# Patient Record
Sex: MALE | Race: BLACK OR AFRICAN AMERICAN | NOT HISPANIC OR LATINO | Employment: FULL TIME | URBAN - METROPOLITAN AREA
[De-identification: names, ages, dates, MRNs, and addresses within clinical notes are randomized per-mention and may not be internally consistent; named-entity substitution may affect disease eponyms.]

---

## 2017-04-12 ENCOUNTER — TELEPHONE (OUTPATIENT)
Dept: FAMILY MEDICINE | Facility: CLINIC | Age: 52
End: 2017-04-12

## 2017-04-12 ENCOUNTER — OFFICE VISIT (OUTPATIENT)
Dept: FAMILY MEDICINE | Facility: CLINIC | Age: 52
End: 2017-04-12
Payer: COMMERCIAL

## 2017-04-12 ENCOUNTER — LAB VISIT (OUTPATIENT)
Dept: LAB | Facility: HOSPITAL | Age: 52
End: 2017-04-12
Attending: FAMILY MEDICINE
Payer: COMMERCIAL

## 2017-04-12 VITALS
HEIGHT: 78 IN | DIASTOLIC BLOOD PRESSURE: 86 MMHG | HEART RATE: 104 BPM | BODY MASS INDEX: 28.57 KG/M2 | WEIGHT: 246.94 LBS | SYSTOLIC BLOOD PRESSURE: 129 MMHG | TEMPERATURE: 98 F

## 2017-04-12 DIAGNOSIS — M06.9 RHEUMATOID ARTHRITIS OF HAND, UNSPECIFIED LATERALITY, UNSPECIFIED RHEUMATOID FACTOR PRESENCE: ICD-10-CM

## 2017-04-12 DIAGNOSIS — J06.9 UPPER RESPIRATORY TRACT INFECTION, UNSPECIFIED TYPE: Primary | ICD-10-CM

## 2017-04-12 DIAGNOSIS — R31.9 HEMATURIA: Primary | ICD-10-CM

## 2017-04-12 DIAGNOSIS — R51.9 HEADACHE, UNSPECIFIED HEADACHE TYPE: ICD-10-CM

## 2017-04-12 DIAGNOSIS — C61 PROSTATE CANCER: ICD-10-CM

## 2017-04-12 DIAGNOSIS — R31.9 HEMATURIA: ICD-10-CM

## 2017-04-12 DIAGNOSIS — I10 ESSENTIAL HYPERTENSION: ICD-10-CM

## 2017-04-12 LAB
ALBUMIN SERPL BCP-MCNC: 3.4 G/DL
ALP SERPL-CCNC: 85 U/L
ALT SERPL W/O P-5'-P-CCNC: 12 U/L
ANION GAP SERPL CALC-SCNC: 7 MMOL/L
AST SERPL-CCNC: 31 U/L
BILIRUB SERPL-MCNC: 0.4 MG/DL
BUN SERPL-MCNC: 18 MG/DL
CALCIUM SERPL-MCNC: 9.3 MG/DL
CHLORIDE SERPL-SCNC: 106 MMOL/L
CHOLEST/HDLC SERPL: 5.9 {RATIO}
CO2 SERPL-SCNC: 28 MMOL/L
CREAT SERPL-MCNC: 1.2 MG/DL
EST. GFR  (AFRICAN AMERICAN): >60 ML/MIN/1.73 M^2
EST. GFR  (NON AFRICAN AMERICAN): >60 ML/MIN/1.73 M^2
GLUCOSE SERPL-MCNC: 88 MG/DL
HDL/CHOLESTEROL RATIO: 16.8 %
HDLC SERPL-MCNC: 202 MG/DL
HDLC SERPL-MCNC: 34 MG/DL
LDLC SERPL CALC-MCNC: 126.6 MG/DL
NONHDLC SERPL-MCNC: 168 MG/DL
POTASSIUM SERPL-SCNC: 4.2 MMOL/L
PROSTATE SPECIFIC ANTIGEN, TOTAL: <0.01 NG/ML
PROT SERPL-MCNC: 7.7 G/DL
PSA FREE MFR SERPL: NORMAL %
PSA FREE SERPL-MCNC: <0.01 NG/ML
SODIUM SERPL-SCNC: 141 MMOL/L
TRIGL SERPL-MCNC: 207 MG/DL

## 2017-04-12 PROCEDURE — 99999 PR PBB SHADOW E&M-EST. PATIENT-LVL II: CPT | Mod: PBBFAC,,, | Performed by: FAMILY MEDICINE

## 2017-04-12 PROCEDURE — 80061 LIPID PANEL: CPT

## 2017-04-12 PROCEDURE — 84153 ASSAY OF PSA TOTAL: CPT

## 2017-04-12 PROCEDURE — 36415 COLL VENOUS BLD VENIPUNCTURE: CPT | Mod: PO

## 2017-04-12 PROCEDURE — 80053 COMPREHEN METABOLIC PANEL: CPT

## 2017-04-12 PROCEDURE — 3074F SYST BP LT 130 MM HG: CPT | Mod: S$GLB,,, | Performed by: FAMILY MEDICINE

## 2017-04-12 PROCEDURE — 99214 OFFICE O/P EST MOD 30 MIN: CPT | Mod: 25,S$GLB,, | Performed by: FAMILY MEDICINE

## 2017-04-12 PROCEDURE — 83036 HEMOGLOBIN GLYCOSYLATED A1C: CPT

## 2017-04-12 PROCEDURE — 1160F RVW MEDS BY RX/DR IN RCRD: CPT | Mod: S$GLB,,, | Performed by: FAMILY MEDICINE

## 2017-04-12 PROCEDURE — 96372 THER/PROPH/DIAG INJ SC/IM: CPT | Mod: S$GLB,,, | Performed by: FAMILY MEDICINE

## 2017-04-12 PROCEDURE — 3079F DIAST BP 80-89 MM HG: CPT | Mod: S$GLB,,, | Performed by: FAMILY MEDICINE

## 2017-04-12 RX ORDER — MELOXICAM 15 MG/1
15 TABLET ORAL DAILY
Qty: 10 TABLET | Refills: 1 | Status: SHIPPED | OUTPATIENT
Start: 2017-04-12 | End: 2017-08-08

## 2017-04-12 RX ORDER — METHYLPREDNISOLONE 4 MG/1
TABLET ORAL
Qty: 1 PACKAGE | Refills: 0 | Status: SHIPPED | OUTPATIENT
Start: 2017-04-12 | End: 2017-05-01

## 2017-04-12 RX ORDER — DEXAMETHASONE SODIUM PHOSPHATE 4 MG/ML
8 INJECTION, SOLUTION INTRA-ARTICULAR; INTRALESIONAL; INTRAMUSCULAR; INTRAVENOUS; SOFT TISSUE
Status: COMPLETED | OUTPATIENT
Start: 2017-04-12 | End: 2017-04-12

## 2017-04-12 RX ADMIN — DEXAMETHASONE SODIUM PHOSPHATE 8 MG: 4 INJECTION, SOLUTION INTRA-ARTICULAR; INTRALESIONAL; INTRAMUSCULAR; INTRAVENOUS; SOFT TISSUE at 11:04

## 2017-04-12 NOTE — PROGRESS NOTES
Sachin Gonzalez presents with moderate HA x 5 d followed by upper respiratory congestion,rhinnorhea,moderate cough past 2-3 days. OTC help slightly. Denies nausea,vomiting,diarrhea or significant fever.    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 100 tablet 2    methotrexate 2.5 MG Tab Take 10 tablets (25 mg total) by mouth every 7 days. 150 tablet 1    metoprolol succinate (TOPROL XL) 50 MG 24 hr tablet Take 1 tablet twice daily. hold if systolic blood pressure less than or equal to 100 or heart rate is less than 60 180 tablet 3    sildenafil (VIAGRA) 100 MG tablet Take 1 tablet (100 mg total) by mouth daily as needed. 3 tablet 11     No current facility-administered medications on file prior to visit.      Social History     Social History    Marital status: Single     Spouse name: N/A    Number of children: N/A    Years of education: N/A     Occupational History    Not on file.     Social History Main Topics    Smoking status: Never Smoker    Smokeless tobacco: Never Used    Alcohol use No    Drug use: No    Sexual activity: Yes     Partners: Female     Other Topics Concern    Not on file     Social History Narrative     Family History   Problem Relation Age of Onset    Stroke Father          ROS:  SKIN: No rashes, itching or changes in color or texture of skin.  EYES: Visual acuity fine. No photophobia, ocular pain or diplopia.EARS: Denies ear pain, discharge or  vertigo.NOSE: No loss of smell, no epistaxis some postnasal drip.MOUTH & THROAT: No hoarseness or change in voice. No excessive gum bleeding.CHEST: Denies TYLER, cyanosis, wheezing  CARDIOVASCULAR: Denies chest pain, PND, orthopnea or reduced exercise tolerance.  ABDOMEN:  No weight loss.No abdominal pain, no hematemesis or blood in stool.  URINARY: No flank pain, dysuria or hematuria.  PERIPHERAL VASCULAR: No claudication or cyanosis.  MUSCULOSKELETAL: Negative   NEUROLOGIC: No history of seizures, paralysis, alteration of gait or coordination.    PE: Vital signs as noted  Heent:Normocephalic with no recent cranial trauma,PERRLA,EOMI,conjunctiva clear,fundi reveal no hemmorhage exudate or papilledema.Otic canals clear, tympanic membranes slightly dull bilaterally.Nasal mucosa slightly red and edematous.Posterior pharynx slightly red but without exudate.  Neck:Supple with minimal anterior cervical adenopathy.  Chest:Clear bilateral breath sounds with mild scattered ronchi  Heart:Regular rhthym without murmer  Abdomen:Soft, non tender,no masses, no hepatosplenomegalyExtremeties and Neurologic:Grossly within normal limits  Impression: Upper Respiratory Infection. 465.9  HA  Hematuria  Plan: Medrol dspk  Meloxicam x 10 d   UA  If hematuria cont rec  con   Lab eval

## 2017-04-13 LAB
ESTIMATED AVG GLUCOSE: 117 MG/DL
HBA1C MFR BLD HPLC: 5.7 %

## 2017-04-19 ENCOUNTER — TELEPHONE (OUTPATIENT)
Dept: FAMILY MEDICINE | Facility: CLINIC | Age: 52
End: 2017-04-19

## 2017-04-19 NOTE — TELEPHONE ENCOUNTER
----- Message from Jessica Byrne sent at 4/19/2017  9:02 AM CDT -----  Contact: self [phone]  States that he is calling for lab results. Please call back at [phone]//thank you acc

## 2017-04-24 ENCOUNTER — TELEPHONE (OUTPATIENT)
Dept: FAMILY MEDICINE | Facility: CLINIC | Age: 52
End: 2017-04-24

## 2017-04-24 NOTE — TELEPHONE ENCOUNTER
----- Message from Yola Munoz sent at 4/24/2017  4:25 PM CDT -----  Contact: Patient  Patient stated that he is still having headache, Please call him at [phone].    Thanks  td

## 2017-05-01 ENCOUNTER — HOSPITAL ENCOUNTER (OUTPATIENT)
Dept: RADIOLOGY | Facility: HOSPITAL | Age: 52
Discharge: HOME | End: 2017-05-01
Attending: FAMILY MEDICINE
Payer: COMMERCIAL

## 2017-05-01 ENCOUNTER — OFFICE VISIT (OUTPATIENT)
Dept: INTERNAL MEDICINE | Facility: CLINIC | Age: 52
End: 2017-05-01
Payer: COMMERCIAL

## 2017-05-01 VITALS
SYSTOLIC BLOOD PRESSURE: 130 MMHG | BODY MASS INDEX: 29.28 KG/M2 | TEMPERATURE: 98 F | DIASTOLIC BLOOD PRESSURE: 92 MMHG | HEIGHT: 78 IN | WEIGHT: 253.06 LBS | HEART RATE: 85 BPM | OXYGEN SATURATION: 98 %

## 2017-05-01 DIAGNOSIS — S16.1XXD NECK STRAIN, SUBSEQUENT ENCOUNTER: Primary | ICD-10-CM

## 2017-05-01 DIAGNOSIS — S16.1XXD NECK STRAIN, SUBSEQUENT ENCOUNTER: ICD-10-CM

## 2017-05-01 PROBLEM — S16.1XXA NECK STRAIN: Status: ACTIVE | Noted: 2017-05-01

## 2017-05-01 PROCEDURE — 3075F SYST BP GE 130 - 139MM HG: CPT | Mod: S$GLB,,, | Performed by: FAMILY MEDICINE

## 2017-05-01 PROCEDURE — 72040 X-RAY EXAM NECK SPINE 2-3 VW: CPT | Mod: 26,,, | Performed by: RADIOLOGY

## 2017-05-01 PROCEDURE — 3080F DIAST BP >= 90 MM HG: CPT | Mod: S$GLB,,, | Performed by: FAMILY MEDICINE

## 2017-05-01 PROCEDURE — 1160F RVW MEDS BY RX/DR IN RCRD: CPT | Mod: S$GLB,,, | Performed by: FAMILY MEDICINE

## 2017-05-01 PROCEDURE — 99999 PR PBB SHADOW E&M-EST. PATIENT-LVL III: CPT | Mod: PBBFAC,,, | Performed by: FAMILY MEDICINE

## 2017-05-01 PROCEDURE — 72040 X-RAY EXAM NECK SPINE 2-3 VW: CPT | Mod: TC,PO

## 2017-05-01 PROCEDURE — 99213 OFFICE O/P EST LOW 20 MIN: CPT | Mod: S$GLB,,, | Performed by: FAMILY MEDICINE

## 2017-05-01 RX ORDER — CYCLOBENZAPRINE HCL 10 MG
10 TABLET ORAL 3 TIMES DAILY PRN
Qty: 30 TABLET | Refills: 3 | Status: SHIPPED | OUTPATIENT
Start: 2017-05-01 | End: 2017-05-10

## 2017-05-01 NOTE — PROGRESS NOTES
Subjective:       Patient ID: Sachin Gonzalez is a 51 y.o. male.    Chief Complaint: Headache    Headache    This is a recurrent problem. The current episode started 1 to 4 weeks ago. The problem occurs constantly. The problem has been gradually worsening. The pain is located in the bilateral and occipital region. The pain does not radiate. The quality of the pain is described as aching. The pain is at a severity of 6/10. Associated symptoms include neck pain. Pertinent negatives include no blurred vision, drainage, eye pain, eye redness, insomnia, muscle aches, numbness, phonophobia, photophobia, seizures, sinus pressure, tinnitus, visual change, vomiting or weakness. Nothing aggravates the symptoms. The treatment provided no relief. There is no history of cancer, hypertension, migraine headaches, obesity, sinus disease or TMJ.     Review of Systems   Constitutional: Negative.    HENT: Negative for sinus pressure and tinnitus.    Eyes: Negative for blurred vision, photophobia, pain and redness.   Respiratory: Negative.    Cardiovascular: Negative.    Gastrointestinal: Negative.  Negative for vomiting.   Musculoskeletal: Positive for neck pain.   Neurological: Positive for headaches. Negative for seizures, weakness and numbness.   Psychiatric/Behavioral: Negative.  The patient does not have insomnia.        Objective:      Physical Exam   Constitutional: He appears well-developed and well-nourished.   Cardiovascular: Normal rate and regular rhythm.    Pulmonary/Chest: Effort normal and breath sounds normal.   Abdominal: Soft. Bowel sounds are normal.   Musculoskeletal:        Cervical back: He exhibits decreased range of motion and spasm.   Skin: Skin is warm and dry.   Psychiatric: He has a normal mood and affect. His behavior is normal.       Assessment:       1. Neck strain, subsequent encounter        Plan:       Neck strain, subsequent encounter  Comments:  flexeril worked for neck pain that is the origin of  headaches. Continue with flexeril but see if injections may help. repeat xray  Orders:  -     X-Ray Cervical Spine AP And Lateral; Future; Expected date: 5/1/17  -     Ambulatory referral to Physical Medicine Rehab    Other orders  -     cyclobenzaprine (FLEXERIL) 10 MG tablet; Take 1 tablet (10 mg total) by mouth 3 (three) times daily as needed for Muscle spasms.  Dispense: 30 tablet; Refill: 3

## 2017-05-01 NOTE — MR AVS SNAPSHOT
[facility]  [address] [place] [address]  Phone: [phone]  Fax: [phone]                  Sachin Gonzalez   2017 2:40 PM   Office Visit    Description:  Male : 1965   Provider:  Domo Dick MD   Department:  [facility]           Reason for Visit     Headache           Diagnoses this Visit        Comments    Neck strain, subsequent encounter    -  Primary flexeril worked for neck pain that is the origin of headaches. Continue with flexeril but see if injections may help. repeat xray           To Do List           Future Appointments        Provider Department Dept Phone    2017 5:15 PM [facility] XR2 [facility] [phone]    5/10/2017 12:20 PM Zayra Arroyo MD [facility] [phone]      Goals (5 Years of Data)     Eat more fruits and vegetables       Follow-Up and Disposition     Return in about 6 months (around 2017).    Follow-up and Disposition History       These Medications        Disp Refills Start End    cyclobenzaprine (FLEXERIL) 10 MG tablet 30 tablet 3 2017    Take 1 tablet (10 mg total) by mouth 3 (three) times daily as needed for Muscle spasms. - Oral    Pharmacy: [facility] [place] [place] - [address] Ph #: [phone]         [facility]     [facility] Care Line -  Assistance  Unless otherwise directed by your provider, please contact [facility], our nurse care line that is available for  assistance.     Registered nurses in the [facility] provide: appointment scheduling, clinical advisement, health education, and other advisory services.  Call: [phone] (toll free)               Medications           Message regarding Medications     Verify the changes and/or additions to your medication regime listed below are the same as discussed with your clinician today.  If any of these changes or additions are  "incorrect, please notify your healthcare provider.        START taking these NEW medications        Refills    cyclobenzaprine (FLEXERIL) 10 MG tablet 3    Sig: Take 1 tablet (10 mg total) by mouth 3 (three) times daily as needed for Muscle spasms.    Class: Normal    Route: Oral      STOP taking these medications     methylPREDNISolone (MEDROL DOSEPACK) 4 mg tablet Use as directed on package           Verify that the below list of medications is an accurate representation of the medications you are currently taking.  If none reported, the list may be blank. If incorrect, please contact your healthcare provider. Carry this list with you in case of emergency.           Current Medications     metoprolol succinate (TOPROL XL) 50 MG 24 hr tablet Take 1 tablet twice daily. hold if systolic blood pressure less than or equal to 100 or heart rate is less than 60    sildenafil (VIAGRA) 100 MG tablet Take 1 tablet (100 mg total) by mouth daily as needed.    cyclobenzaprine (FLEXERIL) 10 MG tablet Take 1 tablet (10 mg total) by mouth 3 (three) times daily as needed for Muscle spasms.    folic acid (FOLVITE) 1 MG tablet Take 1 tablet (1,000 mcg total) by mouth once daily.    meloxicam (MOBIC) 15 MG tablet Take 1 tablet (15 mg total) by mouth once daily.    methotrexate 2.5 MG Tab Take 10 tablets (25 mg total) by mouth every 7 days.           Clinical Reference Information           Your Vitals Were     BP Pulse Temp Height Weight SpO2    130/92 (BP Location: Right arm, Patient Position: Sitting) 85 97.8 °F (36.6 °C) (Tympanic) 6' 7" (2.007 m) 114.8 kg (253 lb 1.4 oz) 98%    BMI                28.51 kg/m2          Blood Pressure          Most Recent Value    BP  (!)  130/92      Allergies as of 5/1/2017     No Known Allergies      Immunizations Administered on Date of Encounter - 5/1/2017     None      Orders Placed During Today's Visit      Normal Orders This Visit    Ambulatory referral to Physical Medicine Rehab     Future " Labs/Procedures Expected by Expires    X-Ray Cervical Spine AP And Lateral  5/1/2017 5/1/2018      Language Assistance Services     ATTENTION: Language assistance services are available, free of charge. Please call [phone].      ATENCIÓN: Si belem ballesteros, tiene a west disposición servicios gratuitos de asistencia lingüística. Llame al [phone].     CHÚ Ý: N?u b?n nói Ti?ng Vi?t, có các d?ch v? h? tr? ngôn ng? mi?n phí dành cho b?n. G?i s? [phone].         [facility] complies with applicable Federal civil rights laws and does not discriminate on the basis of race, color, national origin, age, disability, or sex.

## 2017-05-10 ENCOUNTER — INITIAL CONSULT (OUTPATIENT)
Dept: PHYSICAL MEDICINE AND REHAB | Facility: CLINIC | Age: 52
End: 2017-05-10
Payer: COMMERCIAL

## 2017-05-10 VITALS
HEART RATE: 93 BPM | BODY MASS INDEX: 29.27 KG/M2 | DIASTOLIC BLOOD PRESSURE: 85 MMHG | RESPIRATION RATE: 14 BRPM | SYSTOLIC BLOOD PRESSURE: 128 MMHG | WEIGHT: 253 LBS | HEIGHT: 78 IN

## 2017-05-10 DIAGNOSIS — G44.209 TENSION HEADACHE: Primary | ICD-10-CM

## 2017-05-10 DIAGNOSIS — M79.18 MYOFASCIAL PAIN: ICD-10-CM

## 2017-05-10 PROCEDURE — 3079F DIAST BP 80-89 MM HG: CPT | Mod: S$GLB,,, | Performed by: PHYSICAL MEDICINE & REHABILITATION

## 2017-05-10 PROCEDURE — 99204 OFFICE O/P NEW MOD 45 MIN: CPT | Mod: S$GLB,,, | Performed by: PHYSICAL MEDICINE & REHABILITATION

## 2017-05-10 PROCEDURE — 3074F SYST BP LT 130 MM HG: CPT | Mod: S$GLB,,, | Performed by: PHYSICAL MEDICINE & REHABILITATION

## 2017-05-10 PROCEDURE — 1160F RVW MEDS BY RX/DR IN RCRD: CPT | Mod: S$GLB,,, | Performed by: PHYSICAL MEDICINE & REHABILITATION

## 2017-05-10 PROCEDURE — 99999 PR PBB SHADOW E&M-EST. PATIENT-LVL III: CPT | Mod: PBBFAC,,, | Performed by: PHYSICAL MEDICINE & REHABILITATION

## 2017-05-10 RX ORDER — TIZANIDINE 2 MG/1
2-4 TABLET ORAL NIGHTLY PRN
Qty: 60 TABLET | Refills: 1 | Status: SHIPPED | OUTPATIENT
Start: 2017-05-10 | End: 2017-08-08

## 2017-05-10 NOTE — LETTER
May 10, 2017      Domo Dick MD  [address] [place] [address]           [facility]  [address] [place] [address]  Phone: [phone]  Fax: [phone]          Patient: Sachin Gonzalez   MR Number: [id number]   YOB: 1965   Date of Visit: 5/10/2017       Dear Dr. Domo Dick:    Thank you for referring Sachin Gonzalez to me for evaluation. Attached you will find relevant portions of my assessment and plan of care.    If you have questions, please do not hesitate to call me. I look forward to following Sachin Gonzalez along with you.    Sincerely,    Zayra Arroyo MD    Enclosure  CC:  No Recipients    If you would like to receive this communication electronically, please contact [email or web address] or [phone] to request more information on [employer] Link access.    For providers and/or their staff who would like to refer a patient to [facility], please contact us through our one-stop-shop provider referral line, [facility], at [phone].    If you feel you have received this communication in error or would no longer like to receive these types of communications, please e-mail [email or web address]

## 2017-05-10 NOTE — MR AVS SNAPSHOT
[facility]iat[facility]  [address] [facility] [address] [place] [address]  Phone: [phone]  Fax: [phone]                  Sachin Gonzalez   5/10/2017 12:20 PM   Initial consult    Description:  Male : 1965   Provider:  Zayra Arroyo MD   [facility]  [facility]           Reason for Visit     Headache           Diagnoses this Visit        Comments    Tension headache    -  Primary     Myofascial pain                To Do List           Goals (5 Years of Data)     Eat more fruits and vegetables       Follow-Up and Disposition     Return if symptoms worsen or fail to improve.       These Medications        Disp Refills Start End    tizanidine (ZANAFLEX) 2 MG tablet 60 tablet 1 5/10/2017     Take 1-2 tablets (2-4 mg total) by mouth nightly as needed. - Oral    Pharmacy: [address] [place]  [address] [facility] #: [phone]         [facility] On Call     [facility] Nurse Care Line -  Assistance  Unless otherwise directed by your provider, please contact [facility], our nurse care line that is available for  assistance.     Registered nurses in the [facility] Center provide: appointment scheduling, clinical advisement, health education, and other advisory services.  Call: [phone] (toll free)               Medications           Message regarding Medications     Verify the changes and/or additions to your medication regime listed below are the same as discussed with your clinician today.  If any of these changes or additions are incorrect, please notify your healthcare provider.        START taking these NEW medications        Refills    tizanidine (ZANAFLEX) 2 MG tablet 1    Sig: Take 1-2 tablets (2-4 mg total) by mouth nightly as needed.    Class: Normal    Route: Oral      STOP taking these medications     cyclobenzaprine (FLEXERIL) 10 MG tablet Take 1 tablet (10 mg total) by mouth 3 (three) times daily as needed for Muscle spasms.          "  Verify that the below list of medications is an accurate representation of the medications you are currently taking.  If none reported, the list may be blank. If incorrect, please contact your healthcare provider. Carry this list with you in case of emergency.           Current Medications     folic acid (FOLVITE) 1 MG tablet Take 1 tablet (1,000 mcg total) by mouth once daily.    methotrexate 2.5 MG Tab Take 10 tablets (25 mg total) by mouth every 7 days.    metoprolol succinate (TOPROL XL) 50 MG 24 hr tablet Take 1 tablet twice daily. hold if systolic blood pressure less than or equal to 100 or heart rate is less than 60    meloxicam (MOBIC) 15 MG tablet Take 1 tablet (15 mg total) by mouth once daily.    sildenafil (VIAGRA) 100 MG tablet Take 1 tablet (100 mg total) by mouth daily as needed.    tizanidine (ZANAFLEX) 2 MG tablet Take 1-2 tablets (2-4 mg total) by mouth nightly as needed.           Clinical Reference Information           Your Vitals Were     BP Pulse Resp Height Weight BMI    128/85 93 14 6' 7" (2.007 m) 114.8 kg (253 lb) 28.5 kg/m2      Blood Pressure          Most Recent Value    BP  128/85      Allergies as of 5/10/2017     No Known Allergies      Immunizations Administered on Date of Encounter - 5/10/2017     None      Orders Placed During Today's Visit      Normal Orders This Visit    Ambulatory Referral to Physical/Occupational Therapy       Instructions      Tension Headache    A muscle tension headache is a very common cause of head pain. Its also called a stress headache. When some people are under stress, they tense the muscles of their shoulder, neck, and scalp without knowing it. If this tension lasts long enough, a headache can occur. A tension headache can be quite painful. It can last for hours or even days.  Home care  Follow these tips when caring for yourself at home:  · Dont drive yourself home if you were given pain medicine for your headache. Instead, have someone else " drive you home. Try to sleep when you get home. You should feel much better when you wake up.  · Put heat on the back of your neck to help ease neck spasm.  · Drink only clear liquids or eat a light diet until your symptoms get better. This will help you avoid nausea or vomiting.  How to prevent headaches  · Figure out what is causing stress in your life. Learn new ways to handle your stress. Ideas include regular exercise, biofeedback, self-hypnosis, yoga, and meditation. Talk with your healthcare provider to find out more information about managing stress. Many books and digital media are also available on this subject.  · Take time out at the first sign of a tension headache, if possible. Take yourself out of the stressful situation. Find a quiet, comfortable place to sit or lie down and let yourself relax. Heat and deep massage of the tight areas in the neck and shoulders may help ease muscle spasm. You may also get relief from a medicine like ibuprofen or a prescribed muscle relaxant.  Follow-up care  Follow up with your healthcare provider, or as advised. Talk with your provider if you have frequent headaches. He or she can figure out a treatment plan. Ask if you can have medicine to take at home the next time you get a bad headache. This may keep you from having to visit the emergency department in the future. You may need to see a headache specialist (neurologist) if you continue to have headaches.  When to seek medical advice  Call your healthcare provider right away if any of these occur:  · Your head pain gets worse during sexual intercourse or strenuous activity  · Your head pain doesnt get better within 24 hours  · You arent able to keep liquids down (repeated vomiting)  · Fever of 100.4ºF (38ºC) or higher, or as directed by your healthcare provider  · Stiff neck  · Extreme drowsiness, confusion, or fainting  · Dizziness or dizziness with spinning sensation (vertigo)  · Weakness in an arm or leg or  one side of your face  · You have difficulty speaking  · Your vision changes  Date Last Reviewed: 8/1/2016  © 7691-3462 [employer]. [address] [place] [address], [place] [address]. All rights reserved. This information is not intended as a substitute for professional medical care. Always follow your healthcare professional's instructions.        Preventing Tension Headaches  Lifestyle changes are the key to preventing tension-type headaches. Learn what changes in your environment and daily activities can prevent the strain and tension that lead to headaches. If emotional stress is a factor, stress reduction may bring relief. Other lifestyle changes can help keep you healthier and better able to cope with pain.  What may cause your headaches  Causes of tension-type headaches include:  · Posture and movement. Your posture while you sit, work, drive, and even sleep can put stress on your shoulders and neck. This can tighten muscles in the back of your head, causing headaches.  · Lifting and carrying. These can cause strain on your back and neck, especially if you carry too much weight, carry weight unevenly, or use poor lifting technique.  · Certain sports. Activities that involve jumping, running, and sudden starts, stops, or changes of direction can jar your neck and head. This may lead to tight muscles and pain. Weightlifting and other activities that require upper body strength can lead to tight neck and shoulder muscles.  · Jaw tension. Clenching your jaw or grinding your teeth can result in tension and pain. This may happen while you sleep without your knowing it.  · Eye problems. Eyestrain can cause tension in the muscles around the eyes. Or a problem with your eyeglass prescription can make you hold your head at an awkward angle. This can cause neck strain and headaches.  · Emotional stress. Many factors lead to emotional stress: overwork, family problems, financial difficulties, or sudden life  changes. This may cause muscle tension.  What you can do  Once you know whats causing your headaches, you can work to prevent them. You may need to seek professional help to make some of the needed changes.  · Posture and movement changes. Change the setup of your workspace and car. Learn and maintain good posture. Avoid positions that strain your neck or shoulders. Make sure your bedding and pillows provide good support. Avoid sleeping on couches, chairs, or floors when a bed is available.   · Lifting and carrying. Learn good lifting technique. Make sure to use the proper tools and equipment for lifting.  · Change your sport. Switch to a low-impact sport. To help relieve stress on your neck and head, cut back on activities that depend on upper body strength or that put a lot of twisting motion on the back, such as golf.   · Dental work. See your dentist if you think your headaches are caused by jaw tension or teeth grinding.  · New eyewear. Buy an extra pair of glasses adjusted for reading or working at a computer. This helps to reduce eyestrain and keep your neck at a comfortable angle.  · Stress management. Learn techniques for relaxing and reducing emotional stress, like deep breathing, visualization, progressive relaxation, and biofeedback. Regular sleep habits can also help to control stress.  · Regular sleep and meals. It is important to have a regular sleep cycle and to avoid skipping meals.  Exercise can help  Exercise can improve overall health and help you to relax.  · Neck exercises help keep your neck muscles relaxed during the day. Try the neck exercises shown on this sheet. Start in a neutral (relaxed, centered) position. Do 3 repetitions every 2 to 4 hours throughout the day.  · Low-impact aerobic exercise helps keep your muscles strong and flexible. This helps prevent tension and the pain it causes.  · Stretching, luz elena chi, and yoga help keep your muscles flexible. They may also help relieve  emotional stress.    Lower your left ear toward your left shoulder. Return to neutral. Repeat on the right side.   Lower your chin to your chest and slowly return to neutral.     Look to the left. Return to neutral. Then look to the right.     Move your head forward and back while keeping the top of your head level.   Date Last Reviewed: 11/8/2015  © 0549-3759 [employer]. [address] [place] [address], [place] [address]. All rights reserved. This information is not intended as a substitute for professional medical care. Always follow your healthcare professional's instructions.        Self-Care for Headaches  Most headaches aren't serious and can be relieved with self-care. But some headaches may be a sign of another health problem like eye trouble or high blood pressure. To find the best treatment, learn what kind of headaches you get. For tension headaches, self-care will usually help. To treat migraines, ask your healthcare provider for advice. It is also possible to get both tension and migraine headaches. Self-care involves relieving the pain and avoiding headache triggers if you can.    Ways to reduce pain and tension  Try these steps:  · Apply a cold compress or ice pack to the pain site.  · Drink fluids. If nausea makes it hard to drink, try sucking on ice.  · Rest. Protect yourself from bright light and loud noises.  · Calm your emotions by imagining a peaceful scene.  · Massage tight neck, shoulder, and head muscles.  · To relax muscles, soak in a hot bath or use a hot shower.  Use medicines  Aspirin or aspirin substitutes, such as ibuprofen and acetaminophen, can relieve headache. Remember: Never give aspirin to anyone 18 years old or younger because of the risk of developing Reye syndrome. Use pain medicines only when necessary.  Track your headaches  Keeping a headache diary can help you and your healthcare provider identify what's causing your headaches:  · Note when each headache  "happens.  · Identify your activities and the foods you've eaten 6 to 8 hours before the headache began.  · Look for any trends or "triggers."  Signs of tension headache  Any of the following can be signs:  · Dull pain or feeling of pressure in a tight band around your head  · Pain in your neck or shoulders  · Headache without a definite beginning or end  · Headache after an activity such as driving or working on a computer  Signs of migraine  Any of the following can be signs:  · Throbbing pain on one or both sides of your head  · Nausea or vomiting  · Extreme sensitivity to light, sound, and smells  · Bright spots, flashes, or other visual changes  · Pain or nausea so severe that you can't continue your daily activities  Call your healthcare provider   If you have any of the following symptoms, contact your healthcare provider:  · A headache that lingers after a recent injury or bump to the head.  · A fever with a stiff neck or pain when you bend your head toward your chest.  · A headache along with slurred speech, changes in your vision, or numbness or weakness in your arms or legs.  · A headache for longer than 3 days.  · Frequent headaches, especially in the morning.  · Headaches with seizures   · Seek immediate medical attention if you have a headache that you would call "the worst headache you have ever had."   Date Last Reviewed: 10/4/2015  © 4440-5739 [employer]. [address] [place], [place] [id number]. All rights reserved. This information is not intended as a substitute for professional medical care. Always follow your healthcare professional's instructions.        Tension Headaches  Tension headaches cause a dull, steady pain on both sides of the head and in the neck and the back of the head. The eyes may also feel tired. Tension headaches can be triggered by muscle tension and clenching, lack of sleep, poor posture, eyestrain, stress,depression, anxiety, arthritis of the neck, and other " factors.    To help prevent tension headaches  Take the following steps:  · Make sure your work area is properly set up to help you avoid neck strain and eyestrain.  · Make sure that your eyeglass prescription is current and is appropriate for the work you do.  · Learn techniques for relaxing and reducing emotional stress. These include deep breathing, progressive relaxation, yoga, meditation, and biofeedback.  · Maintain a regular exercise regimen under the guidance of a doctor. This can help keep your neck and back flexible, strong, and relaxed.  To relieve the pain  Take the following steps:  · Use moist heat to relax the muscles. Soak in a hot bath or wrap a warm, moist towel around your neck.  · Brush your scalp lightly with a soft hairbrush.  · Give yourself a massage. Knead the muscles running from your shoulders up the back of your skull.  · Use an ice pack. Apply this directly to the place where you feel pain.  · Rest. Sleeping often helps relieve headache pain.  · Drink plenty of fluids. Dehydration is another trigger for headaches.  · Use pain medicine sparingly for moderate to severe pain.   Date Last Reviewed: 10/19/2015  © 5690-3904 [employer]. [address] [place] [address]. All rights reserved. This information is not intended as a substitute for professional medical care. Always follow your healthcare professional's instructions.        Myofascial Pain Syndrome: Fibrositis  Your pain is caused by a state of chronic muscle tension. This condition is called by various names: myofascial pain, fibrositis and trigger point pain. This can also be due to mechanical stress (such as working at a computer terminal for long periods; or work that requires repetitive motions of the arms or hands) or emotional stress (such as problems on the job or in your personal life). Sometimes there is no obvious cause. The pain can occur in the area of the muscle spasm or at a site distant to it.  For example, spasm of a neck muscle can cause headache. Spasm of the muscle near the shoulder blade can cause pain shooting down the arm.  Home Care:  · Try to identify the factors that may be causing your problem and change them:  ¨ If you feel that emotional stress is a cause of your pain, learn methods to deal more effectively with the stress in your life. These may include regular exercise, muscle relaxation techniques, meditation or simply taking time out for yourself. Consult your doctor or go to a local bookstore and review the many books and tapes available on the subject of stress reduction.  ¨ If you feel that physical stress is a cause for your pain, try to modify any poor work habits.  · You may use acetaminophen (Tylenol) or ibuprofen (Motrin, Advil) to control pain, unless another medicine was prescribed. [NOTE: If you have chronic liver or kidney disease or ever had a stomach ulcer or GI bleeding, talk with your doctor before using these medicines.]  · The use of heat to the muscle (hot compress or heating pad) will be helpful to reduce muscle spasm. Some persons get relief with ice packs. Apply an ice pack (crushed or cubed ice in a plastic bag, wrapped in a towel) for 20 minutes at a time as needed. Use the method that feels best to you.  · Massaging the trigger point and stretching out the muscle are an important parts of prevention and treatment. Trigger point massage can be done by first applying heat to the area to warm and prepare the muscle. Have someone apply steady thumb pressure directly on the knot in the muscle (the most tender point) for 30 seconds. Release the pressure, then massage the surrounding muscle. Repeat the process, applying more pressure to the trigger point each time. Do this up to the limit of pain. With each treatment, the trigger point should become less tender and the pain should decrease. You can apply local pressure to trigger points in the back by lying on the floor  with a tennis ball under the trigger point.  Follow Up  with your doctor as advised or if not improving within the next week. It may be necessary for you to receive physical therapy if you do not respond to home treatment alone.  Get Prompt Medical Attention  if any of the following occur:  · If your trigger point is in the chest muscles, observe for pain that becomes more severe, lasts longer, or spreads into your shoulder/arm, neck or back; you develop trouble breathing, sweating, nausea or vomiting in association with chest pain  · If you develop weakness or numbness in an extremity  · If your pain worsens, regardless of its location  © 5806-5688 [employer]. [address] [place] [address], [place] [address]. All rights reserved. This information is not intended as a substitute for professional medical care. Always follow your healthcare professional's instructions.          Trigger Point Injection  The cause of your muscle pain or spasms may be one or more trigger points. Your health care provider may decide to inject the painful spots to relax the muscle. This can help relieve your pain. Relaxing the muscle can also make movement easier. You may then be able to exercise to strengthen the muscle and help it heal.    What is a trigger point?  A trigger point is a tight, painful knot of muscle fiber. It can form where a muscle is strained or injured. The knot can sometimes be felt under the skin. A trigger point is very tender to the touch. Pain may also spread to other parts of the affected muscle. Muscles around a knee, shoulder blade, or other bones are prone to trigger points. This is because these muscles are more likely to be injured.    About the injections  Any muscle in the body can have one or more trigger points. Several injections may be needed in each trigger point to best relieve pain. These injections may be given in sessions about 2 weeks apart, depending on the preference of your health  care provider. In some cases, you may not feel much change in your symptoms until after the third injection.     © 6556-4683 [employer]. [address] [place] [address]. All rights reserved. This information is not intended as a substitute for professional medical care. Always follow your healthcare professional's instructions.            Your Neck Muscles  The muscles in the neck and shoulders need to be strong to hold the neck and head in place. These muscles also help move the neck and shoulders. Your health care provider can recommend exercises to help stretch and strengthen your neck muscles.    © 0068-8548 [employer]. [address] [place] [address]. All rights reserved. This information is not intended as a substitute for professional medical care. Always follow your healthcare professional's instructions.          Neck Problems: Relieving Your Symptoms  The first goal of treatment is to relieve your symptoms. Your health care provider may recommend self-care treatments. These include resting, applying ice and heat, taking medication, and doing exercises. Your health care provider may also recommend that you see a physical therapist, who can teach you ways to care for and strengthen your neck.    Self-Care Treatments  Pain can end quickly or last awhile. Either way, youll want relief as soon as possible. Your health care provider can tell you which treatments to do at home to help relieve your pain.  · Lying down for a short time takes pressure from the head off the neck.  · Ice and heat can help reduce pain. To bring down swelling, rest an ice pack wrapped in a thin towel on your neck for 15 minutes. To relax sore muscles, apply a warm, wet towel to the area. Or take a warm bath or shower.  · Over-the-counter medications, such as ibuprofen, naproxen, and aspirin, can help reduce pain and swelling. Acetaminophen can help relieve pain. Use these only as  directed.  · Exercises can relax muscles and prevent stiffness. To prepare, drape a warm, wet towel around your neck and shoulders for 5 minutes. Remove the towel. Then do any exercises recommended to you by your health care provider.  Physical Therapy  If self-care treatments arent helping relieve neck pain, your health care provider may suggest one or more sessions of physical therapy. Physical therapy is performed by a specialist trained to treat injuries. Your physical therapist (PT) will teach you how to strengthen muscles, improve the spines alignment, and help you move properly. Treatment methods used in physical therapy may include:  · Heat. A special heating pad called a neck pack may be applied to your neck.  · Exercises. Your PT will teach you exercises to help strengthen your neck and improve its range of motion.  · Joint mobilization. The PT gently moves your vertebrae to help restore motion in your neck joints and reduce neck pain.  · Soft tissue mobilization. The PT massages and stretches the muscles in your neck and shoulders.  · Electrical stimulation. Electrical impulses are sent into your neck. This helps reduce soreness and inflammation.  · Education in body mechanics. The PT shows you ways to position and move your body that protect the neck.  Other Treatments  If physical therapy doesnt relieve your neck pain, your health care provider may suggest other treatments. For example, medications or injections can help relieve pain and swelling. In some cases, surgery may be needed to treat neck problems.  © 4071-7763 [employer]. [address] [place] [address], [place] [address]. All rights reserved. This information is not intended as a substitute for professional medical care. Always follow your healthcare professional's instructions.          Understanding Neck Problems       If you suffer from neck pain, youre not alone. Many people have neck pain at some point in their lives. Problems  such as poor posture, injury, and wear and tear can lead to neck pain. Your health care provider will work with you to find the treatment thats best for your neck.  Types of Neck Problems  The following problems can cause pain or injury in your neck:  · Strains and sprains: Strains (stretched or torn muscles) and sprains (stretched or torn ligaments) can cause neck pain. Strains and sprains can occur during an accident, or when you overuse your neck through repetitive motion. They can also cause your muscles and ligaments to become inflamed (swollen and painful).  · Whiplash and other injuries: Whiplash can result when an impact throws your head, forcing your neck too far forward (hyperflexion), then too far backward (hyperextension). When combined, the two motions can cause a painful injury to different parts of your neck, such as muscles, ligaments, or joints. The most common cause of whiplash is a car accident. But it can also happen during a fall or sports injury.  · Weakened disks: A simple action, such as a sneeze or a cough, can cause one of your disks to bulge (herniate). A herniated disk can put pressure on your nerve and cause pain. Over time, disks can also thin out (degenerate). Flattened disks dont cushion vertebrae well and can cause vertebrae to rub together. Rubbing vertebrae can pinch nerves and cause pain.  · Weakened joints: Aging and injury can cause joints to slowly degenerate. Thinned joints can also cause vertebrae to rub together. This can cause abnormal growths of bone (bone spurs) to form on vertebrae. Bone spurs put pressure on nerves, causing pain.  Common Symptoms  If you have a neck problem, you may have one or more of the following symptoms:  · Muscle tension and spasm: You may not be able to move your neck, arms, or shoulders comfortably if you have muscle tension or stiffness in your neck. If your symptoms arent relieved, you may experience muscle spasms, or knots of contracted  tissue (trigger points) in areas of your neck and shoulders.  · Aches and pains: Dull aches in your head or neck, sharp pains, and swelling of the soft tissue of your neck and shoulders are common symptoms. If theres pressure on the nerves in your neck, you may feel pain in your arms or hands (referred pain).  · Numbness or weakness: If you injure the nerves in your neck, you may experience numbness, tingling, or weakness in your shoulders, arms, or hands. These symptoms arise when disks or bone spurs press on the nerves in your neck.  © 6412-3559 [employer]. [address] [place] [address], [place] [address]. All rights reserved. This information is not intended as a substitute for professional medical care. Always follow your healthcare professional's instructions.          Neck Spasm [No Trauma]    Spasm of the neck muscles can occur after a sudden awkward neck movement. Sleeping with your neck in a crooked position can also cause spasm. Some persons respond to emotional stress by tensing the muscles of their neck, shoulders and upper back. If neck spasm lasts long enough, it can cause headache.  The treatment described below will usually help the pain to go away in 5-7 days. Pain that continues may require further evaluation or other types of treatment such as physical therapy.  Home Care:  1. Rest and relax the muscles. Use a comfortable pillow that supports the head and keeps the spine in a neutral position. The position of the head should not be tilted forward or backward. A rolled up towel may help for a custom fit.  2. Some persons find relief with heat (hot shower, hot bath or heating pad) and massage, while others prefer cold packs (crushed or cubed ice in a plastic bag, wrapped in a towel). Try both and use the method that feels best for 20 minutes several times a day.  3. You may use acetaminophen (Tylenol) or ibuprofen (Motrin, Advil) to control pain, unless another medicine was prescribed.  [NOTE: If you have chronic liver or kidney disease or ever had a stomach ulcer or GI bleeding, talk with your doctor before using these medicines.]  Follow Up  with your doctor or this facility if your symptoms do not show signs of improvement after one week. Physical therapy or further evaluation may be needed.  [NOTE: If x-rays were taken, they will be reviewed by a radiologist. You will be notified of any new findings that may affect your care.]  Return Promptly  or contact your doctor if any of the following occurs:  · Pain becomes worse or spreads into one or both arms  · Weakness or numbness in one or both arms  · Increasing headache with nausea or vomiting  · Fever over 100.4ºF (38.0ºC)  © 1366-3279 [employer]. [address] [place] [address]. All rights reserved. This information is not intended as a substitute for professional medical care. Always follow your healthcare professional's instructions.          Know Your Neck: The Cervical Spine  By learning about the parts of the neck, you can better understand your neck problem. The bones of the neck are called cervical vertebrae, commonly identified as C1 through C7. Together, they form a bony column called the spine. Vertebrae also protect the spinal cord, a pathway for messages to reach the brain. Surrounding the spine are soft tissues such as muscles, tendons, and nerves.        Flexibility Is Key  For the neck to function normally, it has to be flexible enough to move without discomfort. A healthy neck can move easily in six different directions.    © 2000-2015 [employer]. [address] [place] [address]. All rights reserved. This information is not intended as a substitute for professional medical care. Always follow your healthcare professional's instructions.          Protecting Your Neck: Posture and Body Mechanics  Protecting your neck from injuries and pain involves practicing good posture and body  mechanics. This may mean correcting bad habits you have related to the way you hold and move your body. The tips below can help you improve your posture and body mechanics.    What Is Posture and Why Does It Matter?  Posture is the way you hold your body. For many of us, this means hunching over, thrusting the chin forward, and slouching the shoulders. But this kind of poor posture keeps muscles from properly supporting the neck and puts stress on muscles, disks, ligaments, and joints in your neck. As a result, injury and pain can occur.  How Is Your Posture?  Use a full-length mirror to check your posture. To begin, stand normally. Then slowly back up against a wall. Is there space between your head and the wall? Do you slouch your shoulders? Is your chin pointing up or down? All these can cause neck pain and injury.  Improving Your Posture  Follow these steps to improve your posture:  · Pull your shoulders back.  · Think of the ears, shoulders, and hips as a series of dots. Now, adjust your body to connect the dots in a straight line.  · Keep your chin level.  What Are Body Mechanics and Why Do They Matter?  The way you move and position your body during daily activities is called body mechanics. Good body mechanics help protect the neck. This means learning the right ways to stand, sit, and even sleep. So do whats best for your neck and practice good body mechanics.  Standing   To protect your neck while standing:  · Carry objects close to your body.  · Keep your ears and shoulders in a line while standing or walking.  · To lower yourself, bend at the knees with a straight back. Do this instead of looking down and reaching for objects.  · Work at eye level. Dont reach above your head or tilt your head back.  Sitting   To protect your neck while sitting:  · Set up your workstation so your monitor is at eye level. Also, use a document jean when viewing papers or books.  · Keep your knees at or slightly below  the level of your hips.  · Sit up straight, with feet flat on the floor. If your feet dont touch the floor, use a footrest.  · Avoid sitting or driving for long periods. Take frequent breaks.  Sleeping   To protect your neck while sleeping:  · Sleep on your back with a pillow under your knees, or on your side with a pillow between bent knees. This helps align the spine.  · Avoid using pillows that are too high or too low. Instead, use a neck roll or pillow under your neck while you sleep to keep the neck straight.  · Sleep on a mattress that supports you, with a pillow under your neck.  © 3529-7642 [employer]. [address] [place] [address], [place] [address]. All rights reserved. This information is not intended as a substitute for professional medical care. Always follow your healthcare professional's instructions.          Exercises at Your Workstation: Eyes, Neck, and Head     Tired eyes? Stiff neck? A few easy moves can help prevent these kinds of problems. Take a few minutes during your day to do these exercises--right at your desk. They'll loosen up your muscles, keep you more alert, and make a big difference in how you work and feel.    For your eyes  Eye cup  · Lean forward with your elbows on your desk.  · Cup your hands and place them lightly over your closed eyes. Hold for a minute, while breathing deeply in and out.  · Slowly uncover and open your eyes. Repeat 2 times.  Eye roll  · Close your eyes. Slowly roll your eyeballs clockwise all the way around. Repeat 3 times.  · Now slowly roll them all the way around counterclockwise. Repeat 3 times.  Eye rest  · Every 20 minutes, look away from the computer screen. Focus on an object at least 20 feet away. Stay focused on this object for a full 20 seconds.    For your neck and head  Warm-up  · Drop your head gently to your chest. While breathing in, slowly roll your head up to your left shoulder. While breathing out, slowly roll your head back to  center. Repeat to the right.  · Repeat 3 times on each side.  Head tilt  · Sit up straight. Tuck in your chin.  · Slowly tip your head to the left. Return to the center. Then, tip your head to the right.  · Repeat 3 times on each side.    Head turn  · Sit up straight.  · Slowly turn your head and look over your left shoulder. Hold for a few seconds. Go back to the center, then repeat to your right.  · Repeat 3 times on each side.  © 1908-8397 [employer]. [address] [place] [address]. All rights reserved. This information is not intended as a substitute for professional medical care. Always follow your healthcare professional's instructions.          Reach and Hold Exercise    Do this exercise on your hands and knees. Keep your knees under your hips and your hands under your shoulders. Keep your spine in a neutral position (not arched or sagging). Keep your ears in line with your shoulders. Hold for a few seconds before starting the exercise:  4. Tighten your abdominal muscles and raise one arm straight in front of you, palm down. Hold for 5 seconds, then lower. Repeat 5 times.  5. Do the exercise again, this time lifting your arm to the side. Repeat 5 times.  6. Do the exercise again, this time lifting your arm backward, palm up. Repeat 5 times.  Switch sides and do each exercise with the other arm.  © 8244-4745 [employer]. [address] [place] [address]. All rights reserved. This information is not intended as a substitute for professional medical care. Always follow your healthcare professional's instructions.        Shoulder and Upper Back Stretch  To start, stand tall with your ears, shoulders, and hips in line. Your feet should be slightly apart, positioned just under your hips. Focus your eyes directly in front of you.  this position for a few seconds before starting your exercise. This helps increase your awareness of proper posture.  Reach  overhead and slightly back with both arms. Keep your shoulders and neck aligned and your elbows behind your shoulders:  · With your palms facing the ceiling, turn your fingers inward.  · Take a deep breath. Breathe out, and lower your elbows toward your buttocks. Hold for 5 seconds, then return to starting position.  · Repeat 3 times.    © 6852-4655 [employer]. [address] [place] [address]. All rights reserved. This information is not intended as a substitute for professional medical care. Always follow your healthcare professional's instructions.          Shoulder Clock Exercise  To start, stand tall with your ears, shoulders, and hips in line. Your feet should be slightly apart, positioned just under your hips. Focus your eyes directly in front of you.  this position for a few seconds before starting your exercise. This helps increase your awareness of proper posture.  · Imagine that your right shoulder is the center of a clock. With the outer point of your shoulder, roll it around to slowly trace the outer edge of the clock.  · Move clockwise first, then counterclockwise.  · Repeat 3 times. Switch shoulders.   © 6329-7303 [employer]. [address] [place] [address]. All rights reserved. This information is not intended as a substitute for professional medical care. Always follow your healthcare professional's instructions.          Shoulder Girdle Stretch     To start, sit in a chair with your feet flat on the floor. Your weight should be slightly forward so that youre balanced evenly on your buttocks. Relax your shoulders and keep your head level. Using a chair with arms may help you keep your balance:  · Place 1 hand on the outside elbow of the other arm.  · Pull the arm across your body. Hold for 30 to 60 seconds. Repeat once.  · Switch sides.    © 7745-2503 [employer]. [address] [place] [address]. All rights reserved.  This information is not intended as a substitute for professional medical care. Always follow your healthcare professional's instructions.          Shoulder Exercises      To start, sit in a chair with your feet flat on the floor. Your weight should be slightly forward so that youre balanced evenly on your buttocks. Relax your shoulders and keep your head level. Avoid arching your back or rounding your shoulders. Using a chair with arms may help you keep your balance.  · Raise your arms, elbows bent, to shoulder height.  · Slowly move your forearms together. Hold for 5 seconds.  · Return to starting position. Repeat 5 times.  © 3758-0810 [employer] [address] [place] [address]. All rights reserved. This information is not intended as a substitute for professional medical care. Always follow your healthcare professional's instructions.        Shoulder Shrug Exercise  To start, sit in a chair with your feet flat on the floor. Shift your weight slightly forward to avoid rounding your back. Relax. Keep your ears, shoulders, and hips aligned:  · Raise both of your shoulders as high as you can, as if you were trying to touch them to your ears. Keep your head and neck still and relaxed.  · Hold for a count of 10. Release.  · Repeat 5 times.    © 0232-8515 [employer] [address] [place] [address]. All rights reserved. This information is not intended as a substitute for professional medical care. Always follow your healthcare professional's instructions.          Shoulder Squeeze Exercise     To start, sit in a chair with your feet flat on the floor. Shift your weight slightly forward to avoid rounding your back. Relax. Keep your ears, shoulders, and hips aligned:  · Raise your arms to shoulder height, elbows bent and palms forward.  · Move your arms back, squeezing your shoulder blades together.  · Hold for 10 seconds. Return to starting position.   · Repeat 5  times.     © 2289-7208 [employer]. [address] [place] [address], [place] [address]. All rights reserved. This information is not intended as a substitute for professional medical care. Always follow your healthcare professional's instructions.               Language Assistance Services     ATTENTION: Language assistance services are available, free of charge. Please call [phone].      ATENCIÓN: Si habla español, tiene a west disposición servicios gratuitos de asistencia lingüística. Llame al [phone].     CHÚ Ý: N?u b?n nói Ti?ng Vi?t, có các d?ch v? h? tr? ngôn ng? mi?n phí dành cho b?n. G?i s? [phone].         [facility] complies with applicable Federal civil rights laws and does not discriminate on the basis of race, color, national origin, age, disability, or sex.

## 2017-05-10 NOTE — PATIENT INSTRUCTIONS
Tension Headache    A muscle tension headache is a very common cause of head pain. Its also called a stress headache. When some people are under stress, they tense the muscles of their shoulder, neck, and scalp without knowing it. If this tension lasts long enough, a headache can occur. A tension headache can be quite painful. It can last for hours or even days.  Home care  Follow these tips when caring for yourself at home:  · Dont drive yourself home if you were given pain medicine for your headache. Instead, have someone else drive you home. Try to sleep when you get home. You should feel much better when you wake up.  · Put heat on the back of your neck to help ease neck spasm.  · Drink only clear liquids or eat a light diet until your symptoms get better. This will help you avoid nausea or vomiting.  How to prevent headaches  · Figure out what is causing stress in your life. Learn new ways to handle your stress. Ideas include regular exercise, biofeedback, self-hypnosis, yoga, and meditation. Talk with your healthcare provider to find out more information about managing stress. Many books and digital media are also available on this subject.  · Take time out at the first sign of a tension headache, if possible. Take yourself out of the stressful situation. Find a quiet, comfortable place to sit or lie down and let yourself relax. Heat and deep massage of the tight areas in the neck and shoulders may help ease muscle spasm. You may also get relief from a medicine like ibuprofen or a prescribed muscle relaxant.  Follow-up care  Follow up with your healthcare provider, or as advised. Talk with your provider if you have frequent headaches. He or she can figure out a treatment plan. Ask if you can have medicine to take at home the next time you get a bad headache. This may keep you from having to visit the emergency department in the future. You may need to see a headache specialist (neurologist) if you continue  to have headaches.  When to seek medical advice  Call your healthcare provider right away if any of these occur:  · Your head pain gets worse during sexual intercourse or strenuous activity  · Your head pain doesnt get better within 24 hours  · You arent able to keep liquids down (repeated vomiting)  · Fever of 100.4ºF (38ºC) or higher, or as directed by your healthcare provider  · Stiff neck  · Extreme drowsiness, confusion, or fainting  · Dizziness or dizziness with spinning sensation (vertigo)  · Weakness in an arm or leg or one side of your face  · You have difficulty speaking  · Your vision changes  Date Last Reviewed: 8/1/2016 © 2000-2016 [employer]. [address] [place] [address], [place] [address]. All rights reserved. This information is not intended as a substitute for professional medical care. Always follow your healthcare professional's instructions.        Preventing Tension Headaches  Lifestyle changes are the key to preventing tension-type headaches. Learn what changes in your environment and daily activities can prevent the strain and tension that lead to headaches. If emotional stress is a factor, stress reduction may bring relief. Other lifestyle changes can help keep you healthier and better able to cope with pain.  What may cause your headaches  Causes of tension-type headaches include:  · Posture and movement. Your posture while you sit, work, drive, and even sleep can put stress on your shoulders and neck. This can tighten muscles in the back of your head, causing headaches.  · Lifting and carrying. These can cause strain on your back and neck, especially if you carry too much weight, carry weight unevenly, or use poor lifting technique.  · Certain sports. Activities that involve jumping, running, and sudden starts, stops, or changes of direction can jar your neck and head. This may lead to tight muscles and pain. Weightlifting and other activities that require upper body strength  can lead to tight neck and shoulder muscles.  · Jaw tension. Clenching your jaw or grinding your teeth can result in tension and pain. This may happen while you sleep without your knowing it.  · Eye problems. Eyestrain can cause tension in the muscles around the eyes. Or a problem with your eyeglass prescription can make you hold your head at an awkward angle. This can cause neck strain and headaches.  · Emotional stress. Many factors lead to emotional stress: overwork, family problems, financial difficulties, or sudden life changes. This may cause muscle tension.  What you can do  Once you know whats causing your headaches, you can work to prevent them. You may need to seek professional help to make some of the needed changes.  · Posture and movement changes. Change the setup of your workspace and car. Learn and maintain good posture. Avoid positions that strain your neck or shoulders. Make sure your bedding and pillows provide good support. Avoid sleeping on couches, chairs, or floors when a bed is available.   · Lifting and carrying. Learn good lifting technique. Make sure to use the proper tools and equipment for lifting.  · Change your sport. Switch to a low-impact sport. To help relieve stress on your neck and head, cut back on activities that depend on upper body strength or that put a lot of twisting motion on the back, such as golf.   · Dental work. See your dentist if you think your headaches are caused by jaw tension or teeth grinding.  · New eyewear. Buy an extra pair of glasses adjusted for reading or working at a computer. This helps to reduce eyestrain and keep your neck at a comfortable angle.  · Stress management. Learn techniques for relaxing and reducing emotional stress, like deep breathing, visualization, progressive relaxation, and biofeedback. Regular sleep habits can also help to control stress.  · Regular sleep and meals. It is important to have a regular sleep cycle and to avoid skipping  meals.  Exercise can help  Exercise can improve overall health and help you to relax.  · Neck exercises help keep your neck muscles relaxed during the day. Try the neck exercises shown on this sheet. Start in a neutral (relaxed, centered) position. Do 3 repetitions every 2 to 4 hours throughout the day.  · Low-impact aerobic exercise helps keep your muscles strong and flexible. This helps prevent tension and the pain it causes.  · Stretching, luz elena chi, and yoga help keep your muscles flexible. They may also help relieve emotional stress.    Lower your left ear toward your left shoulder. Return to neutral. Repeat on the right side.   Lower your chin to your chest and slowly return to neutral.     Look to the left. Return to neutral. Then look to the right.     Move your head forward and back while keeping the top of your head level.   Date Last Reviewed: 11/8/2015  © 1240-5671 [employer]. [address] [place] [address]. All rights reserved. This information is not intended as a substitute for professional medical care. Always follow your healthcare professional's instructions.        Self-Care for Headaches  Most headaches aren't serious and can be relieved with self-care. But some headaches may be a sign of another health problem like eye trouble or high blood pressure. To find the best treatment, learn what kind of headaches you get. For tension headaches, self-care will usually help. To treat migraines, ask your healthcare provider for advice. It is also possible to get both tension and migraine headaches. Self-care involves relieving the pain and avoiding headache triggers if you can.    Ways to reduce pain and tension  Try these steps:  · Apply a cold compress or ice pack to the pain site.  · Drink fluids. If nausea makes it hard to drink, try sucking on ice.  · Rest. Protect yourself from bright light and loud noises.  · Calm your emotions by imagining a peaceful scene.  · Massage  "tight neck, shoulder, and head muscles.  · To relax muscles, soak in a hot bath or use a hot shower.  Use medicines  Aspirin or aspirin substitutes, such as ibuprofen and acetaminophen, can relieve headache. Remember: Never give aspirin to anyone 18 years old or younger because of the risk of developing Reye syndrome. Use pain medicines only when necessary.  Track your headaches  Keeping a headache diary can help you and your healthcare provider identify what's causing your headaches:  · Note when each headache happens.  · Identify your activities and the foods you've eaten 6 to 8 hours before the headache began.  · Look for any trends or "triggers."  Signs of tension headache  Any of the following can be signs:  · Dull pain or feeling of pressure in a tight band around your head  · Pain in your neck or shoulders  · Headache without a definite beginning or end  · Headache after an activity such as driving or working on a computer  Signs of migraine  Any of the following can be signs:  · Throbbing pain on one or both sides of your head  · Nausea or vomiting  · Extreme sensitivity to light, sound, and smells  · Bright spots, flashes, or other visual changes  · Pain or nausea so severe that you can't continue your daily activities  Call your healthcare provider   If you have any of the following symptoms, contact your healthcare provider:  · A headache that lingers after a recent injury or bump to the head.  · A fever with a stiff neck or pain when you bend your head toward your chest.  · A headache along with slurred speech, changes in your vision, or numbness or weakness in your arms or legs.  · A headache for longer than 3 days.  · Frequent headaches, especially in the morning.  · Headaches with seizures   · Seek immediate medical attention if you have a headache that you would call "the worst headache you have ever had."   Date Last Reviewed: 10/4/2015  © 1970-9453 [employer] [address] [facility], " [place] [address]. All rights reserved. This information is not intended as a substitute for professional medical care. Always follow your healthcare professional's instructions.        Tension Headaches  Tension headaches cause a dull, steady pain on both sides of the head and in the neck and the back of the head. The eyes may also feel tired. Tension headaches can be triggered by muscle tension and clenching, lack of sleep, poor posture, eyestrain, stress,depression, anxiety, arthritis of the neck, and other factors.    To help prevent tension headaches  Take the following steps:  · Make sure your work area is properly set up to help you avoid neck strain and eyestrain.  · Make sure that your eyeglass prescription is current and is appropriate for the work you do.  · Learn techniques for relaxing and reducing emotional stress. These include deep breathing, progressive relaxation, yoga, meditation, and biofeedback.  · Maintain a regular exercise regimen under the guidance of a doctor. This can help keep your neck and back flexible, strong, and relaxed.  To relieve the pain  Take the following steps:  · Use moist heat to relax the muscles. Soak in a hot bath or wrap a warm, moist towel around your neck.  · Brush your scalp lightly with a soft hairbrush.  · Give yourself a massage. Knead the muscles running from your shoulders up the back of your skull.  · Use an ice pack. Apply this directly to the place where you feel pain.  · Rest. Sleeping often helps relieve headache pain.  · Drink plenty of fluids. Dehydration is another trigger for headaches.  · Use pain medicine sparingly for moderate to severe pain.   Date Last Reviewed: 10/19/2015  © 3405-6779 [employer]. [address] [place] [address], [place] [address]. All rights reserved. This information is not intended as a substitute for professional medical care. Always follow your healthcare professional's instructions.        Myofascial Pain Syndrome:  Fibrositis  Your pain is caused by a state of chronic muscle tension. This condition is called by various names: myofascial pain, fibrositis and trigger point pain. This can also be due to mechanical stress (such as working at a computer terminal for long periods; or work that requires repetitive motions of the arms or hands) or emotional stress (such as problems on the job or in your personal life). Sometimes there is no obvious cause. The pain can occur in the area of the muscle spasm or at a site distant to it. For example, spasm of a neck muscle can cause headache. Spasm of the muscle near the shoulder blade can cause pain shooting down the arm.  Home Care:  · Try to identify the factors that may be causing your problem and change them:  ¨ If you feel that emotional stress is a cause of your pain, learn methods to deal more effectively with the stress in your life. These may include regular exercise, muscle relaxation techniques, meditation or simply taking time out for yourself. Consult your doctor or go to a local bookstore and review the many books and tapes available on the subject of stress reduction.  ¨ If you feel that physical stress is a cause for your pain, try to modify any poor work habits.  · You may use acetaminophen (Tylenol) or ibuprofen (Motrin, Advil) to control pain, unless another medicine was prescribed. [NOTE: If you have chronic liver or kidney disease or ever had a stomach ulcer or GI bleeding, talk with your doctor before using these medicines.]  · The use of heat to the muscle (hot compress or heating pad) will be helpful to reduce muscle spasm. Some persons get relief with ice packs. Apply an ice pack (crushed or cubed ice in a plastic bag, wrapped in a towel) for 20 minutes at a time as needed. Use the method that feels best to you.  · Massaging the trigger point and stretching out the muscle are an important parts of prevention and treatment. Trigger point massage can be done by  first applying heat to the area to warm and prepare the muscle. Have someone apply steady thumb pressure directly on the knot in the muscle (the most tender point) for 30 seconds. Release the pressure, then massage the surrounding muscle. Repeat the process, applying more pressure to the trigger point each time. Do this up to the limit of pain. With each treatment, the trigger point should become less tender and the pain should decrease. You can apply local pressure to trigger points in the back by lying on the floor with a tennis ball under the trigger point.  Follow Up  with your doctor as advised or if not improving within the next week. It may be necessary for you to receive physical therapy if you do not respond to home treatment alone.  Get Prompt Medical Attention  if any of the following occur:  · If your trigger point is in the chest muscles, observe for pain that becomes more severe, lasts longer, or spreads into your shoulder/arm, neck or back; you develop trouble breathing, sweating, nausea or vomiting in association with chest pain  · If you develop weakness or numbness in an extremity  · If your pain worsens, regardless of its location  © 3941-2393 [employer]. [address] [place] [address]. All rights reserved. This information is not intended as a substitute for professional medical care. Always follow your healthcare professional's instructions.          Trigger Point Injection  The cause of your muscle pain or spasms may be one or more trigger points. Your health care provider may decide to inject the painful spots to relax the muscle. This can help relieve your pain. Relaxing the muscle can also make movement easier. You may then be able to exercise to strengthen the muscle and help it heal.    What is a trigger point?  A trigger point is a tight, painful knot of muscle fiber. It can form where a muscle is strained or injured. The knot can sometimes be felt under the  skin. A trigger point is very tender to the touch. Pain may also spread to other parts of the affected muscle. Muscles around a knee, shoulder blade, or other bones are prone to trigger points. This is because these muscles are more likely to be injured.    About the injections  Any muscle in the body can have one or more trigger points. Several injections may be needed in each trigger point to best relieve pain. These injections may be given in sessions about 2 weeks apart, depending on the preference of your health care provider. In some cases, you may not feel much change in your symptoms until after the third injection.     © 6262-6775 [employer]. [address] [place] [address]. All rights reserved. This information is not intended as a substitute for professional medical care. Always follow your healthcare professional's instructions.            Your Neck Muscles  The muscles in the neck and shoulders need to be strong to hold the neck and head in place. These muscles also help move the neck and shoulders. Your health care provider can recommend exercises to help stretch and strengthen your neck muscles.    © 0598-6112 [employer]. [address] [place] [address]. All rights reserved. This information is not intended as a substitute for professional medical care. Always follow your healthcare professional's instructions.          Neck Problems: Relieving Your Symptoms  The first goal of treatment is to relieve your symptoms. Your health care provider may recommend self-care treatments. These include resting, applying ice and heat, taking medication, and doing exercises. Your health care provider may also recommend that you see a physical therapist, who can teach you ways to care for and strengthen your neck.    Self-Care Treatments  Pain can end quickly or last awhile. Either way, youll want relief as soon as possible. Your health care provider can tell you  which treatments to do at home to help relieve your pain.  · Lying down for a short time takes pressure from the head off the neck.  · Ice and heat can help reduce pain. To bring down swelling, rest an ice pack wrapped in a thin towel on your neck for 15 minutes. To relax sore muscles, apply a warm, wet towel to the area. Or take a warm bath or shower.  · Over-the-counter medications, such as ibuprofen, naproxen, and aspirin, can help reduce pain and swelling. Acetaminophen can help relieve pain. Use these only as directed.  · Exercises can relax muscles and prevent stiffness. To prepare, drape a warm, wet towel around your neck and shoulders for 5 minutes. Remove the towel. Then do any exercises recommended to you by your health care provider.  Physical Therapy  If self-care treatments arent helping relieve neck pain, your health care provider may suggest one or more sessions of physical therapy. Physical therapy is performed by a specialist trained to treat injuries. Your physical therapist (PT) will teach you how to strengthen muscles, improve the spines alignment, and help you move properly. Treatment methods used in physical therapy may include:  · Heat. A special heating pad called a neck pack may be applied to your neck.  · Exercises. Your PT will teach you exercises to help strengthen your neck and improve its range of motion.  · Joint mobilization. The PT gently moves your vertebrae to help restore motion in your neck joints and reduce neck pain.  · Soft tissue mobilization. The PT massages and stretches the muscles in your neck and shoulders.  · Electrical stimulation. Electrical impulses are sent into your neck. This helps reduce soreness and inflammation.  · Education in body mechanics. The PT shows you ways to position and move your body that protect the neck.  Other Treatments  If physical therapy doesnt relieve your neck pain, your health care provider may suggest other treatments. For example,  medications or injections can help relieve pain and swelling. In some cases, surgery may be needed to treat neck problems.  © 0075-4593 [employer]. [address] [place] [address], [place] [address]. All rights reserved. This information is not intended as a substitute for professional medical care. Always follow your healthcare professional's instructions.          Understanding Neck Problems       If you suffer from neck pain, youre not alone. Many people have neck pain at some point in their lives. Problems such as poor posture, injury, and wear and tear can lead to neck pain. Your health care provider will work with you to find the treatment thats best for your neck.  Types of Neck Problems  The following problems can cause pain or injury in your neck:  · Strains and sprains: Strains (stretched or torn muscles) and sprains (stretched or torn ligaments) can cause neck pain. Strains and sprains can occur during an accident, or when you overuse your neck through repetitive motion. They can also cause your muscles and ligaments to become inflamed (swollen and painful).  · Whiplash and other injuries: Whiplash can result when an impact throws your head, forcing your neck too far forward (hyperflexion), then too far backward (hyperextension). When combined, the two motions can cause a painful injury to different parts of your neck, such as muscles, ligaments, or joints. The most common cause of whiplash is a car accident. But it can also happen during a fall or sports injury.  · Weakened disks: A simple action, such as a sneeze or a cough, can cause one of your disks to bulge (herniate). A herniated disk can put pressure on your nerve and cause pain. Over time, disks can also thin out (degenerate). Flattened disks dont cushion vertebrae well and can cause vertebrae to rub together. Rubbing vertebrae can pinch nerves and cause pain.  · Weakened joints: Aging and injury can cause joints to slowly degenerate.  Thinned joints can also cause vertebrae to rub together. This can cause abnormal growths of bone (bone spurs) to form on vertebrae. Bone spurs put pressure on nerves, causing pain.  Common Symptoms  If you have a neck problem, you may have one or more of the following symptoms:  · Muscle tension and spasm: You may not be able to move your neck, arms, or shoulders comfortably if you have muscle tension or stiffness in your neck. If your symptoms arent relieved, you may experience muscle spasms, or knots of contracted tissue (trigger points) in areas of your neck and shoulders.  · Aches and pains: Dull aches in your head or neck, sharp pains, and swelling of the soft tissue of your neck and shoulders are common symptoms. If theres pressure on the nerves in your neck, you may feel pain in your arms or hands (referred pain).  · Numbness or weakness: If you injure the nerves in your neck, you may experience numbness, tingling, or weakness in your shoulders, arms, or hands. These symptoms arise when disks or bone spurs press on the nerves in your neck.  © 1119-3208 [employer]. [address] [place] [address]. All rights reserved. This information is not intended as a substitute for professional medical care. Always follow your healthcare professional's instructions.          Neck Spasm [No Trauma]    Spasm of the neck muscles can occur after a sudden awkward neck movement. Sleeping with your neck in a crooked position can also cause spasm. Some persons respond to emotional stress by tensing the muscles of their neck, shoulders and upper back. If neck spasm lasts long enough, it can cause headache.  The treatment described below will usually help the pain to go away in 5-7 days. Pain that continues may require further evaluation or other types of treatment such as physical therapy.  Home Care:  1. Rest and relax the muscles. Use a comfortable pillow that supports the head and keeps the spine in a  neutral position. The position of the head should not be tilted forward or backward. A rolled up towel may help for a custom fit.  2. Some persons find relief with heat (hot shower, hot bath or heating pad) and massage, while others prefer cold packs (crushed or cubed ice in a plastic bag, wrapped in a towel). Try both and use the method that feels best for 20 minutes several times a day.  3. You may use acetaminophen (Tylenol) or ibuprofen (Motrin, Advil) to control pain, unless another medicine was prescribed. [NOTE: If you have chronic liver or kidney disease or ever had a stomach ulcer or GI bleeding, talk with your doctor before using these medicines.]  Follow Up  with your doctor or this facility if your symptoms do not show signs of improvement after one week. Physical therapy or further evaluation may be needed.  [NOTE: If x-rays were taken, they will be reviewed by a radiologist. You will be notified of any new findings that may affect your care.]  Return Promptly  or contact your doctor if any of the following occurs:  · Pain becomes worse or spreads into one or both arms  · Weakness or numbness in one or both arms  · Increasing headache with nausea or vomiting  · Fever over 100.4ºF (38.0ºC)  © 2033-9135 [employer]. [address] [place] [address]. All rights reserved. This information is not intended as a substitute for professional medical care. Always follow your healthcare professional's instructions.          Know Your Neck: The Cervical Spine  By learning about the parts of the neck, you can better understand your neck problem. The bones of the neck are called cervical vertebrae, commonly identified as C1 through C7. Together, they form a bony column called the spine. Vertebrae also protect the spinal cord, a pathway for messages to reach the brain. Surrounding the spine are soft tissues such as muscles, tendons, and nerves.        Flexibility Is Key  For the neck to function  normally, it has to be flexible enough to move without discomfort. A healthy neck can move easily in six different directions.    © 1467-5832 [employer]. [address] [place] [address], [place] [address]. All rights reserved. This information is not intended as a substitute for professional medical care. Always follow your healthcare professional's instructions.          Protecting Your Neck: Posture and Body Mechanics  Protecting your neck from injuries and pain involves practicing good posture and body mechanics. This may mean correcting bad habits you have related to the way you hold and move your body. The tips below can help you improve your posture and body mechanics.    What Is Posture and Why Does It Matter?  Posture is the way you hold your body. For many of us, this means hunching over, thrusting the chin forward, and slouching the shoulders. But this kind of poor posture keeps muscles from properly supporting the neck and puts stress on muscles, disks, ligaments, and joints in your neck. As a result, injury and pain can occur.  How Is Your Posture?  Use a full-length mirror to check your posture. To begin, stand normally. Then slowly back up against a wall. Is there space between your head and the wall? Do you slouch your shoulders? Is your chin pointing up or down? All these can cause neck pain and injury.  Improving Your Posture  Follow these steps to improve your posture:  · Pull your shoulders back.  · Think of the ears, shoulders, and hips as a series of dots. Now, adjust your body to connect the dots in a straight line.  · Keep your chin level.  What Are Body Mechanics and Why Do They Matter?  The way you move and position your body during daily activities is called body mechanics. Good body mechanics help protect the neck. This means learning the right ways to stand, sit, and even sleep. So do whats best for your neck and practice good body mechanics.  Standing   To protect your neck while  standing:  · Carry objects close to your body.  · Keep your ears and shoulders in a line while standing or walking.  · To lower yourself, bend at the knees with a straight back. Do this instead of looking down and reaching for objects.  · Work at eye level. Dont reach above your head or tilt your head back.  Sitting   To protect your neck while sitting:  · Set up your workstation so your monitor is at eye level. Also, use a document jean when viewing papers or books.  · Keep your knees at or slightly below the level of your hips.  · Sit up straight, with feet flat on the floor. If your feet dont touch the floor, use a footrest.  · Avoid sitting or driving for long periods. Take frequent breaks.  Sleeping   To protect your neck while sleeping:  · Sleep on your back with a pillow under your knees, or on your side with a pillow between bent knees. This helps align the spine.  · Avoid using pillows that are too high or too low. Instead, use a neck roll or pillow under your neck while you sleep to keep the neck straight.  · Sleep on a mattress that supports you, with a pillow under your neck.  © 4703-5224 [employer]. [address] [place] [address]. All rights reserved. This information is not intended as a substitute for professional medical care. Always follow your healthcare professional's instructions.          Exercises at Your Workstation: Eyes, Neck, and Head     Tired eyes? Stiff neck? A few easy moves can help prevent these kinds of problems. Take a few minutes during your day to do these exercises--right at your desk. They'll loosen up your muscles, keep you more alert, and make a big difference in how you work and feel.    For your eyes  Eye cup  · Lean forward with your elbows on your desk.  · Cup your hands and place them lightly over your closed eyes. Hold for a minute, while breathing deeply in and out.  · Slowly uncover and open your eyes. Repeat 2 times.  Eye roll  · Close  your eyes. Slowly roll your eyeballs clockwise all the way around. Repeat 3 times.  · Now slowly roll them all the way around counterclockwise. Repeat 3 times.  Eye rest  · Every 20 minutes, look away from the computer screen. Focus on an object at least 20 feet away. Stay focused on this object for a full 20 seconds.    For your neck and head  Warm-up  · Drop your head gently to your chest. While breathing in, slowly roll your head up to your left shoulder. While breathing out, slowly roll your head back to center. Repeat to the right.  · Repeat 3 times on each side.  Head tilt  · Sit up straight. Tuck in your chin.  · Slowly tip your head to the left. Return to the center. Then, tip your head to the right.  · Repeat 3 times on each side.    Head turn  · Sit up straight.  · Slowly turn your head and look over your left shoulder. Hold for a few seconds. Go back to the center, then repeat to your right.  · Repeat 3 times on each side.  © 1241-3546 [employer]. [address] [place] [address]. All rights reserved. This information is not intended as a substitute for professional medical care. Always follow your healthcare professional's instructions.          Reach and Hold Exercise    Do this exercise on your hands and knees. Keep your knees under your hips and your hands under your shoulders. Keep your spine in a neutral position (not arched or sagging). Keep your ears in line with your shoulders. Hold for a few seconds before starting the exercise:  4. Tighten your abdominal muscles and raise one arm straight in front of you, palm down. Hold for 5 seconds, then lower. Repeat 5 times.  5. Do the exercise again, this time lifting your arm to the side. Repeat 5 times.  6. Do the exercise again, this time lifting your arm backward, palm up. Repeat 5 times.  Switch sides and do each exercise with the other arm.  © 6334-0930 [employer]. [address] [place] [address]. All  rights reserved. This information is not intended as a substitute for professional medical care. Always follow your healthcare professional's instructions.        Shoulder and Upper Back Stretch  To start, stand tall with your ears, shoulders, and hips in line. Your feet should be slightly apart, positioned just under your hips. Focus your eyes directly in front of you.  this position for a few seconds before starting your exercise. This helps increase your awareness of proper posture.  Reach overhead and slightly back with both arms. Keep your shoulders and neck aligned and your elbows behind your shoulders:  · With your palms facing the ceiling, turn your fingers inward.  · Take a deep breath. Breathe out, and lower your elbows toward your buttocks. Hold for 5 seconds, then return to starting position.  · Repeat 3 times.    © 3098-9937 [employer]. [address] [place] [address]. All rights reserved. This information is not intended as a substitute for professional medical care. Always follow your healthcare professional's instructions.          Shoulder Clock Exercise  To start, stand tall with your ears, shoulders, and hips in line. Your feet should be slightly apart, positioned just under your hips. Focus your eyes directly in front of you.  this position for a few seconds before starting your exercise. This helps increase your awareness of proper posture.  · Imagine that your right shoulder is the center of a clock. With the outer point of your shoulder, roll it around to slowly trace the outer edge of the clock.  · Move clockwise first, then counterclockwise.  · Repeat 3 times. Switch shoulders.   © 0398-6654 [employer]. [address] [place] [address]. All rights reserved. This information is not intended as a substitute for professional medical care. Always follow your healthcare professional's instructions.          Shoulder Girdle Stretch      To start, sit in a chair with your feet flat on the floor. Your weight should be slightly forward so that youre balanced evenly on your buttocks. Relax your shoulders and keep your head level. Using a chair with arms may help you keep your balance:  · Place 1 hand on the outside elbow of the other arm.  · Pull the arm across your body. Hold for 30 to 60 seconds. Repeat once.  · Switch sides.    © 2578-2552 [employer]. [address] [place] [address]. All rights reserved. This information is not intended as a substitute for professional medical care. Always follow your healthcare professional's instructions.          Shoulder Exercises      To start, sit in a chair with your feet flat on the floor. Your weight should be slightly forward so that youre balanced evenly on your buttocks. Relax your shoulders and keep your head level. Avoid arching your back or rounding your shoulders. Using a chair with arms may help you keep your balance.  · Raise your arms, elbows bent, to shoulder height.  · Slowly move your forearms together. Hold for 5 seconds.  · Return to starting position. Repeat 5 times.  © 6961-0189 [employer]. [address] [place] [address]. All rights reserved. This information is not intended as a substitute for professional medical care. Always follow your healthcare professional's instructions.        Shoulder Shrug Exercise  To start, sit in a chair with your feet flat on the floor. Shift your weight slightly forward to avoid rounding your back. Relax. Keep your ears, shoulders, and hips aligned:  · Raise both of your shoulders as high as you can, as if you were trying to touch them to your ears. Keep your head and neck still and relaxed.  · Hold for a count of 10. Release.  · Repeat 5 times.    © 2000-2015 [employer]. [address] [place] [address]. All rights reserved. This information is not intended as a substitute for  professional medical care. Always follow your healthcare professional's instructions.          Shoulder Squeeze Exercise     To start, sit in a chair with your feet flat on the floor. Shift your weight slightly forward to avoid rounding your back. Relax. Keep your ears, shoulders, and hips aligned:  · Raise your arms to shoulder height, elbows bent and palms forward.  · Move your arms back, squeezing your shoulder blades together.  · Hold for 10 seconds. Return to starting position.   · Repeat 5 times.     © 3438-8853 [employer]. [address] [place] [address], [place] [address]. All rights reserved. This information is not intended as a substitute for professional medical care. Always follow your healthcare professional's instructions.
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
## 2017-07-14 ENCOUNTER — TELEPHONE (OUTPATIENT)
Dept: PHYSICAL MEDICINE AND REHAB | Facility: CLINIC | Age: 52
End: 2017-07-14

## 2017-07-14 DIAGNOSIS — M79.18 MYOFASCIAL PAIN: Primary | ICD-10-CM

## 2017-07-14 DIAGNOSIS — G44.209 TENSION HEADACHE: ICD-10-CM

## 2017-07-14 NOTE — TELEPHONE ENCOUNTER
----- Message from Nilda Rod sent at 7/14/2017  9:44 AM CDT -----  needs chiropracter info again..[phone] (home)

## 2017-07-27 RX ORDER — METOPROLOL SUCCINATE 50 MG/1
TABLET, EXTENDED RELEASE ORAL
Qty: 180 TABLET | Refills: 3 | Status: SHIPPED | OUTPATIENT
Start: 2017-07-27 | End: 2018-11-14 | Stop reason: SDUPTHER

## 2017-08-08 ENCOUNTER — OFFICE VISIT (OUTPATIENT)
Dept: INTERNAL MEDICINE | Facility: CLINIC | Age: 52
End: 2017-08-08
Payer: COMMERCIAL

## 2017-08-08 VITALS
HEART RATE: 93 BPM | OXYGEN SATURATION: 98 % | BODY MASS INDEX: 27.64 KG/M2 | SYSTOLIC BLOOD PRESSURE: 118 MMHG | WEIGHT: 245.38 LBS | DIASTOLIC BLOOD PRESSURE: 78 MMHG | TEMPERATURE: 96 F

## 2017-08-08 DIAGNOSIS — K11.20 SIALADENITIS: Primary | ICD-10-CM

## 2017-08-08 PROCEDURE — 99999 PR PBB SHADOW E&M-EST. PATIENT-LVL III: CPT | Mod: PBBFAC,,, | Performed by: FAMILY MEDICINE

## 2017-08-08 PROCEDURE — 99214 OFFICE O/P EST MOD 30 MIN: CPT | Mod: S$GLB,,, | Performed by: FAMILY MEDICINE

## 2017-08-08 PROCEDURE — 3008F BODY MASS INDEX DOCD: CPT | Mod: S$GLB,,, | Performed by: FAMILY MEDICINE

## 2017-08-08 PROCEDURE — 3074F SYST BP LT 130 MM HG: CPT | Mod: S$GLB,,, | Performed by: FAMILY MEDICINE

## 2017-08-08 PROCEDURE — 3078F DIAST BP <80 MM HG: CPT | Mod: S$GLB,,, | Performed by: FAMILY MEDICINE

## 2017-08-08 RX ORDER — AMOXICILLIN AND CLAVULANATE POTASSIUM 500; 125 MG/1; MG/1
1 TABLET, FILM COATED ORAL 2 TIMES DAILY
Qty: 14 TABLET | Refills: 0 | Status: SHIPPED | OUTPATIENT
Start: 2017-08-08 | End: 2017-09-22 | Stop reason: ALTCHOICE

## 2017-08-08 NOTE — PROGRESS NOTES
Subjective:       Patient ID: Sachin Gonzalez is a 52 y.o. male.    Chief Complaint: Facial Swelling (Right jaw pain) and Headache    Right jaw pain:      Pt reports some right jaw pain about 1 day. Pt has had some sore throat with movement. Pt reports no sinus congestion or pressure. Pt had dental surgery but it was on the left side. Headaches in the past but unrelated to this episode      Review of Systems   Constitutional: Negative.    HENT: Negative.    Respiratory: Negative.    Cardiovascular: Negative.    Genitourinary: Negative.    Neurological: Negative.    Hematological: Negative.    Psychiatric/Behavioral: Negative.        Objective:      Physical Exam   Constitutional: He appears well-developed and well-nourished.   Cardiovascular: Normal rate and regular rhythm.    Pulmonary/Chest: Effort normal and breath sounds normal.   Abdominal: Soft. Bowel sounds are normal.   Skin: Skin is warm and dry.   Psychiatric: He has a normal mood and affect. His behavior is normal.       Assessment:       1. Sialadenitis        Plan:       Sialadenitis  Comments:  Augmentin 500 mg bid    Other orders  -     amoxicillin-clavulanate 500-125mg (AUGMENTIN) 500-125 mg Tab; Take 1 tablet (500 mg total) by mouth 2 (two) times daily.  Dispense: 14 tablet; Refill: 0

## 2017-08-08 NOTE — MEDICAL/APP STUDENT
Subjective:       Patient ID: Sachin Gonzalez is a 52 y.o. male.    Chief Complaint: Facial Swelling (Right jaw pain) and Headache    Pt is a 53 yo male with R sided jaw pain and swelling first noted yesterday.  Pain is constant, worsened with yawning and chewing.  Improvement with ibuprofen.  Pt reports root canal 2 - 3 weeks ago on Left side (opposite of today's pain) for which he was given amoxicillin and pain medications.  Pt took amoxicillin for about 4 - 7 days post-procedure, then restarted the medication yesterday when R sided pain and swelling started.  Mild improvement noted with restarting this medication.      Pt denies fevers, denies cough, denies muscle aches, does note some fatigue and sore throat.  No odynophagia, but mild dysphagia.          Review of Systems   Constitutional: Positive for fatigue. Negative for chills, fever and unexpected weight change.   HENT: Positive for facial swelling, postnasal drip, sore throat and trouble swallowing. Negative for congestion, drooling, ear pain, rhinorrhea, sinus pressure, sneezing and tinnitus.    Respiratory: Negative for cough, choking, chest tightness, wheezing and stridor.    Cardiovascular: Negative.    Gastrointestinal: Positive for blood in stool and constipation. Negative for abdominal distention, abdominal pain, diarrhea, nausea, rectal pain and vomiting.   Genitourinary: Negative.    Musculoskeletal: Negative.    Skin: Negative.    Allergic/Immunologic: Negative.    Neurological: Negative.    Hematological: Negative.    Psychiatric/Behavioral: Negative.        Objective:      Physical Exam    Assessment:       No diagnosis found.    Plan:     DDx siadylinitis vs infection of the oral cavity    - Augmentin 5 days

## 2017-09-22 ENCOUNTER — OFFICE VISIT (OUTPATIENT)
Dept: INTERNAL MEDICINE | Facility: CLINIC | Age: 52
End: 2017-09-22
Payer: COMMERCIAL

## 2017-09-22 ENCOUNTER — HOSPITAL ENCOUNTER (OUTPATIENT)
Dept: RADIOLOGY | Facility: HOSPITAL | Age: 52
Discharge: HOME | End: 2017-09-22
Attending: FAMILY MEDICINE
Payer: COMMERCIAL

## 2017-09-22 VITALS
HEART RATE: 96 BPM | HEIGHT: 78 IN | DIASTOLIC BLOOD PRESSURE: 74 MMHG | SYSTOLIC BLOOD PRESSURE: 106 MMHG | TEMPERATURE: 97 F | BODY MASS INDEX: 28.57 KG/M2 | WEIGHT: 246.94 LBS

## 2017-09-22 DIAGNOSIS — M79.671 RIGHT FOOT PAIN: ICD-10-CM

## 2017-09-22 DIAGNOSIS — H93.8X3 IRRITATION OF BOTH EARS: Primary | ICD-10-CM

## 2017-09-22 PROCEDURE — 3074F SYST BP LT 130 MM HG: CPT | Mod: S$GLB,,, | Performed by: FAMILY MEDICINE

## 2017-09-22 PROCEDURE — 73630 X-RAY EXAM OF FOOT: CPT | Mod: 50,TC,PO

## 2017-09-22 PROCEDURE — 99999 PR PBB SHADOW E&M-EST. PATIENT-LVL IV: CPT | Mod: PBBFAC,,, | Performed by: FAMILY MEDICINE

## 2017-09-22 PROCEDURE — 3008F BODY MASS INDEX DOCD: CPT | Mod: S$GLB,,, | Performed by: FAMILY MEDICINE

## 2017-09-22 PROCEDURE — 73630 X-RAY EXAM OF FOOT: CPT | Mod: 26,50,, | Performed by: RADIOLOGY

## 2017-09-22 PROCEDURE — 99213 OFFICE O/P EST LOW 20 MIN: CPT | Mod: 25,S$GLB,, | Performed by: FAMILY MEDICINE

## 2017-09-22 PROCEDURE — 3078F DIAST BP <80 MM HG: CPT | Mod: S$GLB,,, | Performed by: FAMILY MEDICINE

## 2017-09-22 RX ORDER — OLOPATADINE HYDROCHLORIDE 1 MG/ML
1 SOLUTION/ DROPS OPHTHALMIC 2 TIMES DAILY
Qty: 5 ML | Refills: 0 | Status: SHIPPED | OUTPATIENT
Start: 2017-09-22 | End: 2018-05-14 | Stop reason: ALTCHOICE

## 2017-09-22 NOTE — PROGRESS NOTES
Subjective:       Patient ID: Sachin Gonzalez is a 52 y.o. male.    Chief Complaint: Eye Problem and Foot Pain    Eye irritation:       Pt has some red eyes that are not itch by irritated.      Eye Problem    The right eye is affected. This is a new problem. The current episode started 1 to 4 weeks ago. The problem occurs constantly. The problem has been unchanged. There was no injury mechanism. The pain is at a severity of 7/10. The pain is moderate. There is no known exposure to pink eye. He does not wear contacts. Associated symptoms include eye redness. Pertinent negatives include no blurred vision, itching, nausea, photophobia or recent URI. He has tried nothing for the symptoms.   Foot Pain   Pertinent negatives include no arthralgias, joint swelling, myalgias or nausea.     Review of Systems   Eyes: Positive for redness. Negative for blurred vision, photophobia and itching.   Cardiovascular: Negative.    Gastrointestinal: Negative for nausea.   Genitourinary: Negative.    Musculoskeletal: Negative for arthralgias, gait problem, joint swelling and myalgias.        Foot pain   Neurological: Negative.    Hematological: Negative.        Objective:      Physical Exam   Constitutional: He is oriented to person, place, and time. He appears well-developed and well-nourished.   Neck: Normal range of motion. Neck supple.   Cardiovascular: Normal rate and regular rhythm.    Pulmonary/Chest: Effort normal and breath sounds normal.   Abdominal: Soft. Bowel sounds are normal. He exhibits no mass. There is no guarding.   Neurological: He is alert and oriented to person, place, and time.   Skin: Skin is warm and dry.       Assessment:       1. Irritation of both ears    2. Right foot pain        Plan:       Irritation of both ears  Comments:  Will recommend OTC antihistamine    Right foot pain  Comments:  Will get bilateral feet xray  Orders:  -     Ambulatory referral to Podiatry  -     X-Ray Foot Complete Bilateral;  Future; Expected date: 09/22/2017    Other orders  -     olopatadine (PATANOL) 0.1 % ophthalmic solution; Place 1 drop into the right eye 2 (two) times daily.  Dispense: 5 mL; Refill: 0

## 2017-10-11 ENCOUNTER — TELEPHONE (OUTPATIENT)
Dept: INTERNAL MEDICINE | Facility: CLINIC | Age: 52
End: 2017-10-11

## 2017-10-11 NOTE — TELEPHONE ENCOUNTER
----- Message from Mandi Astorga sent at 10/11/2017  4:00 PM CDT -----  Would like to know about xray results before podiatry appt. Please call back at [phone]. thanks

## 2017-10-12 ENCOUNTER — OFFICE VISIT (OUTPATIENT)
Dept: PODIATRY | Facility: CLINIC | Age: 52
End: 2017-10-12
Payer: COMMERCIAL

## 2017-10-12 VITALS
HEIGHT: 78 IN | BODY MASS INDEX: 28.59 KG/M2 | WEIGHT: 247.13 LBS | SYSTOLIC BLOOD PRESSURE: 131 MMHG | DIASTOLIC BLOOD PRESSURE: 90 MMHG | HEART RATE: 77 BPM

## 2017-10-12 DIAGNOSIS — M19.079 INFLAMMATION OF FOOT JOINT: Primary | ICD-10-CM

## 2017-10-12 DIAGNOSIS — M21.41 PES PLANUS OF BOTH FEET: ICD-10-CM

## 2017-10-12 DIAGNOSIS — M21.42 PES PLANUS OF BOTH FEET: ICD-10-CM

## 2017-10-12 PROCEDURE — 99203 OFFICE O/P NEW LOW 30 MIN: CPT | Mod: S$GLB,,, | Performed by: PODIATRIST

## 2017-10-12 PROCEDURE — 99999 PR PBB SHADOW E&M-EST. PATIENT-LVL III: CPT | Mod: PBBFAC,,, | Performed by: PODIATRIST

## 2017-10-12 RX ORDER — IBUPROFEN 800 MG/1
800 TABLET ORAL 2 TIMES DAILY
Qty: 60 TABLET | Refills: 3 | Status: SHIPPED | OUTPATIENT
Start: 2017-10-12 | End: 2019-02-19 | Stop reason: SDUPTHER

## 2017-10-12 NOTE — LETTER
October 24, 2017      Domo Dick MD  [address] [place] [address]           [facility] - Podiatry  [address]  [place] [address]  Phone: [phone]  Fax: [phone]          Patient: Sachin Gonzalez   MR Number: [id number]   YOB: 1965   Date of Visit: 10/12/2017       Dear Dr. Domo Dick:    Thank you for referring Sachin Gonzalez to me for evaluation. Attached you will find relevant portions of my assessment and plan of care.    If you have questions, please do not hesitate to call me. I look forward to following Sachin Gonzalez along with you.    Sincerely,    Bimal Wu  CC:  No Recipients    If you would like to receive this communication electronically, please contact [email or web address] or [phone] to request more information on [employer] access.    For providers and/or their staff who would like to refer a patient to [facility], please contact us through our one-stop-shop provider referral line, [facility], at [phone].    If you feel you have received this communication in error or would no longer like to receive these types of communications, please e-mail [email or web address]
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
## 2017-11-21 RX ORDER — METHOTREXATE 2.5 MG/1
TABLET ORAL
Qty: 20 TABLET | Refills: 14 | OUTPATIENT
Start: 2017-11-21

## 2017-11-22 ENCOUNTER — TELEPHONE (OUTPATIENT)
Dept: RHEUMATOLOGY | Facility: CLINIC | Age: 52
End: 2017-11-22

## 2017-11-22 DIAGNOSIS — M05.79 RHEUMATOID ARTHRITIS INVOLVING MULTIPLE SITES WITH POSITIVE RHEUMATOID FACTOR: Primary | ICD-10-CM

## 2017-11-22 DIAGNOSIS — Z79.631 LONG TERM METHOTREXATE USER: Primary | ICD-10-CM

## 2017-11-22 RX ORDER — METHOTREXATE 2.5 MG/1
25 TABLET ORAL
Qty: 150 TABLET | Refills: 1 | OUTPATIENT
Start: 2017-11-22

## 2017-11-23 NOTE — TELEPHONE ENCOUNTER
----- Message from Franca Garcia RN sent at 11/22/2017 12:48 PM CST -----  Please put labs in epic

## 2017-12-07 ENCOUNTER — LAB VISIT (OUTPATIENT)
Dept: LAB | Facility: HOSPITAL | Age: 52
End: 2017-12-07
Attending: INTERNAL MEDICINE
Payer: COMMERCIAL

## 2017-12-07 DIAGNOSIS — M05.79 RHEUMATOID ARTHRITIS INVOLVING MULTIPLE SITES WITH POSITIVE RHEUMATOID FACTOR: ICD-10-CM

## 2017-12-07 LAB
ALBUMIN SERPL BCP-MCNC: 3.7 G/DL
ALP SERPL-CCNC: 89 U/L
ALT SERPL W/O P-5'-P-CCNC: 15 U/L
ANION GAP SERPL CALC-SCNC: 7 MMOL/L
AST SERPL-CCNC: 22 U/L
BASOPHILS # BLD AUTO: 0.03 K/UL
BASOPHILS NFR BLD: 0.6 %
BILIRUB SERPL-MCNC: 0.3 MG/DL
BUN SERPL-MCNC: 20 MG/DL
CALCIUM SERPL-MCNC: 9.3 MG/DL
CHLORIDE SERPL-SCNC: 106 MMOL/L
CO2 SERPL-SCNC: 28 MMOL/L
CREAT SERPL-MCNC: 1.3 MG/DL
CRP SERPL-MCNC: 14.3 MG/L
DIFFERENTIAL METHOD: ABNORMAL
EOSINOPHIL # BLD AUTO: 0.2 K/UL
EOSINOPHIL NFR BLD: 4.2 %
ERYTHROCYTE [DISTWIDTH] IN BLOOD BY AUTOMATED COUNT: 13.3 %
ERYTHROCYTE [SEDIMENTATION RATE] IN BLOOD BY WESTERGREN METHOD: 10 MM/HR
EST. GFR  (AFRICAN AMERICAN): >60 ML/MIN/1.73 M^2
EST. GFR  (NON AFRICAN AMERICAN): >60 ML/MIN/1.73 M^2
GLUCOSE SERPL-MCNC: 93 MG/DL
HCT VFR BLD AUTO: 42.4 %
HGB BLD-MCNC: 14 G/DL
IMM GRANULOCYTES # BLD AUTO: 0.01 K/UL
IMM GRANULOCYTES NFR BLD AUTO: 0.2 %
LYMPHOCYTES # BLD AUTO: 1.2 K/UL
LYMPHOCYTES NFR BLD: 23.5 %
MCH RBC QN AUTO: 31.3 PG
MCHC RBC AUTO-ENTMCNC: 33 G/DL
MCV RBC AUTO: 95 FL
MONOCYTES # BLD AUTO: 0.5 K/UL
MONOCYTES NFR BLD: 10.1 %
NEUTROPHILS # BLD AUTO: 3.1 K/UL
NEUTROPHILS NFR BLD: 61.4 %
NRBC BLD-RTO: 0 /100 WBC
PLATELET # BLD AUTO: 271 K/UL
PMV BLD AUTO: 10.3 FL
POTASSIUM SERPL-SCNC: 4.2 MMOL/L
PROT SERPL-MCNC: 8 G/DL
RBC # BLD AUTO: 4.48 M/UL
SODIUM SERPL-SCNC: 141 MMOL/L
WBC # BLD AUTO: 4.97 K/UL

## 2017-12-07 PROCEDURE — 36415 COLL VENOUS BLD VENIPUNCTURE: CPT | Mod: PO

## 2017-12-07 PROCEDURE — 80053 COMPREHEN METABOLIC PANEL: CPT

## 2017-12-07 PROCEDURE — 85651 RBC SED RATE NONAUTOMATED: CPT | Mod: PO

## 2017-12-07 PROCEDURE — 86140 C-REACTIVE PROTEIN: CPT

## 2017-12-07 PROCEDURE — 85025 COMPLETE CBC W/AUTO DIFF WBC: CPT

## 2017-12-07 RX ORDER — METHOTREXATE 2.5 MG/1
25 TABLET ORAL
Qty: 150 TABLET | Refills: 1 | OUTPATIENT
Start: 2017-12-07

## 2017-12-08 ENCOUNTER — TELEPHONE (OUTPATIENT)
Dept: RHEUMATOLOGY | Facility: CLINIC | Age: 52
End: 2017-12-08

## 2017-12-08 RX ORDER — METHOTREXATE 2.5 MG/1
25 TABLET ORAL
Qty: 150 TABLET | Refills: 1 | Status: SHIPPED | OUTPATIENT
Start: 2017-12-08 | End: 2018-08-30 | Stop reason: SDUPTHER

## 2017-12-08 RX ORDER — FOLIC ACID 1 MG/1
1000 TABLET ORAL DAILY
Qty: 100 TABLET | Refills: 2 | Status: SHIPPED | OUTPATIENT
Start: 2017-12-08 | End: 2019-06-11 | Stop reason: SDUPTHER

## 2017-12-27 ENCOUNTER — TELEPHONE (OUTPATIENT)
Dept: INTERNAL MEDICINE | Facility: CLINIC | Age: 52
End: 2017-12-27

## 2017-12-27 DIAGNOSIS — Z12.5 SCREENING PSA (PROSTATE SPECIFIC ANTIGEN): Primary | ICD-10-CM

## 2017-12-27 NOTE — TELEPHONE ENCOUNTER
----- Message from Doris Leal sent at 12/27/2017  3:00 PM CST -----  Contact: pt   Pt would like to get a PSA test done,,, please call him back at [phone]

## 2017-12-27 NOTE — TELEPHONE ENCOUNTER
----- Message from Raeann Ellignton sent at 12/27/2017  3:52 PM CST -----  Contact: patient  Returning your call. Please call patient @ [phone]. Thanks, kiana

## 2017-12-28 ENCOUNTER — LAB VISIT (OUTPATIENT)
Dept: LAB | Facility: HOSPITAL | Age: 52
End: 2017-12-28
Attending: FAMILY MEDICINE
Payer: COMMERCIAL

## 2017-12-28 DIAGNOSIS — Z12.5 SCREENING PSA (PROSTATE SPECIFIC ANTIGEN): ICD-10-CM

## 2017-12-28 LAB — COMPLEXED PSA SERPL-MCNC: <0.01 NG/ML

## 2017-12-28 PROCEDURE — 36415 COLL VENOUS BLD VENIPUNCTURE: CPT | Mod: PO

## 2017-12-28 PROCEDURE — 84153 ASSAY OF PSA TOTAL: CPT

## 2018-01-04 ENCOUNTER — TELEPHONE (OUTPATIENT)
Dept: INTERNAL MEDICINE | Facility: CLINIC | Age: 53
End: 2018-01-04

## 2018-01-04 NOTE — TELEPHONE ENCOUNTER
----- Message from Valeria Edmond sent at 1/4/2018  2:44 PM CST -----  Pt at [phone]//states he had lab work done last week//is calling for the results//please call//thanks/[facility]

## 2018-03-09 ENCOUNTER — PATIENT OUTREACH (OUTPATIENT)
Dept: ADMINISTRATIVE | Facility: HOSPITAL | Age: 53
End: 2018-03-09

## 2018-05-14 ENCOUNTER — OFFICE VISIT (OUTPATIENT)
Dept: INTERNAL MEDICINE | Facility: CLINIC | Age: 53
End: 2018-05-14
Payer: COMMERCIAL

## 2018-05-14 ENCOUNTER — LAB VISIT (OUTPATIENT)
Dept: LAB | Facility: HOSPITAL | Age: 53
End: 2018-05-14
Attending: FAMILY MEDICINE
Payer: COMMERCIAL

## 2018-05-14 VITALS
WEIGHT: 241.88 LBS | DIASTOLIC BLOOD PRESSURE: 68 MMHG | HEIGHT: 78 IN | SYSTOLIC BLOOD PRESSURE: 100 MMHG | BODY MASS INDEX: 27.98 KG/M2 | TEMPERATURE: 98 F | HEART RATE: 86 BPM

## 2018-05-14 DIAGNOSIS — R20.0 NUMBNESS AND TINGLING OF BOTH LOWER EXTREMITIES: ICD-10-CM

## 2018-05-14 DIAGNOSIS — R20.2 NUMBNESS AND TINGLING OF BOTH LOWER EXTREMITIES: ICD-10-CM

## 2018-05-14 DIAGNOSIS — G44.209 TENSION-TYPE HEADACHE, NOT INTRACTABLE, UNSPECIFIED CHRONICITY PATTERN: Primary | ICD-10-CM

## 2018-05-14 DIAGNOSIS — R22.43 LOCALIZED SWELLING, MASS, OR LUMP OF LOWER EXTREMITY, BILATERAL: ICD-10-CM

## 2018-05-14 DIAGNOSIS — K62.5 BRBPR (BRIGHT RED BLOOD PER RECTUM): ICD-10-CM

## 2018-05-14 PROCEDURE — 36415 COLL VENOUS BLD VENIPUNCTURE: CPT | Mod: PO

## 2018-05-14 PROCEDURE — 3008F BODY MASS INDEX DOCD: CPT | Mod: CPTII,S$GLB,, | Performed by: FAMILY MEDICINE

## 2018-05-14 PROCEDURE — 99214 OFFICE O/P EST MOD 30 MIN: CPT | Mod: S$GLB,,, | Performed by: FAMILY MEDICINE

## 2018-05-14 PROCEDURE — 82607 VITAMIN B-12: CPT

## 2018-05-14 PROCEDURE — 3078F DIAST BP <80 MM HG: CPT | Mod: CPTII,S$GLB,, | Performed by: FAMILY MEDICINE

## 2018-05-14 PROCEDURE — 99999 PR PBB SHADOW E&M-EST. PATIENT-LVL III: CPT | Mod: PBBFAC,,, | Performed by: FAMILY MEDICINE

## 2018-05-14 PROCEDURE — 3074F SYST BP LT 130 MM HG: CPT | Mod: CPTII,S$GLB,, | Performed by: FAMILY MEDICINE

## 2018-05-14 PROCEDURE — 82746 ASSAY OF FOLIC ACID SERUM: CPT

## 2018-05-14 RX ORDER — TRAMADOL HYDROCHLORIDE 50 MG/1
TABLET ORAL
COMMUNITY
Start: 2018-04-25 | End: 2018-06-13 | Stop reason: ALTCHOICE

## 2018-05-14 RX ORDER — BUTALBITAL, ACETAMINOPHEN AND CAFFEINE 50; 325; 40 MG/1; MG/1; MG/1
1 TABLET ORAL EVERY 8 HOURS
Qty: 90 TABLET | Refills: 0 | Status: SHIPPED | OUTPATIENT
Start: 2018-05-14 | End: 2018-06-13

## 2018-05-14 NOTE — PROGRESS NOTES
Subjective:       Patient ID: Sachin Gonzalez is a 52 y.o. male.    Chief Complaint: Numbness and Headache    Numbness:      Pt reports that numbness in both legs. Pt reports that feels like numbness and swelling.           Headache    This is a new problem. The current episode started 1 to 4 weeks ago. The problem occurs constantly. The problem has been unchanged. The pain is located in the temporal region. The pain does not radiate. The quality of the pain is described as aching and squeezing. The pain is at a severity of 4/10. The pain is mild. Pertinent negatives include no abdominal pain, abnormal behavior, blurred vision, coughing, dizziness, drainage, fever, hearing loss, nausea, neck pain, tingling or tinnitus.     Review of Systems   Constitutional: Positive for activity change. Negative for fever.   HENT: Negative for hearing loss and tinnitus.    Eyes: Negative for blurred vision.   Respiratory: Negative for cough.    Gastrointestinal: Negative for abdominal pain and nausea.   Musculoskeletal: Negative for neck pain.   Neurological: Positive for headaches. Negative for dizziness and tingling.       Objective:      Physical Exam   Constitutional: He is oriented to person, place, and time. He appears well-developed and well-nourished.   Cardiovascular: Normal rate and regular rhythm.    Pulmonary/Chest: Effort normal and breath sounds normal.   Abdominal: Soft. Bowel sounds are normal.   Neurological: He is alert and oriented to person, place, and time.       Assessment:       1. Tension-type headache, not intractable, unspecified chronicity pattern    2. Numbness and tingling of both lower extremities    3. Localized swelling, mass, or lump of lower extremity, bilateral    4. BRBPR (bright red blood per rectum)        Plan:       Tension-type headache, not intractable, unspecified chronicity pattern  Comments:  Will start pt on fiorcet      Numbness and tingling of both lower extremities  Comments:  Will  do B12 and folate  Orders:  -     Folate; Future; Expected date: 05/14/2018  -     Vitamin B12; Future; Expected date: 05/14/2018  -     EMG W/ ULTRASOUND AND NERVE CONDUCTION TEST 2 Extremities; Future    Localized swelling, mass, or lump of lower extremity, bilateral  Comments:  Will do US to rule out  Orders:  -     US Lower Extremity Veins Bilateral; Future; Expected date: 05/14/2018    BRBPR (bright red blood per rectum)  Comments:  Clear colonoscopy. no metion of hemorrhoids or divertcula  Orders:  -     Ambulatory referral to Gastroenterology    Other orders  -     butalbital-acetaminophen-caffeine -40 mg (FIORICET, ESGIC) -40 mg per tablet; Take 1 tablet by mouth every 8 (eight) hours.  Dispense: 90 tablet; Refill: 0

## 2018-05-15 ENCOUNTER — TELEPHONE (OUTPATIENT)
Dept: INTERNAL MEDICINE | Facility: CLINIC | Age: 53
End: 2018-05-15

## 2018-05-15 LAB
FOLATE SERPL-MCNC: 35.8 NG/ML
VIT B12 SERPL-MCNC: 920 PG/ML

## 2018-05-15 NOTE — TELEPHONE ENCOUNTER
----- Message from Nilda Rod sent at 5/15/2018 10:53 AM CDT -----  states that antibiotic cream & steroid rx haven't been sent in..[phone].    [id number] - [place] - [address]  [address] [place] [address]  Phone: [phone] Fax: [phone]

## 2018-05-17 ENCOUNTER — HOSPITAL ENCOUNTER (OUTPATIENT)
Dept: RADIOLOGY | Facility: HOSPITAL | Age: 53
Discharge: HOME | End: 2018-05-17
Attending: STUDENT IN AN ORGANIZED HEALTH CARE EDUCATION/TRAINING PROGRAM
Payer: COMMERCIAL

## 2018-05-17 ENCOUNTER — OFFICE VISIT (OUTPATIENT)
Dept: RHEUMATOLOGY | Facility: CLINIC | Age: 53
End: 2018-05-17
Payer: COMMERCIAL

## 2018-05-17 ENCOUNTER — HOSPITAL ENCOUNTER (OUTPATIENT)
Dept: RADIOLOGY | Facility: HOSPITAL | Age: 53
Discharge: HOME | End: 2018-05-17
Attending: INTERNAL MEDICINE
Payer: COMMERCIAL

## 2018-05-17 VITALS
DIASTOLIC BLOOD PRESSURE: 73 MMHG | SYSTOLIC BLOOD PRESSURE: 127 MMHG | WEIGHT: 245 LBS | BODY MASS INDEX: 28.35 KG/M2 | HEIGHT: 78 IN | HEART RATE: 86 BPM

## 2018-05-17 DIAGNOSIS — M17.2 POST-TRAUMATIC OSTEOARTHRITIS OF BOTH KNEES: ICD-10-CM

## 2018-05-17 DIAGNOSIS — M15.9 GENERALIZED OSTEOARTHRITIS OF MULTIPLE SITES: ICD-10-CM

## 2018-05-17 DIAGNOSIS — M05.772 RHEUMATOID ARTHRITIS INVOLVING BOTH ANKLES WITH POSITIVE RHEUMATOID FACTOR: Primary | ICD-10-CM

## 2018-05-17 DIAGNOSIS — M19.90 OSTEOARTHRITIS, UNSPECIFIED OSTEOARTHRITIS TYPE, UNSPECIFIED SITE: ICD-10-CM

## 2018-05-17 DIAGNOSIS — Z79.631 LONG TERM METHOTREXATE USER: ICD-10-CM

## 2018-05-17 DIAGNOSIS — M05.771 RHEUMATOID ARTHRITIS INVOLVING BOTH ANKLES WITH POSITIVE RHEUMATOID FACTOR: Primary | ICD-10-CM

## 2018-05-17 DIAGNOSIS — M05.771 RHEUMATOID ARTHRITIS INVOLVING BOTH ANKLES WITH POSITIVE RHEUMATOID FACTOR: ICD-10-CM

## 2018-05-17 DIAGNOSIS — Z55.9 EDUCATIONAL CIRCUMSTANCE: ICD-10-CM

## 2018-05-17 DIAGNOSIS — M05.772 RHEUMATOID ARTHRITIS INVOLVING BOTH ANKLES WITH POSITIVE RHEUMATOID FACTOR: ICD-10-CM

## 2018-05-17 PROCEDURE — 73630 X-RAY EXAM OF FOOT: CPT | Mod: 50,TC

## 2018-05-17 PROCEDURE — 77077 JOINT SURVEY SINGLE VIEW: CPT | Mod: 26,,, | Performed by: STUDENT IN AN ORGANIZED HEALTH CARE EDUCATION/TRAINING PROGRAM

## 2018-05-17 PROCEDURE — 3074F SYST BP LT 130 MM HG: CPT | Mod: CPTII,S$GLB,, | Performed by: INTERNAL MEDICINE

## 2018-05-17 PROCEDURE — 73630 X-RAY EXAM OF FOOT: CPT | Mod: 26,50,, | Performed by: STUDENT IN AN ORGANIZED HEALTH CARE EDUCATION/TRAINING PROGRAM

## 2018-05-17 PROCEDURE — 3008F BODY MASS INDEX DOCD: CPT | Mod: CPTII,S$GLB,, | Performed by: INTERNAL MEDICINE

## 2018-05-17 PROCEDURE — 99214 OFFICE O/P EST MOD 30 MIN: CPT | Mod: S$GLB,,, | Performed by: INTERNAL MEDICINE

## 2018-05-17 PROCEDURE — 3078F DIAST BP <80 MM HG: CPT | Mod: CPTII,S$GLB,, | Performed by: INTERNAL MEDICINE

## 2018-05-17 PROCEDURE — 77077 JOINT SURVEY SINGLE VIEW: CPT | Mod: TC

## 2018-05-17 PROCEDURE — 99999 PR PBB SHADOW E&M-EST. PATIENT-LVL III: CPT | Mod: PBBFAC,,, | Performed by: INTERNAL MEDICINE

## 2018-05-17 SDOH — SOCIAL DETERMINANTS OF HEALTH (SDOH): PROBLEMS RELATED TO EDUCATION AND LITERACY, UNSPECIFIED: Z55.9

## 2018-05-17 ASSESSMENT — ROUTINE ASSESSMENT OF PATIENT INDEX DATA (RAPID3)
PSYCHOLOGICAL DISTRESS SCORE: 2.2
MDHAQ FUNCTION SCORE: .9
PAIN SCORE: 8
TOTAL RAPID3 SCORE: 6
AM STIFFNESS SCORE: 1, YES
PATIENT GLOBAL ASSESSMENT SCORE: 7
FATIGUE SCORE: 8

## 2018-05-17 NOTE — PATIENT INSTRUCTIONS
Lab draw and xrays today.   Cont Methotrexate 20mg every week (8 pills) with Folic acid 1mg every day.   Start daily aerobic exercise, at least 30 mins per day.  Daily stretches to feet and legs.   Take Cosamin ASU x 3pills/day for achy joints.   Have labs drawn every 3 months at [facility] to continue on Methotrexate.  Return to clinic in 6 months.
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
## 2018-05-17 NOTE — Clinical Note
Please give appt in 3 months. His labs are abnormal. Not necessarily due to RA but will need to reassess him.

## 2018-05-21 ENCOUNTER — INITIAL CONSULT (OUTPATIENT)
Dept: GASTROENTEROLOGY | Facility: CLINIC | Age: 53
End: 2018-05-21
Payer: COMMERCIAL

## 2018-05-21 VITALS
DIASTOLIC BLOOD PRESSURE: 70 MMHG | SYSTOLIC BLOOD PRESSURE: 110 MMHG | BODY MASS INDEX: 27.8 KG/M2 | HEART RATE: 72 BPM | HEIGHT: 78 IN | WEIGHT: 240.31 LBS

## 2018-05-21 DIAGNOSIS — Z12.11 SCREENING FOR COLON CANCER: ICD-10-CM

## 2018-05-21 DIAGNOSIS — D64.9 ANEMIA, UNSPECIFIED TYPE: Primary | ICD-10-CM

## 2018-05-21 PROCEDURE — 3078F DIAST BP <80 MM HG: CPT | Mod: CPTII,S$GLB,, | Performed by: NURSE PRACTITIONER

## 2018-05-21 PROCEDURE — 3074F SYST BP LT 130 MM HG: CPT | Mod: CPTII,S$GLB,, | Performed by: NURSE PRACTITIONER

## 2018-05-21 PROCEDURE — 99204 OFFICE O/P NEW MOD 45 MIN: CPT | Mod: S$GLB,,, | Performed by: NURSE PRACTITIONER

## 2018-05-21 PROCEDURE — 3008F BODY MASS INDEX DOCD: CPT | Mod: CPTII,S$GLB,, | Performed by: NURSE PRACTITIONER

## 2018-05-21 PROCEDURE — 99999 PR PBB SHADOW E&M-EST. PATIENT-LVL III: CPT | Mod: PBBFAC,,, | Performed by: NURSE PRACTITIONER

## 2018-05-21 NOTE — LETTER
May 21, 2018      Domo Dick MD  [address] [place] [address]           [facility]  [address] [place] [address]  Phone: [phone]  Fax: [phone]          Patient: Sachin oGnzalez   MR Number: [id number]   YOB: 1965   Date of Visit: 5/21/2018       Dear Dr. Domo Dick:    Thank you for referring Sachin Gonzalez to me for evaluation. Attached you will find relevant portions of my assessment and plan of care.    If you have questions, please do not hesitate to call me. I look forward to following Sachin Gonzalez along with you.    Sincerely,    Adia Mcmillan, [facility]    Enclosure  CC:  No Recipients    If you would like to receive this communication electronically, please contact [email or web address] or [phone] to request more information on [facility] [employer] access.    For providers and/or their staff who would like to refer a patient to [facility], please contact us through our one-stop-shop provider referral line, [facility], at [phone].    If you feel you have received this communication in error or would no longer like to receive these types of communications, please e-mail [email or web address]

## 2018-05-21 NOTE — PROGRESS NOTES
Clinic Consult:  [facility] Consultation Note    Reason for Consult:  The primary encounter diagnosis was Anemia, unspecified type. A diagnosis of Screening for colon cancer was also pertinent to this visit.    PCP: Domo Dick   [id number] / [place] [id number]    HPI:  This is a 52 y.o. male here for evaluation of the above  Pt states that over the last 2 years, he has had 3-4 episodes of rectal bleeding. He states that the blood is bright red, both when wiping and on top of the stool.  He denies any abdominal or rectal pain associated with the bleeding.  He has had some hard to pass stools associated with the bleeding.   Last colonoscopy 2014, unremarkable.   Denies any abdominal pain.  No nausea or vomiting.  No change in bowel pattern.  No weight loss.  Of note, he has a slight anemia on recent labs    Review of Systems   Constitutional: Negative for chills, fever, malaise/fatigue and weight loss.   Respiratory: Negative for cough.    Cardiovascular: Negative for chest pain.   Gastrointestinal:        Per HPI   Musculoskeletal: Negative for myalgias.   Skin: Negative for itching and rash.   Neurological: Negative for headaches.   Psychiatric/Behavioral: The patient is not nervous/anxious.        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       Surgical History:  Past Surgical History:   Procedure Laterality Date    PROSTATECTOMY         Family History:   Family History   Problem Relation Age of Onset    Stroke Father        Social History:   Social History   Substance Use Topics    Smoking status: Never Smoker    Smokeless tobacco: Never Used    Alcohol use No       Allergies: Reviewed    Home Medications:   Current Outpatient Prescriptions on File Prior to Visit   Medication Sig  "Dispense Refill    butalbital-acetaminophen-caffeine -40 mg (FIORICET, ESGIC) -40 mg per tablet Take 1 tablet by mouth every 8 (eight) hours. 90 tablet 0    folic acid (FOLVITE) 1 MG tablet Take 1 tablet (1,000 mcg total) by mouth once daily. 100 tablet 2    ibuprofen (ADVIL,MOTRIN) 800 MG tablet Take 1 tablet (800 mg total) by mouth 2 (two) times daily. 60 tablet 3    methotrexate 2.5 MG Tab Take 10 tablets (25 mg total) by mouth every 7 days. 150 tablet 1    metoprolol succinate (TOPROL XL) 50 MG 24 hr tablet Take 1 tablet twice daily. hold if systolic blood pressure less than or equal to 100 or heart rate is less than 60 180 tablet 3    traMADol (ULTRAM) 50 mg tablet       sildenafil (VIAGRA) 100 MG tablet Take 1 tablet (100 mg total) by mouth daily as needed. 3 tablet 11     No current facility-administered medications on file prior to visit.        Physical Exam:  Vital Signs:  /70   Pulse 72   Ht 6' 7" (2.007 m)   Wt 109 kg (240 lb 4.8 oz)   BMI 27.07 kg/m²   Body mass index is 27.07 kg/m².  Physical Exam   Constitutional: He is oriented to person, place, and time. He appears well-developed and well-nourished.   HENT:   Head: Normocephalic.   Eyes: No scleral icterus.   Neck: Normal range of motion.   Cardiovascular: Normal rate and regular rhythm.    Pulmonary/Chest: Effort normal and breath sounds normal.   Abdominal: Soft. Bowel sounds are normal. He exhibits no distension. There is no tenderness.   Musculoskeletal: Normal range of motion.   Neurological: He is alert and oriented to person, place, and time.   Skin: Skin is warm and dry.   Psychiatric: He has a normal mood and affect.   Vitals reviewed.      Labs: Pertinent labs reviewed.    Assessment:  1. Anemia, unspecified type    2. Screening for colon cancer         Recommendations:  - Hemorrhoids vs GIB vs other etiologies  - Will repeat CBC in 6 weeks for trending  - FIT to confirm bleeding.  If positive, will plan for " colonoscopy.   Anemia, unspecified type  -     CBC auto differential; Future; Expected date: 07/02/2018    Screening for colon cancer  -     Fecal Immunochemical Test (iFOBT); Future; Expected date: 05/21/2018          Follow up to be determined by results of above.        Thank you so much for allowing me to participate in the care of Sachin Gonzalez    JOB Pan

## 2018-05-22 ENCOUNTER — HOSPITAL ENCOUNTER (OUTPATIENT)
Dept: RADIOLOGY | Facility: HOSPITAL | Age: 53
Discharge: HOME | End: 2018-05-22
Attending: FAMILY MEDICINE
Payer: COMMERCIAL

## 2018-05-22 DIAGNOSIS — R22.43 LOCALIZED SWELLING, MASS, OR LUMP OF LOWER EXTREMITY, BILATERAL: ICD-10-CM

## 2018-05-22 PROCEDURE — 93970 EXTREMITY STUDY: CPT | Mod: TC,PO

## 2018-05-22 PROCEDURE — 93970 EXTREMITY STUDY: CPT | Mod: 26,,, | Performed by: RADIOLOGY

## 2018-05-30 ENCOUNTER — TELEPHONE (OUTPATIENT)
Dept: PODIATRY | Facility: CLINIC | Age: 53
End: 2018-05-30

## 2018-05-30 ENCOUNTER — OFFICE VISIT (OUTPATIENT)
Dept: PODIATRY | Facility: CLINIC | Age: 53
End: 2018-05-30
Payer: COMMERCIAL

## 2018-05-30 VITALS
HEIGHT: 78 IN | HEART RATE: 80 BPM | WEIGHT: 239.88 LBS | RESPIRATION RATE: 20 BRPM | DIASTOLIC BLOOD PRESSURE: 87 MMHG | SYSTOLIC BLOOD PRESSURE: 126 MMHG | BODY MASS INDEX: 27.75 KG/M2

## 2018-05-30 DIAGNOSIS — R20.0 NUMBNESS AND TINGLING OF BOTH LOWER EXTREMITIES: Primary | ICD-10-CM

## 2018-05-30 DIAGNOSIS — R20.2 NUMBNESS AND TINGLING OF BOTH LOWER EXTREMITIES: Primary | ICD-10-CM

## 2018-05-30 PROCEDURE — 3074F SYST BP LT 130 MM HG: CPT | Mod: CPTII,S$GLB,, | Performed by: PODIATRIST

## 2018-05-30 PROCEDURE — 3008F BODY MASS INDEX DOCD: CPT | Mod: CPTII,S$GLB,, | Performed by: PODIATRIST

## 2018-05-30 PROCEDURE — 99999 PR PBB SHADOW E&M-EST. PATIENT-LVL III: CPT | Mod: PBBFAC,,, | Performed by: PODIATRIST

## 2018-05-30 PROCEDURE — 3079F DIAST BP 80-89 MM HG: CPT | Mod: CPTII,S$GLB,, | Performed by: PODIATRIST

## 2018-05-30 PROCEDURE — 99214 OFFICE O/P EST MOD 30 MIN: CPT | Mod: S$GLB,,, | Performed by: PODIATRIST

## 2018-05-30 NOTE — TELEPHONE ENCOUNTER
----- Message from Demetria Munoz sent at 5/30/2018  3:42 PM CDT -----  Contact: pt   Call pt regarding a med that he was told to get over the counter. Pt states that he don't see that name of med on his after summary.     [phone] (home)

## 2018-05-30 NOTE — TELEPHONE ENCOUNTER
Mr. PHAN Gonzalez was given a return call and he was informed per Dr. Guzman that the over the counter mediation was vicks vapor rub with mentholatum. Patient verbalized understanding, and the call ended well.

## 2018-06-05 ENCOUNTER — LAB VISIT (OUTPATIENT)
Dept: LAB | Facility: HOSPITAL | Age: 53
End: 2018-06-05
Attending: NURSE PRACTITIONER
Payer: COMMERCIAL

## 2018-06-05 DIAGNOSIS — Z12.11 SCREENING FOR COLON CANCER: ICD-10-CM

## 2018-06-05 LAB — HEMOCCULT STL QL IA: NEGATIVE

## 2018-06-05 PROCEDURE — 82274 ASSAY TEST FOR BLOOD FECAL: CPT

## 2018-06-06 ENCOUNTER — TELEPHONE (OUTPATIENT)
Dept: GASTROENTEROLOGY | Facility: CLINIC | Age: 53
End: 2018-06-06

## 2018-06-06 NOTE — TELEPHONE ENCOUNTER
----- Message from Yola Munoz sent at 6/6/2018  2:43 PM CDT -----  Contact: Patient   Patient returned called, Please call him at [phone].      Thanks  Td

## 2018-06-10 NOTE — PROGRESS NOTES
Subjective:     Patient ID: Sachin Gonzalez is a 53 y.o. male.    Chief Complaint: Foot Pain (left heel pain for a few months)    Sachin is a 53 y.o. male who presents to the clinic complaining of heel pain in the left foot,  Patient states the pain is a burning tingling pain that has been present for several months. Patient denies pain in the heel just a burning pain . Patient denies any trauma to the foot.     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    Traumatic osteoarthritis of ankle or foot    Peyronie's disease    Long-term use of immunosuppressant medication    Cephalic vein thrombosis-post op    Sinusitis    Hypertension    Acute intractable tension-type headache    Neck pain    Shortness of breath    Chest discomfort    Dysuria    Urinary frequency    Neck strain    Tension-type headache, not intractable    Myofascial pain    Sialadenitis    Irritation of both ears    Right foot pain    Numbness and tingling of both lower extremities    Localized swelling, mass, or lump of lower extremity, bilateral    BRBPR (bright red blood per rectum)       Medication List with Changes/Refills   Current Medications    BUTALBITAL-ACETAMINOPHEN-CAFFEINE -40 MG (FIORICET, ESGIC) -40 MG PER TABLET    Take 1 tablet by mouth every 8 (eight) hours.    FOLIC ACID (FOLVITE) 1 MG TABLET    Take 1 tablet (1,000 mcg total) by mouth once daily.    IBUPROFEN (ADVIL,MOTRIN) 800 MG TABLET    Take 1 tablet (800 mg total) by mouth 2 (two) times daily.    METHOTREXATE 2.5 MG TAB    Take 10 tablets (25 mg total) by mouth every 7 days.    METOPROLOL SUCCINATE (TOPROL XL) 50 MG 24 HR TABLET    Take 1 tablet twice daily. hold if systolic blood pressure less than or equal to  "100 or heart rate is less than 60    SILDENAFIL (VIAGRA) 100 MG TABLET    Take 1 tablet (100 mg total) by mouth daily as needed.    TRAMADOL (ULTRAM) 50 MG TABLET           Review of patient's allergies indicates:   Allergen Reactions    No known allergies        Past Surgical History:   Procedure Laterality Date    PROSTATECTOMY         Family History   Problem Relation Age of Onset    Stroke Father        Social History     Social History    Marital status: Single     Spouse name: N/A    Number of children: N/A    Years of education: N/A     Occupational History    Not on file.     Social History Main Topics    Smoking status: Never Smoker    Smokeless tobacco: Never Used    Alcohol use No    Drug use: No    Sexual activity: Yes     Partners: Female     Other Topics Concern    Not on file     Social History Narrative    No narrative on file       Vitals:    05/30/18 1502   BP: 126/87   Pulse: 80   Resp: 20   Weight: 108.8 kg (239 lb 13.8 oz)   Height: 6' 7" (2.007 m)   PainSc: 0-No pain       Hemoglobin A1C   Date Value Ref Range Status   04/12/2017 5.7 4.5 - 6.2 % Final     Comment:     According to ADA guidelines, hemoglobin A1C <7.0% represents  optimal control in non-pregnant diabetic patients.  Different  metrics may apply to specific populations.   Standards of Medical Care in Diabetes - 2016.  For the purpose of screening for the presence of diabetes:  <5.7%     Consistent with the absence of diabetes  5.7-6.4%  Consistent with increasing risk for diabetes   (prediabetes)  >or=6.5%  Consistent with diabetes  Currently no consensus exists for use of hemoglobin A1C  for diagnosis of diabetes for children.         Review of Systems   Constitutional: Negative for chills and fever.   Respiratory: Negative for shortness of breath.    Cardiovascular: Negative for chest pain, palpitations, orthopnea, claudication and leg swelling.   Gastrointestinal: Negative for diarrhea, nausea and vomiting. "   Musculoskeletal: Negative for joint pain.   Skin: Negative for rash.   Neurological: Positive for tingling. Negative for dizziness, sensory change, focal weakness and weakness.   Psychiatric/Behavioral: Negative.              Objective:      PHYSICAL EXAM: Apperance: Alert and orient in no distress,well developed, and with good attention to grooming and body habits  Patient presents ambulating in tennis shoes.  Lower Extremity Exam:  VASCULAR: Dorsalis pedis pulses 2/4 bilateral and Posterior Tibial pulses 2/4 bilateral. Capillary fill time <4 seconds bilateral. No edema observed bilateral. Varicosities absent bilateral. Skin temperature of the lower extremities is warm to warm, proximal to distal. Hair growth WNL bilateral.  DERMATOLOGICAL: No skin rashes, subcutaneous nodules, lesions, or ulcers observed bilateral.  NEUROLOGICAL: Light touch, sharp-dull, proprioception all present and equal bilaterally.  Vibratory sensation intact at bilateral hallux. Protective sensation intact at all 10 sites as tested with a [place] 5.07 monofilament.   MUSCULOSKELETAL: Muscle strength is 5/5 for foot inverters, everters, plantarflexors, and dorsiflexors. Muscle tone is normal. (--) pain on palpation of left heel.         Assessment:       Encounter Diagnosis   Name Primary?    Numbness and tingling of both lower extremities Yes         Plan:   Numbness and tingling of both lower extremities      I counseled the patient on his conditions, regarding findings of my examination, my impressions, and usual treatment plan.   Discussed with patient treatments for neuropathy consisting of topical or oral medication.  Counseled patient on further testing such as EMG test that has been ordered by his PCP.  Recommendations given for over-the-counter medicine such as [employer] Go[employer] and/or Capsaicin.  Counseled patient on daily foot inspections and proper shoe gear.  Patient to return as needed.                Reina Guzman,  DPM [facility]

## 2018-06-12 ENCOUNTER — OFFICE VISIT (OUTPATIENT)
Dept: PHYSICAL MEDICINE AND REHAB | Facility: CLINIC | Age: 53
End: 2018-06-12
Payer: COMMERCIAL

## 2018-06-12 VITALS
WEIGHT: 239 LBS | HEIGHT: 78 IN | SYSTOLIC BLOOD PRESSURE: 123 MMHG | RESPIRATION RATE: 14 BRPM | DIASTOLIC BLOOD PRESSURE: 80 MMHG | HEART RATE: 89 BPM | BODY MASS INDEX: 27.65 KG/M2

## 2018-06-12 DIAGNOSIS — M54.16 CHRONIC LUMBAR RADICULOPATHY: ICD-10-CM

## 2018-06-12 PROCEDURE — 99999 PR PBB SHADOW E&M-EST. PATIENT-LVL III: CPT | Mod: PBBFAC,,, | Performed by: PHYSICAL MEDICINE & REHABILITATION

## 2018-06-12 PROCEDURE — 3074F SYST BP LT 130 MM HG: CPT | Mod: CPTII,S$GLB,, | Performed by: PHYSICAL MEDICINE & REHABILITATION

## 2018-06-12 PROCEDURE — 95909 NRV CNDJ TST 5-6 STUDIES: CPT | Mod: S$GLB,,, | Performed by: PHYSICAL MEDICINE & REHABILITATION

## 2018-06-12 PROCEDURE — 95885 MUSC TST DONE W/NERV TST LIM: CPT | Mod: S$GLB,,, | Performed by: PHYSICAL MEDICINE & REHABILITATION

## 2018-06-12 PROCEDURE — 99214 OFFICE O/P EST MOD 30 MIN: CPT | Mod: 25,S$GLB,, | Performed by: PHYSICAL MEDICINE & REHABILITATION

## 2018-06-12 PROCEDURE — 3008F BODY MASS INDEX DOCD: CPT | Mod: CPTII,S$GLB,, | Performed by: PHYSICAL MEDICINE & REHABILITATION

## 2018-06-12 PROCEDURE — 3079F DIAST BP 80-89 MM HG: CPT | Mod: CPTII,S$GLB,, | Performed by: PHYSICAL MEDICINE & REHABILITATION

## 2018-06-12 NOTE — PATIENT INSTRUCTIONS
Exercises to Strengthen Your Lower Back  Strong lower back and abdominal muscles work together to support your spine. The exercises below will help strengthen the lower back. It is important that you begin exercising slowly and increase levels gradually.  Always begin any exercise program with stretching. If you feel pain while doing any of these exercises, stop and talk to your doctor about a more specific exercise program that better suits your condition.   Low back stretch  The point of stretching is to make you more flexible and increase your range of motion. Stretch only as much as you are able. Stretch slowly. Do not push your stretch to the limit. If at any point you feel pain while stretching, this is your (temporary) limit.  · Lie on your back with your knees bent and both feet on the ground.  · Slowly raise your left knee to your chest as you flatten your lower back against the floor. Hold for 5 seconds.  · Relax and repeat the exercise with your right knee.  · Do 10 of these exercises for each leg.  · Repeat hugging both knees to your chest at the same time.  Building lower back strength  Start your exercise routine with 10 to 30 minutes a day, 1 to 3 times a day.  Initial exercises  Lying on your back:  1. Ankle pumps: Move your foot up and down, towards your head, and then away. Repeat 10 times with each foot.  2. Heel slides: Slowly bend your knee, drawing the heel of your foot towards you. Then slide your heel/foot from you, straightening your knee. Do not lift your foot off the floor (this is not a leg lift).  3. Abdominal contraction: Bend your knees and put your hands on your stomach. Tighten your stomach muscles. Hold for 5 seconds, then relax. Repeat 10 times.  4. Straight leg raise: Bend one leg at the knee and keep the other leg straight. Tighten your stomach muscles. Slowly lift your straight leg 6 to 12 inches off the floor and hold for up to 5 seconds. Repeat 10 times on each  side.  Standin. Wall squats: Stand with your back against the wall. Move your feet about 12 inches away from the wall. Tighten your stomach muscles, and slowly bend your knees until they are at about a 45 degree angle. Do not go down too far. Hold about 5 seconds. Then slowly return to your starting position. Repeat 10 times.  2. Heel raises: Stand facing the wall. Slowly raise the heels of your feet up and down, while keeping your toes on the floor. If you have trouble balancing, you can touch the wall with your hands. Repeat 10 times.  More advanced exercises  When you feel comfortable enough, try these exercises.  1. Kneeling lumbar extension: Begin on your hands and knees. At the same time, raise and straighten your right arm and left leg until they are parallel to the ground. Hold for 2 seconds and come back slowly to a starting position. Repeat with left arm and right leg, alternating 10 times.  2. Prone lumbar extension: Lie face down, arms extended overhead, palms on the floor. At the same time, raise your right arm and left leg as high as comfortably possible. Hold for 10 seconds and slowly return to start. Repeat with left arm and right leg, alternating 10 times. Gradually build up to 20 times. (Advanced: Repeat this exercise raising both arms and both legs a few inches off the floor at the same time. Hold for 5 seconds and release.)  3. Pelvic tilt: Lie on the floor on your back with your knees bent at 90 degrees. Your feet should be flat on the floor. Inhale, exhale, then slowly contract your abdominal muscles bringing your navel toward your spine. Let your pelvis rock back until your lower back is flat on the floor. Hold for 10 seconds while breathing smoothly.  4. Abdominal crunch: Perform a pelvic tilt (above) flattening your lower back against the floor. Holding the tension in your abdominal muscles, take another breath and raise your shoulder blades off the ground (this is not a full sit-up).  Keep your head in line with your body (dont bend your neck forward). Hold for 2 seconds, then slowly lower.  Date Last Reviewed: 6/1/2016  © 5122-4117 [employer]. [address] [place] [address]. All rights reserved. This information is not intended as a substitute for professional medical care. Always follow your healthcare professional's instructions.        Possible Causes of Low Back or Leg Pain    The symptoms in your back or leg may be due to pressure on a nerve. This pressure may be caused by a damaged disk or by abnormal bone growth. Either way, you may feel pain, burning, tingling, or numbness. If you have pressure on a nerve that connects to the sciatic nerve, pain may shoot down your leg.    Pressure from the disk  Constant wear and tear can weaken a disk over time and cause back pain. The disk can then be damaged by a sudden movement or injury. If its soft center begins to bulge, the disk may press on a nerve. Or the outside of the disk may tear, and the soft center may squeeze through and pinch a nerve.    Pressure from bone  As a disk wears out, the vertebrae right above and below the disk begin to touch. This can put pressure on a nerve. Often, abnormal bone (called bone spurs) grows where the vertebrae rub against each other. This can cause the foramen or the spinal canal to narrow (called stenosis) and press against a nerve.  Date Last Reviewed: 10/4/2015  © 5386-1264 [employer]. [address] [place] [address]. All rights reserved. This information is not intended as a substitute for professional medical care. Always follow your healthcare professional's instructions.        Causes of Lumbar (Low Back) Pain  Low back pain can be caused by problems with any part of the lumbar spine. A disk can herniate (push out) and press on a nerve. Vertebrae can rub against each other or slip out of place. This can irritate facet joints and nerves. It can also  lead to stenosis, a narrowing of the spinal canal or foramen.  Pressure from a disk  Constant wear and tear on a disk can cause it to weaken and push outward. Part of the disk may then press on nearby nerves. There are two common types of herniated disks:  Contained means the soft nucleus is protruding outward.   Extruded means the firm annulus has torn, letting the soft center squeeze through.     Pressure from bone  An unstable spine   With age, a disk may thin and wear out. Vertebrae above and below the disk may begin to touch. This can put pressure on nerves. It can also cause bone spurs (growths) to form where the bones rub together.    Stenosis results when bone spurs narrow the foramen or spinal canal. This also puts pressure on nerves. Slipping vertebrae can irritate nerves and joints. They can also worsen stenosis.    In some cases, vertebrae become unstable and slip forward. This is called spondylolisthesis.     Date Last Reviewed: 10/12/2015  © 4532-8257 [employer]. [address] [place] [address], [place] [address]. All rights reserved. This information is not intended as a substitute for professional medical care. Always follow your healthcare professional's instructions.        Relieving Back Pain  Back pain is a common problem. You can strain back muscles by lifting too much weight or just by moving the wrong way. Back strain can be uncomfortable, even painful. And it can take weeks or months to improve. To help yourself feel better and prevent future back strains, try these tips.  Important Note: Do not give aspirin to children or teens without first discussing it with your healthcare provider.      ? Ice    Ice reduces muscle pain and swelling. It helps most during the first 24 to 48 hours after an injury.  · Wrap an ice pack or a bag of frozen peas in a thin towel. (Never place ice directly on your skin.)  · Place the ice where your back hurts the most.  · Dont ice for more than 20 minutes  at a time.  · You can use ice several times a day.  ? Medicines  Over-the-counter pain relievers can include acetaminophen and anti-inflammatory medicines, which includes aspirin or ibuprofen. They can help ease discomfort. Some also reduce swelling.  · Tell your healthcare provider about any medicines you are already taking.  · Take medicines only as directed.  ? Heat  After the first 48 hours, heat can relax sore muscles and improve blood flow.  · Try a warm bath or shower. Or use a heating pad set on low. To prevent a burn, keep a cloth between you and the heating pad.  · Dont use a heating pad for more than 15 minutes at a time. Never sleep on a heating pad.  Date Last Reviewed: 9/1/2015  © 7439-2555 [employer]. [address] [place] [address], [place] [address]. All rights reserved. This information is not intended as a substitute for professional medical care. Always follow your healthcare professional's instructions.        Back Safety: Poor Posture Hurts  An unhealthy spine often starts with bad habits. Poor movement patterns and posture problems are common causes of back pain. Disk, bone, nerve, and soft tissue problems can all be affected by poor posture. They can lead to pain, stiffness, and other symptoms.    Poor posture backfires  Poor posture can cause pain. Too much slouching puts increased pressure on the disks. An excessive lumbar curve can overload and inflame the vertebrae. As a result, the back muscles may tighten or spasm to splint and protect the spine. This adds to the pain you feel.    Proper posture: The key to safe movement  Your spine bears your weight throughout the day. This is true whether youre sleeping, standing, or bending. Certain positions strain your spine more than others. But by maintaining proper posture in all positions, you can reduce the stress on your spine.       To improve your standing posture, follow these steps:  · Breathe deeply.  · Relax your shoulders,  hips, and ankles. · Think of the ears, shoulders, hips, and ankles as a series of dots. Now, adjust your body to connect the dots in a straight line.  · Tuck your buttocks in just a bit if you need to.      Date Last Reviewed: 10/28/2015  © 2742-1769 [employer]. [address] [place] [address], [place] [id number]. All rights reserved. This information is not intended as a substitute for professional medical care. Always follow your healthcare professional's instructions.        Caring for Your Back Throughout the Day  Take care of your back throughout the day. You will likely have fewer back problems if you do. Try to warm up before you move. Shift positions often. Also do your best to form healthy habits.    Warm up for the day  Do a few slow, catlike stretches before starting your day. This simple warmup can soften your disks, stretch your back muscles, and help prevent injuries.  Shift positions often  At work and at home, change positions often. This helps keep your body from getting stiff. Stand up or lean back while you sit. If you can, get up and move every 1/2 hour.  Form healthy habits  Here are some suggestions:   · Keep a healthy weight. When you weigh too much, your back is under excess strain. But losing just a few extra pounds can help a lot.  · Try not to overeat. Learn about serving sizes. The size of a serving depends on the food and the food group. Many foods list serving sizes on the labels.  · Handle minor aches with cold and heat. Apply cold the first 24 to 48 hours. Use heat after that. Always place a thin cloth between your skin and the source of cold or heat.  · Take medicines as directed. This helps keep pain under control. Always read labels, and call your healthcare provider or pharmacist if you have any questions.  Walk each day  A daily walk keeps your back and thigh muscles stretched and strong. This gives your back better support. Be sure to walk with your spines three curves  aligned, by keeping your head, hips, and toes connected by a vertical line.   Date Last Reviewed: 10/18/2015  © 7615-0269 [employer]. [address] [place] [address]. All rights reserved. This information is not intended as a substitute for professional medical care. Always follow your healthcare professional's instructions.        Relieving Tension in Your Back  Being relaxed helps keep your mind healthy and your back ready to move. Take short breaks often. Walk around. Stretch. Switch tasks. Also give the following a try.  Make time to relax. Start by setting aside 5 minutes daily.   Deep breathing    Deep breathing is a simple way to reduce stress. You can do it almost any time you need to relax.  · Inhale slowly through your nose. Let your lungs and stomach expand.  · Hold your breath for 2 to 3 seconds.  · Exhale slowly through your mouth until your lungs feel empty. Repeat 3 to 4 times.  Relieve tension  Muscle tension can create tender spots called trigger points. The tips below may help relieve muscle tension.  · Press the trigger point if you can reach it. If not, lie on a soft tennis ball, or ask a friend to press the spot. Use steady pressure for 10 to 15 seconds. Breathe deeply. Repeat a few times.  · Massage trigger points with ice for 2 to 5 minutes. Press lightly at first. Slowly increase firmness.  Date Last Reviewed: 10/18/2015  © 3694-9300 [employer]. [address] [place] [address]. All rights reserved. This information is not intended as a substitute for professional medical care. Always follow your healthcare professional's instructions.        Back Safety: Basics of Good Posture  Good posture helps protect you from injury. It also increases your comfort. Aim for good posture throughout the day.    Check your posture  The human body works best when it is properly aligned. To improve your standing posture, follow these steps:  · Take a moment to  close your eyes and feel your body. Then breathe deeply and relax your shoulders, hips, and knees.  · Now, from the very top of your head, lift up just a bit. Think of a line linking your ears, shoulders, hips, and ankles. Adjust your body to follow the line. You may need to relax your hips and tuck your buttocks under a bit.  · Next, take a look at yourself in a mirror. Is one ear, shoulder, or hip higher than the other? They should be level.  Check how you sit  When you sit properly, pressure on your back is reduced. Try these steps:  · Sit so that the curve of your lower back fits easily against the chair. Keep your gaze level.  · Support your feet. They should be flat on the floor or on a footrest. Your knees should be level with your hips.  · Adjust the chair height as needed. Sit so your forearms are level with the work surface.  Proper posture helps  When your back is aligned, its more likely to stay safe throughout the day.  · Standing in place. Rest one foot on a stool or low box to ease pressure on your lower back. Switch feet often. If you can, adjust the height of your work surface so your neck and shoulders arent under strain.  · Driving. Sit close enough to the steering wheel to keep your knees slightly bent. For comfort, your knees should be level with your hips or just a bit lower. Sit as straight as you can. The curve of your lower back should be fully supported.  · Walking. Stand tall and walk with your head up. Let your arms swing while you walk. This helps relax muscles. Wear shoes that fit and support your feet. If you will be standing or walking for a long time, dont wear high heels.  · Sitting and sleeping. Choose your furniture with care. Make sure its not causing or increasing your back pain. Chairs should allow for comfortable, correct sitting posture. Use pillows for added support if needed. Your bed should support your backs natural curves without being too hard or too soft.  Date  Last Reviewed: 10/18/2015  © 3882-4377 [employer]. [address] [place] [address], [place] [address]. All rights reserved. This information is not intended as a substitute for professional medical care. Always follow your healthcare professional's instructions.        Understanding Lumbar Radiculopathy    Lumbar radiculopathy is irritation or inflammation of a nerve root in the low back. It causes symptoms that spread out from the back down one or both legs. To understand this condition, it helps to understand the parts of the spine:  · Vertebrae. These are bones that stack to form the spine. The lumbar spine contains the 5 bottom vertebrae.  · Disks. These are soft pads of tissue between the vertebrae. They act as shock absorbers for the spine.  · Spinal canal. This is a tunnel formed within the stacked vertebrae. In the lumbar spine, nerves run through this canal.  · Nerves. These branch off and leave the spinal canal, traveling out to parts of the body. As they leave the spinal canal, nerves pass through openings between the vertebrae. The nerve root is the part of the nerve that is closest to the spinal canal.  · Sciatic nerve. This is a large nerve formed from several nerve roots in the low back. This nerve extends down the back of the leg to the foot.  With lumbar radiculopathy, nerve roots in the low back become irritated. This leads to pain and symptoms. The sciatic nerve is commonly involved, so the condition is often called sciatica.  What causes lumbar radiculopathy?  Aging, injury, poor posture, extra body weight, and other issues can lead to problems in the low back. These problems may then irritate nerve roots. They include:  · Damage to a disk in the lumbar spine. The damaged disk may then press on nearby nerve roots.  · Degeneration from wear and tear, and aging. This can lead to narrowing (stenosis) of the openings between the vertebrae. The narrowed openings press on nerve roots as they  leave the spinal canal.  · Unstable spine. This is when a vertebra slips forward. It can then press on a nerve root.  Other, less common things can put pressure on nerves in the low back. These include diabetes, infection, or a tumor.  Symptoms of lumbar radiculopathy  These include:  · Pain in the low back  · Pain, numbness, tingling, or weakness that travels into the buttocks, hip, groin, or leg  · Muscle spasms  Treatment for lumbar radiculopathy  In most cases, your healthcare provider will first try treatments that help relieve symptoms. These may include:  · Prescription and over-the-counter pain medicines. These help relieve pain, swelling, and irritation.  · Limits on positions and activities that increase pain. But lying in bed or avoiding all movement is only recommended for a short period of time.  · Physical therapy, including exercises and stretches. This helps decrease pain and increase movement and function.  · Steroid shots into the lower back. This may help relieve symptoms for a time.  · Weight-loss program. If you are overweight, losing extra pounds may help relieve symptoms.  In some cases, you may need surgery to fix the underlying problem. This depends on the cause, the symptoms, and how long the pain has lasted.  Possible complications  Over time, an irritated and inflamed nerve may become damaged. This may lead to long-lasting (permanent) numbness or weakness in your legs and feet. If symptoms change suddenly or get worse, be sure to let your healthcare provider know.  When to call your healthcare provider  Call your healthcare provider right away if you have any of these:  · New pain or pain that gets worse  · New or increasing weakness, tingling, or numbness in your leg or foot  · Problems controlling your bladder or bowel   Date Last Reviewed: 3/10/2016  © 6915-3445 [employer]. [address] [place] [address], [place] [address]. All rights reserved. This information is not intended  as a substitute for professional medical care. Always follow your healthcare professional's instructions.

## 2018-06-12 NOTE — PROGRESS NOTES
[facility] [address] [place] [address]  Phone: [phone]          Full Name: Sachin Gonzalez YOB: 1965  Patient ID: [id number]      Visit Date: 6/12/2018 14:33  Age: 53 Years 0 Months Old  Examining Physician: Zayra Arroyo M.D.  Referring Physician: Dr Dick  Reason for Referral: atif pain          Chief Complaint   Patient presents with    Numbness     legs       HPI: This is a 53 y.o.  male being seen in clinic today for evaluation of bilateral leg numbness/tingling mostly in the bottom of his feet. His left foot is worse than the right and has persisted over the past several months.  With prolonged standing/walking his symptoms can worsen.  Rest or sitting provides some relief.  He denies weakness in his legs and only occasional low back pain.    History obtained from patient    Past family, medical, social, and surgical history reviewed in chart    Review of Systems:     General- denies lethargy, weight change, fever, chills  Head/neck- denies swallowing difficulties  ENT- denies hearing changes  Cardiovascular-denies chest pain  Pulmonary- denies shortness of breath  GI- denies constipation or bowel incontinence  - denies bladder incontinence  Skin- denies wounds or rashes  Musculoskeletal- denies weakness, +pain  Neurologic- +numbness and tingling  Psychiatric- denies depressive or psychotic features, denies anxiety  Lymphatic-denies swelling  Endocrine- denies hypoglycemic symptoms/DM history  All other pertinent systems negative     Physical Examination:  General: Well developed, well nourished male, NAD  HEENT:NCAT EOMI bilaterally   Pulmonary:Normal respirations    Spinal Examination: CERVICAL  Active ROM is within normal limits.  Inspection: No deformity of spinal alignment.    Spinal Examination: LUMBAR or THORACIC  Active ROM is within normal limits.  Inspection: No deformity of spinal alignment.    Palpation: No vertebral tenderness to percussion.       Bilateral Upper and Lower Extremities:  Pulses are 2+ at radial bilaterally.  Shoulder/Elbow/Wrist/Hand ROM   Hip/Knee/Ankle ROM wnl  Bilateral Extremities show normal capillary refill.  No signs of cyanosis, rubor, edema, skin changes, or dysvascular changes of appendages.  Nails appear intact.    Neurological Exam:  Cranial Nerves:  II-XII grossly intact    Manual Muscle Testing: (Motor 5=normal)  5/5 strength bilateral lower extremities    No focal atrophy is noted of either \ lower extremity.    Bilateral Reflexes:hypo at patellar  No clonus at knee or ankle.    Sensation: tested to light touch  - intact in legs    Gait: Narrow base and good arm swing.      Entire procedure explained to patient prior to proceeding.  Verbal consent obtained      SNC      Nerve / Sites Rec. Site Onset Lat Peak Lat Amp Segments Distance Velocity     ms ms µV  mm m/s   L Sural - Ankle (Calf)      Calf Ankle NR NR NR Calf - Ankle 140 NR   R Sural - Ankle (Calf)      Calf Ankle NR NR NR Calf - Ankle 140 NR       MNC      Nerve / Sites Muscle Latency Amplitude Duration Rel Amp Segments Distance Lat Diff Velocity     ms mV ms %  mm ms m/s   L Peroneal - EDB      Ankle EDB 7.8 0.9 4.3 100 Ankle - EDB 80        Fib head EDB 17.2 0.8 5.5 91.7 Fib head - Ankle 430 9.4 46      Pop fossa EDB 19.4 0.7 4.4 83.6 Pop fossa - Fib head 100 2.2 45         Pop fossa - Ankle  11.6    R Peroneal - EDB      Ankle EDB 6.0 0.3 9.0 100 Ankle - EDB 80        Fib head EDB 16.6 0.3 11.4 96 Fib head - Ankle 420 10.6 40      Pop fossa EDB 19.1 0.3 10.5 118 Pop fossa - Fib head 100 2.4 41         Pop fossa - Ankle  13.1    L Tibial - AH      Ankle AH 13.3 0.0 5.1 100 Ankle - AH 80        Pop fossa AH 17.7 0.0 5.1 225 Pop fossa - Ankle 500 4.3 116   R Tibial - AH      Ankle AH 15.4 0.0 5.6 100 Ankle - AH 80        Pop fossa AH 25.4 0.0 5.4 104 Pop fossa - Ankle 530 10.0 53       EMG            EMG Summary Table     Spontaneous MUAP Recruitment   Muscle IA Fib  PSW Fasc Other Amp Dur Polys Pattern Effort   R. Gastrocnemius (Medial head) N None None None . N N N Red max   R. Tibialis anterior N None None None . N N N Red max   R. Abductor hallucis Decr None None None .    1-2 muap Sub Max   R. Extensor digitorum brevis N None None None . N n 1+ Reduced Max   R. Vastus lateralis N None None None . N n N N Max                          INTERPRETATION  -Bilateral sural sensory nerve  conduction study showed no response to stimulation  -Bilateral peroneal motor nerve conduction study showed prolonged latency, dec amplitude, and normal conduction velocity  -Bilateral tibial motor nerve conduction study showed prolonged latency, significantly dec amplitude, and conduction velocity    IMPRESSION  1. ABNORMAL study  2. There is electrodiagnostic evidence of a CHRONIC radiculopathy of the S1 and L5 nerve roots bilaterally    PLAN  1. Follow up with referring provider: Dr. Domo Dick  2. Handouts on lumbar radiculopathy, back care, exercise, etc provided. Consider referral to PT to focus on lower ext strength, desensitization. Try low dose gabapentin for symptom relief.   3. This study is good for one year. If symptoms worsen or do not improve, please re-consult.    Zayra Arroyo M.D.  Physical Medicine and Rehab

## 2018-06-12 NOTE — LETTER
June 12, 2018      Domo Dick MD  [address] [place] [address]           [facility] - Physiatry  [address] [place] [address]  Phone: [phone]  Fax: [phone]          Patient: Sachin Gonzalez   MR Number: [id number]   YOB: 1965   Date of Visit: 6/12/2018       Dear Dr. Domo Dick:    Thank you for referring Sachin Gonzalez to me for evaluation. Attached you will find relevant portions of my assessment and plan of care.    If you have questions, please do not hesitate to call me. I look forward to following Sachin Gonzalez along with you.    Sincerely,    Zayra Arroyo MD    Enclosure  CC:  No Recipients    If you would like to receive this communication electronically, please contact [email or web address] or [phone] to request more information on [employer] access.    For providers and/or their staff who would like to refer a patient to [facility], please contact us through our one-stop-shop provider referral line, [facility], at [phone].    If you feel you have received this communication in error or would no longer like to receive these types of communications, please e-mail [email or web address]

## 2018-06-13 ENCOUNTER — OFFICE VISIT (OUTPATIENT)
Dept: INTERNAL MEDICINE | Facility: CLINIC | Age: 53
End: 2018-06-13
Payer: COMMERCIAL

## 2018-06-13 ENCOUNTER — TELEPHONE (OUTPATIENT)
Dept: INTERNAL MEDICINE | Facility: CLINIC | Age: 53
End: 2018-06-13

## 2018-06-13 VITALS
BODY MASS INDEX: 28.24 KG/M2 | WEIGHT: 244.06 LBS | TEMPERATURE: 97 F | DIASTOLIC BLOOD PRESSURE: 78 MMHG | SYSTOLIC BLOOD PRESSURE: 128 MMHG | HEIGHT: 78 IN | HEART RATE: 108 BPM

## 2018-06-13 DIAGNOSIS — M54.16 CHRONIC RADICULAR LUMBAR PAIN: Primary | ICD-10-CM

## 2018-06-13 DIAGNOSIS — G89.29 CHRONIC RADICULAR LUMBAR PAIN: Primary | ICD-10-CM

## 2018-06-13 PROCEDURE — 99999 PR PBB SHADOW E&M-EST. PATIENT-LVL III: CPT | Mod: PBBFAC,,, | Performed by: FAMILY MEDICINE

## 2018-06-13 PROCEDURE — 3074F SYST BP LT 130 MM HG: CPT | Mod: CPTII,S$GLB,, | Performed by: FAMILY MEDICINE

## 2018-06-13 PROCEDURE — 3078F DIAST BP <80 MM HG: CPT | Mod: CPTII,S$GLB,, | Performed by: FAMILY MEDICINE

## 2018-06-13 PROCEDURE — 3008F BODY MASS INDEX DOCD: CPT | Mod: CPTII,S$GLB,, | Performed by: FAMILY MEDICINE

## 2018-06-13 PROCEDURE — 99214 OFFICE O/P EST MOD 30 MIN: CPT | Mod: S$GLB,,, | Performed by: FAMILY MEDICINE

## 2018-06-13 RX ORDER — GABAPENTIN 300 MG/1
300 CAPSULE ORAL 3 TIMES DAILY
Qty: 90 CAPSULE | Refills: 0 | Status: SHIPPED | OUTPATIENT
Start: 2018-06-13 | End: 2018-08-30 | Stop reason: SDUPTHER

## 2018-06-13 NOTE — PROGRESS NOTES
Subjective:       Patient ID: Sachin Gonzalez is a 53 y.o. male.    Chief Complaint: Results    HPI  Review of Systems   Constitutional: Negative.    Respiratory: Negative.    Gastrointestinal: Negative.    Genitourinary: Negative.    Musculoskeletal: Negative.    Hematological: Negative.    Psychiatric/Behavioral: Negative.        Objective:      Physical Exam   Constitutional: He is oriented to person, place, and time. He appears well-developed and well-nourished.   Cardiovascular: Normal rate and regular rhythm.    Pulmonary/Chest: Effort normal and breath sounds normal.   Abdominal: Soft. Bowel sounds are normal.   Musculoskeletal:        Lumbar back: He exhibits normal range of motion, no tenderness, no pain and no spasm.   Neurological: He is alert and oriented to person, place, and time.   Skin: Skin is warm and dry.       Assessment:       1. Chronic radicular lumbar pain        Plan:       Chronic radicular lumbar pain  Comments:  Will do MRI of the lumbar spine. Will start on Gabapentin 300 mg po tid work up too  Orders:  -     MRI Lumbar Spine Without Contrast; Future; Expected date: 06/13/2018    Other orders  -     gabapentin (NEURONTIN) 300 MG capsule; Take 1 capsule (300 mg total) by mouth 3 (three) times daily.  Dispense: 90 capsule; Refill: 0

## 2018-06-13 NOTE — PATIENT INSTRUCTIONS
Gabapentin:      5 days: 1 tablet at night       Day 6:     1 tablet in morning and 1 at night.      Day 11:      1 tablet in morning, noon and night

## 2018-06-13 NOTE — TELEPHONE ENCOUNTER
----- Message from Domo Dick MD sent at 6/12/2018  4:18 PM CDT -----  Lets get pt in for appt. Review EMG  ----- Message -----  From: Zayra Arroyo MD  Sent: 6/12/2018   3:08 PM  To: Reina Guzman DPM, Domo Dick MD

## 2018-06-27 ENCOUNTER — PATIENT OUTREACH (OUTPATIENT)
Dept: ADMINISTRATIVE | Facility: HOSPITAL | Age: 53
End: 2018-06-27

## 2018-07-03 ENCOUNTER — TELEPHONE (OUTPATIENT)
Dept: RADIOLOGY | Facility: HOSPITAL | Age: 53
End: 2018-07-03

## 2018-07-05 ENCOUNTER — HOSPITAL ENCOUNTER (OUTPATIENT)
Dept: RADIOLOGY | Facility: HOSPITAL | Age: 53
Discharge: HOME | End: 2018-07-05
Attending: FAMILY MEDICINE
Payer: COMMERCIAL

## 2018-07-05 ENCOUNTER — TELEPHONE (OUTPATIENT)
Dept: INTERNAL MEDICINE | Facility: CLINIC | Age: 53
End: 2018-07-05

## 2018-07-05 DIAGNOSIS — M54.16 CHRONIC RADICULAR LUMBAR PAIN: ICD-10-CM

## 2018-07-05 DIAGNOSIS — G89.29 CHRONIC RADICULAR LUMBAR PAIN: ICD-10-CM

## 2018-07-05 NOTE — PROGRESS NOTES
Patient states that MD informed him that his head was not going into magnet.  Patient will contact ordering MD for sedative and further instructions.

## 2018-07-05 NOTE — TELEPHONE ENCOUNTER
Patient was scheduled for MRI this morning and could not complete it. Please place external referral for open MRI

## 2018-07-06 DIAGNOSIS — M54.16 LUMBAR RADICULOPATHY, CHRONIC: Primary | ICD-10-CM

## 2018-07-10 ENCOUNTER — PATIENT MESSAGE (OUTPATIENT)
Dept: GASTROENTEROLOGY | Facility: CLINIC | Age: 53
End: 2018-07-10

## 2018-07-11 ENCOUNTER — TELEPHONE (OUTPATIENT)
Dept: INTERNAL MEDICINE | Facility: CLINIC | Age: 53
End: 2018-07-11

## 2018-07-11 DIAGNOSIS — M54.16 LUMBAR RADICULOPATHY, CHRONIC: Primary | ICD-10-CM

## 2018-07-11 NOTE — TELEPHONE ENCOUNTER
----- Message from Ana Patrick sent at 7/11/2018  3:35 PM CDT -----  Pt is requesting a call from nurse to discuss [place] images dosen't have any opening.          Please call pt back at [phone]

## 2018-07-11 NOTE — TELEPHONE ENCOUNTER
----- Message from Deepthi Ng sent at 7/11/2018 10:36 AM CDT -----  Contact: Pt.   Pt is calling regarding  [facility] is  waiting on insurance AUTHORIZATION. ...[phone] (home)

## 2018-07-16 ENCOUNTER — TELEPHONE (OUTPATIENT)
Dept: INTERNAL MEDICINE | Facility: CLINIC | Age: 53
End: 2018-07-16

## 2018-07-16 NOTE — TELEPHONE ENCOUNTER
----- Message from Yola Munoz sent at 7/16/2018  9:07 AM CDT -----  Contact: Patient   Patient said that he has the DVD for his MRI and wants to know what to do with it. Please call him at [phone].    Thanks  td

## 2018-07-16 NOTE — TELEPHONE ENCOUNTER
----- Message from Ashleigh Bonilla sent at 7/16/2018  9:14 AM CDT -----  Contact: pt-  [phone]  [facility]- is needing correct fax number.  They state they sent paperwork Friday.  Pt would not take fax number from me.

## 2018-07-19 ENCOUNTER — TELEPHONE (OUTPATIENT)
Dept: INTERNAL MEDICINE | Facility: CLINIC | Age: 53
End: 2018-07-19

## 2018-07-19 NOTE — TELEPHONE ENCOUNTER
----- Message from Terrie José sent at 7/19/2018  2:28 PM CDT -----  Contact: PT  He's calling in regards to mri results pls call pt back at  [phone] (home)

## 2018-07-19 NOTE — TELEPHONE ENCOUNTER
Patient notified I did see his mri results, but would call to find out I also [facility] and was told that the report has been faxed several times already to [phone]. I informed them that was the wrong fax number and to please refax to [phone]

## 2018-07-20 ENCOUNTER — TELEPHONE (OUTPATIENT)
Dept: INTERNAL MEDICINE | Facility: CLINIC | Age: 53
End: 2018-07-20

## 2018-07-20 DIAGNOSIS — M48.061 LUMBAR FORAMINAL STENOSIS: Primary | ICD-10-CM

## 2018-07-23 ENCOUNTER — TELEPHONE (OUTPATIENT)
Dept: INTERNAL MEDICINE | Facility: CLINIC | Age: 53
End: 2018-07-23

## 2018-07-23 NOTE — TELEPHONE ENCOUNTER
----- Message from Jessica Byrne sent at 7/23/2018  9:36 AM CDT -----  Contact: self [phone]  States that he needs the dos and don'ts of his diagnosis and a letter with his diagnosis. Please call back at [phone]//thank you acc

## 2018-07-24 ENCOUNTER — TELEPHONE (OUTPATIENT)
Dept: INTERNAL MEDICINE | Facility: CLINIC | Age: 53
End: 2018-07-24

## 2018-07-24 NOTE — TELEPHONE ENCOUNTER
----- Message from Lisbeth Estes sent at 7/24/2018  8:45 AM CDT -----  Contact: pt  Pt is requesting a call back from the nurse in regards to getting his MRI results and the do's and don't of what the DrErasmowant him to do or don't do.  [phone] (home)

## 2018-07-24 NOTE — TELEPHONE ENCOUNTER
Patient would like some recommendation of Do's and Don'ts from his MRI. He would like it in a letter form with his diagnosis from the MRI.

## 2018-07-30 ENCOUNTER — TELEPHONE (OUTPATIENT)
Dept: INTERNAL MEDICINE | Facility: CLINIC | Age: 53
End: 2018-07-30

## 2018-07-30 NOTE — TELEPHONE ENCOUNTER
Returned patient's call. Informed pt that requested letter is not ready.  Will call once provider has completed it.

## 2018-07-30 NOTE — TELEPHONE ENCOUNTER
----- Message from Lisbeth Estes sent at 7/30/2018  2:32 PM CDT -----  Contact: pt   Pt is requesting a call  Back from the nurse in regards to getting the do's and don'ts for his job and a copy of  His MRI  Results for his job  [phone] (home)

## 2018-08-02 ENCOUNTER — TELEPHONE (OUTPATIENT)
Dept: INTERNAL MEDICINE | Facility: CLINIC | Age: 53
End: 2018-08-02

## 2018-08-02 NOTE — TELEPHONE ENCOUNTER
Patient stated that he is calling in regards to a letter that states the do's and dont's in regards to his MRI and the diagnosis of the MRI. Patient stated that his job is waiting on letter

## 2018-08-02 NOTE — TELEPHONE ENCOUNTER
----- Message from Zahra Lane sent at 8/2/2018  3:40 PM CDT -----  Contact: self/[phone]  Would like to consult with nurse regarding test result and his do and don't, patient states his job is waiting on a respond, please call back at [phone]. Thanks/ar

## 2018-08-07 ENCOUNTER — TELEPHONE (OUTPATIENT)
Dept: INTERNAL MEDICINE | Facility: CLINIC | Age: 53
End: 2018-08-07

## 2018-08-07 NOTE — TELEPHONE ENCOUNTER
----- Message from Ena Norris sent at 8/7/2018 11:38 AM CDT -----  Contact: Patient  Patient called and stated his results of MRI to be mailed to him were supposed to be mailed to him about 2 weeks ago and he needs it for his job. Also, he stated he needed a list of do's and don't's based on the MRI results. He can be contacted at [phone].    Thanks,  Ena

## 2018-08-13 ENCOUNTER — TELEPHONE (OUTPATIENT)
Dept: RHEUMATOLOGY | Facility: CLINIC | Age: 53
End: 2018-08-13

## 2018-08-14 ENCOUNTER — TELEPHONE (OUTPATIENT)
Dept: NEUROSURGERY | Facility: CLINIC | Age: 53
End: 2018-08-14

## 2018-08-14 ENCOUNTER — TELEPHONE (OUTPATIENT)
Dept: INTERNAL MEDICINE | Facility: CLINIC | Age: 53
End: 2018-08-14
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
## 2018-08-14 NOTE — TELEPHONE ENCOUNTER
----- Message from Zahra Lane sent at 8/14/2018 10:40 AM CDT -----  Contact: self/[phone]  Would like to consult with nurse regarding his appt on 08-24 with Neurosurgery, please call back at [phone]. Thanks/ar

## 2018-08-14 NOTE — TELEPHONE ENCOUNTER
Spoke with pt and rescheduled appt for [facility]. Date, time, and location verified.     ----- Message from Ena Arzola LPN sent at 8/14/2018 10:56 AM CDT -----  Patient is scheduled on 8/24/2018 at 10:00am. He stated that he has a plan to catch that day and wants to know if he would be earlier that morning. Please contact patient and let him know.    Thanks,  Ginette LAZARO

## 2018-08-15 ENCOUNTER — OFFICE VISIT (OUTPATIENT)
Dept: RHEUMATOLOGY | Facility: CLINIC | Age: 53
End: 2018-08-15
Payer: COMMERCIAL

## 2018-08-15 ENCOUNTER — HOSPITAL ENCOUNTER (OUTPATIENT)
Dept: RADIOLOGY | Facility: HOSPITAL | Age: 53
Discharge: HOME | End: 2018-08-15
Attending: INTERNAL MEDICINE
Payer: COMMERCIAL

## 2018-08-15 VITALS
SYSTOLIC BLOOD PRESSURE: 141 MMHG | BODY MASS INDEX: 28.88 KG/M2 | HEIGHT: 78 IN | HEART RATE: 81 BPM | DIASTOLIC BLOOD PRESSURE: 84 MMHG | WEIGHT: 249.63 LBS

## 2018-08-15 DIAGNOSIS — M15.9 GENERALIZED OSTEOARTHRITIS OF MULTIPLE SITES: ICD-10-CM

## 2018-08-15 DIAGNOSIS — Z55.9 EDUCATIONAL CIRCUMSTANCE: ICD-10-CM

## 2018-08-15 DIAGNOSIS — M05.772 RHEUMATOID ARTHRITIS INVOLVING BOTH ANKLES WITH POSITIVE RHEUMATOID FACTOR: Primary | ICD-10-CM

## 2018-08-15 DIAGNOSIS — Z79.631 LONG TERM METHOTREXATE USER: ICD-10-CM

## 2018-08-15 DIAGNOSIS — M54.2 CERVICALGIA: ICD-10-CM

## 2018-08-15 DIAGNOSIS — M17.2 POST-TRAUMATIC OSTEOARTHRITIS OF BOTH KNEES: ICD-10-CM

## 2018-08-15 DIAGNOSIS — M05.771 RHEUMATOID ARTHRITIS INVOLVING BOTH ANKLES WITH POSITIVE RHEUMATOID FACTOR: Primary | ICD-10-CM

## 2018-08-15 PROCEDURE — 72050 X-RAY EXAM NECK SPINE 4/5VWS: CPT | Mod: 26,,, | Performed by: RADIOLOGY

## 2018-08-15 PROCEDURE — 99999 PR PBB SHADOW E&M-EST. PATIENT-LVL III: CPT | Mod: PBBFAC,,, | Performed by: INTERNAL MEDICINE

## 2018-08-15 PROCEDURE — 99214 OFFICE O/P EST MOD 30 MIN: CPT | Mod: S$GLB,,, | Performed by: INTERNAL MEDICINE

## 2018-08-15 PROCEDURE — 72050 X-RAY EXAM NECK SPINE 4/5VWS: CPT | Mod: TC

## 2018-08-15 PROCEDURE — 3008F BODY MASS INDEX DOCD: CPT | Mod: CPTII,S$GLB,, | Performed by: INTERNAL MEDICINE

## 2018-08-15 PROCEDURE — 3077F SYST BP >= 140 MM HG: CPT | Mod: CPTII,S$GLB,, | Performed by: INTERNAL MEDICINE

## 2018-08-15 PROCEDURE — 3079F DIAST BP 80-89 MM HG: CPT | Mod: CPTII,S$GLB,, | Performed by: INTERNAL MEDICINE

## 2018-08-15 SDOH — SOCIAL DETERMINANTS OF HEALTH (SDOH): PROBLEMS RELATED TO EDUCATION AND LITERACY, UNSPECIFIED: Z55.9

## 2018-08-15 ASSESSMENT — ROUTINE ASSESSMENT OF PATIENT INDEX DATA (RAPID3)
TOTAL RAPID3 SCORE: 2.67
PATIENT GLOBAL ASSESSMENT SCORE: 3
WHEN YOU AWAKENED IN THE MORNING OVER THE LAST WEEK, PLEASE INDICATE THE AMOUNT OF TIME IT TAKES UNTIL YOU ARE AS LIMBER AS YOU WILL BE FOR THE DAY: 5 MINUTES
PSYCHOLOGICAL DISTRESS SCORE: 2.2
FATIGUE SCORE: 3
PAIN SCORE: 3
AM STIFFNESS SCORE: 1, YES
MDHAQ FUNCTION SCORE: .6

## 2018-08-15 NOTE — PATIENT INSTRUCTIONS
[facility]  [address]  [phone]     You may want to gradually increase your gabapentin. You can double up on the nightly dose & discuss with your PCP.     Moist heat and range of motion of your neck.    Do the neck and low back exercises shown.

## 2018-08-27 ENCOUNTER — HOSPITAL ENCOUNTER (OUTPATIENT)
Dept: RADIOLOGY | Facility: HOSPITAL | Age: 53
Discharge: HOME | End: 2018-08-27
Attending: NEUROLOGICAL SURGERY
Payer: COMMERCIAL

## 2018-08-27 ENCOUNTER — INITIAL CONSULT (OUTPATIENT)
Dept: NEUROSURGERY | Facility: CLINIC | Age: 53
End: 2018-08-27
Payer: COMMERCIAL

## 2018-08-27 VITALS
HEIGHT: 78 IN | WEIGHT: 249.88 LBS | HEART RATE: 77 BPM | SYSTOLIC BLOOD PRESSURE: 140 MMHG | DIASTOLIC BLOOD PRESSURE: 90 MMHG | BODY MASS INDEX: 28.91 KG/M2

## 2018-08-27 DIAGNOSIS — M54.9 BACK PAIN, UNSPECIFIED BACK LOCATION, UNSPECIFIED BACK PAIN LATERALITY, UNSPECIFIED CHRONICITY: Primary | ICD-10-CM

## 2018-08-27 DIAGNOSIS — M48.061 LUMBAR FORAMINAL STENOSIS: ICD-10-CM

## 2018-08-27 DIAGNOSIS — M54.9 BACK PAIN, UNSPECIFIED BACK LOCATION, UNSPECIFIED BACK PAIN LATERALITY, UNSPECIFIED CHRONICITY: ICD-10-CM

## 2018-08-27 PROCEDURE — 99999 PR PBB SHADOW E&M-EST. PATIENT-LVL III: CPT | Mod: PBBFAC,,, | Performed by: NEUROLOGICAL SURGERY

## 2018-08-27 PROCEDURE — 72100 X-RAY EXAM L-S SPINE 2/3 VWS: CPT | Mod: 26,,, | Performed by: RADIOLOGY

## 2018-08-27 PROCEDURE — 72100 X-RAY EXAM L-S SPINE 2/3 VWS: CPT | Mod: TC,FY,PO

## 2018-08-27 PROCEDURE — 99204 OFFICE O/P NEW MOD 45 MIN: CPT | Mod: S$GLB,,, | Performed by: NEUROLOGICAL SURGERY

## 2018-08-27 PROCEDURE — 3008F BODY MASS INDEX DOCD: CPT | Mod: CPTII,S$GLB,, | Performed by: NEUROLOGICAL SURGERY

## 2018-08-27 PROCEDURE — 72120 X-RAY BEND ONLY L-S SPINE: CPT | Mod: 26,,, | Performed by: RADIOLOGY

## 2018-08-27 PROCEDURE — 3080F DIAST BP >= 90 MM HG: CPT | Mod: CPTII,S$GLB,, | Performed by: NEUROLOGICAL SURGERY

## 2018-08-27 PROCEDURE — 3077F SYST BP >= 140 MM HG: CPT | Mod: CPTII,S$GLB,, | Performed by: NEUROLOGICAL SURGERY

## 2018-08-27 NOTE — PROGRESS NOTES
Neurosurgery History and Physical    Patient ID: Sachin Gonzalez is a 53 y.o. male.    Chief Complaint   Patient presents with    Lumbar Spine Pain (L-Spine)     pt states pain began around 2 months ago, unsure if accident related or not, lower back into BLE grace LLE with numbness and tingling; denies bladder issues states has noticed darkened stool; Oswestry 22%; PHQ 9         Review of Systems   Constitutional: Negative.    HENT: Negative.    Eyes: Negative.    Respiratory: Negative.    Cardiovascular: Negative.    Gastrointestinal: Negative.    Endocrine: Negative.    Genitourinary: Negative.    Musculoskeletal: Positive for back pain. Negative for gait problem.   Skin: Negative.    Allergic/Immunologic: Negative.    Neurological: Positive for numbness. Negative for weakness.   Hematological: Negative.    Psychiatric/Behavioral: Negative.        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     Social History     Socioeconomic History    Marital status: Single     Spouse name: Not on file    Number of children: Not on file    Years of education: Not on file    Highest education level: Not on file   Social Needs    Financial resource strain: Not on file    Food insecurity - worry: Not on file    Food insecurity - inability: Not on file    Transportation needs - medical: Not on file    Transportation needs - non-medical: Not on file   Occupational History    Not on file   Tobacco Use    Smoking status: Never Smoker    Smokeless tobacco: Never Used   Substance and Sexual Activity    Alcohol use: No    Drug use: No    Sexual activity: Yes     Partners: Female   Other Topics Concern    Not on file   Social History Narrative    Not on file     Family History   Problem Relation Age of Onset    Stroke Father      Review of  "patient's allergies indicates:   Allergen Reactions    No known allergies        Current Outpatient Medications:     folic acid (FOLVITE) 1 MG tablet, Take 1 tablet (1,000 mcg total) by mouth once daily., Disp: 100 tablet, Rfl: 2    gabapentin (NEURONTIN) 300 MG capsule, Take 1 capsule (300 mg total) by mouth 3 (three) times daily., Disp: 90 capsule, Rfl: 0    ibuprofen (ADVIL,MOTRIN) 800 MG tablet, Take 1 tablet (800 mg total) by mouth 2 (two) times daily., Disp: 60 tablet, Rfl: 3    methotrexate 2.5 MG Tab, Take 10 tablets (25 mg total) by mouth every 7 days., Disp: 150 tablet, Rfl: 1    metoprolol succinate (TOPROL XL) 50 MG 24 hr tablet, Take 1 tablet twice daily. hold if systolic blood pressure less than or equal to 100 or heart rate is less than 60, Disp: 180 tablet, Rfl: 3  Blood pressure (!) 140/90, pulse 77, height 6' 7" (2.007 m), weight 113.3 kg (249 lb 14.3 oz).      Neurologic Exam     Mental Status   Oriented to person, place, and time.   Attention: normal. Concentration: normal.   Speech: speech is normal   Level of consciousness: alert  Knowledge: good.     Cranial Nerves     CN II   Visual acuity: normal    CN III, IV, VI   Pupils are equal, round, and reactive to light.  Extraocular motions are normal.     CN V   Facial sensation intact.     CN VII   Facial expression full, symmetric.     CN VIII   Hearing: intact    CN IX, X   Palate: symmetric    CN XI   CN XI normal.     CN XII   CN XII normal.     Motor Exam   Muscle bulk: normal  Overall muscle tone: normal  Right arm pronator drift: absent  Left arm pronator drift: absent    Strength   Right deltoid: 5/5  Left deltoid: 5/5  Right biceps: 5/5  Left biceps: 5/5  Right triceps: 5/5  Left triceps: 5/5  Right wrist flexion: 5/5  Left wrist flexion: 5/5  Right wrist extension: 5/5  Left wrist extension: 5/5  Right interossei: 5/5  Left interossei: 5/5  Right iliopsoas: 5/5  Left iliopsoas: 5/5  Right quadriceps: 5/5  Left quadriceps: " 5/5  Right hamstrin/5  Left hamstrin/5  Right anterior tibial: 5/5  Left anterior tibial: 5/5  Right posterior tibial: 5/5  Left posterior tibial: 5/5  Right peroneal: 5/5  Left peroneal: 5/5  Right gastroc: 5/5  Left gastroc: 5/5    Sensory Exam   Light touch normal.     Gait, Coordination, and Reflexes     Gait  Gait: normal    Coordination   Romberg: negative  Finger to nose coordination: normal    Tremor   Resting tremor: absent    Reflexes   Right brachioradialis: 1+  Left brachioradialis: 1+  Right biceps: 1+  Left biceps: 1+  Right triceps: 1+  Left triceps: 1+  Right patellar: 0  Left patellar: 0  Right achilles: 0  Left achilles: 0  Right plantar: normal  Left plantar: normal  Right Adkins: absent  Left Adkins: absent  Right ankle clonus: absent  Left ankle clonus: absent      Physical Exam   Constitutional: He is oriented to person, place, and time. He appears well-developed and well-nourished.   HENT:   Head: Normocephalic and atraumatic.   Eyes: EOM are normal. Pupils are equal, round, and reactive to light.   Neck: Normal range of motion. Neck supple.   Cardiovascular: Normal rate and regular rhythm.   Pulmonary/Chest: Effort normal.   Abdominal: Soft.   Musculoskeletal: Normal range of motion.   Neurological: He is alert and oriented to person, place, and time. He has a normal Finger-Nose-Finger Test and a normal Romberg Test. Gait normal.   Reflex Scores:       Tricep reflexes are 1+ on the right side and 1+ on the left side.       Bicep reflexes are 1+ on the right side and 1+ on the left side.       Brachioradialis reflexes are 1+ on the right side and 1+ on the left side.       Patellar reflexes are 0 on the right side and 0 on the left side.       Achilles reflexes are 0 on the right side and 0 on the left side.  Skin: Skin is warm and dry.   Psychiatric: He has a normal mood and affect. His speech is normal and behavior is normal. Judgment and thought content normal.   Nursing note and  "vitals reviewed.      Vital Signs  Pulse: 77  BP: (!) 140/90  Pain Score:   4  Pain Loc: Back  Height and Weight  Height: 6' 7" (200.7 cm)  Weight: 113.3 kg (249 lb 14.3 oz)  BSA (Calculated - sq m): 2.51 sq meters  BMI (Calculated): 28.2  Weight in (lb) to have BMI = 25: 221.5]    Provider dictation:  I reviewed the MRI imaging. He has degenerative L4-L5 grade I spondylolisthesis with disc degeneration and Modic changes and Schmorl's node in L4. I do not appreciate a pars defect. He had EMG which showed chronic (not acute) bilateral L5 and S1 radiculopathy.    He has been experiencing some left foot numbness in the plantar aspect when standing for a long time on hard surfaces. This has resolved in the last couple weeks when he has not had to stand as much. He has low back pain when leaning forward to pick things up occasionally but this is mild.     On exam, he is full strength with perhaps trace isolated bilateral EHL mild weakness although I suspect this is rather due to his bilateral hallux valgus deformity. He currently has no numbness. He has no myelopathy.     I do note see any convincing correlate to his left foot numbness on his MRI. He describes a heal and plantar foot distribution, which would correspond to S1. There is no stenosis at L5-S1 and the mild lateral recess stenosis at L4-L5 is worse on the right. His low back is certainly likely to be due to his L4-L5 degeneration and spondylolisthesis but it is mild, intermittent and he feels that it is improving with core-strengthening exercises.     We will order an updated flexion-extension XR of his lumbar spine to rule major dynamic instability but absent any concerning findings, I do not suspect that any lumbar surgery would be indicated. If his numbness returns, consideration for intrinsic foot pathology should be given but a trial of lumbar epidural steroid injection would not be unreasonable.           Visit Diagnosis:  Back pain, unspecified back " location, unspecified back pain laterality, unspecified chronicity  -     X-Ray Lumbar Spine Ap Lateral w/Flex Ext; Future; Expected date: 08/27/2018    Lumbar foraminal stenosis

## 2018-08-27 NOTE — LETTER
August 27, 2018      Domo Dick MD  [address]  [place] [address]           [facility]  [address] [facility] [address] [place] [address]  Phone: [phone]  Fax: [phone]          Patient: Sachin Gonzalez   MR Number: [id number]   YOB: 1965   Date of Visit: 8/27/2018       Dear Dr. Domo Dick:    Thank you for referring Sachin Gonzalez to me for evaluation. Attached you will find relevant portions of my assessment and plan of care.    If you have questions, please do not hesitate to call me. I look forward to following Sachin Gonzalez along with you.    Sincerely,    Nelda Galeana, NACHO    Enclosure  CC:  No Recipients    If you would like to receive this communication electronically, please contact [email or web address] or [phone] to request more information on [employer] access.    For providers and/or their staff who would like to refer a patient to [facility], please contact us through our one-stop-shop provider referral line, [facility], at [phone].    If you feel you have received this communication in error or would no longer like to receive these types of communications, please e-mail [email or web address]

## 2018-08-28 ENCOUNTER — TELEPHONE (OUTPATIENT)
Dept: NEUROSURGERY | Facility: CLINIC | Age: 53
End: 2018-08-28

## 2018-08-28 NOTE — TELEPHONE ENCOUNTER
Called patient and discussed results of lumbar x-ray with flex/ext. There is no instability with flex/ext. No need for further neurosurgical follow-up. Patient informed to call the clinic with any further questions or concerns.

## 2018-08-30 RX ORDER — METHOTREXATE 2.5 MG/1
TABLET ORAL
Qty: 150 TABLET | Refills: 1 | Status: SHIPPED | OUTPATIENT
Start: 2018-08-30 | End: 2019-06-10 | Stop reason: SDUPTHER

## 2018-08-31 NOTE — TELEPHONE ENCOUNTER
----- Message from Sam Nino sent at 8/31/2018  7:46 AM CDT -----  Contact: Ptg   States he needs a copy of his excuse rg him being unable to lift any weight and can be reached at [phone]/ faxed to [phone] Attn: Sachin Gonzalez//zi/osito

## 2018-09-04 ENCOUNTER — TELEPHONE (OUTPATIENT)
Dept: NEUROSURGERY | Facility: CLINIC | Age: 53
End: 2018-09-04

## 2018-09-04 RX ORDER — GABAPENTIN 300 MG/1
CAPSULE ORAL
Qty: 90 CAPSULE | Refills: 0 | Status: SHIPPED | OUTPATIENT
Start: 2018-09-04 | End: 2020-01-06

## 2018-09-04 NOTE — TELEPHONE ENCOUNTER
Please advise. Dr. Brady did not suggest a surgical intervention.    ----- Message from Lan Enamorado sent at 9/4/2018 10:05 AM CDT -----  Contact: pt  He's calling in regards to a dr's excuse and work limitations for his employer, [phone] (home)

## 2018-09-05 NOTE — TELEPHONE ENCOUNTER
Left voice message for pt informing that a work excuse may be obtained for date of visit, but not for a restriction note.

## 2018-09-05 NOTE — TELEPHONE ENCOUNTER
Pt called back and is upset d/t we will not write an excuse for restrictions. He is requesting a call from either Dr. Brady or NADYA Montemayor to further explain why. Please advise. Thanks in advance.

## 2018-09-07 NOTE — TELEPHONE ENCOUNTER
Returned patient's call and no answer. Left voicemail to return call. If patient returns call please inform him that we do not provide work limitations for non-surgical patients.     We can refer him to Physical Medicine & Rehab for evaluation of work limitations. Please place referral and schedule appointment for patient if he agrees.

## 2018-09-12 DIAGNOSIS — M54.9 BACK PAIN, UNSPECIFIED BACK LOCATION, UNSPECIFIED BACK PAIN LATERALITY, UNSPECIFIED CHRONICITY: Primary | ICD-10-CM

## 2018-09-12 DIAGNOSIS — M48.061 LUMBAR FORAMINAL STENOSIS: ICD-10-CM

## 2018-10-02 ENCOUNTER — TELEPHONE (OUTPATIENT)
Dept: NEUROSURGERY | Facility: CLINIC | Age: 53
End: 2018-10-02

## 2018-10-02 DIAGNOSIS — M54.2 NECK PAIN: Primary | ICD-10-CM

## 2018-10-02 NOTE — TELEPHONE ENCOUNTER
pt and notified him that we were sending his info and referral to [facility] in [place]. Pt verbalized understanding.     ----- Message from Tiffany Bass PA-C sent at 10/2/2018  1:03 PM CDT -----  Please send referral to [facility] for FCE.     Thanks!    ----- Message -----  From: Zayra Arroyo MD  Sent: 10/2/2018  10:31 AM  To: Tiffany Bass PA-C    No I do not.  This is done by physical therapy.  There are certain therapy clinics outside of [facility] who perform FCEs. ([facility] is the therapy clinic I usually send patients to for FCEs)  I talked with the patient this morning and had him rescheduled with interventional pain in the next 4-5 weeks if he doesn't improve conservatively.      ----- Message -----  From: Tiffany Bass PA-C  Sent: 10/2/2018  10:30 AM  To: Zayra Arroyo MD    Patient was referred to you for a functional capacity test. Do you not perform these?    Thanks you,    Tiffany Bass PA-C    ----- Message -----  From: Zayra Arroyo MD  Sent: 10/2/2018   7:47 AM  To: Tiffany Bass PA-C    Good morning,    This patient was referred to me incorrectly.  We have a separate pain management department (Dr Graham and Dr Vargas) who practice interventional and minimal medical management.  If you all are considering epidurals, then he needs to see the department of interventional pain management.  If you want him to consider therapy, then he should see me.  Let me know if you have any questions or concerns.     Thanks    Dr Arroyo

## 2018-11-06 ENCOUNTER — TELEPHONE (OUTPATIENT)
Dept: FAMILY MEDICINE | Facility: CLINIC | Age: 53
End: 2018-11-06

## 2018-11-06 NOTE — TELEPHONE ENCOUNTER
----- Message from Lisbeth Estes sent at 11/6/2018 10:22 AM CST -----  Contact: pt   Pt is requesting a call back from the nurse in regards to the pt needing a refill and being out of his med for a week now pt is also wanting Dr Blum to  Remain his PCP   [phone] (home)         1. What is the name of the medication you are requesting? metoprolol  2. What is the dose?   3. How do you take the medication? Orally, topically, etc? Orally  4. How often do you take this medication? 2 daily  5. Do you need a 30 day or 90 day supply? 90 day  6. How many refills are you requesting? Per   7. What is your preferred pharmacy and location of the pharmacy?     [facility] [address] [place] [address] [address] [facility]  [address] [facility] [address] [address]  Phone: [phone] Fax: [phone]    8. Who can we contact with further questions? Pt

## 2018-11-13 ENCOUNTER — TELEPHONE (OUTPATIENT)
Dept: FAMILY MEDICINE | Facility: CLINIC | Age: 53
End: 2018-11-13

## 2018-11-13 NOTE — TELEPHONE ENCOUNTER
----- Message from Sam Nino sent at 11/13/2018 10:49 AM CST -----  Contact: Pt   States he's calling to follow up on why is medicine wasn't called in to his pharm and can be reached at [phone]//thanks/dbw     1. What is the name of the medication you are requesting? metroprlol  2. What is the dose? unknown  3. How do you take the medication? Orally, topically, etc? Orally   4. How often do you take this medication? Twice daily   5. Do you need a 30 day or 90 day supply? 90 days  6. How many refills are you requesting?   7. What is your preferred pharmacy and location of the pharmacy?   8. Who can we contact with further questions? pt      [facility] [address] [place] [address] [place] - [address] [facility]  [address] [facility] [address]  Phone: [phone] Fax: [phone]

## 2018-11-13 NOTE — TELEPHONE ENCOUNTER
Left detailed message for pt that he switched PCP's over a year ago and Dr. Blum is not able to take him back at this time.

## 2018-11-13 NOTE — TELEPHONE ENCOUNTER
----- Message from Sam Nino sent at 11/13/2018 10:48 AM CST -----  Contact: pt   States he's calling to get a release to rtn to work and date it for November 1st & can be reached at [phone]//thanks/dbw

## 2018-11-14 ENCOUNTER — OFFICE VISIT (OUTPATIENT)
Dept: INTERNAL MEDICINE | Facility: CLINIC | Age: 53
End: 2018-11-14
Payer: COMMERCIAL

## 2018-11-14 VITALS
WEIGHT: 254.63 LBS | SYSTOLIC BLOOD PRESSURE: 120 MMHG | HEIGHT: 78 IN | DIASTOLIC BLOOD PRESSURE: 80 MMHG | TEMPERATURE: 97 F | HEART RATE: 110 BPM | BODY MASS INDEX: 29.46 KG/M2

## 2018-11-14 DIAGNOSIS — I10 ESSENTIAL HYPERTENSION: Primary | ICD-10-CM

## 2018-11-14 DIAGNOSIS — M48.061 LUMBAR FORAMINAL STENOSIS: ICD-10-CM

## 2018-11-14 PROCEDURE — 99214 OFFICE O/P EST MOD 30 MIN: CPT | Mod: 25,S$GLB,, | Performed by: FAMILY MEDICINE

## 2018-11-14 PROCEDURE — 90686 IIV4 VACC NO PRSV 0.5 ML IM: CPT | Mod: S$GLB,,, | Performed by: FAMILY MEDICINE

## 2018-11-14 PROCEDURE — 3079F DIAST BP 80-89 MM HG: CPT | Mod: CPTII,S$GLB,, | Performed by: FAMILY MEDICINE

## 2018-11-14 PROCEDURE — 3074F SYST BP LT 130 MM HG: CPT | Mod: CPTII,S$GLB,, | Performed by: FAMILY MEDICINE

## 2018-11-14 PROCEDURE — 99999 PR PBB SHADOW E&M-EST. PATIENT-LVL III: CPT | Mod: PBBFAC,,, | Performed by: FAMILY MEDICINE

## 2018-11-14 PROCEDURE — 3008F BODY MASS INDEX DOCD: CPT | Mod: CPTII,S$GLB,, | Performed by: FAMILY MEDICINE

## 2018-11-14 PROCEDURE — 90471 IMMUNIZATION ADMIN: CPT | Mod: S$GLB,,, | Performed by: FAMILY MEDICINE

## 2018-11-14 RX ORDER — METOPROLOL SUCCINATE 50 MG/1
TABLET, EXTENDED RELEASE ORAL
Qty: 180 TABLET | Refills: 1 | Status: SHIPPED | OUTPATIENT
Start: 2018-11-14 | End: 2019-06-07 | Stop reason: SDUPTHER

## 2018-11-14 RX ORDER — BACLOFEN 20 MG/1
20 TABLET ORAL 3 TIMES DAILY
Qty: 90 TABLET | Refills: 3 | Status: SHIPPED | OUTPATIENT
Start: 2018-11-14 | End: 2019-06-11

## 2018-11-14 NOTE — PROGRESS NOTES
Subjective:       Patient ID: Sachin Gonzalez is a 53 y.o. male.    Chief Complaint: paperwork    F/U:      Pt is a 53 year old with multiple arthritic complaints. Pt has lower back pain that has been worked up by Neurosurgery. There is no surgical issues at this time. Pt BP is well controlled.      Review of Systems   Constitutional: Negative.    Respiratory: Negative.    Cardiovascular: Positive for leg swelling (bilateral).   Genitourinary: Negative.    Neurological: Negative.        Objective:      Physical Exam   Constitutional: He is oriented to person, place, and time. He appears well-developed and well-nourished.   Cardiovascular: Normal rate and regular rhythm. Exam reveals no friction rub.   No murmur heard.  Pulmonary/Chest: Effort normal and breath sounds normal. No stridor. He has no wheezes.   Musculoskeletal: Normal range of motion.   Neurological: He is alert and oriented to person, place, and time.       Assessment:       1. Essential hypertension    2. Lumbar foraminal stenosis        Plan:       Essential hypertension  Comments:  BP is well controlled    Lumbar foraminal stenosis  Comments:  Pt has been seen by neurosurgery does not feel there is any surgical issues. Will start pt on baclofen 20 mg three times a day    Other orders  -     baclofen (LIORESAL) 20 MG tablet; Take 1 tablet (20 mg total) by mouth 3 (three) times daily.  Dispense: 90 tablet; Refill: 3

## 2018-11-14 NOTE — TELEPHONE ENCOUNTER
----- Message from Nisreen Gary sent at 11/13/2018  4:27 PM CST -----  Contact: pt  The pt states he needs a refill on his Metoprolol medication, the pt also needs a medical release form, the pt can be reached at [phone]///[email or web address]

## 2018-11-14 NOTE — TELEPHONE ENCOUNTER
Returned patient's call.  Patient did not answer.  Patient will need an appointment to discuss letter with

## 2018-11-14 NOTE — TELEPHONE ENCOUNTER
----- Message from Zahra Lane sent at 11/14/2018  9:52 AM CST -----  Contact: self/[phone]  Returning call, please call back at [phone]. Thanks/ar

## 2018-11-15 ENCOUNTER — LAB VISIT (OUTPATIENT)
Dept: LAB | Facility: HOSPITAL | Age: 53
End: 2018-11-15
Attending: INTERNAL MEDICINE
Payer: COMMERCIAL

## 2018-11-15 ENCOUNTER — TELEPHONE (OUTPATIENT)
Dept: RHEUMATOLOGY | Facility: CLINIC | Age: 53
End: 2018-11-15

## 2018-11-15 DIAGNOSIS — Z79.631 LONG TERM METHOTREXATE USER: ICD-10-CM

## 2018-11-15 DIAGNOSIS — M05.771 RHEUMATOID ARTHRITIS INVOLVING BOTH ANKLES WITH POSITIVE RHEUMATOID FACTOR: ICD-10-CM

## 2018-11-15 DIAGNOSIS — M05.772 RHEUMATOID ARTHRITIS INVOLVING BOTH ANKLES WITH POSITIVE RHEUMATOID FACTOR: ICD-10-CM

## 2018-11-15 DIAGNOSIS — R30.0 DYSURIA: Primary | ICD-10-CM

## 2018-11-15 DIAGNOSIS — M15.9 GENERALIZED OSTEOARTHRITIS OF MULTIPLE SITES: ICD-10-CM

## 2018-11-15 LAB
ALBUMIN SERPL BCP-MCNC: 3.7 G/DL
ALP SERPL-CCNC: 77 U/L
ALT SERPL W/O P-5'-P-CCNC: 37 U/L
ANION GAP SERPL CALC-SCNC: 7 MMOL/L
AST SERPL-CCNC: 31 U/L
BASOPHILS # BLD AUTO: 0.03 K/UL
BASOPHILS NFR BLD: 0.6 %
BILIRUB SERPL-MCNC: 0.5 MG/DL
BUN SERPL-MCNC: 17 MG/DL
CALCIUM SERPL-MCNC: 9.3 MG/DL
CHLORIDE SERPL-SCNC: 104 MMOL/L
CO2 SERPL-SCNC: 28 MMOL/L
CREAT SERPL-MCNC: 1.2 MG/DL
CRP SERPL-MCNC: 8.7 MG/L
DIFFERENTIAL METHOD: ABNORMAL
EOSINOPHIL # BLD AUTO: 0.2 K/UL
EOSINOPHIL NFR BLD: 3 %
ERYTHROCYTE [DISTWIDTH] IN BLOOD BY AUTOMATED COUNT: 13.4 %
ERYTHROCYTE [SEDIMENTATION RATE] IN BLOOD BY WESTERGREN METHOD: 5 MM/HR
EST. GFR  (AFRICAN AMERICAN): >60 ML/MIN/1.73 M^2
EST. GFR  (NON AFRICAN AMERICAN): >60 ML/MIN/1.73 M^2
GLUCOSE SERPL-MCNC: 122 MG/DL
HCT VFR BLD AUTO: 42.1 %
HGB BLD-MCNC: 13.6 G/DL
IMM GRANULOCYTES # BLD AUTO: 0.02 K/UL
IMM GRANULOCYTES NFR BLD AUTO: 0.4 %
LYMPHOCYTES # BLD AUTO: 1 K/UL
LYMPHOCYTES NFR BLD: 18.4 %
MCH RBC QN AUTO: 31.2 PG
MCHC RBC AUTO-ENTMCNC: 32.3 G/DL
MCV RBC AUTO: 97 FL
MONOCYTES # BLD AUTO: 0.4 K/UL
MONOCYTES NFR BLD: 7.6 %
NEUTROPHILS # BLD AUTO: 3.7 K/UL
NEUTROPHILS NFR BLD: 70 %
NRBC BLD-RTO: 0 /100 WBC
PLATELET # BLD AUTO: 237 K/UL
PMV BLD AUTO: 10.9 FL
POTASSIUM SERPL-SCNC: 4.1 MMOL/L
PROT SERPL-MCNC: 7.6 G/DL
RBC # BLD AUTO: 4.36 M/UL
SODIUM SERPL-SCNC: 139 MMOL/L
WBC # BLD AUTO: 5.27 K/UL

## 2018-11-15 PROCEDURE — 86140 C-REACTIVE PROTEIN: CPT

## 2018-11-15 PROCEDURE — 80053 COMPREHEN METABOLIC PANEL: CPT

## 2018-11-15 PROCEDURE — 36415 COLL VENOUS BLD VENIPUNCTURE: CPT | Mod: PO

## 2018-11-15 PROCEDURE — 85651 RBC SED RATE NONAUTOMATED: CPT | Mod: PO

## 2018-11-15 PROCEDURE — 85025 COMPLETE CBC W/AUTO DIFF WBC: CPT

## 2019-02-19 DIAGNOSIS — R20.0 NUMBNESS AND TINGLING OF BOTH LOWER EXTREMITIES: Primary | ICD-10-CM

## 2019-02-19 DIAGNOSIS — M19.079 INFLAMMATION OF FOOT JOINT: ICD-10-CM

## 2019-02-19 DIAGNOSIS — R20.2 NUMBNESS AND TINGLING OF BOTH LOWER EXTREMITIES: Primary | ICD-10-CM

## 2019-02-19 RX ORDER — IBUPROFEN 800 MG/1
800 TABLET ORAL 2 TIMES DAILY
Qty: 60 TABLET | Refills: 3 | Status: SHIPPED | OUTPATIENT
Start: 2019-02-19 | End: 2020-01-06 | Stop reason: SDUPTHER

## 2019-04-02 ENCOUNTER — TELEPHONE (OUTPATIENT)
Dept: INTERNAL MEDICINE | Facility: CLINIC | Age: 54
End: 2019-04-02

## 2019-04-02 NOTE — TELEPHONE ENCOUNTER
----- Message from Herlinda Laughlin sent at 4/2/2019 10:28 AM CDT -----  Contact: [phone]   Pt would like to consult with the nurse about a recommendation for vitamins.  Please call back at [phone]. x-

## 2019-05-21 ENCOUNTER — LAB VISIT (OUTPATIENT)
Dept: LAB | Facility: HOSPITAL | Age: 54
End: 2019-05-21
Attending: INTERNAL MEDICINE
Payer: COMMERCIAL

## 2019-05-21 DIAGNOSIS — Z79.631 LONG TERM METHOTREXATE USER: ICD-10-CM

## 2019-05-21 DIAGNOSIS — M15.9 GENERALIZED OSTEOARTHRITIS OF MULTIPLE SITES: ICD-10-CM

## 2019-05-21 DIAGNOSIS — M05.771 RHEUMATOID ARTHRITIS INVOLVING BOTH ANKLES WITH POSITIVE RHEUMATOID FACTOR: ICD-10-CM

## 2019-05-21 DIAGNOSIS — M05.772 RHEUMATOID ARTHRITIS INVOLVING BOTH ANKLES WITH POSITIVE RHEUMATOID FACTOR: ICD-10-CM

## 2019-05-21 LAB
BASOPHILS # BLD AUTO: 0.03 K/UL (ref 0–0.2)
BASOPHILS NFR BLD: 0.7 % (ref 0–1.9)
DIFFERENTIAL METHOD: ABNORMAL
EOSINOPHIL # BLD AUTO: 0.2 K/UL (ref 0–0.5)
EOSINOPHIL NFR BLD: 4.1 % (ref 0–8)
ERYTHROCYTE [DISTWIDTH] IN BLOOD BY AUTOMATED COUNT: 13.2 % (ref 11.5–14.5)
ERYTHROCYTE [SEDIMENTATION RATE] IN BLOOD BY WESTERGREN METHOD: 24 MM/HR (ref 0–23)
HCT VFR BLD AUTO: 42.7 % (ref 40–54)
HGB BLD-MCNC: 13.9 G/DL (ref 14–18)
IMM GRANULOCYTES # BLD AUTO: 0 K/UL (ref 0–0.04)
IMM GRANULOCYTES NFR BLD AUTO: 0 % (ref 0–0.5)
LYMPHOCYTES # BLD AUTO: 1.3 K/UL (ref 1–4.8)
LYMPHOCYTES NFR BLD: 31.6 % (ref 18–48)
MCH RBC QN AUTO: 31.7 PG (ref 27–31)
MCHC RBC AUTO-ENTMCNC: 32.6 G/DL (ref 32–36)
MCV RBC AUTO: 97 FL (ref 82–98)
MONOCYTES # BLD AUTO: 0.4 K/UL (ref 0.3–1)
MONOCYTES NFR BLD: 9.2 % (ref 4–15)
NEUTROPHILS # BLD AUTO: 2.2 K/UL (ref 1.8–7.7)
NEUTROPHILS NFR BLD: 54.4 % (ref 38–73)
NRBC BLD-RTO: 0 /100 WBC
PLATELET # BLD AUTO: 269 K/UL (ref 150–350)
PMV BLD AUTO: 10.4 FL (ref 9.2–12.9)
RBC # BLD AUTO: 4.39 M/UL (ref 4.6–6.2)
WBC # BLD AUTO: 4.11 K/UL (ref 3.9–12.7)

## 2019-05-21 PROCEDURE — 85025 COMPLETE CBC W/AUTO DIFF WBC: CPT

## 2019-05-21 PROCEDURE — 85652 RBC SED RATE AUTOMATED: CPT

## 2019-05-21 PROCEDURE — 80053 COMPREHEN METABOLIC PANEL: CPT

## 2019-05-21 PROCEDURE — 86140 C-REACTIVE PROTEIN: CPT

## 2019-05-21 PROCEDURE — 36415 COLL VENOUS BLD VENIPUNCTURE: CPT

## 2019-05-22 LAB
ALBUMIN SERPL BCP-MCNC: 3.9 G/DL (ref 3.5–5.2)
ALP SERPL-CCNC: 80 U/L (ref 55–135)
ALT SERPL W/O P-5'-P-CCNC: 14 U/L (ref 10–44)
ANION GAP SERPL CALC-SCNC: 10 MMOL/L (ref 8–16)
AST SERPL-CCNC: 21 U/L (ref 10–40)
BILIRUB SERPL-MCNC: 0.4 MG/DL (ref 0.1–1)
BUN SERPL-MCNC: 18 MG/DL (ref 6–20)
CALCIUM SERPL-MCNC: 10.1 MG/DL (ref 8.7–10.5)
CHLORIDE SERPL-SCNC: 105 MMOL/L (ref 95–110)
CO2 SERPL-SCNC: 24 MMOL/L (ref 23–29)
CREAT SERPL-MCNC: 1.2 MG/DL (ref 0.5–1.4)
CRP SERPL-MCNC: 5.3 MG/L (ref 0–8.2)
EST. GFR  (AFRICAN AMERICAN): >60 ML/MIN/1.73 M^2
EST. GFR  (NON AFRICAN AMERICAN): >60 ML/MIN/1.73 M^2
GLUCOSE SERPL-MCNC: 90 MG/DL (ref 70–110)
POTASSIUM SERPL-SCNC: 4.3 MMOL/L (ref 3.5–5.1)
PROT SERPL-MCNC: 7.8 G/DL (ref 6–8.4)
SODIUM SERPL-SCNC: 139 MMOL/L (ref 136–145)

## 2019-06-07 DIAGNOSIS — I10 HYPERTENSION, UNSPECIFIED TYPE: Primary | ICD-10-CM
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
## 2019-06-08 NOTE — TELEPHONE ENCOUNTER
Confirmed patient's preferred pharmacy, allergies, and current medications in Epic.    Forwarded metoprolol ER 50 MG tab refill request to Dr. Dick for review.

## 2019-06-10 RX ORDER — METOPROLOL SUCCINATE 50 MG/1
TABLET, EXTENDED RELEASE ORAL
Qty: 180 TABLET | Refills: 3 | Status: SHIPPED | OUTPATIENT
Start: 2019-06-10 | End: 2020-03-13 | Stop reason: SDUPTHER

## 2019-06-10 RX ORDER — METHOTREXATE 2.5 MG/1
25 TABLET ORAL
Qty: 150 TABLET | Refills: 1 | Status: SHIPPED | OUTPATIENT
Start: 2019-06-10 | End: 2019-06-15 | Stop reason: SDUPTHER

## 2019-06-11 ENCOUNTER — OFFICE VISIT (OUTPATIENT)
Dept: RHEUMATOLOGY | Facility: CLINIC | Age: 54
End: 2019-06-11
Payer: COMMERCIAL

## 2019-06-11 VITALS
HEIGHT: 78 IN | DIASTOLIC BLOOD PRESSURE: 94 MMHG | HEART RATE: 77 BPM | WEIGHT: 249.13 LBS | BODY MASS INDEX: 28.82 KG/M2 | SYSTOLIC BLOOD PRESSURE: 146 MMHG

## 2019-06-11 DIAGNOSIS — Z79.631 LONG TERM METHOTREXATE USER: ICD-10-CM

## 2019-06-11 DIAGNOSIS — M05.79 RHEUMATOID ARTHRITIS INVOLVING MULTIPLE SITES WITH POSITIVE RHEUMATOID FACTOR: Primary | ICD-10-CM

## 2019-06-11 DIAGNOSIS — M15.9 GENERALIZED OSTEOARTHRITIS OF MULTIPLE SITES: ICD-10-CM

## 2019-06-11 DIAGNOSIS — Z55.9 EDUCATIONAL CIRCUMSTANCE: ICD-10-CM

## 2019-06-11 PROCEDURE — 99999 PR PBB SHADOW E&M-EST. PATIENT-LVL III: CPT | Mod: PBBFAC,,, | Performed by: INTERNAL MEDICINE

## 2019-06-11 PROCEDURE — 3008F PR BODY MASS INDEX (BMI) DOCUMENTED: ICD-10-PCS | Mod: CPTII,S$GLB,, | Performed by: INTERNAL MEDICINE

## 2019-06-11 PROCEDURE — 3077F PR MOST RECENT SYSTOLIC BLOOD PRESSURE >= 140 MM HG: ICD-10-PCS | Mod: CPTII,S$GLB,, | Performed by: INTERNAL MEDICINE

## 2019-06-11 PROCEDURE — 3008F BODY MASS INDEX DOCD: CPT | Mod: CPTII,S$GLB,, | Performed by: INTERNAL MEDICINE

## 2019-06-11 PROCEDURE — 3080F PR MOST RECENT DIASTOLIC BLOOD PRESSURE >= 90 MM HG: ICD-10-PCS | Mod: CPTII,S$GLB,, | Performed by: INTERNAL MEDICINE

## 2019-06-11 PROCEDURE — 99999 PR PBB SHADOW E&M-EST. PATIENT-LVL III: ICD-10-PCS | Mod: PBBFAC,,, | Performed by: INTERNAL MEDICINE

## 2019-06-11 PROCEDURE — 3080F DIAST BP >= 90 MM HG: CPT | Mod: CPTII,S$GLB,, | Performed by: INTERNAL MEDICINE

## 2019-06-11 PROCEDURE — 99214 OFFICE O/P EST MOD 30 MIN: CPT | Mod: S$GLB,,, | Performed by: INTERNAL MEDICINE

## 2019-06-11 PROCEDURE — 99214 PR OFFICE/OUTPT VISIT, EST, LEVL IV, 30-39 MIN: ICD-10-PCS | Mod: S$GLB,,, | Performed by: INTERNAL MEDICINE

## 2019-06-11 PROCEDURE — 3077F SYST BP >= 140 MM HG: CPT | Mod: CPTII,S$GLB,, | Performed by: INTERNAL MEDICINE

## 2019-06-11 RX ORDER — FOLIC ACID 1 MG/1
1000 TABLET ORAL DAILY
Qty: 100 TABLET | Refills: 2 | Status: SHIPPED | OUTPATIENT
Start: 2019-06-11

## 2019-06-11 SDOH — SOCIAL DETERMINANTS OF HEALTH (SDOH): PROBLEMS RELATED TO EDUCATION AND LITERACY, UNSPECIFIED: Z55.9

## 2019-06-11 ASSESSMENT — ROUTINE ASSESSMENT OF PATIENT INDEX DATA (RAPID3)
PSYCHOLOGICAL DISTRESS SCORE: 1.1
WHEN YOU AWAKENED IN THE MORNING OVER THE LAST WEEK, PLEASE INDICATE THE AMOUNT OF TIME IT TAKES UNTIL YOU ARE AS LIMBER AS YOU WILL BE FOR THE DAY: 3 MINUTES
PAIN SCORE: 4.5
TOTAL RAPID3 SCORE: 4.61
FATIGUE SCORE: 2.5
MDHAQ FUNCTION SCORE: .7
AM STIFFNESS SCORE: 1, YES
PATIENT GLOBAL ASSESSMENT SCORE: 7

## 2019-06-11 NOTE — PATIENT INSTRUCTIONS
Try taking 4 MTX in the AM & 4 in the PM on the one day a week you take it.  Take folic acid 1 mg/d = 1000 mcg    Build up your quads.  Do shoulder exercises.  Do low back exercises.

## 2019-06-15 RX ORDER — METHOTREXATE 2.5 MG/1
TABLET ORAL
Qty: 150 TABLET | Refills: 1 | Status: SHIPPED | OUTPATIENT
Start: 2019-06-15 | End: 2020-01-30

## 2019-08-07 ENCOUNTER — CLINICAL SUPPORT (OUTPATIENT)
Dept: OTHER | Facility: CLINIC | Age: 54
End: 2019-08-07
Payer: COMMERCIAL

## 2019-08-07 DIAGNOSIS — Z00.8 ENCOUNTER FOR OTHER GENERAL EXAMINATION: ICD-10-CM

## 2019-08-07 PROCEDURE — 82947 PR  ASSAY QUANTITATIVE,BLOOD GLUCOSE: ICD-10-PCS | Mod: QW,S$GLB,, | Performed by: INTERNAL MEDICINE

## 2019-08-07 PROCEDURE — 80061 PR  LIPID PANEL: ICD-10-PCS | Mod: QW,S$GLB,, | Performed by: INTERNAL MEDICINE

## 2019-08-07 PROCEDURE — 80061 LIPID PANEL: CPT | Mod: QW,S$GLB,, | Performed by: INTERNAL MEDICINE

## 2019-08-07 PROCEDURE — 99401 PR PREVENT COUNSEL,INDIV,15 MIN: ICD-10-PCS | Mod: S$GLB,,, | Performed by: INTERNAL MEDICINE

## 2019-08-07 PROCEDURE — 82947 ASSAY GLUCOSE BLOOD QUANT: CPT | Mod: QW,S$GLB,, | Performed by: INTERNAL MEDICINE

## 2019-08-07 PROCEDURE — 99401 PREV MED CNSL INDIV APPRX 15: CPT | Mod: S$GLB,,, | Performed by: INTERNAL MEDICINE

## 2019-08-09 VITALS — HEIGHT: 78 IN | BODY MASS INDEX: 28.06 KG/M2

## 2019-08-09 LAB
GLUCOSE SERPL-MCNC: 132 MG/DL (ref 60–140)
HDLC SERPL-MCNC: 40 MG/DL
POC CHOLESTEROL, LDL (DOCK): 146 MG/DL
POC CHOLESTEROL, TOTAL: 212 MG/DL
TRIGL SERPL-MCNC: 133 MG/DL

## 2019-12-04 ENCOUNTER — LAB VISIT (OUTPATIENT)
Dept: LAB | Facility: HOSPITAL | Age: 54
End: 2019-12-04
Attending: INTERNAL MEDICINE
Payer: COMMERCIAL

## 2019-12-04 ENCOUNTER — IMMUNIZATION (OUTPATIENT)
Dept: PHARMACY | Facility: CLINIC | Age: 54
End: 2019-12-04
Payer: COMMERCIAL

## 2019-12-04 DIAGNOSIS — Z79.631 LONG TERM METHOTREXATE USER: ICD-10-CM

## 2019-12-04 DIAGNOSIS — R30.0 DYSURIA: ICD-10-CM

## 2019-12-04 DIAGNOSIS — M05.79 RHEUMATOID ARTHRITIS INVOLVING MULTIPLE SITES WITH POSITIVE RHEUMATOID FACTOR: ICD-10-CM

## 2019-12-04 LAB
ALBUMIN SERPL BCP-MCNC: 3.9 G/DL (ref 3.5–5.2)
ALP SERPL-CCNC: 90 U/L (ref 55–135)
ALT SERPL W/O P-5'-P-CCNC: 13 U/L (ref 10–44)
ANION GAP SERPL CALC-SCNC: 5 MMOL/L (ref 8–16)
AST SERPL-CCNC: 28 U/L (ref 10–40)
BASOPHILS # BLD AUTO: 0.03 K/UL (ref 0–0.2)
BASOPHILS NFR BLD: 0.6 % (ref 0–1.9)
BILIRUB SERPL-MCNC: 0.6 MG/DL (ref 0.1–1)
BUN SERPL-MCNC: 15 MG/DL (ref 6–20)
CALCIUM SERPL-MCNC: 9.3 MG/DL (ref 8.7–10.5)
CHLORIDE SERPL-SCNC: 106 MMOL/L (ref 95–110)
CO2 SERPL-SCNC: 28 MMOL/L (ref 23–29)
COMPLEXED PSA SERPL-MCNC: <0.01 NG/ML (ref 0–4)
CREAT SERPL-MCNC: 1.2 MG/DL (ref 0.5–1.4)
CRP SERPL-MCNC: 21.2 MG/L (ref 0–8.2)
DIFFERENTIAL METHOD: ABNORMAL
EOSINOPHIL # BLD AUTO: 0.1 K/UL (ref 0–0.5)
EOSINOPHIL NFR BLD: 2.5 % (ref 0–8)
ERYTHROCYTE [DISTWIDTH] IN BLOOD BY AUTOMATED COUNT: 14 % (ref 11.5–14.5)
ERYTHROCYTE [SEDIMENTATION RATE] IN BLOOD BY WESTERGREN METHOD: 17 MM/HR (ref 0–23)
EST. GFR  (AFRICAN AMERICAN): >60 ML/MIN/1.73 M^2
EST. GFR  (NON AFRICAN AMERICAN): >60 ML/MIN/1.73 M^2
GLUCOSE SERPL-MCNC: 100 MG/DL (ref 70–110)
HCT VFR BLD AUTO: 42.9 % (ref 40–54)
HGB BLD-MCNC: 13.6 G/DL (ref 14–18)
IMM GRANULOCYTES # BLD AUTO: 0.01 K/UL (ref 0–0.04)
IMM GRANULOCYTES NFR BLD AUTO: 0.2 % (ref 0–0.5)
LYMPHOCYTES # BLD AUTO: 1 K/UL (ref 1–4.8)
LYMPHOCYTES NFR BLD: 20 % (ref 18–48)
MCH RBC QN AUTO: 32 PG (ref 27–31)
MCHC RBC AUTO-ENTMCNC: 31.7 G/DL (ref 32–36)
MCV RBC AUTO: 101 FL (ref 82–98)
MONOCYTES # BLD AUTO: 0.6 K/UL (ref 0.3–1)
MONOCYTES NFR BLD: 11.4 % (ref 4–15)
NEUTROPHILS # BLD AUTO: 3.2 K/UL (ref 1.8–7.7)
NEUTROPHILS NFR BLD: 65.3 % (ref 38–73)
NRBC BLD-RTO: 0 /100 WBC
PLATELET # BLD AUTO: 231 K/UL (ref 150–350)
PMV BLD AUTO: 10.9 FL (ref 9.2–12.9)
POTASSIUM SERPL-SCNC: 4.4 MMOL/L (ref 3.5–5.1)
PROT SERPL-MCNC: 7.5 G/DL (ref 6–8.4)
RBC # BLD AUTO: 4.25 M/UL (ref 4.6–6.2)
SODIUM SERPL-SCNC: 139 MMOL/L (ref 136–145)
WBC # BLD AUTO: 4.84 K/UL (ref 3.9–12.7)

## 2019-12-04 PROCEDURE — 85652 RBC SED RATE AUTOMATED: CPT

## 2019-12-04 PROCEDURE — 84153 ASSAY OF PSA TOTAL: CPT

## 2019-12-04 PROCEDURE — 80053 COMPREHEN METABOLIC PANEL: CPT

## 2019-12-04 PROCEDURE — 36415 COLL VENOUS BLD VENIPUNCTURE: CPT

## 2019-12-04 PROCEDURE — 85025 COMPLETE CBC W/AUTO DIFF WBC: CPT

## 2019-12-04 PROCEDURE — 86140 C-REACTIVE PROTEIN: CPT

## 2019-12-06 ENCOUNTER — TELEPHONE (OUTPATIENT)
Dept: URGENT CARE | Facility: CLINIC | Age: 54
End: 2019-12-06

## 2019-12-06 ENCOUNTER — OFFICE VISIT (OUTPATIENT)
Dept: INTERNAL MEDICINE | Facility: CLINIC | Age: 54
End: 2019-12-06
Payer: COMMERCIAL

## 2019-12-06 VITALS
DIASTOLIC BLOOD PRESSURE: 94 MMHG | OXYGEN SATURATION: 98 % | WEIGHT: 244.94 LBS | HEIGHT: 78 IN | HEART RATE: 83 BPM | TEMPERATURE: 99 F | BODY MASS INDEX: 28.34 KG/M2 | SYSTOLIC BLOOD PRESSURE: 126 MMHG

## 2019-12-06 DIAGNOSIS — R22.0 FACIAL SWELLING: Primary | ICD-10-CM

## 2019-12-06 DIAGNOSIS — B35.1 TOENAIL FUNGUS: ICD-10-CM

## 2019-12-06 PROCEDURE — 99999 PR PBB SHADOW E&M-EST. PATIENT-LVL IV: CPT | Mod: PBBFAC,,, | Performed by: NURSE PRACTITIONER

## 2019-12-06 PROCEDURE — 3008F PR BODY MASS INDEX (BMI) DOCUMENTED: ICD-10-PCS | Mod: CPTII,S$GLB,, | Performed by: NURSE PRACTITIONER

## 2019-12-06 PROCEDURE — 99214 OFFICE O/P EST MOD 30 MIN: CPT | Mod: S$GLB,,, | Performed by: NURSE PRACTITIONER

## 2019-12-06 PROCEDURE — 3080F PR MOST RECENT DIASTOLIC BLOOD PRESSURE >= 90 MM HG: ICD-10-PCS | Mod: CPTII,S$GLB,, | Performed by: NURSE PRACTITIONER

## 2019-12-06 PROCEDURE — 3008F BODY MASS INDEX DOCD: CPT | Mod: CPTII,S$GLB,, | Performed by: NURSE PRACTITIONER

## 2019-12-06 PROCEDURE — 3074F SYST BP LT 130 MM HG: CPT | Mod: CPTII,S$GLB,, | Performed by: NURSE PRACTITIONER

## 2019-12-06 PROCEDURE — 99214 PR OFFICE/OUTPT VISIT, EST, LEVL IV, 30-39 MIN: ICD-10-PCS | Mod: S$GLB,,, | Performed by: NURSE PRACTITIONER

## 2019-12-06 PROCEDURE — 3074F PR MOST RECENT SYSTOLIC BLOOD PRESSURE < 130 MM HG: ICD-10-PCS | Mod: CPTII,S$GLB,, | Performed by: NURSE PRACTITIONER

## 2019-12-06 PROCEDURE — 3080F DIAST BP >= 90 MM HG: CPT | Mod: CPTII,S$GLB,, | Performed by: NURSE PRACTITIONER

## 2019-12-06 PROCEDURE — 99999 PR PBB SHADOW E&M-EST. PATIENT-LVL IV: ICD-10-PCS | Mod: PBBFAC,,, | Performed by: NURSE PRACTITIONER

## 2019-12-06 RX ORDER — CLINDAMYCIN HYDROCHLORIDE 300 MG/1
300 CAPSULE ORAL 3 TIMES DAILY
Qty: 30 CAPSULE | Refills: 0 | Status: SHIPPED | OUTPATIENT
Start: 2019-12-06 | End: 2019-12-16

## 2019-12-06 NOTE — TELEPHONE ENCOUNTER
----- Message from Nilda Rod sent at 12/6/2019  1:33 PM CST -----  Contact: self  patient possibly 15 mins late...[phone] (home)

## 2019-12-06 NOTE — PROGRESS NOTES
Subjective:       Patient ID: Sachin Gonzalez is a 54 y.o. male.    Chief Complaint: Facial Swelling (RT X 4 days)    Patient presents with a few concerns.  Reports swelling area to right side of face that started about 4 days ago.  Unsure of the source.  Reports toenail fungus that started a few months ago.  Also has some abdominal pain and bloating after eating dairy products.   This is something new.  Has tried Lactaid pills- mild relief.     Review of Systems   Constitutional: Negative for chills and fatigue.   Respiratory: Negative for cough and shortness of breath.    Gastrointestinal: Positive for abdominal pain (intermittent with dairy ).   Musculoskeletal: Negative for arthralgias and gait problem.   Skin: Positive for wound. Negative for color change and rash.   Psychiatric/Behavioral: Negative for agitation and confusion.       Objective:      Physical Exam   Constitutional: He is oriented to person, place, and time. Vital signs are normal. He appears well-developed and well-nourished.   HENT:   Head: Normocephalic and atraumatic.       Neck: Normal range of motion.   Cardiovascular: Normal rate.   Pulmonary/Chest: Effort normal.   Musculoskeletal: Normal range of motion.   Discolored 2nd toenail.    Neurological: He is alert and oriented to person, place, and time.   Skin: Skin is warm.   Psychiatric: He has a normal mood and affect. His behavior is normal.       Assessment:       1. Facial swelling    2. Toenail fungus        Plan:         Facial swelling    Toenail fungus  -     Ambulatory Referral to Podiatry    Other orders  -     clindamycin (CLEOCIN) 300 MG capsule; Take 1 capsule (300 mg total) by mouth 3 (three) times daily. for 10 days  Dispense: 30 capsule; Refill: 0        Recommended to follow up in 2 weeks or sooner if symptoms change.  Podiatry referral.  Patient will schedule a follow up when he looks at his work schedule.

## 2019-12-06 NOTE — TELEPHONE ENCOUNTER
I returned a call to the pt who states he was just getting dropped off to his car and he could make it her for 2:25 and I informed him that will be fine and I will see him then. He verbalized understanding. anneli  //tracy

## 2019-12-12 ENCOUNTER — TELEPHONE (OUTPATIENT)
Dept: INTERNAL MEDICINE | Facility: CLINIC | Age: 54
End: 2019-12-12

## 2019-12-12 ENCOUNTER — OFFICE VISIT (OUTPATIENT)
Dept: INTERNAL MEDICINE | Facility: CLINIC | Age: 54
End: 2019-12-12
Payer: COMMERCIAL

## 2019-12-12 VITALS
HEIGHT: 78 IN | BODY MASS INDEX: 28.29 KG/M2 | HEART RATE: 102 BPM | TEMPERATURE: 98 F | DIASTOLIC BLOOD PRESSURE: 88 MMHG | WEIGHT: 244.5 LBS | SYSTOLIC BLOOD PRESSURE: 128 MMHG

## 2019-12-12 DIAGNOSIS — M06.9 RHEUMATOID ARTHRITIS OF HAND, UNSPECIFIED LATERALITY, UNSPECIFIED RHEUMATOID FACTOR PRESENCE: ICD-10-CM

## 2019-12-12 DIAGNOSIS — C61 PROSTATE CANCER: ICD-10-CM

## 2019-12-12 DIAGNOSIS — K11.20 PAROTIDITIS: Primary | ICD-10-CM

## 2019-12-12 PROCEDURE — 3074F PR MOST RECENT SYSTOLIC BLOOD PRESSURE < 130 MM HG: ICD-10-PCS | Mod: CPTII,S$GLB,, | Performed by: FAMILY MEDICINE

## 2019-12-12 PROCEDURE — 3079F DIAST BP 80-89 MM HG: CPT | Mod: CPTII,S$GLB,, | Performed by: FAMILY MEDICINE

## 2019-12-12 PROCEDURE — 3008F PR BODY MASS INDEX (BMI) DOCUMENTED: ICD-10-PCS | Mod: CPTII,S$GLB,, | Performed by: FAMILY MEDICINE

## 2019-12-12 PROCEDURE — 3079F PR MOST RECENT DIASTOLIC BLOOD PRESSURE 80-89 MM HG: ICD-10-PCS | Mod: CPTII,S$GLB,, | Performed by: FAMILY MEDICINE

## 2019-12-12 PROCEDURE — 99999 PR PBB SHADOW E&M-EST. PATIENT-LVL III: CPT | Mod: PBBFAC,,, | Performed by: FAMILY MEDICINE

## 2019-12-12 PROCEDURE — 3074F SYST BP LT 130 MM HG: CPT | Mod: CPTII,S$GLB,, | Performed by: FAMILY MEDICINE

## 2019-12-12 PROCEDURE — 99214 OFFICE O/P EST MOD 30 MIN: CPT | Mod: S$GLB,,, | Performed by: FAMILY MEDICINE

## 2019-12-12 PROCEDURE — 99214 PR OFFICE/OUTPT VISIT, EST, LEVL IV, 30-39 MIN: ICD-10-PCS | Mod: S$GLB,,, | Performed by: FAMILY MEDICINE

## 2019-12-12 PROCEDURE — 99999 PR PBB SHADOW E&M-EST. PATIENT-LVL III: ICD-10-PCS | Mod: PBBFAC,,, | Performed by: FAMILY MEDICINE

## 2019-12-12 PROCEDURE — 3008F BODY MASS INDEX DOCD: CPT | Mod: CPTII,S$GLB,, | Performed by: FAMILY MEDICINE

## 2019-12-12 RX ORDER — CIPROFLOXACIN 750 MG/1
750 TABLET, FILM COATED ORAL EVERY 12 HOURS
Qty: 20 TABLET | Refills: 0 | Status: SHIPPED | OUTPATIENT
Start: 2019-12-12 | End: 2020-01-06

## 2019-12-12 RX ORDER — METHYLPREDNISOLONE 4 MG/1
TABLET ORAL
Qty: 1 PACKAGE | Refills: 0 | Status: SHIPPED | OUTPATIENT
Start: 2019-12-12 | End: 2020-01-06

## 2019-12-12 NOTE — TELEPHONE ENCOUNTER
MD Luisa Fernandez MA             MTX & Cipro may increase MTX levels, but ok to take.   Just monitor CBC & renal fcn.

## 2019-12-12 NOTE — TELEPHONE ENCOUNTER
----- Message from Franca Trinidad sent at 12/12/2019  8:46 AM CST -----  Contact: pt  Type:  Patient Returning Call    Who Called:pt  Who Left Message for Patient:nurse  Does the patient know what this is regarding?:his appt today   Would the patient rather a call back or a response via [employer]? Call back   Best Call Back Number:[phone]  Additional Information: .    Thank you

## 2019-12-12 NOTE — TELEPHONE ENCOUNTER
----- Message from Jenn Jackson MD sent at 12/12/2019 12:57 PM CST -----  MTX & Cipro may increase MTX levels, but ok to take.  Just monitor CBC & renal fcn.  ----- Message -----  From: Luisa Obrien MA  Sent: 12/12/2019  12:16 PM CST  To: Jenn Jackson MD    Good afternoon,     Dr. Dick wants to know if he adds Cipro to patient already on Clindamycin (due to insufficient coverage for parodiditis, should patient stop methotrexate until done?  If so, for how long

## 2019-12-13 ENCOUNTER — HOSPITAL ENCOUNTER (OUTPATIENT)
Dept: RADIOLOGY | Facility: HOSPITAL | Age: 54
Discharge: HOME | End: 2019-12-13
Attending: FAMILY MEDICINE
Payer: COMMERCIAL

## 2019-12-13 DIAGNOSIS — K11.20 PAROTIDITIS: ICD-10-CM

## 2019-12-13 PROCEDURE — 76536 US EXAM OF HEAD AND NECK: CPT | Mod: TC,PO

## 2019-12-13 PROCEDURE — 76536 US SOFT TISSUE HEAD NECK THYROID: ICD-10-PCS | Mod: 26,,, | Performed by: RADIOLOGY

## 2019-12-13 PROCEDURE — 76536 US EXAM OF HEAD AND NECK: CPT | Mod: 26,,, | Performed by: RADIOLOGY

## 2019-12-13 NOTE — TELEPHONE ENCOUNTER
I s/w pt in regards to medication, I informed pt that it is okay for him to take methotrexate with cipro they will just have to monitor his cbc and renal function. Pt thanked me for returning call and ended call. //BJ

## 2019-12-13 NOTE — TELEPHONE ENCOUNTER
----- Message from Demetria Munoz sent at 12/13/2019 10:44 AM CST -----  Contact: pt   Pt is requesting a call back in regards to verify that he is able to take a certain med and also to see if the med that the doctor want to put him on was called in.   [phone] (home)

## 2019-12-19 ENCOUNTER — PATIENT MESSAGE (OUTPATIENT)
Dept: INTERNAL MEDICINE | Facility: CLINIC | Age: 54
End: 2019-12-19

## 2020-01-06 ENCOUNTER — OFFICE VISIT (OUTPATIENT)
Dept: PODIATRY | Facility: CLINIC | Age: 55
End: 2020-01-06
Payer: COMMERCIAL

## 2020-01-06 VITALS
WEIGHT: 244.94 LBS | SYSTOLIC BLOOD PRESSURE: 126 MMHG | HEART RATE: 100 BPM | HEIGHT: 78 IN | BODY MASS INDEX: 28.34 KG/M2 | DIASTOLIC BLOOD PRESSURE: 94 MMHG

## 2020-01-06 DIAGNOSIS — R20.0 NUMBNESS AND TINGLING OF BOTH LOWER EXTREMITIES: ICD-10-CM

## 2020-01-06 DIAGNOSIS — R20.2 NUMBNESS AND TINGLING OF BOTH LOWER EXTREMITIES: ICD-10-CM

## 2020-01-06 DIAGNOSIS — M19.079 INFLAMMATION OF FOOT JOINT: Primary | ICD-10-CM

## 2020-01-06 DIAGNOSIS — B35.1 ONYCHOMYCOSIS OF TOENAIL: ICD-10-CM

## 2020-01-06 PROCEDURE — 3008F PR BODY MASS INDEX (BMI) DOCUMENTED: ICD-10-PCS | Mod: CPTII,S$GLB,, | Performed by: PODIATRIST

## 2020-01-06 PROCEDURE — 3080F DIAST BP >= 90 MM HG: CPT | Mod: CPTII,S$GLB,, | Performed by: PODIATRIST

## 2020-01-06 PROCEDURE — 3080F PR MOST RECENT DIASTOLIC BLOOD PRESSURE >= 90 MM HG: ICD-10-PCS | Mod: CPTII,S$GLB,, | Performed by: PODIATRIST

## 2020-01-06 PROCEDURE — 99213 PR OFFICE/OUTPT VISIT, EST, LEVL III, 20-29 MIN: ICD-10-PCS | Mod: S$GLB,,, | Performed by: PODIATRIST

## 2020-01-06 PROCEDURE — 99999 PR PBB SHADOW E&M-EST. PATIENT-LVL II: ICD-10-PCS | Mod: PBBFAC,,, | Performed by: PODIATRIST

## 2020-01-06 PROCEDURE — 3074F PR MOST RECENT SYSTOLIC BLOOD PRESSURE < 130 MM HG: ICD-10-PCS | Mod: CPTII,S$GLB,, | Performed by: PODIATRIST

## 2020-01-06 PROCEDURE — 99999 PR PBB SHADOW E&M-EST. PATIENT-LVL II: CPT | Mod: PBBFAC,,, | Performed by: PODIATRIST

## 2020-01-06 PROCEDURE — 3074F SYST BP LT 130 MM HG: CPT | Mod: CPTII,S$GLB,, | Performed by: PODIATRIST

## 2020-01-06 PROCEDURE — 3008F BODY MASS INDEX DOCD: CPT | Mod: CPTII,S$GLB,, | Performed by: PODIATRIST

## 2020-01-06 PROCEDURE — 99213 OFFICE O/P EST LOW 20 MIN: CPT | Mod: S$GLB,,, | Performed by: PODIATRIST

## 2020-01-06 RX ORDER — IBUPROFEN 800 MG/1
800 TABLET ORAL 2 TIMES DAILY
Qty: 60 TABLET | Refills: 3 | Status: SHIPPED | OUTPATIENT
Start: 2020-01-06 | End: 2020-03-04 | Stop reason: SDUPTHER

## 2020-01-06 NOTE — LETTER
January 12, 2020      Jeanette Goodwin NP  [address]  [place] [address]           [facility]  [address] [facility]  [place] [address]  Phone: [phone]  Fax: [phone]          Patient: Sachin Gonzalez   MR Number: [id number]   YOB: 1965   Date of Visit: 1/6/2020       Dear Jeanette Goodwin:    Thank you for referring Sachin Gonzalez to me for evaluation. Attached you will find relevant portions of my assessment and plan of care.    If you have questions, please do not hesitate to call me. I look forward to following Sachin Gonzalez along with you.    Sincerely,    Reina Guzman DPM    Enclosure  CC:  No Recipients    If you would like to receive this communication electronically, please contact [email or web address] or [phone] to request more information on [employer] access.    For providers and/or their staff who would like to refer a patient to [facility], please contact us through our one-stop-shop provider referral line, [facility], at [phone].    If you feel you have received this communication in error or would no longer like to receive these types of communications, please e-mail [email or web address]

## 2020-01-08 NOTE — PROGRESS NOTES
Subjective:     Patient ID: Sachin Gonzalez is a 54 y.o. male.    Chief Complaint: Nail Problem (possible bilateral 2nd toe fungus. thick and discolored nails. no c/o pain. wears tennis shoes with socks. non-diabetic Pt. last seen on 12/12/19 with Dr. Dick)    Sachin is a 54 y.o. male who presents to the clinic complaining of thick and discolored toenails on both feet. Sachin is inquiring about treatment options.    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    Traumatic osteoarthritis of ankle or foot    Peyronie's disease    Long-term use of immunosuppressant medication    Cephalic vein thrombosis-post op    Sinusitis    Hypertension    Acute intractable tension-type headache    Neck pain    Shortness of breath    Chest discomfort    Dysuria    Urinary frequency    Neck strain    Tension-type headache, not intractable    Myofascial pain    Parotiditis    Irritation of both ears    Right foot pain    Numbness and tingling of both lower extremities    Localized swelling, mass, or lump of lower extremity, bilateral    BRBPR (bright red blood per rectum)    Lumbar foraminal stenosis       Medication List with Changes/Refills   Current Medications    FOLIC ACID (FOLVITE) 1 MG TABLET    Take 1 tablet (1,000 mcg total) by mouth once daily.    METHOTREXATE 2.5 MG TAB    TAKE 10 TABLETS BY MOUTH ONCE A WEEK    METOPROLOL SUCCINATE (TOPROL XL) 50 MG 24 HR TABLET    Take 1 tablet twice daily. hold if systolic blood pressure less than or equal to 100 or heart rate is less than 60   Changed and/or Refilled Medications    Modified Medication Previous Medication    IBUPROFEN (ADVIL,MOTRIN) 800 MG TABLET ibuprofen (ADVIL,MOTRIN) 800 MG tablet       Take 1 tablet (800 mg  "total) by mouth 2 (two) times daily.    Take 1 tablet (800 mg total) by mouth 2 (two) times daily.   Discontinued Medications    CIPROFLOXACIN HCL (CIPRO) 750 MG TABLET    Take 1 tablet (750 mg total) by mouth every 12 (twelve) hours.    GABAPENTIN (NEURONTIN) 300 MG CAPSULE    TAKE 1 CAPSULE BY MOUTH THREE TIMES DAILY    METHYLPREDNISOLONE (MEDROL DOSEPACK) 4 MG TABLET    use as directed       Review of patient's allergies indicates:   Allergen Reactions    No known allergies        Past Surgical History:   Procedure Laterality Date    PROSTATECTOMY         Family History   Problem Relation Age of Onset    Stroke Father        Social History     Socioeconomic History    Marital status: Single     Spouse name: Not on file    Number of children: Not on file    Years of education: Not on file    Highest education level: Not on file   Occupational History    Not on file   Social Needs    Financial resource strain: Not on file    Food insecurity:     Worry: Not on file     Inability: Not on file    Transportation needs:     Medical: Not on file     Non-medical: Not on file   Tobacco Use    Smoking status: Never Smoker    Smokeless tobacco: Never Used   Substance and Sexual Activity    Alcohol use: No    Drug use: No    Sexual activity: Yes     Partners: Female   Lifestyle    Physical activity:     Days per week: Not on file     Minutes per session: Not on file    Stress: Not at all   Relationships    Social connections:     Talks on phone: Not on file     Gets together: Not on file     Attends Anabaptism service: Not on file     Active member of club or organization: Not on file     Attends meetings of clubs or organizations: Not on file     Relationship status: Not on file   Other Topics Concern    Not on file   Social History Narrative    Not on file       Vitals:    01/06/20 1403   BP: (!) 126/94   Pulse: 100   Weight: 111.1 kg (244 lb 15.3 oz)   Height: 6' 7" (2.007 m)       Review of Systems "   Constitutional: Negative for chills and fever.   Respiratory: Negative for shortness of breath.    Cardiovascular: Negative for chest pain, palpitations, orthopnea, claudication and leg swelling.   Gastrointestinal: Negative for diarrhea, nausea and vomiting.   Musculoskeletal: Negative for joint pain.   Skin: Negative for rash.   Neurological: Negative for dizziness, tingling, sensory change, focal weakness and weakness.   Psychiatric/Behavioral: Negative.              Objective:      PHYSICAL EXAM: Apperance: Alert and orient in no distress,well developed, and with good attention to grooming and body habits  Patient presents ambulating in tennis shoes.  Lower Extremity Exam:  VASCULAR: Dorsalis pedis pulses 2/4 bilateral and Posterior Tibial pulses 2/4 bilateral. Capillary fill time <4 seconds bilateral. No edema observed bilateral. Varicosities absent bilateral. Skin temperature of the lower extremities is warm to warm, proximal to distal. Hair growth WNL bilateral.  DERMATOLOGICAL: No skin rashes, subcutaneous nodules, lesions, or ulcers observed bilateral. Bilateral 2nd nails elongated, thickened, and discolored with subungual debris.  NEUROLOGICAL: Light touch, sharp-dull, proprioception all present and equal bilaterally.  Vibratory sensation intact at bilateral hallux. Protective sensation intact at all 10 sites as tested with a [place] 5.07 monofilament.   MUSCULOSKELETAL: Muscle strength is 5/5 for foot inverters, everters, plantarflexors, and dorsiflexors. Muscle tone is normal. (--) pain on palpation of left heel.         Assessment:       Encounter Diagnoses   Name Primary?    Inflammation of foot joint Yes    Numbness and tingling of both lower extremities     Onychomycosis of toenail          Plan:   Inflammation of foot joint  -     ibuprofen (ADVIL,MOTRIN) 800 MG tablet; Take 1 tablet (800 mg total) by mouth 2 (two) times daily.  Dispense: 60 tablet; Refill: 3    Numbness and tingling  of both lower extremities  -     ibuprofen (ADVIL,MOTRIN) 800 MG tablet; Take 1 tablet (800 mg total) by mouth 2 (two) times daily.  Dispense: 60 tablet; Refill: 3    Onychomycosis of toenail      I counseled the patient on his conditions, regarding findings of my examination, my impressions, and usual treatment plan.   Patient instructed to spray all shoes with Lysol disinfectant spray and let dry before wearing. Patient instructed to wash all socks in hot water and bleach.  Discuss treatment options for nail fungus.  I explained that fungus lives in a warm dark moist environment and therefore patient should make every attempt to keep feet clean and dry.  We discussed drying feet thoroughly after shower particularly between the toes and then applying powder between the toes and in the shoes.    For fungal toenails I prescribed topical medication to be used daily for up to a year.  We also discussed oral Lamisil but I did not recommend it as a first line of treatment since it is an internal medicine that may potentially have side effects, including liver problems. Patient elects for OTC topical treatment.   Prescribed Ibuprofen 800mg to be taken as needed. Patient advised on the possible elevation of blood pressure or heart effects and caution to take pills as needed and to discontinue use if symptoms arise, patient agreed.  Patient to return as needed.              eRina Guzman DPM  [facility]

## 2020-01-12 ENCOUNTER — PATIENT OUTREACH (OUTPATIENT)
Dept: ADMINISTRATIVE | Facility: OTHER | Age: 55
End: 2020-01-12

## 2020-01-12 DIAGNOSIS — Z12.11 ENCOUNTER FOR FIT (FECAL IMMUNOCHEMICAL TEST) SCREENING: Primary | ICD-10-CM

## 2020-01-30 RX ORDER — METHOTREXATE 2.5 MG/1
TABLET ORAL
Qty: 150 TABLET | Refills: 4 | Status: SHIPPED | OUTPATIENT
Start: 2020-01-30 | End: 2021-03-22

## 2020-02-06 ENCOUNTER — PATIENT OUTREACH (OUTPATIENT)
Dept: ADMINISTRATIVE | Facility: HOSPITAL | Age: 55
End: 2020-02-06

## 2020-02-14 ENCOUNTER — PATIENT OUTREACH (OUTPATIENT)
Dept: ADMINISTRATIVE | Facility: HOSPITAL | Age: 55
End: 2020-02-14

## 2020-03-04 DIAGNOSIS — R20.0 NUMBNESS AND TINGLING OF BOTH LOWER EXTREMITIES: ICD-10-CM

## 2020-03-04 DIAGNOSIS — R20.2 NUMBNESS AND TINGLING OF BOTH LOWER EXTREMITIES: ICD-10-CM

## 2020-03-04 DIAGNOSIS — M19.079 INFLAMMATION OF FOOT JOINT: ICD-10-CM

## 2020-03-04 RX ORDER — IBUPROFEN 800 MG/1
800 TABLET ORAL 2 TIMES DAILY
Qty: 60 TABLET | Refills: 3 | Status: SHIPPED | OUTPATIENT
Start: 2020-03-04 | End: 2020-07-01

## 2020-03-11 ENCOUNTER — OFFICE VISIT (OUTPATIENT)
Dept: INTERNAL MEDICINE | Facility: CLINIC | Age: 55
End: 2020-03-11
Payer: COMMERCIAL

## 2020-03-11 VITALS
HEART RATE: 100 BPM | BODY MASS INDEX: 28.16 KG/M2 | WEIGHT: 243.38 LBS | TEMPERATURE: 97 F | SYSTOLIC BLOOD PRESSURE: 112 MMHG | DIASTOLIC BLOOD PRESSURE: 84 MMHG | HEIGHT: 78 IN

## 2020-03-11 DIAGNOSIS — K21.9 GASTROESOPHAGEAL REFLUX DISEASE, ESOPHAGITIS PRESENCE NOT SPECIFIED: ICD-10-CM

## 2020-03-11 DIAGNOSIS — R09.81 SINUS CONGESTION: ICD-10-CM

## 2020-03-11 DIAGNOSIS — K11.5 PAROTID SIALOLITHIASIS: Primary | ICD-10-CM

## 2020-03-11 PROCEDURE — 3008F BODY MASS INDEX DOCD: CPT | Mod: CPTII,S$GLB,, | Performed by: FAMILY MEDICINE

## 2020-03-11 PROCEDURE — 99999 PR PBB SHADOW E&M-EST. PATIENT-LVL IV: ICD-10-PCS | Mod: PBBFAC,,, | Performed by: FAMILY MEDICINE

## 2020-03-11 PROCEDURE — 99214 OFFICE O/P EST MOD 30 MIN: CPT | Mod: S$GLB,,, | Performed by: FAMILY MEDICINE

## 2020-03-11 PROCEDURE — 3074F PR MOST RECENT SYSTOLIC BLOOD PRESSURE < 130 MM HG: ICD-10-PCS | Mod: CPTII,S$GLB,, | Performed by: FAMILY MEDICINE

## 2020-03-11 PROCEDURE — 3079F DIAST BP 80-89 MM HG: CPT | Mod: CPTII,S$GLB,, | Performed by: FAMILY MEDICINE

## 2020-03-11 PROCEDURE — 3074F SYST BP LT 130 MM HG: CPT | Mod: CPTII,S$GLB,, | Performed by: FAMILY MEDICINE

## 2020-03-11 PROCEDURE — 3008F PR BODY MASS INDEX (BMI) DOCUMENTED: ICD-10-PCS | Mod: CPTII,S$GLB,, | Performed by: FAMILY MEDICINE

## 2020-03-11 PROCEDURE — 99999 PR PBB SHADOW E&M-EST. PATIENT-LVL IV: CPT | Mod: PBBFAC,,, | Performed by: FAMILY MEDICINE

## 2020-03-11 PROCEDURE — 99214 PR OFFICE/OUTPT VISIT, EST, LEVL IV, 30-39 MIN: ICD-10-PCS | Mod: S$GLB,,, | Performed by: FAMILY MEDICINE

## 2020-03-11 PROCEDURE — 3079F PR MOST RECENT DIASTOLIC BLOOD PRESSURE 80-89 MM HG: ICD-10-PCS | Mod: CPTII,S$GLB,, | Performed by: FAMILY MEDICINE

## 2020-03-11 RX ORDER — PANTOPRAZOLE SODIUM 40 MG/1
40 TABLET, DELAYED RELEASE ORAL DAILY
Qty: 30 TABLET | Refills: 11 | Status: SHIPPED | OUTPATIENT
Start: 2020-03-11 | End: 2020-04-21 | Stop reason: SDUPTHER

## 2020-03-11 RX ORDER — METHYLPREDNISOLONE 4 MG/1
TABLET ORAL
Qty: 1 PACKAGE | Refills: 0 | Status: SHIPPED | OUTPATIENT
Start: 2020-03-11 | End: 2020-05-07

## 2020-03-11 NOTE — PROGRESS NOTES
Subjective:       Patient ID: Sachin Gonzalez is a 54 y.o. male.    Chief Complaint: Facial Swelling (right )    Pt is a 54 year old. Pt has Parotid calculus in Dec. Seemed to resolve and now complaining last few weeks. Pt also reporting some GERD symptoms with some dark stools. Feels burning in epigastric area.    Review of Systems   Constitutional: Negative.    Respiratory: Negative.    Cardiovascular: Negative.    Genitourinary: Negative.    Musculoskeletal: Negative.    Neurological: Negative.    Hematological: Negative.        Objective:      Physical Exam   Constitutional: He is oriented to person, place, and time. He appears well-developed and well-nourished.   Cardiovascular: Normal rate and regular rhythm. Exam reveals no friction rub.   No murmur heard.  Pulmonary/Chest: Effort normal and breath sounds normal. No stridor. He has no wheezes.   Abdominal: Soft. Bowel sounds are normal. There is no tenderness. There is no guarding.   Musculoskeletal: Normal range of motion.   Neurological: He is alert and oriented to person, place, and time. He displays normal reflexes. Coordination normal.   Skin: Skin is warm and dry.   Psychiatric: He has a normal mood and affect. His behavior is normal.       Assessment:       1. Parotid sialolithiasis    2. Gastroesophageal reflux disease, esophagitis presence not specified    3. Sinus congestion        Plan:       Parotid sialolithiasis  Comments:  Pt has 8 mm calculus per US done in Dec.   Orders:  -     Ambulatory referral/consult to ENT; Future; Expected date: 03/18/2020    Gastroesophageal reflux disease, esophagitis presence not specified  Comments:  Will start on protonix    Sinus congestion  Comments:  Will start on medrol dose pack    Other orders  -     pantoprazole (PROTONIX) 40 MG tablet; Take 1 tablet (40 mg total) by mouth once daily.  Dispense: 30 tablet; Refill: 11  -     methylPREDNISolone (MEDROL DOSEPACK) 4 mg tablet; use as directed  Dispense: 1  Package; Refill: 0

## 2020-03-12 ENCOUNTER — PATIENT OUTREACH (OUTPATIENT)
Dept: ADMINISTRATIVE | Facility: OTHER | Age: 55
End: 2020-03-12

## 2020-03-13 ENCOUNTER — OFFICE VISIT (OUTPATIENT)
Dept: OTOLARYNGOLOGY | Facility: CLINIC | Age: 55
End: 2020-03-13
Payer: COMMERCIAL

## 2020-03-13 VITALS
SYSTOLIC BLOOD PRESSURE: 126 MMHG | WEIGHT: 240.94 LBS | TEMPERATURE: 99 F | RESPIRATION RATE: 16 BRPM | BODY MASS INDEX: 27.88 KG/M2 | HEART RATE: 75 BPM | HEIGHT: 78 IN | DIASTOLIC BLOOD PRESSURE: 82 MMHG

## 2020-03-13 DIAGNOSIS — I10 HYPERTENSION, UNSPECIFIED TYPE: ICD-10-CM

## 2020-03-13 DIAGNOSIS — K11.20 PAROTIDITIS: ICD-10-CM

## 2020-03-13 DIAGNOSIS — K11.5 PAROTID SIALOLITHIASIS: Primary | ICD-10-CM

## 2020-03-13 DIAGNOSIS — K11.5 PAROTID SIALOLITHIASIS: ICD-10-CM

## 2020-03-13 PROCEDURE — 99243 PR OFFICE CONSULTATION,LEVEL III: ICD-10-PCS | Mod: S$GLB,,, | Performed by: OTOLARYNGOLOGY

## 2020-03-13 PROCEDURE — 99243 OFF/OP CNSLTJ NEW/EST LOW 30: CPT | Mod: S$GLB,,, | Performed by: OTOLARYNGOLOGY

## 2020-03-13 PROCEDURE — 99999 PR PBB SHADOW E&M-EST. PATIENT-LVL IV: CPT | Mod: PBBFAC,,, | Performed by: OTOLARYNGOLOGY

## 2020-03-13 PROCEDURE — 99999 PR PBB SHADOW E&M-EST. PATIENT-LVL IV: ICD-10-PCS | Mod: PBBFAC,,, | Performed by: OTOLARYNGOLOGY

## 2020-03-13 RX ORDER — AMOXICILLIN AND CLAVULANATE POTASSIUM 875; 125 MG/1; MG/1
1 TABLET, FILM COATED ORAL 2 TIMES DAILY
Qty: 20 TABLET | Refills: 0 | Status: SHIPPED | OUTPATIENT
Start: 2020-03-13 | End: 2020-03-23

## 2020-03-13 NOTE — LETTER
March 13, 2020      Domo Dick MD  [address] [facility]  [place] [address]           [facility]  [address] [facility] [place] [address]  Phone: [phone]  Fax: [phone]          Patient: Sachin Gonzalez   MR Number: [id number]   YOB: 1965   Date of Visit: 3/13/2020       Dear Dr. Domo Dick:    Thank you for referring Sachin Gonzalez to me for evaluation. Attached you will find relevant portions of my assessment and plan of care.    If you have questions, please do not hesitate to call me. I look forward to following Sachin Gonzalez along with you.    Sincerely,    Bony Wheeler MD    Enclosure  CC:  No Recipients    If you would like to receive this communication electronically, please contact [email or web address] or [phone] to request more information on [employer] access.    For providers and/or their staff who would like to refer a patient to [facility], please contact us through our one-stop-shop provider referral line, [facility], at [phone].    If you feel you have received this communication in error or would no longer like to receive these types of communications, please e-mail [email or web address]

## 2020-03-13 NOTE — PROGRESS NOTES
Referring Provider:    Domo Dick Md  [address] [facility]  [place] [address]  Subjective:   Patient: Sachin Gonzalez [id number], :1965   Visit date:3/13/2020 10:44 AM    Chief Complaint:  Follow-up (parotid sialolithiasis, salivary gland)    HPI:  Sachin is a 54 y.o. male who I was asked to see in consultation for evaluation of the following issue(s):    Patient presented to clinic on 20 for an evaluation of a R parotid sialolithiasis. Seen by PCP< Dr Dick, for this on 20 w/u with US revealed stone. Previous episode in Dec 2019.   Since then, he has never had full relief from the pain and discomfort on the right side.  No prior history of similar events.     Review of Systems:  -     Allergic/Immunologic: is allergic to no known allergies..  -     Constitutional: Current temp: 98.5 °F (36.9 °C) (Tympanic)      His meds, allergies, medical, surgical, social & family histories were reviewed & updated:  -     He has a current medication list which includes the following prescription(s): folic acid, ibuprofen, methotrexate, methylprednisolone, metoprolol succinate, pantoprazole, and amoxicillin-clavulanate 875-125mg.  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   -     He does not have any pertinent problems on file.   -     He  has a past surgical history that includes Prostatectomy.  -     He  reports that he has never smoked. He has never used smokeless tobacco. He reports that he does not drink alcohol or use drugs.  -     His family history includes Stroke in his father.  -     He is allergic to no known allergies.    Objective:     Physical Exam:  Vitals:  /82 (BP Location: Left arm, Patient Position: Sitting, BP Method: Large (Automatic))   Pulse 75   Temp 98.5 °F (36.9 °C) (Tympanic)   Resp 16   Ht 6'  "7" (2.007 m)   Wt 109.3 kg (240 lb 15.4 oz)   BMI 27.15 kg/m²   General appearance:  Well developed, well nourished    Eyes:  Extraocular motions intact, PERRL    Communication:  no hoarseness, no dysphonia    Ears:  Otoscopy of external auditory canals and tympanic membranes was normal, clinical speech reception thresholds grossly intact, no mass/lesion of auricle.  Nose:  No masses/lesions of external nose, nasal mucosa, septum, and turbinates were within normal limits.  Mouth:  No mass/lesion of lips, teeth, gums, hard/soft palate, tongue, tonsils, or oropharynx.  Clear saliva expressed from Stensen's duct     Cardiovascular:  No pedal edema; Radial Pulses +2     Neck & Lymphatics:  No cervical lymphadenopathy, no neck mass/crepitus/ asymmetry, trachea is midline, no thyroid enlargement/tenderness/mass. Right tail of parotid firm, enlarged and tender to palpation    Psych: Oriented x3,  Alert with normal mood and affect.     Respiration/Chest:  Symmetric expansion during respiration, normal respiratory effort.    Skin:  Warm and intact. No ulcerations of face, scalp, neck.    US SOFT TISSUE HEAD NECK THYROID    CLINICAL HISTORY:  .  Sialoadenitis, unspecified    TECHNIQUE:  Ultrasound of the right parotid gland was performed.    COMPARISON:  None.    FINDINGS:  There is heterogeneity of the right parotid parenchyma and a large 8 mm parotid calculus.  Evaluation of the contralateral left parotid gland is unremarkable.      Impression       Right parotid sialolithiasis.         Assessment & Plan:   Sachin was seen today for follow-up.    Diagnoses and all orders for this visit:    Parotid sialolithiasis  -     Ambulatory referral/consult to ENT  -     CT Maxillofacial Without Contrast; Future  -     amoxicillin-clavulanate 875-125mg (AUGMENTIN) 875-125 mg per tablet; Take 1 tablet by mouth 2 (two) times daily. for 10 days    Parotid sialolithiasis  Comments:  Pt has 8 mm calculus per US done in Dec. "   Orders:  -     Ambulatory referral/consult to ENT  -     CT Maxillofacial Without Contrast; Future  -     amoxicillin-clavulanate 875-125mg (AUGMENTIN) 875-125 mg per tablet; Take 1 tablet by mouth 2 (two) times daily. for 10 days    Parotiditis  -     CT Maxillofacial Without Contrast; Future  -     amoxicillin-clavulanate 875-125mg (AUGMENTIN) 875-125 mg per tablet; Take 1 tablet by mouth 2 (two) times daily. for 10 days      CT max face  Follow up  Stone is in the tail of parotid so unlikely to be amenable to transoral removal; likely will need formal parotidectomy        Thank you for allowing me to participate in the care of [facility].       Bony Wheeler MD, FACS  [facility]   [facility]  [address]  [place] [address]  P: [phone]  F: [phone]

## 2020-03-15 RX ORDER — METOPROLOL SUCCINATE 50 MG/1
TABLET, EXTENDED RELEASE ORAL
Qty: 180 TABLET | Refills: 0 | Status: SHIPPED | OUTPATIENT
Start: 2020-03-15 | End: 2020-08-17

## 2020-03-18 ENCOUNTER — HOSPITAL ENCOUNTER (OUTPATIENT)
Dept: RADIOLOGY | Facility: HOSPITAL | Age: 55
Discharge: HOME | End: 2020-03-18
Attending: OTOLARYNGOLOGY
Payer: COMMERCIAL

## 2020-03-18 DIAGNOSIS — K11.20 PAROTIDITIS: ICD-10-CM

## 2020-03-18 DIAGNOSIS — K11.5 PAROTID SIALOLITHIASIS: ICD-10-CM

## 2020-03-18 PROCEDURE — 70486 CT MAXILLOFACIAL W/O DYE: CPT | Mod: TC

## 2020-03-18 PROCEDURE — 70486 CT MAXILLOFACIAL WITHOUT CONTRAST: ICD-10-PCS | Mod: 26,,, | Performed by: RADIOLOGY

## 2020-03-18 PROCEDURE — 70486 CT MAXILLOFACIAL W/O DYE: CPT | Mod: 26,,, | Performed by: RADIOLOGY

## 2020-04-02 ENCOUNTER — TELEPHONE (OUTPATIENT)
Dept: OTOLARYNGOLOGY | Facility: CLINIC | Age: 55
End: 2020-04-02

## 2020-04-02 ENCOUNTER — OFFICE VISIT (OUTPATIENT)
Dept: OTOLARYNGOLOGY | Facility: CLINIC | Age: 55
End: 2020-04-02
Payer: COMMERCIAL

## 2020-04-02 DIAGNOSIS — K11.5 PAROTID SIALOLITHIASIS: Primary | ICD-10-CM

## 2020-04-02 PROCEDURE — 99213 OFFICE O/P EST LOW 20 MIN: CPT | Mod: 95,,, | Performed by: OTOLARYNGOLOGY

## 2020-04-02 PROCEDURE — 99213 PR OFFICE/OUTPT VISIT, EST, LEVL III, 20-29 MIN: ICD-10-PCS | Mod: 95,,, | Performed by: OTOLARYNGOLOGY

## 2020-04-02 NOTE — PROGRESS NOTES
Referring Provider:    No referring provider defined for this encounter.  Subjective:   Patient: Sachin Gonzalez [id number], :1965   Visit date:2020 10:44 AM    Chief Complaint:  Parotid swelling    HPI:  Sachin is a 54 y.o. male who I was asked to see in consultation for evaluation of the following issue(s):    Patient presented to clinic on 20 for an evaluation of a R parotid sialolithiasis. Seen by PCP< Dr Dick, for this on 20 w/u with US revealed stone. Previous episode in Dec 2019.   Since then, he had never had full relief from the pain and discomfort on the right side.  I placed him on Augmentin at that visit.  Since then, no longer with pain or discomfort.  Swelling is persistent focally but improved.     Review of Systems:  -     Allergic/Immunologic: is allergic to no known allergies..  -     Constitutional: afebrile      His meds, allergies, medical, surgical, social & family histories were reviewed & updated:  -     He has a current medication list which includes the following prescription(s): folic acid, ibuprofen, methotrexate, methylprednisolone, metoprolol succinate, and pantoprazole.  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   -     He does not have any pertinent problems on file.   -     He  has a past surgical history that includes Prostatectomy.  -     He  reports that he has never smoked. He has never used smokeless tobacco. He reports that he does not drink alcohol or use drugs.  -     His family history includes Stroke in his father.  -     He is allergic to no known allergies.    Objective:       Physical Exam:    [x] Well developed  [x] well nourished  [x] Extraocular motions intact  [x] no hoarseness   [x] no dysphonia  [x]  No masses/lesions of external nose  [x] Oriented x3  [x]  Alert with  normal mood and affect.   [x]  No ulcerations of face or neck.    Fullness in right posterior parotid    US SOFT TISSUE HEAD NECK THYROID    CLINICAL HISTORY:  .  Sialoadenitis, unspecified    TECHNIQUE:  Ultrasound of the right parotid gland was performed.    COMPARISON:  None.    FINDINGS:  There is heterogeneity of the right parotid parenchyma and a large 8 mm parotid calculus.  Evaluation of the contralateral left parotid gland is unremarkable.      Impression       Right parotid sialolithiasis.     EXAMINATION:  CT MAXILLOFACIAL WITHOUT CONTRAST    CLINICAL HISTORY:  chronic parotitis; Sialolithiasis    TECHNIQUE:  Low dose axial images, sagittal and coronal reformations were obtained through the face.  Contrast was not administered.    COMPARISON:  Ultrasound 12/13/2019    FINDINGS:  Evaluation limited without contrast.  Dental hardware artifact also limits evaluation.    Visualized brain and orbits are unremarkable.  Mild paranasal sinus mucosal thickening present.  Mastoid air cells clear.  No acute osseous abnormality.  Degenerative findings of the visualized mid cervical spine.    There is increased attenuation the right parotid gland compared to the left without significant enlargement or overlying fat stranding.  8 mm right intraparotid calcification present.  No definite salivary gland ductal dilatation or evidence of sialolithiasis.    No pathologically enlarged lymph nodes.  Visualized airway unremarkable.      Impression       Increased attenuation of the right parotid gland which may reflect sequela of chronic inflammation.  No significant overlying fat stranding to suggest acute inflammation.  8 mm intraparotid calcification present without distal obstructing sialolithiasis appreciated.      Electronically signed by: Fish Springer MD  Date: 03/18/2020  Time: 16:21         Assessment & Plan:   Diagnoses and all orders for this visit:    Parotid sialolithiasis  -     Case Request Operating Room:  EXCISION, PAROTID GLAND            Large sialolith in proximal parotid, posterior to buccinator.  Recommend posterior approach.  Discussed risks of sialocele, fistula, stricture, facial nerve weakness.   Immunosuppressed.  As long as symptoms are mild, will wait until May.  Discussed that this may need to be postponed even further due to COVID 19.  If sx are worsening, will need to be taken to the OR sooner.    Sachin understood that the risk of parotid surgery includes, but is not limited to facial nerve paralysis or weakness, bleeding/hematoma, infection, skin flap necrosis, cosmetic deformity, trismus, salivary fistula, facial synkinesis, numbness of the ear, recurrence of tumor, cosmetic deformity (scarring or contour defect), and sweating of the cheek when eating.  Temporary facial nerve paresis, involving all or just one or two branches of the facial nerve, and permanent total paralysis have occurred, respectively, in 9.3% to 64.6% and in 0% to 8% of parotidectomies, reported in the literature. The cases of transient facial nerve paresis generally resolved within 6 months, with 90% within 1 month.  Incidence of facial nerve injury is higher in patients with very large tumors, malignancy and deep lobe tumors.        Thank you for allowing me to participate in the care of Sachin.           Bony Wheeler MD, FACS  [facility]   [facility]  [address]  [place] [address]  P: [phone]  F: [phone]      The patient location is: home  The chief complaint leading to consultation is: parotid swelling  Visit type: Virtual visit with synchronous audio and video  Total time spent with patient: 25  Each patient to whom he or she provides medical services by telemedicine is:  (1) informed of the relationship between the physician and patient and the respective role of any other health care provider with respect to management of the patient; and (2) notified that he or she  may decline to receive medical services by telemedicine and may withdraw from such care at any time.

## 2020-04-02 NOTE — TELEPHONE ENCOUNTER
Pt needed to reschedule his appointment for later due to technical difficulties             ----- Message from Lucinda Wolff sent at 4/2/2020  3:10 PM CDT -----  Contact: self  Pt is requesting a call back regarding virtual appt. Stated that he has been trying to get help from tech. Please call pt back at [phone]

## 2020-04-21 ENCOUNTER — TELEPHONE (OUTPATIENT)
Dept: INTERNAL MEDICINE | Facility: CLINIC | Age: 55
End: 2020-04-21

## 2020-04-21 ENCOUNTER — HOSPITAL ENCOUNTER (EMERGENCY)
Facility: HOSPITAL | Age: 55
Discharge: HOME | End: 2020-04-21
Attending: EMERGENCY MEDICINE
Payer: COMMERCIAL

## 2020-04-21 VITALS
SYSTOLIC BLOOD PRESSURE: 154 MMHG | DIASTOLIC BLOOD PRESSURE: 81 MMHG | BODY MASS INDEX: 26.81 KG/M2 | OXYGEN SATURATION: 99 % | TEMPERATURE: 99 F | HEART RATE: 79 BPM | WEIGHT: 238 LBS | RESPIRATION RATE: 18 BRPM

## 2020-04-21 DIAGNOSIS — R19.7 DIARRHEA, UNSPECIFIED TYPE: ICD-10-CM

## 2020-04-21 DIAGNOSIS — K21.9 GASTROESOPHAGEAL REFLUX DISEASE, ESOPHAGITIS PRESENCE NOT SPECIFIED: Primary | ICD-10-CM
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
## 2020-04-21 DIAGNOSIS — R06.02 SOB (SHORTNESS OF BREATH): ICD-10-CM

## 2020-04-21 LAB
ALBUMIN SERPL BCP-MCNC: 3.9 G/DL (ref 3.5–5.2)
ALP SERPL-CCNC: 69 U/L (ref 55–135)
ALT SERPL W/O P-5'-P-CCNC: 18 U/L (ref 10–44)
ANION GAP SERPL CALC-SCNC: 10 MMOL/L (ref 8–16)
AST SERPL-CCNC: 20 U/L (ref 10–40)
BASOPHILS # BLD AUTO: 0.04 K/UL (ref 0–0.2)
BASOPHILS NFR BLD: 0.9 % (ref 0–1.9)
BILIRUB SERPL-MCNC: 0.4 MG/DL (ref 0.1–1)
BNP SERPL-MCNC: 29 PG/ML (ref 0–99)
BUN SERPL-MCNC: 19 MG/DL (ref 6–20)
CALCIUM SERPL-MCNC: 9.3 MG/DL (ref 8.7–10.5)
CHLORIDE SERPL-SCNC: 106 MMOL/L (ref 95–110)
CK SERPL-CCNC: 121 U/L (ref 20–200)
CO2 SERPL-SCNC: 25 MMOL/L (ref 23–29)
CREAT SERPL-MCNC: 1.2 MG/DL (ref 0.5–1.4)
DIFFERENTIAL METHOD: ABNORMAL
EOSINOPHIL # BLD AUTO: 0.1 K/UL (ref 0–0.5)
EOSINOPHIL NFR BLD: 2.6 % (ref 0–8)
ERYTHROCYTE [DISTWIDTH] IN BLOOD BY AUTOMATED COUNT: 13.7 % (ref 11.5–14.5)
EST. GFR  (AFRICAN AMERICAN): >60 ML/MIN/1.73 M^2
EST. GFR  (NON AFRICAN AMERICAN): >60 ML/MIN/1.73 M^2
GLUCOSE SERPL-MCNC: 99 MG/DL (ref 70–110)
HCT VFR BLD AUTO: 44.9 % (ref 40–54)
HGB BLD-MCNC: 14.7 G/DL (ref 14–18)
IMM GRANULOCYTES # BLD AUTO: 0.01 K/UL (ref 0–0.04)
IMM GRANULOCYTES NFR BLD AUTO: 0.2 % (ref 0–0.5)
INR PPP: 1 (ref 0.8–1.2)
LYMPHOCYTES # BLD AUTO: 1.1 K/UL (ref 1–4.8)
LYMPHOCYTES NFR BLD: 24.8 % (ref 18–48)
MCH RBC QN AUTO: 32.2 PG (ref 27–31)
MCHC RBC AUTO-ENTMCNC: 32.7 G/DL (ref 32–36)
MCV RBC AUTO: 99 FL (ref 82–98)
MONOCYTES # BLD AUTO: 0.5 K/UL (ref 0.3–1)
MONOCYTES NFR BLD: 10.2 % (ref 4–15)
NEUTROPHILS # BLD AUTO: 2.8 K/UL (ref 1.8–7.7)
NEUTROPHILS NFR BLD: 61.3 % (ref 38–73)
NRBC BLD-RTO: 0 /100 WBC
PLATELET # BLD AUTO: 266 K/UL (ref 150–350)
PMV BLD AUTO: 9.3 FL (ref 9.2–12.9)
POTASSIUM SERPL-SCNC: 4 MMOL/L (ref 3.5–5.1)
PROT SERPL-MCNC: 7.8 G/DL (ref 6–8.4)
PROTHROMBIN TIME: 10.9 SEC (ref 9–12.5)
RBC # BLD AUTO: 4.56 M/UL (ref 4.6–6.2)
SARS-COV-2 RDRP RESP QL NAA+PROBE: NEGATIVE
SODIUM SERPL-SCNC: 141 MMOL/L (ref 136–145)
TROPONIN I SERPL DL<=0.01 NG/ML-MCNC: <0.006 NG/ML (ref 0–0.03)
WBC # BLD AUTO: 4.6 K/UL (ref 3.9–12.7)

## 2020-04-21 PROCEDURE — 84484 ASSAY OF TROPONIN QUANT: CPT

## 2020-04-21 PROCEDURE — 82550 ASSAY OF CK (CPK): CPT

## 2020-04-21 PROCEDURE — 85610 PROTHROMBIN TIME: CPT

## 2020-04-21 PROCEDURE — 85025 COMPLETE CBC W/AUTO DIFF WBC: CPT

## 2020-04-21 PROCEDURE — 80053 COMPREHEN METABOLIC PANEL: CPT

## 2020-04-21 PROCEDURE — 93010 EKG 12-LEAD: ICD-10-PCS | Mod: ,,, | Performed by: INTERNAL MEDICINE

## 2020-04-21 PROCEDURE — U0002 COVID-19 LAB TEST NON-CDC: HCPCS

## 2020-04-21 PROCEDURE — 83880 ASSAY OF NATRIURETIC PEPTIDE: CPT

## 2020-04-21 PROCEDURE — 93010 ELECTROCARDIOGRAM REPORT: CPT | Mod: ,,, | Performed by: INTERNAL MEDICINE

## 2020-04-21 PROCEDURE — 93005 ELECTROCARDIOGRAM TRACING: CPT

## 2020-04-21 PROCEDURE — 99285 EMERGENCY DEPT VISIT HI MDM: CPT | Mod: 25

## 2020-04-21 PROCEDURE — 36415 COLL VENOUS BLD VENIPUNCTURE: CPT

## 2020-04-21 RX ORDER — PANTOPRAZOLE SODIUM 40 MG/1
40 TABLET, DELAYED RELEASE ORAL DAILY
Qty: 30 TABLET | Refills: 0 | Status: SHIPPED | OUTPATIENT
Start: 2020-04-21 | End: 2020-06-10 | Stop reason: SDUPTHER

## 2020-04-21 RX ORDER — HYDROCORTISONE 25 MG/ML
LOTION TOPICAL 2 TIMES DAILY
Qty: 60 ML | Refills: 0 | Status: SHIPPED | OUTPATIENT
Start: 2020-04-21 | End: 2020-05-07

## 2020-04-21 NOTE — TELEPHONE ENCOUNTER
I returned pt's call in regards being tested for covid 19. Pt stated that he started experiencing SOB and cough on last week. I informed pt that he could go to the [facility] on [facility] and they would let him know if he can be tested. Pt thanked me for returning his call and ended call. //BJ

## 2020-04-21 NOTE — ED PROVIDER NOTES
SCRIBE #1 NOTE: I, Ari Waters, am scribing for, and in the presence of, Bret Jacobsen NP. I have scribed the entire note.       History     Chief Complaint   Patient presents with    Shortness of Breath     Pt reports some SOB over the last week with chest pressure; Did see [facility] today and was told to come to ED due to oxygen sat dropped to 92% with exertion. denies any SOB currently. Denies Cough, congestion, or fever. Reports episode of diarrhea today. States he also has some skin rash to his arms, hands, and inner legs for several weeks. Denies itching has been putting witch hazel on spots; denies change of soaps, detergents, etc     Gastroesophageal Reflux     Also reports he was dx with GERD and put on meds for this sensation in chest before which helped but he ran out of the medication. Reports when he belches the pain gets better      Review of patient's allergies indicates:   Allergen Reactions    No known allergies          History of Present Illness     HPI    4/21/2020, 6:44 PM  History obtained from the patient      History of Present Illness: Sachin Gonzalez is a 54 y.o. male patient with a PMHx of HTN, RA, prostate cancer, and traumatic osteoarthritis who presents to the Emergency Department for evaluation of SOB associated with chest pressure which onset gradually one week ago. Pt saw his PCP today and was referred to the ED for a SPO2 of 92% upon exertion.  Pt also c/o gastroesophageal reflux after being dx with GERD. He states that the medication relieved this issue, but he's run out. Symptoms are constant and moderate in severity. Exertion exacerbates pt' SOB reported. Associated sxs include diarrhea. Patient denies any cough, congestion, fever, chills, HA, dizziness, abd pain, nausea, vomiting, weakness, back pain, neck pain, sore throat, and all other sxs at this time. Pt also presents with skin rash to arms, but states he has been working in the yard. Prior Tx includes witch hazel, with  no new recent use of detergents or soaps. No further complaints or concerns at this time.       Arrival mode: Personal vehicle     PCP: Domo Dick MD        Past Medical History:  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       Past Surgical History:  Past Surgical History:   Procedure Laterality Date    PROSTATECTOMY           Family History:  Family History   Problem Relation Age of Onset    Stroke Father        Social History:  Social History     Tobacco Use    Smoking status: Never Smoker    Smokeless tobacco: Never Used   Substance and Sexual Activity    Alcohol use: No    Drug use: No    Sexual activity: Yes     Partners: Female        Review of Systems     Review of Systems   Constitutional: Negative for chills and fever.   HENT: Negative for congestion and sore throat.         (+) gastroesophageal reflux   Respiratory: Positive for shortness of breath. Negative for cough.    Cardiovascular: Negative for chest pain.        (+) chest pressure   Gastrointestinal: Positive for diarrhea. Negative for abdominal pain, nausea and vomiting.   Genitourinary: Negative for dysuria.   Musculoskeletal: Negative for back pain and neck pain.   Skin: Negative for rash.   Neurological: Negative for dizziness, weakness and headaches.   Hematological: Does not bruise/bleed easily.   All other systems reviewed and are negative.     Physical Exam     Initial Vitals [04/21/20 1814]   BP Pulse Resp Temp SpO2   (!) 155/82 96 20 99.1 °F (37.3 °C) 100 %      MAP       --          Physical Exam  Nursing Notes and Vital Signs Reviewed.  Constitutional: Patient is in no acute distress. Well-developed and well-nourished.  Head: Atraumatic. Normocephalic.  Eyes: PERRL. EOM intact. Conjunctivae are not pale. No scleral icterus.  ENT: Mucous  membranes are moist. Oropharynx is clear and symmetric.    Neck: Supple. Full ROM. No lymphadenopathy.  Cardiovascular: Regular rate. Regular rhythm. No murmurs, rubs, or gallops. Distal pulses are 2+ and symmetric.  Pulmonary/Chest: No respiratory distress. Clear to auscultation bilaterally. No wheezing or rales.  Abdominal: Soft and non-distended.  There is no tenderness.  No rebound, guarding, or rigidity. Good bowel sounds.  Musculoskeletal: Moves all extremities. No obvious deformities. No edema.   Skin: Warm and dry. Small, erythematous lesions to arms.  Neurological:  Alert, awake, and appropriate.  Normal speech.  No acute focal neurological deficits are appreciated.  Psychiatric: Normal affect. Good eye contact. Appropriate in content.     ED Course   Procedures  ED Vital Signs:  Vitals:    04/21/20 1814 04/21/20 1933 04/21/20 2021   BP: (!) 155/82  (!) 154/81   Pulse: 96 82 79   Resp: 20 18 18   Temp: 99.1 °F (37.3 °C)  98.9 °F (37.2 °C)   TempSrc: Oral  Oral   SpO2: 100% 99% 99%   Weight: 108 kg (238 lb)         Abnormal Lab Results:  Labs Reviewed   CBC W/ AUTO DIFFERENTIAL - Abnormal; Notable for the following components:       Result Value    RBC 4.56 (*)     Mean Corpuscular Volume 99 (*)     Mean Corpuscular Hemoglobin 32.2 (*)     All other components within normal limits   SARS-COV-2 RNA AMPLIFICATION, QUAL    Narrative:     What symptom criteria does the patient meet?->Difficulty  breathing   COMPREHENSIVE METABOLIC PANEL   B-TYPE NATRIURETIC PEPTIDE   TROPONIN I   PROTIME-INR   CK        All Lab Results:  Results for orders placed or performed during the hospital encounter of 04/21/20   COVID-19 Rapid Screening   Result Value Ref Range    SARS-CoV-2 RNA, Amplification, Qual Negative Negative   CBC auto differential   Result Value Ref Range    WBC 4.60 3.90 - 12.70 K/uL    RBC 4.56 (L) 4.60 - 6.20 M/uL    Hemoglobin 14.7 14.0 - 18.0 g/dL    Hematocrit 44.9 40.0 - 54.0 %    Mean Corpuscular Volume  99 (H) 82 - 98 fL    Mean Corpuscular Hemoglobin 32.2 (H) 27.0 - 31.0 pg    Mean Corpuscular Hemoglobin Conc 32.7 32.0 - 36.0 g/dL    RDW 13.7 11.5 - 14.5 %    Platelets 266 150 - 350 K/uL    MPV 9.3 9.2 - 12.9 fL    Immature Granulocytes 0.2 0.0 - 0.5 %    Gran # (ANC) 2.8 1.8 - 7.7 K/uL    Immature Grans (Abs) 0.01 0.00 - 0.04 K/uL    Lymph # 1.1 1.0 - 4.8 K/uL    Mono # 0.5 0.3 - 1.0 K/uL    Eos # 0.1 0.0 - 0.5 K/uL    Baso # 0.04 0.00 - 0.20 K/uL    nRBC 0 0 /100 WBC    Gran% 61.3 38.0 - 73.0 %    Lymph% 24.8 18.0 - 48.0 %    Mono% 10.2 4.0 - 15.0 %    Eosinophil% 2.6 0.0 - 8.0 %    Basophil% 0.9 0.0 - 1.9 %    Differential Method Automated    Comprehensive metabolic panel   Result Value Ref Range    Sodium 141 136 - 145 mmol/L    Potassium 4.0 3.5 - 5.1 mmol/L    Chloride 106 95 - 110 mmol/L    CO2 25 23 - 29 mmol/L    Glucose 99 70 - 110 mg/dL    BUN, Bld 19 6 - 20 mg/dL    Creatinine 1.2 0.5 - 1.4 mg/dL    Calcium 9.3 8.7 - 10.5 mg/dL    Total Protein 7.8 6.0 - 8.4 g/dL    Albumin 3.9 3.5 - 5.2 g/dL    Total Bilirubin 0.4 0.1 - 1.0 mg/dL    Alkaline Phosphatase 69 55 - 135 U/L    AST 20 10 - 40 U/L    ALT 18 10 - 44 U/L    Anion Gap 10 8 - 16 mmol/L    eGFR if African American >60 >60 mL/min/1.73 m^2    eGFR if non African American >60 >60 mL/min/1.73 m^2   Brain natriuretic peptide   Result Value Ref Range    BNP 29 0 - 99 pg/mL   Troponin I   Result Value Ref Range    Troponin I <0.006 0.000 - 0.026 ng/mL   Protime-INR   Result Value Ref Range    Prothrombin Time 10.9 9.0 - 12.5 sec    INR 1.0 0.8 - 1.2   CPK   Result Value Ref Range     20 - 200 U/L         Imaging Results:  Imaging Results          X-Ray Chest AP Portable (Final result)  Result time 04/21/20 20:05:05    Final result by Rafael Howell MD (04/21/20 20:05:05)                 Impression:      1.  Low lung volumes with vascular crowding or atelectasis in the lung bases.  A function of interstitial pulmonary edema or interstitial  infectious process difficult to exclude in the right clinical setting.    2.  Stable findings as noted above.      Electronically signed by: Rafael Howell MD  Date:    04/21/2020  Time:    20:05             Narrative:    EXAMINATION:  XR CHEST AP PORTABLE    CLINICAL HISTORY:  Suspected Covid-19 Virus Infection;    COMPARISON:  November 14, 2016    FINDINGS:  Low lung volumes with vascular crowding or atelectasis in the lung bases.  The lungs are otherwise clear.  The cardiac silhouette size is normal. The trachea is midline and the mediastinal width is normal. Negative for focal infiltrate, effusion or pneumothorax. Pulmonary vasculature is normal. Negative for osseous abnormalities. Tortuous aorta.  Marginal spondylosis with convex-left curvature of the upper thoracic spine.                                 The EKG was ordered, reviewed, and independently interpreted by the ED provider.  Interpretation time: 18:25  Rate: 90 BPM  Rhythm: normal sinus rhythm  Interpretation: No significant ST changes. No STEMI.           The Emergency Provider reviewed the vital signs and test results, which are outlined above.     ED Discussion   8:33 PM: Reassessed pt at this time.  Pt states his condition has improved at this time. Discussed with pt all pertinent ED information and results. Discussed pt dx and plan of tx. Gave pt all f/u and return to the ED instructions. All questions and concerns were addressed at this time. Pt expresses understanding of information and instructions, and is comfortable with plan to discharge. Pt is stable for discharge.    I discussed with patient and/or family/caretaker that evaluation in the ED does not suggest any emergent or life threatening medical conditions requiring immediate intervention beyond what was provided in the ED, and I believe patient is safe for discharge.  Regardless, an unremarkable evaluation in the ED does not preclude the development or presence of a serious of life  threatening condition. As such, patient was instructed to return immediately for any worsening or change in current symptoms.         Medical Decision Making:   Clinical Tests:   Lab Tests: Ordered and Reviewed  Radiological Study: Ordered and Reviewed  Medical Tests: Ordered and Reviewed           ED Medication(s):  Medications - No data to display    Discharge Medication List as of 4/21/2020  8:23 PM          Follow-up Information     Schedule an appointment as soon as possible for a visit  with Domo Dick MD.    Specialty:  Family Medicine  Contact information:  [address]  [place] [address]  [phone]             [facility] - .    Specialty:  Emergency Medicine  Why:  As needed, If symptoms worsen  Contact information:  [address] [facility] [address] [phone]                     Scribe Attestation:   Scribe #1: I performed the above scribed service and the documentation accurately describes the services I performed. I attest to the accuracy of the note.     Attending:   Physician Attestation Statement for Scribe #1: I, Bret Jacobsen NP, personally performed the services described in this documentation, as scribed by Ari Waters, in my presence, and it is both accurate and complete.           Clinical Impression       ICD-10-CM ICD-9-CM   1. Gastroesophageal reflux disease, esophagitis presence not specified K21.9 530.81   2. SOB (shortness of breath) R06.02 786.05   3. Diarrhea, unspecified type R19.7 787.91   4. Bug bite, initial encounter W57.XXXA 919.4       Disposition:   Disposition: Discharged  Condition: Stable         Bret Jacobsen NP  04/22/20 1750

## 2020-04-21 NOTE — TELEPHONE ENCOUNTER
----- Message from Celeste Fields sent at 4/21/2020  3:16 PM CDT -----  Contact: Patient  Patient would like a call back concerning getting orders for Covid-19 testing. Please call to advise at Ph .[phone] (home)

## 2020-04-28 ENCOUNTER — TELEPHONE (OUTPATIENT)
Dept: OTOLARYNGOLOGY | Facility: CLINIC | Age: 55
End: 2020-04-28

## 2020-04-28 NOTE — TELEPHONE ENCOUNTER
Spoke with pt and got him scheduled for his pre op visit pt is still currently on the schedule to have surgery on 5/13/20        ----- Message from Ashli Hunter LPN sent at 4/28/2020  8:13 AM CDT -----  Good morning,   I called this patient yesterday to schedule a PAT appointment before scheduled surgery on 5/13/20. He said he was unaware that he was scheduled for surgery and would call Dr. Lea office to clarify. Is he still going to have surgery on 5/13?

## 2020-05-05 ENCOUNTER — PATIENT OUTREACH (OUTPATIENT)
Dept: ADMINISTRATIVE | Facility: OTHER | Age: 55
End: 2020-05-05

## 2020-05-07 ENCOUNTER — OFFICE VISIT (OUTPATIENT)
Dept: OTOLARYNGOLOGY | Facility: CLINIC | Age: 55
End: 2020-05-07
Payer: COMMERCIAL

## 2020-05-07 ENCOUNTER — HOSPITAL ENCOUNTER (OUTPATIENT)
Dept: PREADMISSION TESTING | Facility: HOSPITAL | Age: 55
Discharge: HOME | End: 2020-05-07
Attending: OTOLARYNGOLOGY
Payer: COMMERCIAL

## 2020-05-07 ENCOUNTER — TELEPHONE (OUTPATIENT)
Dept: OTOLARYNGOLOGY | Facility: CLINIC | Age: 55
End: 2020-05-07

## 2020-05-07 VITALS
WEIGHT: 238.75 LBS | DIASTOLIC BLOOD PRESSURE: 87 MMHG | HEART RATE: 72 BPM | TEMPERATURE: 98 F | BODY MASS INDEX: 26.9 KG/M2 | SYSTOLIC BLOOD PRESSURE: 122 MMHG

## 2020-05-07 VITALS
RESPIRATION RATE: 16 BRPM | OXYGEN SATURATION: 99 % | DIASTOLIC BLOOD PRESSURE: 82 MMHG | TEMPERATURE: 99 F | SYSTOLIC BLOOD PRESSURE: 131 MMHG | HEART RATE: 77 BPM

## 2020-05-07 DIAGNOSIS — R07.89 CHEST DISCOMFORT: ICD-10-CM

## 2020-05-07 DIAGNOSIS — C61 PROSTATE CANCER: ICD-10-CM

## 2020-05-07 DIAGNOSIS — Z01.818 PREOP TESTING: Primary | ICD-10-CM

## 2020-05-07 DIAGNOSIS — K11.5 PAROTID SIALOLITHIASIS: ICD-10-CM

## 2020-05-07 DIAGNOSIS — K11.5 PAROTID SIALOLITHIASIS: Primary | ICD-10-CM

## 2020-05-07 DIAGNOSIS — Z79.60 LONG-TERM USE OF IMMUNOSUPPRESSANT MEDICATION: ICD-10-CM

## 2020-05-07 PROCEDURE — 3074F SYST BP LT 130 MM HG: CPT | Mod: CPTII,S$GLB,, | Performed by: OTOLARYNGOLOGY

## 2020-05-07 PROCEDURE — 3008F BODY MASS INDEX DOCD: CPT | Mod: CPTII,S$GLB,, | Performed by: OTOLARYNGOLOGY

## 2020-05-07 PROCEDURE — 3074F PR MOST RECENT SYSTOLIC BLOOD PRESSURE < 130 MM HG: ICD-10-PCS | Mod: CPTII,S$GLB,, | Performed by: OTOLARYNGOLOGY

## 2020-05-07 PROCEDURE — 99999 PR PBB SHADOW E&M-EST. PATIENT-LVL III: CPT | Mod: PBBFAC,,, | Performed by: OTOLARYNGOLOGY

## 2020-05-07 PROCEDURE — 3079F DIAST BP 80-89 MM HG: CPT | Mod: CPTII,S$GLB,, | Performed by: OTOLARYNGOLOGY

## 2020-05-07 PROCEDURE — 99999 PR PBB SHADOW E&M-EST. PATIENT-LVL III: ICD-10-PCS | Mod: PBBFAC,,, | Performed by: OTOLARYNGOLOGY

## 2020-05-07 PROCEDURE — 99214 OFFICE O/P EST MOD 30 MIN: CPT | Mod: S$GLB,,, | Performed by: OTOLARYNGOLOGY

## 2020-05-07 PROCEDURE — 3079F PR MOST RECENT DIASTOLIC BLOOD PRESSURE 80-89 MM HG: ICD-10-PCS | Mod: CPTII,S$GLB,, | Performed by: OTOLARYNGOLOGY

## 2020-05-07 PROCEDURE — 3008F PR BODY MASS INDEX (BMI) DOCUMENTED: ICD-10-PCS | Mod: CPTII,S$GLB,, | Performed by: OTOLARYNGOLOGY

## 2020-05-07 PROCEDURE — 99214 PR OFFICE/OUTPT VISIT, EST, LEVL IV, 30-39 MIN: ICD-10-PCS | Mod: S$GLB,,, | Performed by: OTOLARYNGOLOGY

## 2020-05-07 RX ORDER — ASCORBIC ACID 250 MG
250 TABLET ORAL DAILY
COMMUNITY

## 2020-05-07 RX ORDER — MAGNESIUM 30 MG
TABLET ORAL NIGHTLY
COMMUNITY
End: 2022-02-15

## 2020-05-07 NOTE — H&P
Preoperative History and Physical                                                             Hospital Medicine      Chief Complaint: Preoperative evaluation     History of Present Illness:      Sachin Gonzalez is a 54 y.o. male with a right parotid sialolithiasis who presents to the office today for a preoperative consultation at the request of Dr. Wheeler who plans on performing right parotidectomy.     Functional Status:      The patient is able to climb a flight of stairs. The patient is able to ambulate 3 blocks without difficulty. The patient's functional status is not affected by the surgical problem. The patient's functional status is not affected by shortness of breath, chest pain, dyspnea on exertion and fatigue.    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        Past Surgical History:      Past Surgical History:   Procedure Laterality Date    ESOPHAGOGASTRODUODENOSCOPY      PROSTATECTOMY  2011        Social History:      Social History     Socioeconomic History    Marital status: Single     Spouse name: Not on file    Number of children: Not on file    Years of education: Not on file    Highest education level: Not on file   Occupational History    Not on file   Social Needs    Financial resource strain: Not on file    Food insecurity:     Worry: Not on file     Inability: Not on file    Transportation needs:     Medical: Not on file     Non-medical: Not on file   Tobacco Use    Smoking status: Never Smoker    Smokeless tobacco: Never Used   Substance and Sexual Activity    Alcohol use: No     Drug use: No    Sexual activity: Yes     Partners: Female   Lifestyle    Physical activity:     Days per week: Not on file     Minutes per session: Not on file    Stress: Not at all   Relationships    Social connections:     Talks on phone: Not on file     Gets together: Not on file     Attends Confucianist service: Not on file     Active member of club or organization: Not on file     Attends meetings of clubs or organizations: Not on file     Relationship status: Not on file   Other Topics Concern    Not on file   Social History Narrative    Not on file        Family History:      Family History   Problem Relation Age of Onset    Stroke Father     Alcohol abuse Father     Arthritis Mother     Hypertension Mother     Anxiety disorder Mother     No Known Problems Sister     Kidney disease Brother        Allergies:      Review of patient's allergies indicates:   Allergen Reactions    No known allergies        Medications:      Current Outpatient Medications   Medication Sig    ALFALFA ORAL Take by mouth every evening. Hold 5 days prior to surgery    ascorbic acid, vitamin C, (VITAMIN C) 250 MG tablet Take 250 mg by mouth once daily.    folic acid (FOLVITE) 1 MG tablet Take 1 tablet (1,000 mcg total) by mouth once daily.    herbal complex no.174 (ECHINACEA AND GOLDENSEAL ORAL) Take by mouth once daily.    ibuprofen (ADVIL,MOTRIN) 800 MG tablet Take 1 tablet (800 mg total) by mouth 2 (two) times daily. (Patient taking differently: Take 400 mg by mouth every 6 (six) hours as needed. Hold 3 days prior to surgery)    magnesium 30 mg Tab Take by mouth every evening.    methotrexate 2.5 MG Tab TAKE 10 TABLETS EVERY 7 DAYS (Patient taking differently: Take 2.5 mg by mouth every 7 days. Last dose was 5/1/20)    metoprolol succinate (TOPROL XL) 50 MG 24 hr tablet Take 1 tablet twice daily. hold if systolic blood pressure less than or equal to 100 or heart rate is less than 60    pantoprazole (PROTONIX) 40  MG tablet Take 1 tablet (40 mg total) by mouth once daily. (Patient taking differently: Take 40 mg by mouth every evening. )     No current facility-administered medications for this encounter.        Vitals:      Vitals:    05/07/20 1116   BP: 131/82   Pulse: 77   Resp: 16   Temp: 99 °F (37.2 °C)       Review of Systems:        Constitutional: Negative for fever, chills, weight loss, malaise/fatigue and diaphoresis.   HENT: +right neck swollen Negative for hearing loss, ear pain, nosebleeds, congestion, sore throat, neck pain, tinnitus and ear discharge.    Eyes: Negative for blurred vision, double vision, photophobia, pain, discharge and redness.   Respiratory: Negative for cough, hemoptysis, sputum production, shortness of breath, wheezing and stridor.    Cardiovascular: +pt reports recurrent chest pain accompanied by SOB and fatigue Negative for palpitations, orthopnea, claudication, leg swelling and PND.   Gastrointestinal: Negative for heartburn, nausea, vomiting, abdominal pain, diarrhea, constipation, blood in stool and melena.   Genitourinary: Negative for dysuria, urgency, frequency, hematuria and flank pain.   Musculoskeletal: +chronic back pain and arthralgias Negative for myalgias and falls.   Skin: Negative for itching and rash.   Neurological: Negative for dizziness, tingling, tremors, sensory change, speech change, focal weakness, seizures, loss of consciousness, weakness and headaches.   Endo/Heme/Allergies: Negative for environmental allergies and polydipsia. Does not bruise/bleed easily.   Psychiatric/Behavioral: Negative for depression, suicidal ideas, hallucinations, memory loss and substance abuse. The patient is not nervous/anxious and does not have insomnia.    All 14 systems reviewed and negative except as noted above.    Physical Exam:      Constitutional: Appears well-developed, well-nourished and in no acute distress.  Patient is oriented to person, place, and time.   Head: Normocephalic  and atraumatic. Mucous membranes moist.  Neck: +fullness to right parotid Neck supple  Cardiovascular: Normal rate and regular rhythm.  S1 S2 appreciated by ascultation.  Pulmonary/Chest: Effort normal and clear to auscultation bilaterally. No respiratory distress.   Abdomen: Soft. Non-tender and non-distended. Bowel sounds are normal.   Neurological: Patient is alert and oriented to person, place and time. Moves all extremities.  Skin: Warm and dry. No lesions.  Extremities: No clubbing, cyanosis or edema.    Laboratory data:      Reviewed and noted in plan where applicable. Please see chart for full laboratory data.    No results for input(s): CPK, CPKMB, TROPONINI, MB in the last 24 hours. No results for input(s): POCTGLUCOSE in the last 24 hours.     Lab Results   Component Value Date    INR 1.0 2020    INR 1.1 06/10/2012       Lab Results   Component Value Date    WBC 4.60 2020    HGB 14.7 2020    HCT 44.9 2020    MCV 99 (H) 2020     2020       No results for input(s): GLU, NA, K, CL, CO2, BUN, CREATININE, CALCIUM, MG in the last 24 hours.    Predictors of intubation difficulty:       Morbid obesity? no   Anatomically abnormal facies? no   Prominent incisors? no   Receding mandible? no   Short, thick neck? no   Neck range of motion: normal     Cardiographics:      EC20  Normal sinus rhythm  Normal ECG  When compared with ECG of 2016 08:19,  NV interval has decreased  Confirmed by ELISSA JOHNS MD ([id number]) on 2020 11:15:32 AM    Imaging:      Chest x-ray: 20  Low lung volumes with vascular crowding or atelectasis in the lung bases.  A function of interstitial pulmonary edema or interstitial infectious process difficult to exclude in the right clinical setting  Assessment and Plan:      Parotid sialolithiasis  The patient is scheduled for possible right parotidectomy by Dr. Wheeler     Known risk factors for perioperative complications:    Episodes of  chest discomfort   GERD  HTN  Immunocompromised 2/2 Methotrexate     Difficulty with intubation is not anticipated.    Cardiac Risk Estimation: Will obtain cardiac evaluation prior to surgery 2/2 recent episodes of exertional CP/SON     1.) Preoperative workup as follows: cardiac eval .  2.) Change in medication regimen before surgery: discontinue Herbals and NSAIDs (Ibuprofen) 5 days before surgery  3.) Prophylaxis for cardiac events with perioperative beta-blockers: cont Toprol.  4.) Invasive hemodynamic monitoring perioperatively: not indicated.  5.) Deep vein thrombosis prophylaxis postoperatively: regimen to be chosen by surgical team.  6.) Surveillance for postoperative MI with ECG immediately postoperatively and on postoperati ve days 1 and 2 AND troponin levels 24 hours postoperatively and on day 4 or hospital discharge (whichever comes first): not indicated.  7.) Current medications which may produce withdrawal symptoms if withheld perioperatively: none   8.) Other measures: F/U with Cardiology as scheduled for Preop eval, instructed pt to contact Rheumatologist regarding perioperative instructions for Methotrexate     Chest discomfort  Pt reported chest/epigastric pain/burning to PCP on 3/11/20-treated with protonix   Pt had another episode of exertional chest pain accompanied by SOB after working in yard on 4/22/20. Pt was evaluated in ED that day. EKG was normal. Troponin was normal. BNP normal. CXR showed possible interstitial pulmonary edema   Pt reports he still has intermittent chest discomfort   Will have pt obtain Preop cardiac evaluation.     Gastroesophageal reflux disease  Cont Protonix     RA (rheumatoid arthritis)  Followed by Dr. Jackson   Cont methotrexate and Folic acid  Pt instructed to contact his Rheumatologist regarding perioperative instructions for Methotrexate     Hypertension  Stable   Cont Toprol     Long-term use of immunosuppressant medication  2/2 Methotrexate  See above      Prostate cancer  S/p Prostatectomy   Stable   F/U with Urology

## 2020-05-07 NOTE — ASSESSMENT & PLAN NOTE
Pt reported chest/epigastric pain/burning to PCP on 3/11/20-treated with protonix   Pt had another episode of exertional chest pain accompanied by SOB after working in yard on 4/22/20. Pt was evaluated in ED that day. EKG was normal. Troponin was normal. BNP normal. CXR showed possible interstitial pulmonary edema   Will have pt obtain Preop cardiac evaluation.

## 2020-05-07 NOTE — PROGRESS NOTES
Subjective:   Patient: Sachin Gonzalez [id number], :1965   Visit date:2020 12:17 PM    Chief Complaint:  Other (pre op)    HPI:  Sachin is a 54 y.o. male who presented to clinic on 20 for a f/u visit. Patient first presented to clinic on 20 for an evaluation of a R parotid sialolithiasis. Seen by PCP  Dr Dick, for this on 20 w/u with US revealed stone. Previous episode in Dec 2019.  Since, he has never had full relief from the pain and discomfort on the right side.  No prior history of similar events.  Feeling better since last visit.       Review of Systems:  -     Allergic/Immunologic: is allergic to no known allergies..  -     Constitutional: Current temp: 98 °F (36.7 °C) (Tympanic)    -     He has a current medication list which includes the following prescription(s): alfalfa, ascorbic acid (vitamin c), folic acid, herbal complex no.174, ibuprofen, magnesium, methotrexate, metoprolol succinate, and pantoprazole.  Objective:     Physical Exam:  Vitals:  /87   Pulse 72   Temp 98 °F (36.7 °C) (Tympanic)   Wt 108.3 kg (238 lb 12.1 oz)   BMI 26.90 kg/m²   Appearance:  Well-developed, well-nourished.  Communication:  Able to communicate, no hoarseness.  Head & Face:  Normocephalic, atraumatic, no sinus tenderness, normal facial strength.  Eyes:  Extraocular motions intact.  Ears:  Otoscopy of external auditory canals and tympanic membranes was normal, clinical speech reception thresholds grossly intact, no mass/lesion of auricle.  Nose:  No masses/lesions of external nose, nasal mucosa, septum, and turbinates were within normal limits.  Mouth:  No mass/lesion of lips, teeth, gums, hard/soft palate, tongue, tonsils, or oropharynx.  Neck & Lymphatics:  No cervical lymphadenopathy, no neck mass/crepitus/ asymmetry, trachea is midline, no thyroid enlargement/tenderness/mass.  Firm area in right tail of parotid  Neuro/Psych: Alert with normal mood and affect.   Abdominal: Normal  appearance.   Respiration/Chest:  Symmetric expansion during respiration, normal respiratory effort.  Skin:  Warm and intact  Cardiovascular:  No peripheral vascular edema or varicosities.    CT MAXILLOFACIAL WITHOUT CONTRAST    CLINICAL HISTORY:  chronic parotitis; Sialolithiasis    TECHNIQUE:  Low dose axial images, sagittal and coronal reformations were obtained through the face.  Contrast was not administered.    COMPARISON:  Ultrasound 12/13/2019    FINDINGS:  Evaluation limited without contrast.  Dental hardware artifact also limits evaluation.    Visualized brain and orbits are unremarkable.  Mild paranasal sinus mucosal thickening present.  Mastoid air cells clear.  No acute osseous abnormality.  Degenerative findings of the visualized mid cervical spine.    There is increased attenuation the right parotid gland compared to the left without significant enlargement or overlying fat stranding.  8 mm right intraparotid calcification present.  No definite salivary gland ductal dilatation or evidence of sialolithiasis.    No pathologically enlarged lymph nodes.  Visualized airway unremarkable.      Impression       Increased attenuation of the right parotid gland which may reflect sequela of chronic inflammation.  No significant overlying fat stranding to suggest acute inflammation.  8 mm intraparotid calcification present without distal obstructing sialolithiasis appreciated.     US SOFT TISSUE HEAD NECK THYROID    CLINICAL HISTORY:  .  Sialoadenitis, unspecified    TECHNIQUE:  Ultrasound of the right parotid gland was performed.    COMPARISON:  None.    FINDINGS:  There is heterogeneity of the right parotid parenchyma and a large 8 mm parotid calculus.  Evaluation of the contralateral left parotid gland is unremarkable.      Impression       Right parotid sialolithiasis.         Assessment & Plan:   Sachin was seen today for other.    Diagnoses and all orders for this visit:    Parotid  sialolithiasis        Discussed with him parotidectomy.  He is reluctant to have any type of larger surgery.  I discussed that sialendoscopy +/- ESWL may be an option.  I encouraged him to see Dr. Ledezma for evaluation.      Over 25 minutes were spent in face to face contact with the patient with greater than 50% spent discussing their diagnosis and coordination of care.                    Bony Wheeler MD, FACS  [facility]   [facility]  [address]  [place] [address]  P: [phone]  F: [phone]

## 2020-05-07 NOTE — DISCHARGE INSTRUCTIONS
To confirm, Your doctor has instructed you that surgery is scheduled for 5/13/20.      Please report to [facility] on Monday 5/11/20 between 8am-12pm for your mandatory COVID19 test.      Please report to [facility].  Pre admit office will call afternoon prior to surgery with final arrival time    INSTRUCTIONS IMPORTANT!!!   Do not eat, drink, or smoke after 12 midnight-including water. OK to brush teeth, no gum, candy or mints!    ¨ Take only these medicines with a small swallow of water-morning of surgery.  Metoprolol     Pre operative instructions:  Please review the Pre-Operative Instruction booklet that you were given.        Bathing Instructions--See page 6 in the Pre-operative booklet.      Prevention of surgical site infections:     -Keep incisions clean and dry.   -Do not soak/submerge incisions in water until completely healed.   -Do not apply lotions, powders, creams, or deodorants to site.   -Always make sure hands are cleaned with antibacterial soap/ alcohol-based  prior to touching the surgical site.  (This includes doctors, nurses, staff, and yourself.)    Signs and symptoms:   -Redness and pain around the area where you had surgery   -Drainage of cloudy fluid from your surgical wound   -Fever over 100.4       I have read or had read and explained to me, and understand the above information.  Additional comments or instructions:  Received a copy of Pre-operative instructions booklet, FAQ surgical site infection sheet, and packets of hibiclens (if indicated).

## 2020-05-07 NOTE — ASSESSMENT & PLAN NOTE
The patient is scheduled for possible right parotidectomy by Dr. Wheeler     Known risk factors for perioperative complications:    Episodes of chest discomfort   GERD  HTN  Immunocompromised 2/2 Methotrexate     Difficulty with intubation is not anticipated.    Cardiac Risk Estimation: Will obtain cardiac evaluation prior to surgery 2/2 recent episodes of exertional CP/SON     1.) Preoperative workup as follows: cardiac eval .  2.) Change in medication regimen before surgery: discontinue Herbals and NSAIDs (Ibuprofen) 5 days before surgery  3.) Prophylaxis for cardiac events with perioperative beta-blockers: cont Toprol.  4.) Invasive hemodynamic monitoring perioperatively: not indicated.  5.) Deep vein thrombosis prophylaxis postoperatively: regimen to be chosen by surgical team.  6.) Surveillance for postoperative MI with ECG immediately postoperatively and on postoperati ve days 1 and 2 AND troponin levels 24 hours postoperatively and on day 4 or hospital discharge (whichever comes first): not indicated.  7.) Current medications which may produce withdrawal symptoms if withheld perioperatively: none   8.) Other measures: F/U with Cardiology as scheduled for Preop eval, instructed pt to contact Rheumatologist regarding perioperative instructions for Methotrexate

## 2020-05-07 NOTE — ASSESSMENT & PLAN NOTE
Followed by Dr. Jackson   Cont methotrexate and Folic acid  Pt instructed to contact his Rheumatologist regarding perioperative instructions for Methotrexate

## 2020-05-12 ENCOUNTER — PATIENT OUTREACH (OUTPATIENT)
Dept: ADMINISTRATIVE | Facility: OTHER | Age: 55
End: 2020-05-12

## 2020-05-13 ENCOUNTER — OFFICE VISIT (OUTPATIENT)
Dept: CARDIOLOGY | Facility: CLINIC | Age: 55
End: 2020-05-13
Payer: COMMERCIAL

## 2020-05-13 VITALS
BODY MASS INDEX: 27.05 KG/M2 | DIASTOLIC BLOOD PRESSURE: 82 MMHG | HEART RATE: 85 BPM | OXYGEN SATURATION: 99 % | SYSTOLIC BLOOD PRESSURE: 122 MMHG | WEIGHT: 240.06 LBS

## 2020-05-13 DIAGNOSIS — E78.2 MIXED HYPERLIPIDEMIA: ICD-10-CM

## 2020-05-13 DIAGNOSIS — Z01.810 PREOP CARDIOVASCULAR EXAM: Primary | ICD-10-CM

## 2020-05-13 DIAGNOSIS — I10 ESSENTIAL HYPERTENSION: ICD-10-CM

## 2020-05-13 DIAGNOSIS — R07.89 CHEST PAIN, ATYPICAL: ICD-10-CM

## 2020-05-13 PROCEDURE — 99245 PR OFFICE CONSULTATION,LEVEL V: ICD-10-PCS | Mod: S$GLB,,, | Performed by: INTERNAL MEDICINE

## 2020-05-13 PROCEDURE — 99999 PR PBB SHADOW E&M-EST. PATIENT-LVL III: ICD-10-PCS | Mod: PBBFAC,,, | Performed by: INTERNAL MEDICINE

## 2020-05-13 PROCEDURE — 99245 OFF/OP CONSLTJ NEW/EST HI 55: CPT | Mod: S$GLB,,, | Performed by: INTERNAL MEDICINE

## 2020-05-13 PROCEDURE — 99999 PR PBB SHADOW E&M-EST. PATIENT-LVL III: CPT | Mod: PBBFAC,,, | Performed by: INTERNAL MEDICINE

## 2020-05-13 NOTE — PROGRESS NOTES
Subjective:   Patient ID:  Sachin Gonzalez is a 54 y.o. male who presents for evaluation of No chief complaint on file.      55 yo male, came in for preop of removal of R parotid sialolithiasis  PMH HTN, GERD and prostate cancer s/p TURP, h/o XRT in 2012.  C/o chest pain pressure, after heavy meals, or spicy food. Resolved after protonix.  No dyspnea, dizziness, syncope, orthopnea.  No smoking/drinking  No f/h premature CAD  Dose walking, yard work,. 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       Past Surgical History:   Procedure Laterality Date    ESOPHAGOGASTRODUODENOSCOPY      PROSTATECTOMY  2011       Social History     Tobacco Use    Smoking status: Never Smoker    Smokeless tobacco: Never Used   Substance Use Topics    Alcohol use: No    Drug use: No       Family History   Problem Relation Age of Onset    Stroke Father     Alcohol abuse Father     Arthritis Mother     Hypertension Mother     Anxiety disorder Mother     No Known Problems Sister     Kidney disease Brother        Review of Systems   Constitution: Negative for decreased appetite, diaphoresis, fever, malaise/fatigue and night sweats.   HENT: Negative for nosebleeds.    Eyes: Negative for blurred vision and double vision.   Cardiovascular: Positive for chest pain. Negative for claudication, dyspnea on exertion, irregular heartbeat, leg swelling, near-syncope, orthopnea, palpitations, paroxysmal nocturnal dyspnea and syncope.   Respiratory: Negative for cough, shortness of breath, sleep disturbances due to breathing, snoring, sputum production and wheezing.    Endocrine: Negative for cold intolerance and polyuria.   Hematologic/Lymphatic: Does  not bruise/bleed easily.   Skin: Negative for rash.   Musculoskeletal: Negative for back pain, falls, joint pain, joint swelling and neck pain.   Gastrointestinal: Negative for abdominal pain, heartburn, nausea and vomiting.   Genitourinary: Negative for dysuria, frequency and hematuria.   Neurological: Negative for difficulty with concentration, dizziness, focal weakness, headaches, light-headedness, numbness, seizures and weakness.   Psychiatric/Behavioral: Negative for depression, memory loss and substance abuse. The patient does not have insomnia.    Allergic/Immunologic: Negative for HIV exposure and hives.       Objective:   Physical Exam   Constitutional: He is oriented to person, place, and time. He appears well-nourished.   HENT:   Head: Normocephalic.   Eyes: Pupils are equal, round, and reactive to light.   Neck: Normal carotid pulses and no JVD present. Carotid bruit is not present. No thyromegaly present.   Cardiovascular: Normal rate, regular rhythm, normal heart sounds and normal pulses.  No extrasystoles are present. PMI is not displaced. Exam reveals no gallop and no S3.   No murmur heard.  Pulmonary/Chest: Breath sounds normal. No stridor. No respiratory distress.   Abdominal: Soft. Bowel sounds are normal. There is no tenderness. There is no rebound.   Musculoskeletal: Normal range of motion.   Neurological: He is alert and oriented to person, place, and time.   Skin: Skin is intact. No rash noted.   Psychiatric: His behavior is normal.       Lab Results   Component Value Date    CHOL 202 (H) 04/12/2017    CHOL 209 (H) 06/13/2016     Lab Results   Component Value Date    HDL 34 (L) 04/12/2017    HDL 41 06/13/2016     Lab Results   Component Value Date    LDLCALC 126.6 04/12/2017    LDLCALC 136.0 06/13/2016     Lab Results   Component Value Date    TRIG 207 (H) 04/12/2017    TRIG 160 (H) 06/13/2016     Lab Results   Component Value Date    CHOLHDL 16.8 (L) 04/12/2017    CHOLHDL 19.6 (L) 06/13/2016        Chemistry        Component Value Date/Time     04/21/2020 1847    K 4.0 04/21/2020 1847     04/21/2020 1847    CO2 25 04/21/2020 1847    BUN 19 04/21/2020 1847    CREATININE 1.2 04/21/2020 1847    GLU 99 04/21/2020 1847        Component Value Date/Time    CALCIUM 9.3 04/21/2020 1847    ALKPHOS 69 04/21/2020 1847    AST 20 04/21/2020 1847    ALT 18 04/21/2020 1847    BILITOT 0.4 04/21/2020 1847    ESTGFRAFRICA >60 04/21/2020 1847    EGFRNONAA >60 04/21/2020 1847          Lab Results   Component Value Date    HGBA1C 5.7 04/12/2017     Lab Results   Component Value Date    TSH 0.874 06/13/2016     Lab Results   Component Value Date    INR 1.0 04/21/2020    INR 1.1 06/10/2012     Lab Results   Component Value Date    WBC 4.60 04/21/2020    HGB 14.7 04/21/2020    HCT 44.9 04/21/2020    MCV 99 (H) 04/21/2020     04/21/2020     BNP  @LABRCNTIP(BNP,BNPTRIAGEBLO)@  CrCl cannot be calculated (Patient's most recent lab result is older than the maximum 7 days allowed.).  No results found in the last 24 hours.  No results found in the last 24 hours.  No results found in the last 24 hours.    Assessment:      1. Preop cardiovascular exam    2. Essential hypertension    3. Mixed hyperlipidemia    4. Chest pain, atypical        Plan:   Atypical CP and preop ekg sinus tachy  ETT and ECHO ordered    Continue BB  Check Lipid profile with PCP  Counseled DASH  Recommend heart-healthy diet, weight control and regular exercise.  Myles. Risk modification.   F/u withpcp  I have reviewed all pertinent labs and cardiac studies. Plans and recommendations have been formulated under my direct supervision. All questions answered and patient voiced understanding. Patient to continue current medications.     05/22/2020 Addendum  ECHO and treadmill stress ekg normal.  LOW periop risk of CV events for non-high risk procedure.  Good functional and exercise capacity.  No chest pain, active arrhythmia and CHF symptoms.  Ok to  proceed the scheduled surgery without further cardiac study.

## 2020-05-13 NOTE — LETTER
May 13, 2020      [place]  [address] [place] [address]           [facility]  [address]  [place] [address]  Phone: [phone]  Fax: [phone]          Patient: Sachin Gonzalez   MR Number: [id number]   YOB: 1965   Date of Visit: 5/13/2020       Dear Viktoria Reid:    Thank you for referring Sachin Gonzalez to me for evaluation. Attached you will find relevant portions of my assessment and plan of care.    If you have questions, please do not hesitate to call me. I look forward to following Sachin Gonzalez along with you.    Sincerely,    Josef Morrissey MD    Enclosure  CC:  No Recipients    If you would like to receive this communication electronically, please contact [email or web address] or [phone] to request more information on [employer] access.    For providers and/or their staff who would like to refer a patient to [facility], please contact us through our one-stop-shop provider referral line, [facility], at [phone].    If you feel you have received this communication in error or would no longer like to receive these types of communications, please e-mail [email or web address]

## 2020-05-21 ENCOUNTER — HOSPITAL ENCOUNTER (OUTPATIENT)
Dept: CARDIOLOGY | Facility: HOSPITAL | Age: 55
Discharge: HOME | End: 2020-05-21
Attending: INTERNAL MEDICINE
Payer: COMMERCIAL

## 2020-05-21 VITALS
DIASTOLIC BLOOD PRESSURE: 82 MMHG | HEIGHT: 78 IN | BODY MASS INDEX: 27.77 KG/M2 | SYSTOLIC BLOOD PRESSURE: 122 MMHG | WEIGHT: 240 LBS

## 2020-05-21 DIAGNOSIS — Z01.810 PREOP CARDIOVASCULAR EXAM: ICD-10-CM

## 2020-05-21 LAB
AORTIC ROOT ANNULUS: 3.84 CM
ASCENDING AORTA: 2.95 CM
AV INDEX (PROSTH): 0.91
AV MEAN GRADIENT: 3 MMHG
AV PEAK GRADIENT: 5 MMHG
AV VALVE AREA: 2.94 CM2
AV VELOCITY RATIO: 0.96
BSA FOR ECHO PROCEDURE: 2.46 M2
CV ECHO LV RWT: 0.73 CM
DOP CALC AO PEAK VEL: 1.12 M/S
DOP CALC AO VTI: 23.02 CM
DOP CALC LVOT AREA: 3.2 CM2
DOP CALC LVOT DIAMETER: 2.03 CM
DOP CALC LVOT PEAK VEL: 1.08 M/S
DOP CALC LVOT STROKE VOLUME: 67.74 CM3
DOP CALCLVOT PEAK VEL VTI: 20.94 CM
E WAVE DECELERATION TIME: 193.82 MSEC
E/A RATIO: 1.13
E/E' RATIO: 7.37 M/S
ECHO LV POSTERIOR WALL: 1.28 CM (ref 0.6–1.1)
FRACTIONAL SHORTENING: 33 % (ref 28–44)
INTERVENTRICULAR SEPTUM: 1.43 CM (ref 0.6–1.1)
IVRT: 97.05 MSEC
LA MAJOR: 5.19 CM
LA MINOR: 5.32 CM
LA WIDTH: 4.53 CM
LEFT ATRIUM SIZE: 3.05 CM
LEFT ATRIUM VOLUME INDEX: 25.1 ML/M2
LEFT ATRIUM VOLUME: 61.71 CM3
LEFT INTERNAL DIMENSION IN SYSTOLE: 2.36 CM (ref 2.1–4)
LEFT VENTRICLE DIASTOLIC VOLUME INDEX: 20.86 ML/M2
LEFT VENTRICLE DIASTOLIC VOLUME: 51.37 ML
LEFT VENTRICLE MASS INDEX: 67 G/M2
LEFT VENTRICLE SYSTOLIC VOLUME INDEX: 7.9 ML/M2
LEFT VENTRICLE SYSTOLIC VOLUME: 19.39 ML
LEFT VENTRICULAR INTERNAL DIMENSION IN DIASTOLE: 3.51 CM (ref 3.5–6)
LEFT VENTRICULAR MASS: 164.84 G
LV LATERAL E/E' RATIO: 7 M/S
LV SEPTAL E/E' RATIO: 7.78 M/S
MV PEAK A VEL: 0.62 M/S
MV PEAK E VEL: 0.7 M/S
PISA TR MAX VEL: 1.79 M/S
PULM VEIN S/D RATIO: 0.75
PV PEAK D VEL: 0.56 M/S
PV PEAK S VEL: 0.42 M/S
PV PEAK VELOCITY: 0.99 CM/S
RA MAJOR: 5.26 CM
RA PRESSURE: 3 MMHG
RA WIDTH: 4.43 CM
RIGHT VENTRICULAR END-DIASTOLIC DIMENSION: 3.12 CM
SINUS: 3.71 CM
STJ: 3.27 CM
TDI LATERAL: 0.1 M/S
TDI SEPTAL: 0.09 M/S
TDI: 0.1 M/S
TR MAX PG: 13 MMHG
TV REST PULMONARY ARTERY PRESSURE: 16 MMHG

## 2020-05-21 PROCEDURE — 93306 ECHO (CUPID ONLY): ICD-10-PCS | Mod: 26,,, | Performed by: INTERNAL MEDICINE

## 2020-05-21 PROCEDURE — 93018 CV STRESS TEST I&R ONLY: CPT | Mod: ,,, | Performed by: INTERNAL MEDICINE

## 2020-05-21 PROCEDURE — 93016 CV STRESS TEST SUPVJ ONLY: CPT | Mod: ,,, | Performed by: INTERNAL MEDICINE

## 2020-05-21 PROCEDURE — 93017 CV STRESS TEST TRACING ONLY: CPT

## 2020-05-21 PROCEDURE — 93016 EXERCISE STRESS - EKG (CUPID ONLY): ICD-10-PCS | Mod: ,,, | Performed by: INTERNAL MEDICINE

## 2020-05-21 PROCEDURE — 93306 TTE W/DOPPLER COMPLETE: CPT

## 2020-05-21 PROCEDURE — 93306 TTE W/DOPPLER COMPLETE: CPT | Mod: 26,,, | Performed by: INTERNAL MEDICINE

## 2020-05-21 PROCEDURE — 93018 EXERCISE STRESS - EKG (CUPID ONLY): ICD-10-PCS | Mod: ,,, | Performed by: INTERNAL MEDICINE

## 2020-05-22 ENCOUNTER — TELEPHONE (OUTPATIENT)
Dept: CARDIOLOGY | Facility: CLINIC | Age: 55
End: 2020-05-22

## 2020-05-22 ENCOUNTER — PATIENT MESSAGE (OUTPATIENT)
Dept: RHEUMATOLOGY | Facility: CLINIC | Age: 55
End: 2020-05-22

## 2020-05-22 ENCOUNTER — TELEPHONE (OUTPATIENT)
Dept: OTOLARYNGOLOGY | Facility: CLINIC | Age: 55
End: 2020-05-22

## 2020-05-22 LAB
CV STRESS BASE HR: 84 BPM
DIASTOLIC BLOOD PRESSURE: 80 MMHG
OHS CV CPX 1 MINUTE RECOVERY HEART RATE: 141 BPM
OHS CV CPX 85 PERCENT MAX PREDICTED HEART RATE MALE: 139
OHS CV CPX ESTIMATED METS: 11
OHS CV CPX MAX PREDICTED HEART RATE: 164
OHS CV CPX PATIENT IS FEMALE: 0
OHS CV CPX PATIENT IS MALE: 1
OHS CV CPX PEAK DIASTOLIC BLOOD PRESSURE: 82 MMHG
OHS CV CPX PEAK HEAR RATE: 155 BPM
OHS CV CPX PEAK RATE PRESSURE PRODUCT: NORMAL
OHS CV CPX PEAK SYSTOLIC BLOOD PRESSURE: 200 MMHG
OHS CV CPX PERCENT MAX PREDICTED HEART RATE ACHIEVED: 95
OHS CV CPX RATE PRESSURE PRODUCT PRESENTING: 9408
STRESS ANGINA INDEX: 0
STRESS ECHO POST EXERCISE DUR MIN: 8 MINUTES
STRESS ECHO POST EXERCISE DUR SEC: 0 SECONDS
STRESS ECHO TARGET HR: 139 BPM
SYSTOLIC BLOOD PRESSURE: 112 MMHG

## 2020-05-22 NOTE — TELEPHONE ENCOUNTER
----- Message from Tiffany Underwood sent at 5/22/2020  8:35 AM CDT -----  Contact: self  Pt requesting a call back to know if the referral can be emailed or mailed to him. He is not wanting to come in to the clinic. Please call pt back at [phone]

## 2020-05-22 NOTE — TELEPHONE ENCOUNTER
2nd fax of referral to Dr. Ledezma office along with notes from provider. Also, advised patient he may  the hard copy of referral and notes along with CT to bring with him. Copy placed at the 2nd floor registration desk at [facility].

## 2020-05-22 NOTE — TELEPHONE ENCOUNTER
Patient contacted and was advised that his:    ----- Message from Josef Morrissey MD sent at 5/22/2020  7:03 AM CDT -----  Stress test and echo normal  Ok for the surgery  The note should be faxed to the surgeon's office  Patient stated understanding with no questions or concerns the information will be faxed to Dr. Wheeler in our syte and Dr. Ledezma at [facility].

## 2020-05-22 NOTE — TELEPHONE ENCOUNTER
Attempted to return call to patient, no voicemail is setup for a message to be left. Will send records to patient in mail as requested as well as referral and notes faxed a second time to dean's office.

## 2020-06-10 ENCOUNTER — TELEPHONE (OUTPATIENT)
Dept: INTERNAL MEDICINE | Facility: CLINIC | Age: 55
End: 2020-06-10

## 2020-06-10 ENCOUNTER — OFFICE VISIT (OUTPATIENT)
Dept: URGENT CARE | Facility: CLINIC | Age: 55
End: 2020-06-10
Payer: COMMERCIAL

## 2020-06-10 ENCOUNTER — NURSE TRIAGE (OUTPATIENT)
Dept: ADMINISTRATIVE | Facility: CLINIC | Age: 55
End: 2020-06-10

## 2020-06-10 ENCOUNTER — HOSPITAL ENCOUNTER (OUTPATIENT)
Dept: RADIOLOGY | Facility: CLINIC | Age: 55
Discharge: HOME | End: 2020-06-10
Attending: NURSE PRACTITIONER
Payer: COMMERCIAL

## 2020-06-10 VITALS
DIASTOLIC BLOOD PRESSURE: 87 MMHG | TEMPERATURE: 101 F | OXYGEN SATURATION: 95 % | HEART RATE: 117 BPM | SYSTOLIC BLOOD PRESSURE: 129 MMHG

## 2020-06-10 DIAGNOSIS — J22 BACTERIAL LOWER RESPIRATORY INFECTION: Primary | ICD-10-CM

## 2020-06-10 DIAGNOSIS — R06.02 SHORTNESS OF BREATH: ICD-10-CM

## 2020-06-10 DIAGNOSIS — B96.89 BACTERIAL LOWER RESPIRATORY INFECTION: Primary | ICD-10-CM

## 2020-06-10 DIAGNOSIS — K21.9 GASTROESOPHAGEAL REFLUX DISEASE, ESOPHAGITIS PRESENCE NOT SPECIFIED: ICD-10-CM

## 2020-06-10 DIAGNOSIS — R06.02 SOB (SHORTNESS OF BREATH): ICD-10-CM

## 2020-06-10 DIAGNOSIS — R50.9 FEVER, UNSPECIFIED FEVER CAUSE: ICD-10-CM

## 2020-06-10 LAB
CTP QC/QA: YES
FLUAV AG NPH QL: NEGATIVE
FLUBV AG NPH QL: NEGATIVE

## 2020-06-10 PROCEDURE — 99214 OFFICE O/P EST MOD 30 MIN: CPT | Mod: 25,S$GLB,, | Performed by: NURSE PRACTITIONER

## 2020-06-10 PROCEDURE — 87804 POCT INFLUENZA A/B: ICD-10-PCS | Mod: QW,S$GLB,, | Performed by: NURSE PRACTITIONER

## 2020-06-10 PROCEDURE — 71046 XR CHEST PA AND LATERAL: ICD-10-PCS | Mod: S$GLB,,, | Performed by: RADIOLOGY

## 2020-06-10 PROCEDURE — 71046 X-RAY EXAM CHEST 2 VIEWS: CPT | Mod: S$GLB,,, | Performed by: RADIOLOGY

## 2020-06-10 PROCEDURE — 99214 PR OFFICE/OUTPT VISIT, EST, LEVL IV, 30-39 MIN: ICD-10-PCS | Mod: 25,S$GLB,, | Performed by: NURSE PRACTITIONER

## 2020-06-10 PROCEDURE — U0003 INFECTIOUS AGENT DETECTION BY NUCLEIC ACID (DNA OR RNA); SEVERE ACUTE RESPIRATORY SYNDROME CORONAVIRUS 2 (SARS-COV-2) (CORONAVIRUS DISEASE [COVID-19]), AMPLIFIED PROBE TECHNIQUE, MAKING USE OF HIGH THROUGHPUT TECHNOLOGIES AS DESCRIBED BY CMS-2020-01-R: HCPCS

## 2020-06-10 PROCEDURE — 87804 INFLUENZA ASSAY W/OPTIC: CPT | Mod: QW,S$GLB,, | Performed by: NURSE PRACTITIONER

## 2020-06-10 RX ORDER — ALBUTEROL SULFATE 90 UG/1
2 AEROSOL, METERED RESPIRATORY (INHALATION) EVERY 6 HOURS PRN
Qty: 18 G | Refills: 0 | Status: SHIPPED | OUTPATIENT
Start: 2020-06-10 | End: 2020-10-05

## 2020-06-10 RX ORDER — GUAIFENESIN 600 MG/1
600 TABLET, EXTENDED RELEASE ORAL 2 TIMES DAILY PRN
Qty: 14 TABLET | Refills: 0 | Status: SHIPPED | OUTPATIENT
Start: 2020-06-10 | End: 2020-06-17

## 2020-06-10 RX ORDER — LEVOFLOXACIN 750 MG/1
750 TABLET ORAL DAILY
Qty: 7 TABLET | Refills: 0 | Status: SHIPPED | OUTPATIENT
Start: 2020-06-10 | End: 2020-06-17

## 2020-06-10 RX ORDER — PREDNISONE 20 MG/1
20 TABLET ORAL DAILY
Qty: 5 TABLET | Refills: 0 | Status: SHIPPED | OUTPATIENT
Start: 2020-06-10 | End: 2020-06-15

## 2020-06-10 RX ORDER — PANTOPRAZOLE SODIUM 40 MG/1
40 TABLET, DELAYED RELEASE ORAL DAILY
Qty: 30 TABLET | Refills: 0 | Status: SHIPPED | OUTPATIENT
Start: 2020-06-10 | End: 2020-12-17

## 2020-06-10 NOTE — PATIENT INSTRUCTIONS
Shortness of Breath (Dyspnea)  Shortness of breath is the feeling that you can't catch your breath or get enough air. It is also known as dyspnea.  Dyspnea can be caused by many different conditions. They include:  · Acute asthma attack.  · Worsening of chronic lung diseases such as chronic bronchitis and emphysema.  · Heart failure. This is when weak heart muscle allows extra fluid to collect in the lungs.  · Panic attacks or anxiety. Fear can cause rapid breathing (hyperventilation).  · Pneumonia, or an infection in the lung tissue.  · Exposure to toxic substances, fumes, smoke, or certain medicines.  · Blood clot in the lung (pulmonary embolism). This is often from a piece of blood clot in a deep vein of the leg (deep vein thrombosis) that breaks off and travels to the lungs.  · Heart attack or heart-related chest pain (angina).  · Anemia.  · Collapsed lung (pneumothorax).  · Dehydration.  · Pregnancy.  Based on your visit today, the exact cause of your shortness of breath is not certain. Your tests dont show any of the serious causes of dyspnea. You may need other tests to find out if you have a serious problem. Its important to watch for any new symptoms or symptoms that get worse. Follow up with your healthcare provider as directed.  Home care  Follow these tips to take care of yourself at home:  · When your symptoms are better, go back to your usual activities.  · If you smoke, you should stop. Join a quit-smoking program or ask your healthcare provider for help.  · Eat a healthy diet and get plenty of sleep.  · Get regular exercise. Talk with your healthcare provider before starting to exercise, especially if you have other medical problems.  · Cut down on the amount of caffeine and stimulants you consume.  Follow-up care  Follow up with your healthcare provider, or as advised.  If tests were done, you will be told if your treatment needs to be changed. You can call as directed for the results.  (Note:  If an X-ray was taken, a specialist will review it. You will be notified of any new findings that may affect your care.)  Call 911 or get immediate medical care  Shortness of breath may be a sign of a serious medical problem. For example, it may be a problem with your heart or lungs. Call 911 if you have worsening shortness of breath or trouble breathing, especially with any of the symptoms below:  · You are confused or its difficult to wake you.  · You faint or lose consciousness.  · You have a fast heartbeat, or your heartbeat is irregular.  · You are coughing up blood.  · You have pain in your chest, arm, shoulder, neck, or upper back.  · You break out in a sweat.  When to seek medical advice  Call your healthcare provider right away if any of these occur:  · Slight shortness of breath or wheezing  · Redness, pain or swelling in your leg, arm, or other body area  · Swelling in both legs or ankles  · Fast weight gain  · Dizziness or weakness  · Fever of 100.4ºF (38ºC) or higher, or as directed by your healthcare provider  Date Last Reviewed: 9/13/2015  © 5722-1729 [employer]. [address] [place] [address]. All rights reserved. This information is not intended as a substitute for professional medical care. Always follow your healthcare professional's instructions.        Febrile Illness, Uncertain Cause (Adult)  You have a fever, but the cause is not certain. A fever is a natural reaction of the body to an illness such as infection due to a virus or bacteria. In most cases, the temperature itself is not harmful. It actually helps the body fight infections. A fever does not need to be treated unless you feel very uncomfortable.  Sometimes a fever can be an early sign of a more serious infection, so make sure to follow up if your condition worsens.  Home care  Unless given other instructions by your healthcare provider, follow these guidelines when caring for yourself at home.  General  care  · If your symptoms are severe, rest at home for the first 2 to 3 days. When you resume activity, don't let yourself get too tired.  · Do not smoke. Also avoid being exposed to secondhand smoke.  · Your appetite may be poor, so a light diet is fine. Avoid dehydration by drinking 6 to 8 glasses of fluids per day (such as water, soft drinks, sports drinks, juices, tea, or soup). Extra fluids will help loosen secretions in the nose and lungs.  Medicines  · You can take acetaminophen or ibuprofen for pain, unless you were given a different fever-reducing/pain medicine to use. (Note: If you have chronic liver or kidney disease or have ever had a stomach ulcer or gastrointestinal bleeding, talk with your healthcare provider before using these medicines. Also talk to your provider if you are taking medicine to prevent blood clots.) Aspirin should never be given to anyone younger than 18 years of age who is ill with a viral infection or fever. It may cause severe liver or brain damage.  · If you were given antibiotics, take them until they are used up, or your healthcare provider tells you to stop. It is important to finish the antibiotics even though you feel better. This is to make sure the infection has cleared. Be aware that antibiotics are not usually given for a fever with an unknown cause.  · Over-the-counter medicines will not shorten the duration of the illness. However, they may be helpful for the following symptoms: cough, sore throat, or nasal and sinus congestion. Ask your pharmacist for product suggestions. (Note: Do not use decongestants if you have high blood pressure.)  Follow-up care  Follow up with your healthcare provider, or as advised.  · If a culture was done, you will be notified if your treatment needs to be changed. You can call as directed for the results.  · If X-rays, a CT, or an ultrasound were done, a specialist will review them. You will be notified of any findings that may affect your  care.  Call 911  Contact emergency services right away if any of these occur:  · Trouble breathing or swallowing, or wheezing  · Chest pain  · Confusion  · Extreme drowsiness or trouble awakening  · Fainting or loss of consciousness  · Rapid heart rate  · Low blood pressure  · Vomiting blood, or large amounts of blood in stool  · Seizure  When to seek medical advice  Call your healthcare provider right away if any of these occur:  · Cough with lots of colored sputum (mucus) or blood in your sputum  · Severe headache  · Face, neck, throat, or ear pain  · Feeling drowsy  · Abdominal pain  · Repeated vomiting or diarrhea  · Joint pain or a new rash  · Burning when urinating  · Fever of 100.4°F (38°C) or higher, that does not get better after taking fever-reducing medicine  · Feeling weak or dizzy  Date Last Reviewed: 7/30/2015  © 9414-7664 [employer]. [address] [place] [address]. All rights reserved. This information is not intended as a substitute for professional medical care. Always follow your healthcare professional's instructions.      Guidelines for General Prevention of COVID-19    o Take steps to protect yourself from COVID-19. Perform hand hygiene frequently. Wash your hands often with soap and water for at least 20 seconds of use and alcohol-based hand , covering all surfaces of your hands and rubbing them together until they feel dry.  o Avoid touching your eyes, nose, and mouth with unwashed hands.  o Avoid close contact with people and stay home if youre sick, except to get medical care.   o Cover coughs and sneezes with a tissue, or use the inside of your elbow. Immediately wash your hands or use hand .     For more information, see CDC link below:    https://www.cdc.gov/coronavirus/2019-ncov/hcp/guidance-prevent-spread.html#precautionsInstructions for Patients with Confirmed or Suspected COVID-19    If you are awaiting your test result, you will either be  called or it will be released to the patient portal.  If you have any questions about your test, please visit [email or web address] or call our COVID-19 information line at [phone].      Preventing the Spread of Coronavirus Disease 2019 (COVID-19) in Homes and Residential Communities -- Patients     Prevention steps for people with confirmed or suspected COVID-19 (including persons under investigation) who do not need to be hospitalized and people with confirmed COVID-19 who were hospitalized and determined to be medically stable to go home.    Stay home except to get medical care.    Separate yourself from other people and animals in your home.    Call ahead before visiting your doctor.    Wear a face mask.    Cover your coughs and sneezes.    Clean your hands often.    Avoid sharing personal household items.    Clean all high-touch surfaces every day.    Monitor your symptoms. Seek prompt medical attention if your illness is worsening (e.g., difficulty breathing). Before seeking care, call your healthcare provider.    If you have a medical emergency and must call 911, notify the dispatcher that you have or are being evaluated for COVID-19. If possible, put on a face mask before emergency medical services arrive.    Use the following symptom-based strategy to return to normal activity following a suspected or confirmed case of COVID-19. Continue isolation until:   o At least 3 days (72 hours) have passed since recovery defined as resolution of fever without the use of fever-reducing medications and improvement in respiratory symptoms (e.g. cough, shortness of breath), and   o At least 10 days have passed since symptoms first appeared.     Precautions for household members, intimate partners and caregivers in a non-healthcare setting of a patient with symptomatic laboratory-confirmed COVID-19 or a patient under investigation.     Household members, intimate partners and caregivers in a  non-healthcare setting may have close contact with a person with symptomatic, laboratory-confirmed COVID-19 or a person under investigation. Close contacts should monitor their health; they should call their healthcare provider right away if they develop symptoms suggestive of COVID-19 (e.g., fever, cough, shortness of breath). Close contacts should also follow these recommendations:      Make sure that you understand and can help the patient follow their healthcare providers instructions for medication(s) and care. You should help the patient with basic needs in the home and provide support for getting groceries, prescriptions, and other personal needs.    Monitor the patients symptoms. If the patient is getting sicker, call his or her healthcare provider and tell them that the patient has laboratory-confirmed COVID-19. This will help the healthcare providers office take steps to keep people in the office or waiting room from getting infected. Ask the healthcare provider to call the local or [facility] health department for additional guidance. If the patient has a medical emergency and you need to call 911, notify the dispatch personnel that the patient has or is being evaluated for COVID-19.    Household members should stay in another room or be  from the patient as much as possible. Household members should use a separate bedroom and bathroom, if available.    Prohibit visitors who do not have an essential need to be in the home.    Household members should care for any pets. Do not handle pets or other animals while sick.    Make sure that shared spaces in the home have good air flow, such as by an air conditioner.    Perform hand hygiene frequently. Wash your hands often with soap and water for at least 20 seconds or use an alcohol-based hand  that contains 60 to 95% alcohol, covering all surfaces of your hands and rubbing them together until they feel dry. Soap and water are preferred  if hands are visibly dirty.    Avoid touching your eyes, nose and mouth with unwashed hands.    The patient should wear a face mask when you are around other people. If the patient is not able to wear a face mask (for example, because it causes trouble breathing), you, as the caregiver, should wear a mask when you are in the same room as the patient.    Wear a disposable face mask and gloves when you touch or have contact with the patients blood, stool or body fluids, such as saliva, sputum, nasal mucus, vomit and urine.   o Throw out disposable face masks and gloves after using them. Do not reuse.   o When removing personal protective equipment, first remove and dispose of gloves. Then, immediately clean your hands with soap and water or alcohol-based hand . Next, remove and dispose of face mask, and immediately clean your hands again with soap and water or alcohol-based hand .    Avoid sharing household items with the patient. You should not share dishes, drinking glasses, cups, eating utensils, towels, bedding or other items. After the patient uses these items, you should wash them thoroughly (see below Wash laundry thoroughly).    Clean all high-touch surfaces, such as counters, tabletops, doorknobs, bathroom fixtures, toilets, phones, keyboards, tablets and bedside tables, every day. Also, clean any surfaces that may have blood, stool or body fluids on them.   o Use a household cleaning spray or wipe, according to the label instructions. Labels contain instructions for safe and effective use of the cleaning product including precautions you should take when applying the product, such as wearing gloves and making sure you have good ventilation during use of the product.    Wash laundry thoroughly.   o Immediately remove and wash clothes or bedding that have blood, stool or body fluids on them.  o Wear disposable gloves while handling soiled items and keep soiled items away from your  body. Clean your hands (with soap and water or an alcohol-based hand ) immediately after removing your gloves.   o Read and follow directions on labels of laundry or clothing items and detergent. In general, using a normal laundry detergent according to washing machine instructions and dry thoroughly using the warmest temperatures recommended on the clothing label.    Place all used disposable gloves, face masks and other contaminated items in a lined container before disposing of them with other household waste. Clean your hands (with soap and water or an alcohol-based hand ) immediately after handling these items. Soap and water should be used preferentially if hands are visibly dirty.   Discuss any additional questions with your state or local health department  Tips to Control Acid Reflux    To control acid reflux, youll need to make some basic diet and lifestyle changes. The simple steps outlined below may be all youll need to ease discomfort.  Watch what you eat  · Avoid fatty foods and spicy foods.  · Eat fewer acidic foods, such as citrus and tomato-based foods. These can increase symptoms.  · Limit drinking alcohol, caffeine, and fizzy beverages. All increase acid reflux.  · Try limiting chocolate, peppermint, and spearmint. These can worsen acid reflux in some people.  Watch when you eat  · Avoid lying down for 3 hours after eating.  · Do not snack before going to bed.  Raise your head  Raising your head and upper body by 4 to 6 inches helps limit reflux when youre lying down. Put blocks under the head of your bed frame to raise it.  Other changes  · Lose weight, if you need to  · Dont exercise near bedtime  · Avoid tight-fitting clothes  · Limit aspirin and ibuprofen  · Stop smoking   Date Last Reviewed: 7/1/2016  © 0884-7238 [employer]. [address] [place] [address], [place] [address]. All rights reserved. This information is not intended as a substitute for professional  medical care. Always follow your healthcare professional's instructions.

## 2020-06-10 NOTE — TELEPHONE ENCOUNTER
I s/w pt informing him to go to ER or UC. Pt stated that he was going to go UC. I informed pt to get us updated. Pt thanked me for the call and ended call. //BJ

## 2020-06-10 NOTE — PROGRESS NOTES
Subjective:       Patient ID: Sachin Gonzalez is a 55 y.o. male.    Vitals:  tympanic temperature is 101.1 °F (38.4 °C) (abnormal). His blood pressure is 129/87 and his pulse is 117 (abnormal). His oxygen saturation is 95%.     Chief Complaint: COVID-19 Concerns and Gastroesophageal Reflux    Fever    This is a new problem. The current episode started in the past 7 days. The problem occurs constantly. The problem has been gradually worsening. The maximum temperature noted was 101 to 101.9 F. The temperature was taken using a tympanic thermometer. Associated symptoms include diarrhea and headaches. Pertinent negatives include no chest pain, congestion, coughing, nausea, rash, sore throat, urinary pain or vomiting. He has tried acetaminophen for the symptoms. The treatment provided mild relief.       Constitution: Positive for fatigue and fever. Negative for chills.   HENT: Negative for congestion and sore throat.    Neck: Negative for painful lymph nodes.   Cardiovascular: Negative for chest pain and leg swelling.   Eyes: Negative for double vision and blurred vision.   Respiratory: Positive for shortness of breath. Negative for cough.    Gastrointestinal: Positive for diarrhea. Negative for nausea and vomiting.   Genitourinary: Negative for dysuria, frequency and urgency.   Musculoskeletal: Negative for joint pain, joint swelling, muscle cramps and muscle ache.   Skin: Negative for color change, pale and rash.   Allergic/Immunologic: Negative for seasonal allergies.   Neurological: Positive for headaches. Negative for dizziness, history of vertigo, light-headedness and passing out.   Hematologic/Lymphatic: Negative for swollen lymph nodes, easy bruising/bleeding and history of blood clots. Does not bruise/bleed easily.   Psychiatric/Behavioral: Negative for nervous/anxious, sleep disturbance and depression. The patient is not nervous/anxious.        Objective:      Physical Exam   Constitutional: He is oriented to  person, place, and time. He appears well-developed and well-nourished. He is cooperative.  Non-toxic appearance. He does not have a sickly appearance. He does not appear ill. No distress.   HENT:   Head: Normocephalic and atraumatic.   Right Ear: Hearing, tympanic membrane, external ear and ear canal normal.   Left Ear: Hearing, tympanic membrane, external ear and ear canal normal.   Nose: Nose normal. No mucosal edema, rhinorrhea or nasal deformity. No epistaxis. Right sinus exhibits no maxillary sinus tenderness and no frontal sinus tenderness. Left sinus exhibits no maxillary sinus tenderness and no frontal sinus tenderness.   Mouth/Throat: Uvula is midline, oropharynx is clear and moist and mucous membranes are normal. No trismus in the jaw. Normal dentition. No uvula swelling. No oropharyngeal exudate, posterior oropharyngeal edema or posterior oropharyngeal erythema.   Eyes: Conjunctivae and lids are normal. No scleral icterus.   Neck: Trachea normal, full passive range of motion without pain and phonation normal. Neck supple. No neck rigidity. No edema and no erythema present.   Cardiovascular: Regular rhythm, normal heart sounds, intact distal pulses and normal pulses. Tachycardia present.   Pulmonary/Chest: No accessory muscle usage. No tachypnea and no bradypnea. No respiratory distress. He has decreased breath sounds in the left lower field. He has no rhonchi.   Mild increased respiratory effort   Abdominal: Soft. Normal appearance and bowel sounds are normal. He exhibits no distension, no abdominal bruit, no pulsatile midline mass and no mass. There is tenderness (mild) in the epigastric area.   Musculoskeletal: Normal range of motion. He exhibits no edema or deformity.   Lymphadenopathy:        Head (right side): No submandibular and no tonsillar adenopathy present.        Head (left side): No submandibular and no tonsillar adenopathy present.   Neurological: He is alert and oriented to person, place,  and time. He has normal strength. He exhibits normal muscle tone. Coordination normal.   Skin: Skin is warm, dry, intact, not diaphoretic, not pale and no rash. Capillary refill takes less than 2 seconds.   Psychiatric: He has a normal mood and affect. His speech is normal and behavior is normal. Judgment and thought content normal. Cognition and memory are normal.   Nursing note and vitals reviewed.        Assessment:       1. Bacterial lower respiratory infection    2. SOB (shortness of breath)    3. Gastroesophageal reflux disease, esophagitis presence not specified    4. Fever, unspecified fever cause    5. Shortness of breath        Plan:         Bacterial lower respiratory infection  -     levoFLOXacin (LEVAQUIN) 750 MG tablet; Take 1 tablet (750 mg total) by mouth once daily. for 7 days  Dispense: 7 tablet; Refill: 0  -     guaiFENesin (MUCINEX) 600 mg 12 hr tablet; Take 1 tablet (600 mg total) by mouth 2 (two) times daily as needed.  Dispense: 14 tablet; Refill: 0  -     predniSONE (DELTASONE) 20 MG tablet; Take 1 tablet (20 mg total) by mouth once daily. for 5 days  Dispense: 5 tablet; Refill: 0    SOB (shortness of breath)  -     XR CHEST PA AND LATERAL; Future; Expected date: 06/10/2020  -     albuterol (VENTOLIN HFA) 90 mcg/actuation inhaler; Inhale 2 puffs into the lungs every 6 (six) hours as needed for Wheezing. Rescue  Dispense: 18 g; Refill: 0    Gastroesophageal reflux disease, esophagitis presence not specified  -     pantoprazole (PROTONIX) 40 MG tablet; Take 1 tablet (40 mg total) by mouth once daily.  Dispense: 30 tablet; Refill: 0    Fever, unspecified fever cause  -     Airborne and Contact and Droplet Isolation Status; Standing  -     POCT Influenza A/B    Shortness of breath    Other orders  -     COVID-19 Routine Screening         Results for orders placed or performed in visit on 06/10/20   POCT Influenza A/B   Result Value Ref Range    Rapid Influenza A Ag Negative Negative    Rapid  Influenza B Ag Negative Negative     Acceptable Yes      Xr Chest Pa And Lateral    Result Date: 6/10/2020  EXAMINATION: XR CHEST PA AND LATERAL CLINICAL HISTORY: SOB; Shortness of breath TECHNIQUE: PA and lateral views of the chest were performed. COMPARISON: 04/21/2020. FINDINGS: The trachea is unremarkable.  The cardiomediastinal silhouette is within normal limits.  The hemidiaphragms are unremarkable.  There is no evidence of free air beneath the hemidiaphragms.  There are no pleural effusions.  There is no evidence of a pneumothorax.  There is no evidence of pneumomediastinum.  There is an airspace opacity left lower lobe.  There are minimal degenerative changes in the osseous structures.     Airspace opacity in the left lower lobe, consistent with pneumonia.  Follow-up to resolution is suggested. This report was flagged in Epic as abnormal. Electronically signed by: Wil Blake MD Date:    06/10/2020 Time:    15:49      Take antibiotics for entire course.  Do not save medications for later, all medications must be taken for full regimen.  · Getting plenty of rest is very important to fighting infections.  · Increase fluids.   · May apply warm compresses as needed for facial pain and congestion.   · Saline nasal spray to loosen nasal congestion.  · Plain Mucinex as advised  · You may take an over the counter antihistamine for allergy symptoms such as sneezing, itchy/watery eyes, scratchy throat, or congestion.  · Take Tylenol or Ibuprofen as needed for sore throat, body aches, or fever.  · [place] diet as advised  · Avoid fried, greasy, spicy foods and carbonated and caffeinated beverages  · Powerade 0/Gatorade 0 as advised  · Practice social distancing, self quarantine from family and friends, good hand hygiene and infection prevention practices until symptom-free for at least 10 days and fever free for at least 72 hours without fever reducing medication  · Follow up with your primary care  provider if symptoms persist >10 days or sooner for any new or worsening symptoms.   Go to the ER for any fever that does not improve with Tylenol/Ibuprofen, neck stiffness, rash, severe headache, vision changes, shortness of breath, chest pain, facial swelling, severe facial pain, or any other new and concerning symptoms.

## 2020-06-10 NOTE — TELEPHONE ENCOUNTER
Pt states that he has been having headaches, sweating, states he has been feeling weak. Pt states when he check his BP it was 141/101 and . He stated after he took tylenol about 3hrs later everything started to feel normal. Pt states that when he woke up the next day he started feel the same. He states that he checked his temp was 99.7. He states that his reflux seems to have been getting worse. Pt states that when he takes tylenol it kind of help with the headache and the warmness of his body. I informed pt I would get message sent to Dr. Dick for approval. //BJ

## 2020-06-10 NOTE — TELEPHONE ENCOUNTER
Pt thinks he may have covid-19, would like to be tested.  Last week he began with some sweating and headaches.  Two days ago symptoms worsened, headaches, temp today 99.7, he has developed some mild sob, interfering with usual daily activities, has to take rest breaks, and he has some dizziness when he gets up and moves around, as well.  He has had several episodes of loose stools today.  Pt does not want to go to the ED, no appts with Dr. Dick available, gave information on [facility] in [place] area.      Reason for Disposition   MILD difficulty breathing (e.g., minimal/no SOB at rest, SOB with walking, pulse <100)    Additional Information   Negative: SEVERE difficulty breathing (e.g., struggling for each breath, speaks in single words)   Negative: Difficult to awaken or acting confused (e.g., disoriented, slurred speech)   Negative: Bluish (or gray) lips or face now   Negative: Shock suspected (e.g., cold/pale/clammy skin, too weak to stand, low BP, rapid pulse)   Negative: Sounds like a life-threatening emergency to the triager   Negative: [1] COVID-19 exposure AND [2] NO symptoms   Negative: COVID-19 and Breastfeeding, questions about   Negative: [1] Adult with possible COVID-19 symptoms AND [2] triager concerned about severity of symptoms or other causes   Negative: SEVERE or constant chest pain or pressure (Exception: mild central chest pain, present only when coughing)   Negative: MODERATE difficulty breathing (e.g., speaks in phrases, SOB even at rest, pulse 100-120)    Protocols used: CORONAVIRUS (COVID-19) DIAGNOSED OR [id number]

## 2020-06-12 ENCOUNTER — TELEPHONE (OUTPATIENT)
Dept: URGENT CARE | Facility: CLINIC | Age: 55
End: 2020-06-12

## 2020-06-12 LAB — SARS-COV-2 RNA RESP QL NAA+PROBE: NOT DETECTED

## 2020-06-12 NOTE — TELEPHONE ENCOUNTER
Spoke with patient by phone after patient identifiers, regarding negative COVID-19 results, who reports understanding

## 2020-06-15 ENCOUNTER — TELEPHONE (OUTPATIENT)
Dept: INTERNAL MEDICINE | Facility: CLINIC | Age: 55
End: 2020-06-15

## 2020-06-15 NOTE — TELEPHONE ENCOUNTER
----- Message from Tiffany Underwood sent at 6/15/2020 11:35 AM CDT -----  Type:  Sooner Apoointment Request    Caller is requesting a sooner appointment.  Caller declined first available appointment listed below.  Caller will not accept being placed on the waitlist and is requesting a message be sent to doctor.  Name of Caller:Thelander  When is the first available appointment?07/01/2020  Symptoms:pneumonia check//medication check  Would the patient rather a call back or a response via [facility]? call  Best Call Back Number:[phone]  Additional Information:

## 2020-06-25 ENCOUNTER — TELEPHONE (OUTPATIENT)
Dept: RHEUMATOLOGY | Facility: CLINIC | Age: 55
End: 2020-06-25

## 2020-06-25 ENCOUNTER — TELEPHONE (OUTPATIENT)
Dept: INTERNAL MEDICINE | Facility: CLINIC | Age: 55
End: 2020-06-25

## 2020-06-25 DIAGNOSIS — Z79.631 LONG TERM METHOTREXATE USER: Primary | ICD-10-CM

## 2020-06-25 NOTE — TELEPHONE ENCOUNTER
I returned call to pt, he stated that he would like to know if he could take clear lungs to help with his pneumonia. It is an OTC medication. I informed him I would get message sent to Dr. Dick and give him a call back. //BJ

## 2020-06-25 NOTE — TELEPHONE ENCOUNTER
----- Message from Erica Trinidad sent at 6/25/2020  7:26 AM CDT -----  Contact: self  Requesting call back regarding question about pt medication. Rx is clear lungs. Pt also requesting order to have blood work done. Please call back at [phone].

## 2020-06-29 NOTE — TELEPHONE ENCOUNTER
I informed pt that Dr. Dick stated it would be okay for him to take medication it should not hurt him and to give us a call if he has a reaction. //BJ

## 2020-07-01 ENCOUNTER — OFFICE VISIT (OUTPATIENT)
Dept: INTERNAL MEDICINE | Facility: CLINIC | Age: 55
End: 2020-07-01
Payer: COMMERCIAL

## 2020-07-01 ENCOUNTER — HOSPITAL ENCOUNTER (OUTPATIENT)
Dept: RADIOLOGY | Facility: HOSPITAL | Age: 55
Discharge: HOME | End: 2020-07-01
Attending: NURSE PRACTITIONER
Payer: COMMERCIAL

## 2020-07-01 VITALS
OXYGEN SATURATION: 100 % | HEART RATE: 79 BPM | WEIGHT: 229.5 LBS | TEMPERATURE: 97 F | DIASTOLIC BLOOD PRESSURE: 80 MMHG | BODY MASS INDEX: 26.55 KG/M2 | SYSTOLIC BLOOD PRESSURE: 122 MMHG | HEIGHT: 78 IN

## 2020-07-01 DIAGNOSIS — F41.9 ANXIETY: ICD-10-CM

## 2020-07-01 DIAGNOSIS — R51.9 ACUTE INTRACTABLE HEADACHE, UNSPECIFIED HEADACHE TYPE: ICD-10-CM

## 2020-07-01 DIAGNOSIS — J18.9 COMMUNITY ACQUIRED PNEUMONIA OF LEFT LOWER LOBE OF LUNG: Primary | ICD-10-CM

## 2020-07-01 DIAGNOSIS — J18.9 COMMUNITY ACQUIRED PNEUMONIA OF LEFT LOWER LOBE OF LUNG: ICD-10-CM

## 2020-07-01 PROCEDURE — 3074F PR MOST RECENT SYSTOLIC BLOOD PRESSURE < 130 MM HG: ICD-10-PCS | Mod: CPTII,S$GLB,, | Performed by: NURSE PRACTITIONER

## 2020-07-01 PROCEDURE — 3008F PR BODY MASS INDEX (BMI) DOCUMENTED: ICD-10-PCS | Mod: CPTII,S$GLB,, | Performed by: NURSE PRACTITIONER

## 2020-07-01 PROCEDURE — 99999 PR PBB SHADOW E&M-EST. PATIENT-LVL III: CPT | Mod: PBBFAC,,, | Performed by: NURSE PRACTITIONER

## 2020-07-01 PROCEDURE — 3079F DIAST BP 80-89 MM HG: CPT | Mod: CPTII,S$GLB,, | Performed by: NURSE PRACTITIONER

## 2020-07-01 PROCEDURE — 99999 PR PBB SHADOW E&M-EST. PATIENT-LVL III: ICD-10-PCS | Mod: PBBFAC,,, | Performed by: NURSE PRACTITIONER

## 2020-07-01 PROCEDURE — 71046 X-RAY EXAM CHEST 2 VIEWS: CPT | Mod: 26,,, | Performed by: RADIOLOGY

## 2020-07-01 PROCEDURE — 3008F BODY MASS INDEX DOCD: CPT | Mod: CPTII,S$GLB,, | Performed by: NURSE PRACTITIONER

## 2020-07-01 PROCEDURE — 99214 PR OFFICE/OUTPT VISIT, EST, LEVL IV, 30-39 MIN: ICD-10-PCS | Mod: S$GLB,,, | Performed by: NURSE PRACTITIONER

## 2020-07-01 PROCEDURE — 71046 XR CHEST PA AND LATERAL: ICD-10-PCS | Mod: 26,,, | Performed by: RADIOLOGY

## 2020-07-01 PROCEDURE — 3074F SYST BP LT 130 MM HG: CPT | Mod: CPTII,S$GLB,, | Performed by: NURSE PRACTITIONER

## 2020-07-01 PROCEDURE — 71046 X-RAY EXAM CHEST 2 VIEWS: CPT | Mod: TC

## 2020-07-01 PROCEDURE — 3079F PR MOST RECENT DIASTOLIC BLOOD PRESSURE 80-89 MM HG: ICD-10-PCS | Mod: CPTII,S$GLB,, | Performed by: NURSE PRACTITIONER

## 2020-07-01 PROCEDURE — 99214 OFFICE O/P EST MOD 30 MIN: CPT | Mod: S$GLB,,, | Performed by: NURSE PRACTITIONER

## 2020-07-01 RX ORDER — BUSPIRONE HYDROCHLORIDE 5 MG/1
5 TABLET ORAL 2 TIMES DAILY PRN
Qty: 30 TABLET | Refills: 0 | Status: SHIPPED | OUTPATIENT
Start: 2020-07-01 | End: 2020-10-05

## 2020-07-01 RX ORDER — MELOXICAM 15 MG/1
15 TABLET ORAL DAILY
Qty: 10 TABLET | Refills: 0 | Status: SHIPPED | OUTPATIENT
Start: 2020-07-01 | End: 2020-10-05

## 2020-07-01 NOTE — PROGRESS NOTES
Subjective:       Patient ID: Sachin Gonzalez is a 55 y.o. male.    Chief Complaint: Follow-up    HPI    Pt is here follow up on LLE pneumonia  Was given steroid pack and levaquin. 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     Past Surgical History:   Procedure Laterality Date    ESOPHAGOGASTRODUODENOSCOPY      PROSTATECTOMY  2011     Social History     Socioeconomic History    Marital status: Single     Spouse name: Not on file    Number of children: Not on file    Years of education: Not on file    Highest education level: Not on file   Occupational History    Not on file   Social Needs    Financial resource strain: Not on file    Food insecurity     Worry: Not on file     Inability: Not on file    Transportation needs     Medical: Not on file     Non-medical: Not on file   Tobacco Use    Smoking status: Never Smoker    Smokeless tobacco: Never Used   Substance and Sexual Activity    Alcohol use: No    Drug use: No    Sexual activity: Yes     Partners: Female   Lifestyle    Physical activity     Days per week: Not on file     Minutes per session: Not on file    Stress: Not at all   Relationships    Social connections     Talks on phone: Not on file     Gets together: Not on file     Attends Rastafarian service: Not on file     Active member of club or organization: Not on file     Attends meetings of clubs or organizations: Not on file     Relationship status: Not on file   Other Topics Concern    Not on file   Social History Narrative    Not on file     Review of patient's allergies indicates:    Allergen Reactions    No known allergies      Current Outpatient Medications   Medication Sig    albuterol (VENTOLIN HFA) 90 mcg/actuation inhaler Inhale 2 puffs into the lungs every 6 (six) hours as needed for Wheezing. Rescue    ALFALFA ORAL Take by mouth every evening. Hold 5 days prior to surgery    ascorbic acid, vitamin C, (VITAMIN C) 250 MG tablet Take 250 mg by mouth once daily.    folic acid (FOLVITE) 1 MG tablet Take 1 tablet (1,000 mcg total) by mouth once daily.    herbal complex no.174 (ECHINACEA AND GOLDENSEAL ORAL) Take by mouth once daily.    magnesium 30 mg Tab Take by mouth every evening.    methotrexate 2.5 MG Tab TAKE 10 TABLETS EVERY 7 DAYS (Patient taking differently: Take 2.5 mg by mouth every 7 days. Last dose was 5/1/20)    metoprolol succinate (TOPROL XL) 50 MG 24 hr tablet Take 1 tablet twice daily. hold if systolic blood pressure less than or equal to 100 or heart rate is less than 60    pantoprazole (PROTONIX) 40 MG tablet Take 1 tablet (40 mg total) by mouth once daily.    busPIRone (BUSPAR) 5 MG Tab Take 1 tablet (5 mg total) by mouth 2 (two) times daily as needed.    meloxicam (MOBIC) 15 MG tablet Take 1 tablet (15 mg total) by mouth once daily.     No current facility-administered medications for this visit.            Review of Systems   Constitutional: Negative for activity change, appetite change, chills, diaphoresis, fatigue, fever and unexpected weight change.   HENT: Negative for congestion, ear pain, postnasal drip, rhinorrhea, sinus pressure, sinus pain, sneezing, sore throat, tinnitus, trouble swallowing and voice change.    Eyes: Negative for photophobia, pain and visual disturbance.   Respiratory: Negative for cough, chest tightness, shortness of breath and wheezing.    Cardiovascular: Negative for chest pain, palpitations and leg swelling.   Gastrointestinal: Negative for abdominal distention, abdominal pain, constipation, diarrhea, nausea and vomiting.    Genitourinary: Negative for decreased urine volume, difficulty urinating, dysuria, flank pain, frequency, hematuria and urgency.   Musculoskeletal: Negative for arthralgias, back pain, joint swelling, neck pain and neck stiffness.   Allergic/Immunologic: Negative for immunocompromised state.   Neurological: Positive for headaches. Negative for dizziness, tremors, seizures, syncope, facial asymmetry, speech difficulty, weakness, light-headedness and numbness.   Hematological: Negative for adenopathy. Does not bruise/bleed easily.   Psychiatric/Behavioral: Positive for sleep disturbance. Negative for confusion. The patient is nervous/anxious.        Objective:      Physical Exam  HENT:      Head: Normocephalic and atraumatic.      Right Ear: Tympanic membrane normal.      Left Ear: Tympanic membrane normal.   Eyes:      Conjunctiva/sclera: Conjunctivae normal.   Neck:      Musculoskeletal: Normal range of motion and neck supple.   Cardiovascular:      Rate and Rhythm: Normal rate and regular rhythm.      Heart sounds: Normal heart sounds.   Pulmonary:      Effort: Pulmonary effort is normal.      Breath sounds: Normal breath sounds.   Abdominal:      General: Bowel sounds are normal.      Palpations: Abdomen is soft.   Musculoskeletal: Normal range of motion.   Skin:     General: Skin is warm and dry.   Neurological:      Mental Status: He is alert and oriented to person, place, and time.         Assessment:     Vitals:    07/01/20 1057   BP: 122/80   Pulse: 79   Temp: 97.3 °F (36.3 °C)         1. Community acquired pneumonia of left lower lobe of lung    2. Anxiety    3. Acute intractable headache, unspecified headache type        Plan:   Community acquired pneumonia of left lower lobe of lung  -     X-Ray Chest PA And Lateral; Future; Expected date: 07/01/2020    Anxiety  -     busPIRone (BUSPAR) 5 MG Tab; Take 1 tablet (5 mg total) by mouth 2 (two) times daily as needed.  Dispense: 30 tablet; Refill: 0    Acute  intractable headache, unspecified headache type  -     meloxicam (MOBIC) 15 MG tablet; Take 1 tablet (15 mg total) by mouth once daily.  Dispense: 10 tablet; Refill: 0      cxr today  Phone review to follow   Try meds above. If ineffective see PCP

## 2020-07-02 ENCOUNTER — TELEPHONE (OUTPATIENT)
Dept: INTERNAL MEDICINE | Facility: CLINIC | Age: 55
End: 2020-07-02

## 2020-07-02 NOTE — TELEPHONE ENCOUNTER
I returned pt's call in regards to xray. He stated he would like to know if he still has to continue doing inhaler and spirometer. I informed pt I would find out from Dr. Dick and give him a return call. //BJ

## 2020-07-06 ENCOUNTER — TELEPHONE (OUTPATIENT)
Dept: INTERNAL MEDICINE | Facility: CLINIC | Age: 55
End: 2020-07-06

## 2020-07-06 NOTE — TELEPHONE ENCOUNTER
I returned pt's call in regards to medication that he was prescribed at visit on July 1st. Pt stated that he believes that he should not be taking meloxicam for headaches and would like to know from NP is this what she wants him to be taking. I informed pt I would get message sent to Margarita Beltrán and give him a call back. //BJ

## 2020-07-06 NOTE — TELEPHONE ENCOUNTER
----- Message from Va Valladares sent at 7/6/2020  1:20 PM CDT -----  Regarding: confirm medicaiton.  Contact: pt  Pt needs to speak with office. Pt believes he has the wrong medication. [phone]

## 2020-07-07 ENCOUNTER — TELEPHONE (OUTPATIENT)
Dept: INTERNAL MEDICINE | Facility: CLINIC | Age: 55
End: 2020-07-07

## 2020-07-07 RX ORDER — BUTALBITAL, ACETAMINOPHEN AND CAFFEINE 50; 325; 40 MG/1; MG/1; MG/1
1 TABLET ORAL EVERY 6 HOURS PRN
Qty: 20 TABLET | Refills: 0 | Status: SHIPPED | OUTPATIENT
Start: 2020-07-07 | End: 2020-08-06

## 2020-07-07 NOTE — TELEPHONE ENCOUNTER
----- Message from Celeste Fields sent at 7/7/2020  8:35 AM CDT -----  Regarding: elevated blood pressure  Contact: Patient  Patient would like the nurse to give him a call back concerning his elevated blood pressure and headaches. Please call at Ph .[phone] (home)

## 2020-07-07 NOTE — TELEPHONE ENCOUNTER
I s/w pt informing him that medication was sent to pharmacy for him to try, pt scheduled an appointment to follow on Thursday July 9th. //BJ

## 2020-07-07 NOTE — TELEPHONE ENCOUNTER
I returned call to pt in regards to headaches, he stated that he came in to see Margarita Beltrán due to Dr. Dick being out and she prescribed him meloxicam, he states that the medication is not helping him at  All. Pt stated that his BP has been running, 150/100, 140/90, 137/88, 133/90. He states that he has been having nonstop headaches and tylenol is not working. I informed pt I would get message to Dr. Dick and give him a return call. //BJ

## 2020-07-09 ENCOUNTER — OFFICE VISIT (OUTPATIENT)
Dept: INTERNAL MEDICINE | Facility: CLINIC | Age: 55
End: 2020-07-09
Payer: COMMERCIAL

## 2020-07-09 DIAGNOSIS — M06.9 RHEUMATOID ARTHRITIS OF HAND, UNSPECIFIED LATERALITY, UNSPECIFIED RHEUMATOID FACTOR PRESENCE: ICD-10-CM

## 2020-07-09 DIAGNOSIS — I10 ESSENTIAL HYPERTENSION: ICD-10-CM

## 2020-07-09 DIAGNOSIS — G44.229 CHRONIC TENSION-TYPE HEADACHE, NOT INTRACTABLE: Primary | ICD-10-CM

## 2020-07-09 PROCEDURE — 3074F PR MOST RECENT SYSTOLIC BLOOD PRESSURE < 130 MM HG: ICD-10-PCS | Mod: CPTII,S$GLB,, | Performed by: FAMILY MEDICINE

## 2020-07-09 PROCEDURE — 3074F SYST BP LT 130 MM HG: CPT | Mod: CPTII,S$GLB,, | Performed by: FAMILY MEDICINE

## 2020-07-09 PROCEDURE — 3008F BODY MASS INDEX DOCD: CPT | Mod: CPTII,S$GLB,, | Performed by: FAMILY MEDICINE

## 2020-07-09 PROCEDURE — 3008F PR BODY MASS INDEX (BMI) DOCUMENTED: ICD-10-PCS | Mod: CPTII,S$GLB,, | Performed by: FAMILY MEDICINE

## 2020-07-09 PROCEDURE — 99214 PR OFFICE/OUTPT VISIT, EST, LEVL IV, 30-39 MIN: ICD-10-PCS | Mod: S$GLB,,, | Performed by: FAMILY MEDICINE

## 2020-07-09 PROCEDURE — 99214 OFFICE O/P EST MOD 30 MIN: CPT | Mod: S$GLB,,, | Performed by: FAMILY MEDICINE

## 2020-07-09 PROCEDURE — 99999 PR PBB SHADOW E&M-EST. PATIENT-LVL IV: CPT | Mod: PBBFAC,,, | Performed by: FAMILY MEDICINE

## 2020-07-09 PROCEDURE — 3079F DIAST BP 80-89 MM HG: CPT | Mod: CPTII,S$GLB,, | Performed by: FAMILY MEDICINE

## 2020-07-09 PROCEDURE — 3079F PR MOST RECENT DIASTOLIC BLOOD PRESSURE 80-89 MM HG: ICD-10-PCS | Mod: CPTII,S$GLB,, | Performed by: FAMILY MEDICINE

## 2020-07-09 PROCEDURE — 99999 PR PBB SHADOW E&M-EST. PATIENT-LVL IV: ICD-10-PCS | Mod: PBBFAC,,, | Performed by: FAMILY MEDICINE

## 2020-07-09 NOTE — PROGRESS NOTES
Subjective:       Patient ID: Sachin Gonzalez is a 55 y.o. male.    Chief Complaint: Blood Pressure Check and Headache    Pt is a 55 year old who is having headaches all day and wakes-up with headaches. Blood pressure running high at times. Review of recent labs is do not reveal any real abnormalities. Pt was seen for possible pneumonia but seems to have resolved. No fever or SOB.     Review of Systems   Constitutional: Negative.    Respiratory: Negative.    Cardiovascular: Negative.    Musculoskeletal: Negative.          Objective:      Physical Exam  Constitutional:       Appearance: Normal appearance.   Neck:      Musculoskeletal: Normal range of motion and neck supple.   Cardiovascular:      Rate and Rhythm: Normal rate and regular rhythm.      Pulses: Normal pulses.      Heart sounds: Normal heart sounds.   Pulmonary:      Effort: Pulmonary effort is normal.      Breath sounds: Normal breath sounds.   Skin:     General: Skin is warm.   Neurological:      General: No focal deficit present.      Mental Status: He is alert and oriented to person, place, and time.   Psychiatric:         Mood and Affect: Mood normal.         Behavior: Behavior normal.         Assessment:       1. Chronic tension-type headache, not intractable    2. Essential hypertension    3. Rheumatoid arthritis of hand, unspecified laterality, unspecified rheumatoid factor presence        Plan:       Chronic tension-type headache, not intractable  Comments:  Will try maxalt and encourage eye exam. Unclear if this is migraine at thi point    Essential hypertension  Comments:  Monitor BP    Rheumatoid arthritis of hand, unspecified laterality, unspecified rheumatoid factor presence  Comments:  Stable    Other orders  -     rizatriptan (MAXALT) 10 MG tablet; Take 1 tablet (10 mg total) by mouth as needed for Migraine (may repeat 2nd dose 2 hours after first if Headache persists).  Dispense: 10 tablet; Refill: 0

## 2020-07-13 ENCOUNTER — TELEPHONE (OUTPATIENT)
Dept: INTERNAL MEDICINE | Facility: CLINIC | Age: 55
End: 2020-07-13

## 2020-07-13 NOTE — TELEPHONE ENCOUNTER
----- Message from Lucinda Wolff sent at 7/13/2020  8:17 AM CDT -----  Please call pt back regarding BP reading over the weekend. Friday 136/94 Sat 126/92 Sat evening 133/90 Sun 120/86 Sun evening 136/90 Monday morning 120/88. Please call back at [phone]

## 2020-07-15 ENCOUNTER — TELEPHONE (OUTPATIENT)
Dept: INTERNAL MEDICINE | Facility: CLINIC | Age: 55
End: 2020-07-15

## 2020-07-15 VITALS
SYSTOLIC BLOOD PRESSURE: 118 MMHG | TEMPERATURE: 99 F | HEART RATE: 79 BPM | OXYGEN SATURATION: 98 % | BODY MASS INDEX: 26.6 KG/M2 | DIASTOLIC BLOOD PRESSURE: 89 MMHG | WEIGHT: 229.94 LBS | HEIGHT: 78 IN

## 2020-07-15 RX ORDER — RIZATRIPTAN BENZOATE 10 MG/1
10 TABLET ORAL
Qty: 10 TABLET | Refills: 0 | Status: SHIPPED | OUTPATIENT
Start: 2020-07-15 | End: 2020-10-05

## 2020-07-15 NOTE — TELEPHONE ENCOUNTER
----- Message from Pradeep Alonso sent at 7/15/2020  8:52 AM CDT -----  Regarding: pt advice  Contact: Sachin  Pt called to consult with nurse about his severe headaches he have been having for several weeks now. Pt states over the counter meds are not working any more. Please call pt back at [phone].

## 2020-07-23 ENCOUNTER — TELEPHONE (OUTPATIENT)
Dept: INTERNAL MEDICINE | Facility: CLINIC | Age: 55
End: 2020-07-23

## 2020-07-23 NOTE — TELEPHONE ENCOUNTER
----- Message from Sushila Tinoco sent at 7/23/2020  2:09 PM CDT -----  Regarding: Urgent Call  Contact: pt  Pt stated he's still having headaches and his bp is high, he can be reached at [phone] Thanks

## 2020-07-23 NOTE — TELEPHONE ENCOUNTER
I returned call to pt in regards to to having headaches and BP being high. Pt states that BP was 136/97 this morning. He states that he has been taking medication for headaches but only for about 6 hrs and headache starts back. I informed pt I would get message to Dr. Dick and give him call back. //BJ

## 2020-07-24 RX ORDER — HYDROCHLOROTHIAZIDE 12.5 MG/1
12.5 TABLET ORAL DAILY
Qty: 90 TABLET | Refills: 1 | Status: SHIPPED | OUTPATIENT
Start: 2020-07-24 | End: 2020-12-17 | Stop reason: SDUPTHER

## 2020-07-24 NOTE — TELEPHONE ENCOUNTER
I s/w pt in regards to Dr. Dick sending in hydrochlorothiazide 12.5. I informed pt to take medication in the morning and the medication will make me urinate a little more being that it is a diuretic. Pt states he will contact us on next week and let us know how medication is going. //BJ

## 2020-07-27 ENCOUNTER — TELEPHONE (OUTPATIENT)
Dept: INTERNAL MEDICINE | Facility: CLINIC | Age: 55
End: 2020-07-27

## 2020-07-27 NOTE — TELEPHONE ENCOUNTER
I returned call to pt in regards to medication and not being sure how to take medication. I informed pt he can take both bp medications. Pt states that he will contact us at the end of the week with his BP readings. //BJ

## 2020-07-27 NOTE — TELEPHONE ENCOUNTER
----- Message from Jamal Mares sent at 7/27/2020  8:19 AM CDT -----  Regarding: PT advice  Type:  Needs Medical Advice    Who Called:  Pt   Symptoms (please be specific): n/a   How long has patient had these symptoms:  n/a  Pharmacy name and phone #:  n/a  Would the patient rather a call back or a response via [facility]? Call back   Best Call Back Number: [phone] (home)   Additional Information: The patient is calling in regards to getting further instruction on how to take his current medication with his new Rx's due to being confused and does not want to have drug interaction, please advise.

## 2020-08-11 NOTE — TELEPHONE ENCOUNTER
----- Message from Franca Garcia RN sent at 11/15/2018  5:26 PM CST -----  Please put psa order in epic   Health Care Proxy (HCP)

## 2020-08-13 ENCOUNTER — TELEPHONE (OUTPATIENT)
Dept: INTERNAL MEDICINE | Facility: CLINIC | Age: 55
End: 2020-08-13

## 2020-08-13 NOTE — TELEPHONE ENCOUNTER
----- Message from Pradeep Alonso sent at 8/13/2020  3:00 PM CDT -----  Regarding: pt advice  Contact: Sachin  Pt called to consult with nurse do to him still having headaches. Please call pt back at [phone]. Thanks tp       [facility] [address] [facility] [place] - [address]  [address] [facility] [address] [address]  Phone: [phone] Fax: [phone]

## 2020-08-13 NOTE — TELEPHONE ENCOUNTER
I returned call to pt, he states that his headaches have not went away. He states that some days are not as bad as others. He states his BP has been okay, the headaches just have not gone away. He states that the medication has not been working for him. I informed pt I would get message sent to Dr. Dick for approval. //BJ

## 2020-08-14 NOTE — TELEPHONE ENCOUNTER
I returned call to pt informing him he would need to schedule a follow up appointment to discuss headaches since it has been a month in a half. Pt stated he would call back on Monday. //BJ

## 2020-08-16 DIAGNOSIS — I10 HYPERTENSION, UNSPECIFIED TYPE: ICD-10-CM

## 2020-08-17 RX ORDER — METOPROLOL SUCCINATE 50 MG/1
TABLET, EXTENDED RELEASE ORAL
Qty: 180 TABLET | Refills: 3 | Status: SHIPPED | OUTPATIENT
Start: 2020-08-17 | End: 2022-07-21

## 2020-09-03 ENCOUNTER — OFFICE VISIT (OUTPATIENT)
Dept: INTERNAL MEDICINE | Facility: CLINIC | Age: 55
End: 2020-09-03
Payer: COMMERCIAL

## 2020-09-03 ENCOUNTER — HOSPITAL ENCOUNTER (OUTPATIENT)
Dept: RADIOLOGY | Facility: HOSPITAL | Age: 55
Discharge: HOME | End: 2020-09-03
Attending: FAMILY MEDICINE
Payer: COMMERCIAL

## 2020-09-03 VITALS
BODY MASS INDEX: 26.25 KG/M2 | WEIGHT: 226.88 LBS | OXYGEN SATURATION: 98 % | HEIGHT: 78 IN | HEART RATE: 84 BPM | DIASTOLIC BLOOD PRESSURE: 78 MMHG | SYSTOLIC BLOOD PRESSURE: 110 MMHG | TEMPERATURE: 99 F

## 2020-09-03 DIAGNOSIS — R07.89 CHEST WALL PAIN: Primary | ICD-10-CM

## 2020-09-03 DIAGNOSIS — C61 PROSTATE CANCER: ICD-10-CM

## 2020-09-03 DIAGNOSIS — R51.9 NONINTRACTABLE HEADACHE, UNSPECIFIED CHRONICITY PATTERN, UNSPECIFIED HEADACHE TYPE: ICD-10-CM

## 2020-09-03 DIAGNOSIS — R07.89 CHEST WALL PAIN: ICD-10-CM

## 2020-09-03 PROCEDURE — 99213 PR OFFICE/OUTPT VISIT, EST, LEVL III, 20-29 MIN: ICD-10-PCS | Mod: S$GLB,,, | Performed by: FAMILY MEDICINE

## 2020-09-03 PROCEDURE — 71100 X-RAY EXAM RIBS UNI 2 VIEWS: CPT | Mod: 26,RT,, | Performed by: RADIOLOGY

## 2020-09-03 PROCEDURE — 3078F DIAST BP <80 MM HG: CPT | Mod: CPTII,S$GLB,, | Performed by: FAMILY MEDICINE

## 2020-09-03 PROCEDURE — 3074F SYST BP LT 130 MM HG: CPT | Mod: CPTII,S$GLB,, | Performed by: FAMILY MEDICINE

## 2020-09-03 PROCEDURE — 3008F BODY MASS INDEX DOCD: CPT | Mod: CPTII,S$GLB,, | Performed by: FAMILY MEDICINE

## 2020-09-03 PROCEDURE — 3008F PR BODY MASS INDEX (BMI) DOCUMENTED: ICD-10-PCS | Mod: CPTII,S$GLB,, | Performed by: FAMILY MEDICINE

## 2020-09-03 PROCEDURE — 71100 X-RAY EXAM RIBS UNI 2 VIEWS: CPT | Mod: TC,RT

## 2020-09-03 PROCEDURE — 3078F PR MOST RECENT DIASTOLIC BLOOD PRESSURE < 80 MM HG: ICD-10-PCS | Mod: CPTII,S$GLB,, | Performed by: FAMILY MEDICINE

## 2020-09-03 PROCEDURE — 71100 XR RIBS 2 VIEW RIGHT: ICD-10-PCS | Mod: 26,RT,, | Performed by: RADIOLOGY

## 2020-09-03 PROCEDURE — 99999 PR PBB SHADOW E&M-EST. PATIENT-LVL V: ICD-10-PCS | Mod: PBBFAC,,, | Performed by: FAMILY MEDICINE

## 2020-09-03 PROCEDURE — 99999 PR PBB SHADOW E&M-EST. PATIENT-LVL V: CPT | Mod: PBBFAC,,, | Performed by: FAMILY MEDICINE

## 2020-09-03 PROCEDURE — 3074F PR MOST RECENT SYSTOLIC BLOOD PRESSURE < 130 MM HG: ICD-10-PCS | Mod: CPTII,S$GLB,, | Performed by: FAMILY MEDICINE

## 2020-09-03 PROCEDURE — 99213 OFFICE O/P EST LOW 20 MIN: CPT | Mod: S$GLB,,, | Performed by: FAMILY MEDICINE

## 2020-09-03 NOTE — PROGRESS NOTES
Subjective:       Patient ID: Sachin Gonzalez is a 55 y.o. male.    Chief Complaint: Follow-up    Pt is a 55 year old with multiple complaints of headache and pain on the left lower chest wall. Not much pattern or consistency to any of these symptoms. Chest wall pain is in context of not trauma. Headache no going away. Not waking him up at night.     Review of Systems   Constitutional: Negative.    Respiratory: Negative.    Cardiovascular: Negative.    Genitourinary: Negative.    Musculoskeletal: Negative.    Neurological: Negative.    Hematological: Negative.          Objective:      Physical Exam  Constitutional:       Appearance: Normal appearance.   Neck:      Musculoskeletal: Normal range of motion and neck supple.   Cardiovascular:      Rate and Rhythm: Normal rate and regular rhythm.      Heart sounds: Normal heart sounds.   Pulmonary:      Effort: Pulmonary effort is normal.      Breath sounds: Normal breath sounds.   Skin:     General: Skin is warm and dry.   Neurological:      General: No focal deficit present.      Mental Status: He is alert and oriented to person, place, and time.         Assessment:       1. Chest wall pain    2. Nonintractable headache, unspecified chronicity pattern, unspecified headache type    3. Prostate cancer        Plan:       Chest wall pain  Comments:  Not clear at this point. will get xray of ribs.  Orders:  -     X-Ray Ribs 2 View Right; Future; Expected date: 09/03/2020    Nonintractable headache, unspecified chronicity pattern, unspecified headache type  Comments:  Will send to Neurology  Orders:  -     Ambulatory referral/consult to Neurology; Future; Expected date: 09/10/2020    Prostate cancer  Comments:  Follow-up with outside urology

## 2020-09-10 ENCOUNTER — TELEPHONE (OUTPATIENT)
Dept: INTERNAL MEDICINE | Facility: CLINIC | Age: 55
End: 2020-09-10

## 2020-09-10 NOTE — TELEPHONE ENCOUNTER
I returned call to pt in regards to xray results, I informed pt that xray showed nothing abnormal, no pneumonia per Dr. Dick.  Pt also stated that he and Dr. Dick discussed he needed to do labs but no orders were placed. I informed pt I would get message sent to Dr. Dick to put in orders. He stated that he only remembers discussing PSA. //BJ

## 2020-09-10 NOTE — TELEPHONE ENCOUNTER
----- Message from Nisreen Gary sent at 9/10/2020  7:34 AM CDT -----  Regarding: results  Contact: pt  The pt request a call concerning his test results, no additional info given and can be reached at [phone]///[id number]

## 2020-09-11 DIAGNOSIS — Z90.79 STATUS POST PROSTATECTOMY: Primary | ICD-10-CM

## 2020-09-11 NOTE — TELEPHONE ENCOUNTER
I s/w pt informing him that Dr. Dick put in order for him to come in to do PSA lab. Pt scheduled to do lab on September 14th. Pt thanked me for the call and ended call. //BJ

## 2020-09-14 ENCOUNTER — IMMUNIZATION (OUTPATIENT)
Dept: PHARMACY | Facility: CLINIC | Age: 55
End: 2020-09-14
Payer: COMMERCIAL

## 2020-09-14 ENCOUNTER — LAB VISIT (OUTPATIENT)
Dept: LAB | Facility: HOSPITAL | Age: 55
End: 2020-09-14
Attending: FAMILY MEDICINE
Payer: COMMERCIAL

## 2020-09-14 DIAGNOSIS — Z90.79 STATUS POST PROSTATECTOMY: ICD-10-CM

## 2020-09-14 PROCEDURE — 84153 ASSAY OF PSA TOTAL: CPT

## 2020-09-14 PROCEDURE — 36415 COLL VENOUS BLD VENIPUNCTURE: CPT

## 2020-09-15 LAB — COMPLEXED PSA SERPL-MCNC: <0.01 NG/ML (ref 0–4)

## 2020-10-05 ENCOUNTER — LAB VISIT (OUTPATIENT)
Dept: LAB | Facility: HOSPITAL | Age: 55
End: 2020-10-05
Attending: PSYCHIATRY & NEUROLOGY
Payer: COMMERCIAL

## 2020-10-05 ENCOUNTER — OFFICE VISIT (OUTPATIENT)
Dept: NEUROLOGY | Facility: CLINIC | Age: 55
End: 2020-10-05
Payer: COMMERCIAL

## 2020-10-05 ENCOUNTER — PATIENT OUTREACH (OUTPATIENT)
Dept: ADMINISTRATIVE | Facility: OTHER | Age: 55
End: 2020-10-05

## 2020-10-05 VITALS
SYSTOLIC BLOOD PRESSURE: 118 MMHG | DIASTOLIC BLOOD PRESSURE: 78 MMHG | HEIGHT: 78 IN | WEIGHT: 226.63 LBS | BODY MASS INDEX: 26.22 KG/M2 | HEART RATE: 72 BPM

## 2020-10-05 DIAGNOSIS — H93.8X3 IRRITATION OF BOTH EARS: ICD-10-CM

## 2020-10-05 DIAGNOSIS — R07.89 CHEST PAIN, ATYPICAL: ICD-10-CM

## 2020-10-05 DIAGNOSIS — K11.20 PAROTIDITIS: ICD-10-CM

## 2020-10-05 DIAGNOSIS — S16.1XXD STRAIN OF NECK MUSCLE, SUBSEQUENT ENCOUNTER: ICD-10-CM

## 2020-10-05 DIAGNOSIS — K21.9 GASTROESOPHAGEAL REFLUX DISEASE, UNSPECIFIED WHETHER ESOPHAGITIS PRESENT: ICD-10-CM

## 2020-10-05 DIAGNOSIS — R30.0 DYSURIA: ICD-10-CM

## 2020-10-05 DIAGNOSIS — Z01.810 PREOP CARDIOVASCULAR EXAM: ICD-10-CM

## 2020-10-05 DIAGNOSIS — M25.476 EFFUSION OF ANKLE AND FOOT JOINT: ICD-10-CM

## 2020-10-05 DIAGNOSIS — G44.209 TENSION HEADACHE: Primary | ICD-10-CM

## 2020-10-05 DIAGNOSIS — R06.02 SHORTNESS OF BREATH: ICD-10-CM

## 2020-10-05 DIAGNOSIS — M79.18 MYOFASCIAL PAIN: ICD-10-CM

## 2020-10-05 DIAGNOSIS — R35.0 URINARY FREQUENCY: ICD-10-CM

## 2020-10-05 DIAGNOSIS — M48.061 LUMBAR FORAMINAL STENOSIS: ICD-10-CM

## 2020-10-05 DIAGNOSIS — E78.2 MIXED HYPERLIPIDEMIA: ICD-10-CM

## 2020-10-05 DIAGNOSIS — R22.43 LOCALIZED SWELLING, MASS, OR LUMP OF LOWER EXTREMITY, BILATERAL: ICD-10-CM

## 2020-10-05 DIAGNOSIS — I82.619 CEPHALIC VEIN THROMBOSIS: ICD-10-CM

## 2020-10-05 DIAGNOSIS — J32.9 SINUSITIS, UNSPECIFIED CHRONICITY, UNSPECIFIED LOCATION: ICD-10-CM

## 2020-10-05 DIAGNOSIS — G44.209 TENSION HEADACHE: ICD-10-CM

## 2020-10-05 DIAGNOSIS — R51.9 NONINTRACTABLE HEADACHE, UNSPECIFIED CHRONICITY PATTERN, UNSPECIFIED HEADACHE TYPE: ICD-10-CM

## 2020-10-05 DIAGNOSIS — R20.0 NUMBNESS AND TINGLING OF BOTH LOWER EXTREMITIES: ICD-10-CM

## 2020-10-05 DIAGNOSIS — R07.89 CHEST DISCOMFORT: ICD-10-CM

## 2020-10-05 DIAGNOSIS — K11.5 PAROTID SIALOLITHIASIS: ICD-10-CM

## 2020-10-05 DIAGNOSIS — G44.201 ACUTE INTRACTABLE TENSION-TYPE HEADACHE: ICD-10-CM

## 2020-10-05 DIAGNOSIS — M54.2 NECK PAIN: ICD-10-CM

## 2020-10-05 DIAGNOSIS — R09.81 SINUS CONGESTION: ICD-10-CM

## 2020-10-05 DIAGNOSIS — K62.5 BRBPR (BRIGHT RED BLOOD PER RECTUM): ICD-10-CM

## 2020-10-05 DIAGNOSIS — Z90.79 STATUS POST PROSTATECTOMY: ICD-10-CM

## 2020-10-05 DIAGNOSIS — F40.240 CLAUSTROPHOBIA: ICD-10-CM

## 2020-10-05 DIAGNOSIS — M25.473 EFFUSION OF ANKLE AND FOOT JOINT: ICD-10-CM

## 2020-10-05 DIAGNOSIS — I10 ESSENTIAL HYPERTENSION: ICD-10-CM

## 2020-10-05 DIAGNOSIS — N52.31 ERECTILE DYSFUNCTION FOLLOWING RADICAL PROSTATECTOMY: ICD-10-CM

## 2020-10-05 DIAGNOSIS — R20.2 NUMBNESS AND TINGLING OF BOTH LOWER EXTREMITIES: ICD-10-CM

## 2020-10-05 DIAGNOSIS — M06.9 RHEUMATOID ARTHRITIS OF HAND, UNSPECIFIED LATERALITY, UNSPECIFIED WHETHER RHEUMATOID FACTOR PRESENT: ICD-10-CM

## 2020-10-05 DIAGNOSIS — M79.671 RIGHT FOOT PAIN: ICD-10-CM

## 2020-10-05 DIAGNOSIS — N48.6 PEYRONIE'S DISEASE: ICD-10-CM

## 2020-10-05 DIAGNOSIS — Z79.60 LONG-TERM USE OF IMMUNOSUPPRESSANT MEDICATION: ICD-10-CM

## 2020-10-05 DIAGNOSIS — C61 PROSTATE CANCER: ICD-10-CM

## 2020-10-05 DIAGNOSIS — G44.229 CHRONIC TENSION-TYPE HEADACHE, NOT INTRACTABLE: ICD-10-CM

## 2020-10-05 LAB
ANION GAP SERPL CALC-SCNC: 8 MMOL/L (ref 8–16)
BUN SERPL-MCNC: 23 MG/DL (ref 6–20)
CALCIUM SERPL-MCNC: 9.7 MG/DL (ref 8.7–10.5)
CHLORIDE SERPL-SCNC: 101 MMOL/L (ref 95–110)
CO2 SERPL-SCNC: 29 MMOL/L (ref 23–29)
CREAT SERPL-MCNC: 1.2 MG/DL (ref 0.5–1.4)
EST. GFR  (AFRICAN AMERICAN): >60 ML/MIN/1.73 M^2
EST. GFR  (NON AFRICAN AMERICAN): >60 ML/MIN/1.73 M^2
GLUCOSE SERPL-MCNC: 81 MG/DL (ref 70–110)
POTASSIUM SERPL-SCNC: 4.2 MMOL/L (ref 3.5–5.1)
SODIUM SERPL-SCNC: 138 MMOL/L (ref 136–145)

## 2020-10-05 PROCEDURE — 80048 BASIC METABOLIC PNL TOTAL CA: CPT

## 2020-10-05 PROCEDURE — 36415 COLL VENOUS BLD VENIPUNCTURE: CPT

## 2020-10-05 PROCEDURE — 99999 PR PBB SHADOW E&M-EST. PATIENT-LVL IV: CPT | Mod: PBBFAC,,, | Performed by: PSYCHIATRY & NEUROLOGY

## 2020-10-05 PROCEDURE — 99999 PR PBB SHADOW E&M-EST. PATIENT-LVL IV: ICD-10-PCS | Mod: PBBFAC,,, | Performed by: PSYCHIATRY & NEUROLOGY

## 2020-10-05 PROCEDURE — 99244 PR OFFICE CONSULTATION,LEVEL IV: ICD-10-PCS | Mod: S$GLB,,, | Performed by: PSYCHIATRY & NEUROLOGY

## 2020-10-05 PROCEDURE — 99244 OFF/OP CNSLTJ NEW/EST MOD 40: CPT | Mod: S$GLB,,, | Performed by: PSYCHIATRY & NEUROLOGY

## 2020-10-05 RX ORDER — AMITRIPTYLINE HYDROCHLORIDE 25 MG/1
25 TABLET, FILM COATED ORAL NIGHTLY
Qty: 30 TABLET | Refills: 11 | Status: SHIPPED | OUTPATIENT
Start: 2020-10-05 | End: 2020-12-17

## 2020-10-05 RX ORDER — DIAZEPAM 5 MG/1
TABLET ORAL
Qty: 1 TABLET | Refills: 0 | Status: SHIPPED | OUTPATIENT
Start: 2020-10-05 | End: 2020-12-17

## 2020-10-05 NOTE — PROGRESS NOTES
Subjective:       Patient ID: Sachin Gonzalez is a 55 y.o. male.    Chief Complaint: Migraine          HPI     The patient was referred by Dr. Dick for headache evaluation.     The patient is here for evaluation of headaches. The headaches started in  (He was evaluated for similar headaches in 2013) . The headaches are constant, daily to 3 times a week with  3-4/10 dull frontal, temporal  and occipital in location and feel like a rubber band wrapping around the head.The headache is not associated with nausea and light, sound, temperature and smell sensitivity.  Abortive meds like Tylenol have been partially helpful. Stress exacerbates the headache. The patient goes to bed with headache and wakes up with headache but never awoken by it. Brain MRI in 2013 showed possible vascular anomalies in the BS.          Review of Systems   Constitutional: Negative for appetite change and fatigue.   HENT: Negative for hearing loss and tinnitus.    Eyes: Negative for photophobia and visual disturbance.   Respiratory: Negative for apnea and shortness of breath.    Cardiovascular: Negative for chest pain and palpitations.   Gastrointestinal: Negative for nausea and vomiting.   Endocrine: Negative for cold intolerance and heat intolerance.   Genitourinary: Negative for difficulty urinating and urgency.   Musculoskeletal: Positive for arthralgias and neck pain. Negative for back pain, gait problem, joint swelling, myalgias and neck stiffness.   Skin: Negative for color change and rash.   Allergic/Immunologic: Negative for environmental allergies and immunocompromised state.   Neurological: Positive for headaches. Negative for dizziness, tremors, seizures, syncope, facial asymmetry, speech difficulty, weakness, light-headedness and numbness.   Hematological: Negative for adenopathy. Does not bruise/bleed easily.   Psychiatric/Behavioral: Positive for dysphoric mood. Negative for agitation, behavioral problems, confusion,  decreased concentration, hallucinations, self-injury, sleep disturbance and suicidal ideas. The patient is nervous/anxious. The patient is not hyperactive.                  Current Outpatient Medications:     ascorbic acid, vitamin C, (VITAMIN C) 250 MG tablet, Take 250 mg by mouth once daily., Disp: , Rfl:     folic acid (FOLVITE) 1 MG tablet, Take 1 tablet (1,000 mcg total) by mouth once daily., Disp: 100 tablet, Rfl: 2    herbal complex no.174 (ECHINACEA AND GOLDENSEAL ORAL), Take by mouth once daily., Disp: , Rfl:     hydroCHLOROthiazide (HYDRODIURIL) 12.5 MG Tab, Take 1 tablet (12.5 mg total) by mouth once daily., Disp: 90 tablet, Rfl: 1    magnesium 30 mg Tab, Take by mouth every evening., Disp: , Rfl:     methotrexate 2.5 MG Tab, TAKE 10 TABLETS EVERY 7 DAYS (Patient taking differently: Take 2.5 mg by mouth every 7 days. Last dose was 5/1/20), Disp: 150 tablet, Rfl: 4    metoprolol succinate (TOPROL-XL) 50 MG 24 hr tablet, TAKE 1 TABLET TWICE A DAY. HOLD IF SYSTOLIC BLOOD PRESSURE LESS THAN OR EQUAL  OR HEART RATE IS LESS THAN 60, Disp: 180 tablet, Rfl: 3    pantoprazole (PROTONIX) 40 MG tablet, Take 1 tablet (40 mg total) by mouth once daily., Disp: 30 tablet, Rfl: 0    zinc sulfate (ZINC-220 ORAL), Take by mouth., Disp: , Rfl:     amitriptyline (ELAVIL) 25 MG tablet, Take 1 tablet (25 mg total) by mouth every evening., Disp: 30 tablet, Rfl: 11    diazePAM (VALIUM) 5 MG tablet, 1 tab 30 minutes before MRI, Disp: 1 tablet, Rfl: 0  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     Past Surgical History:   Procedure Laterality Date    ESOPHAGOGASTRODUODENOSCOPY      PROSTATECTOMY  2011     Social History     Socioeconomic History    Marital status: Single     Spouse name:  Not on file    Number of children: Not on file    Years of education: Not on file    Highest education level: Not on file   Occupational History    Not on file   Social Needs    Financial resource strain: Not on file    Food insecurity     Worry: Not on file     Inability: Not on file    Transportation needs     Medical: Not on file     Non-medical: Not on file   Tobacco Use    Smoking status: Never Smoker    Smokeless tobacco: Never Used   Substance and Sexual Activity    Alcohol use: No    Drug use: No    Sexual activity: Yes     Partners: Female   Lifestyle    Physical activity     Days per week: Not on file     Minutes per session: Not on file    Stress: Not at all   Relationships    Social connections     Talks on phone: Not on file     Gets together: Not on file     Attends Zoroastrian service: Not on file     Active member of club or organization: Not on file     Attends meetings of clubs or organizations: Not on file     Relationship status: Not on file   Other Topics Concern    Not on file   Social History Narrative    Not on file             Past/Current Medical/Surgical History, Past/Current Social History, Past/Current Family History and Past/Current Medications were reviewed in detail.        Objective:           VITAL SIGNS WERE REVIEWED      GENERAL APPEARANCE:     The patient looks comfortable.    Body habitus is normal.     No signs of respiratory distress.    Normal breathing pattern.    No dysmorphic features    Normal eye contact.     GENERAL MEDICAL EXAM:    HEENT:  Head is atraumatic normocephalic.     No tender temporal arteries. Fundoscopic (Ophthalmoscopic) exam showed no disc edema.      Neck and Axillae: No JVD. No visible lesions.    No carotid bruits. No thyromegaly. No lymphadenopathy.    Cardiopulmonary: No cyanosis. No tachypnea. Normal respiratory effort.    Clear breath sounds. S1, S2 with regular rhythm . No murmurs.     Gastrointestinal/Urogenital:  No jaundice. No  stomas or lesions. No visible hernias. No catheters.     Abdomen is soft non-tender. No masses or organomegaly.    Skin, Hair and Nails: No pathognonomic skin rash. No neurofibromatosis. No visible lesions.No stigmata of autoimmune disease. No clubbing.    Skin is warm and moist. No palpable masses.    Limbs: No varicose veins. No visible swelling.    No palpable edema. Pulses are symmetric. Pedal pulses are palpable.      Muskoskeletal: No visible deformities.No visible lesions.    No spine tenderness. No signs of longstanding neuropathy. No dislocations or fractures.            Neurologic Exam     Mental Status   Oriented to person, place, and time.   Registration: recalls 3 of 3 objects. Recall at 5 minutes: recalls 3 of 3 objects. Follows 3 step commands.   Attention: normal. Concentration: normal.   Speech: speech is normal   Level of consciousness: alert  Knowledge: good and consistent with education. Able to perform simple calculations.   Able to name object. Able to read. Able to repeat. Able to write. Normal comprehension.     Cranial Nerves   Cranial nerves II through XII intact.     CN II   Visual fields full to confrontation.   Visual acuity: normal  Right visual field deficit: none  Left visual field deficit: none     CN III, IV, VI   Pupils are equal, round, and reactive to light.  Extraocular motions are normal.   Right pupil: Size: 2 mm. Shape: regular. Reactivity: brisk. Consensual response: intact. Accommodation: intact.   Left pupil: Size: 2 mm. Shape: regular. Reactivity: brisk. Consensual response: intact. Accommodation: intact.   CN III: no CN III palsy  CN VI: no CN VI palsy  Nystagmus: none   Diplopia: none  Ophthalmoparesis: none  Upgaze: normal  Downgaze: normal  Conjugate gaze: present  Vestibulo-ocular reflex: present    CN V   Facial sensation intact.   Right facial sensation deficit: none  Left facial sensation deficit: none    CN VII   Facial expression full, symmetric.   Right facial  weakness: none  Left facial weakness: none    CN VIII   CN VIII normal.   Hearing: intact  Right Rinne: AC > BC  Left Rinne: AC > BC  Shepherd: does not lateralize     CN IX, X   CN IX normal.   CN X normal.   Palate: symmetric    CN XI   CN XI normal.   Right sternocleidomastoid strength: normal  Left sternocleidomastoid strength: normal  Right trapezius strength: normal  Left trapezius strength: normal    CN XII   CN XII normal.   Tongue: not atrophic  Fasciculations: absent  Tongue deviation: none    Motor Exam   Muscle bulk: normal  Overall muscle tone: normal  Right arm tone: normal  Left arm tone: normal  Right arm pronator drift: absent  Left arm pronator drift: absent  Right leg tone: normal  Left leg tone: normal    Strength   Strength 5/5 throughout.   Right neck flexion: 5/5  Left neck flexion: 5/5  Right neck extension: 5/5  Left neck extension: 5/5  Right deltoid: 5/5  Left deltoid: 5/5  Right biceps: 5/5  Left biceps: 5/5  Right triceps: 5/5  Left triceps: 5/5  Right wrist flexion: 5/5  Left wrist flexion: 5/5  Right wrist extension: 5/5  Left wrist extension: 5/5  Right interossei: 5/5  Left interossei: 5/5  Right iliopsoas: 5/5  Left iliopsoas: 5/5  Right quadriceps: 5/5  Left quadriceps: 5/5  Right hamstrin/5  Left hamstrin/5  Right glutei: 5/5  Left glutei: 5/5  Right anterior tibial: 5/5  Left anterior tibial: 5/5  Right posterior tibial: 5/5  Left posterior tibial: 5/5  Right peroneal: 5/5  Left peroneal: 5/5  Right gastroc: 5/5  Left gastroc: 5/5    Sensory Exam   Light touch normal.   Right arm light touch: normal  Left arm light touch: normal  Right leg light touch: normal  Left leg light touch: normal  Vibration normal.   Right arm vibration: normal  Left arm vibration: normal  Right leg vibration: normal  Left leg vibration: normal  Proprioception normal.   Right arm proprioception: normal  Left arm proprioception: normal  Right leg proprioception: normal  Left leg proprioception:  normal  Pinprick normal.   Right arm pinprick: normal  Left arm pinprick: normal  Right leg pinprick: normal  Left leg pinprick: normal  Graphesthesia: normal  Stereognosis: normal    Gait, Coordination, and Reflexes     Gait  Gait: normal    Coordination   Romberg: negative  Finger to nose coordination: normal  Heel to shin coordination: normal  Tandem walking coordination: normal    Tremor   Resting tremor: absent  Intention tremor: absent  Action tremor: absent    Reflexes   Right brachioradialis: 2+  Left brachioradialis: 2+  Right biceps: 2+  Left biceps: 2+  Right triceps: 2+  Left triceps: 2+  Right patellar: 2+  Left patellar: 2+  Right achilles: 2+  Left achilles: 2+  Right plantar: normal  Left plantar: normal  Right Adkins: absent  Left Adkins: absent  Right ankle clonus: absent  Left ankle clonus: absent  Right pendular knee jerk: absent  Left pendular knee jerk: absent      Lab Results   Component Value Date    WBC 3.57 (L) 07/01/2020    HGB 13.3 (L) 07/01/2020    HCT 42.9 07/01/2020     (H) 07/01/2020     07/01/2020     Sodium   Date Value Ref Range Status   07/01/2020 140 136 - 145 mmol/L Final     Potassium   Date Value Ref Range Status   07/01/2020 4.3 3.5 - 5.1 mmol/L Final     Chloride   Date Value Ref Range Status   07/01/2020 107 95 - 110 mmol/L Final     CO2   Date Value Ref Range Status   07/01/2020 26 23 - 29 mmol/L Final     Glucose   Date Value Ref Range Status   07/01/2020 75 70 - 110 mg/dL Final     BUN, Bld   Date Value Ref Range Status   07/01/2020 14 6 - 20 mg/dL Final     Creatinine   Date Value Ref Range Status   07/01/2020 1.2 0.5 - 1.4 mg/dL Final     Calcium   Date Value Ref Range Status   07/01/2020 9.2 8.7 - 10.5 mg/dL Final     Total Protein   Date Value Ref Range Status   07/01/2020 7.5 6.0 - 8.4 g/dL Final     Albumin   Date Value Ref Range Status   07/01/2020 3.7 3.5 - 5.2 g/dL Final     Total Bilirubin   Date Value Ref Range Status   07/01/2020 0.4 0.1 - 1.0  mg/dL Final     Comment:     For infants and newborns, interpretation of results should be based  on gestational age, weight and in agreement with clinical  observations.  Premature Infant recommended reference ranges:  Up to 24 hours.............<8.0 mg/dL  Up to 48 hours............<12.0 mg/dL  3-5 days..................<15.0 mg/dL  6-29 days.................<15.0 mg/dL       Alkaline Phosphatase   Date Value Ref Range Status   07/01/2020 73 55 - 135 U/L Final     AST   Date Value Ref Range Status   07/01/2020 22 10 - 40 U/L Final     ALT   Date Value Ref Range Status   07/01/2020 13 10 - 44 U/L Final     Anion Gap   Date Value Ref Range Status   07/01/2020 7 (L) 8 - 16 mmol/L Final     eGFR if    Date Value Ref Range Status   07/01/2020 >60.0 >60 mL/min/1.73 m^2 Final     eGFR if non    Date Value Ref Range Status   07/01/2020 >60.0 >60 mL/min/1.73 m^2 Final     Comment:     Calculation used to obtain the estimated glomerular filtration  rate (eGFR) is the CKD-EPI equation.        Lab Results   Component Value Date    ZJHZWIPY04 920 05/14/2018     Lab Results   Component Value Date    TSH 0.874 06/13/2016    [id number] 5.6 08/24/2012 05- and 07-    Brain MRI small focus of enhancement in the left paramedian gemini measuring 3 to 4 mm.      Reviewed the neuroimaging independently       Assessment:       1. Tension headache    2. Nonintractable headache, unspecified chronicity pattern, unspecified headache type    3. Prostate cancer    4. Rheumatoid arthritis of hand, unspecified laterality, unspecified whether rheumatoid factor present    5. Effusion of ankle and foot joint    6. Status post prostatectomy (06/07/12)    7. Erectile dysfunction following radical prostatectomy    8. Peyronie's disease    9. Long-term use of immunosuppressant medication    10. Cephalic vein thrombosis-post op    11. Sinusitis, unspecified chronicity, unspecified location    12. Essential  hypertension    13. Acute intractable tension-type headache    14. Dysuria    15. Chest discomfort    16. Shortness of breath    17. Neck pain    18. Parotiditis    19. Myofascial pain    20. Chronic tension-type headache, not intractable    21. Numbness and tingling of both lower extremities    22. Urinary frequency    23. Strain of neck muscle, subsequent encounter    24. Irritation of both ears    25. Right foot pain    26. Chest pain, atypical    27. Mixed hyperlipidemia    28. Preop cardiovascular exam    29. Sinus congestion    30. Parotid sialolithiasis    31. Lumbar foraminal stenosis    32. Localized swelling, mass, or lump of lower extremity, bilateral    33. BRBPR (bright red blood per rectum)    34. Gastroesophageal reflux disease, unspecified whether esophagitis present    35. Claustrophobia        Plan:       CHRONIC TENSION HEADACHE         Brain MRI W WO    I counseled the patient that tension headache is difficult to treat but luckily is mild and benign.      Try Amitriptyline/Elavil at very small dose of 25-75 mg QHS which can cause sleepiness, dry eyes, dry mouth, urinary retention and rarely cardiac arrhythmias. The patient verbalized understanding of the most common SEs and was encouraged to read the leaflet for more details.     Will consider Baclofen 5-20 mg TID and watch for sedation.     Stress Management    PT/Dry needling    Massage therapy    Biofeedback     Improve posture    Practice deep breathing    Good hydration    Good sleep hygiene            MEDICAL/SURGICAL COMORBIDITIES     All relevant medical comorbidities noted and managed by primary care physician and medical care team.          MISCELLANEOUS MEDICAL PROBLEMS       HEALTHY LIFESTYLE AND PREVENTATIVE CARE    Encouraged the patient to adhere to the age-appropriate health maintenance guidelines including screening tests and vaccinations.     Discussed the overall importance of healthy lifestyle, optimal weight, exercise,  healthy diet, good sleep hygiene and avoiding drugs including smoking, alcohol and recreational drugs. The patient verbalized full understanding.       Advised the patient to follow COVID-19 prevention measures.       I spent 60 minutes face to face with the patient    More than 35  minutes of the time spent in counseling and coordination of care including discussions etiology of diagnosis (TENSION HEADACHE) , pathogenesis of diagnosis, prognosis of diagnosis,, diagnostic results, impression and recommendations, diagnostic studies, management, risks and benefits of treatment, instructions of disease self-management, treatment instructions, follow up requirements, patient and family counseling/involvement in care compliance with treatment regimen. All of the patient's questions were answered during this discussion.        RTC in 8 weeks             Vinh Castillo MD, FAAN    Attending Neurologist/Epileptologist         Diplomate, American Board of Psychiatry and Neurology    Diplomate, American Board of Clinical Neurophysiology     Fellow, American Academy of Neurology

## 2020-10-05 NOTE — PATIENT INSTRUCTIONS
Amitriptyline tablets  What is this medicine?  AMITRIPTYLINE (a john TRIP ti arabella) is used to treat depression.  How should I use this medicine?  Take this medicine by mouth with a drink of water. Follow the directions on the prescription label. You can take the tablets with or without food. Take your medicine at regular intervals. Do not take it more often than directed. Do not stop taking this medicine suddenly except upon the advice of your doctor. Stopping this medicine too quickly may cause serious side effects or your condition may worsen.  A special MedGuide will be given to you by the pharmacist with each prescription and refill. Be sure to read this information carefully each time.  Talk to your pediatrician regarding the use of this medicine in children. Special care may be needed.  What side effects may I notice from receiving this medicine?  Side effects that you should report to your doctor or health care professional as soon as possible:  · allergic reactions like skin rash, itching or hives, swelling of the face, lips, or tongue  · abnormal production of milk in females  · breast enlargement in both males and females  · breathing problems  · confusion, hallucinations  · fast, irregular heartbeat  · fever with increased sweating  · muscle stiffness, or spasms  · pain or difficulty passing urine, loss of bladder control  · seizures  · suicidal thoughts or other mood changes  · swelling of the testicles  · tingling, pain, or numbness in the feet or hands  · yellowing of the eyes or skin  Side effects that usually do not require medical attention (report to your doctor or health care professional if they continue or are bothersome):  · change in sex drive or performance  · constipation or diarrhea  · nausea, vomiting  · weight gain or loss  What may interact with this medicine?  Do not take this medicine with any of the following medications:  · arsenic trioxide  · certain medicines used to regulate  abnormal heartbeat or to treat other heart conditions  · cisapride  · droperidol  · halofantrine  · linezolid  · MAOIs like Carbex, Eldepryl, Marplan, Nardil, and Parnate  · methylene blue  · other medicines for mental depression  · phenothiazines like perphenazine, thioridazine and chlorpromazine  · pimozide  · probucol  · procarbazine  · sparfloxacin  · Russel's Wort  · ziprasidone  This medicine may also interact with the following medications:  · atropine and related drugs like hyoscyamine, scopolamine, tolterodine and others  · barbiturate medicines for inducing sleep or treating seizures, like phenobarbital  · cimetidine  · disulfiram  · ethchlorvynol  · thyroid hormones such as levothyroxine  What if I miss a dose?  If you miss a dose, take it as soon as you can. If it is almost time for your next dose, take only that dose. Do not take double or extra doses.  Where should I keep my medicine?  Keep out of the reach of children.  Store at room temperature between 20 and 25 degrees C (68 and 77 degrees F). Throw away any unused medicine after the expiration date.  What should I tell my health care provider before I take this medicine?  They need to know if you have any of these conditions:  · an alcohol problem  · asthma, difficulty breathing  · bipolar disorder or schizophrenia  · difficulty passing urine, prostate trouble  · glaucoma  · heart disease or previous heart attack  · liver disease  · over active thyroid  · seizures  · thoughts or plans of suicide, a previous suicide attempt, or family history of suicide attempt  · an unusual or allergic reaction to amitriptyline, other medicines, foods, dyes, or preservatives  · pregnant or trying to get pregnant  · breast-feeding  What should I watch for while using this medicine?  Tell your doctor if your symptoms do not get better or if they get worse. Visit your doctor or health care professional for regular checks on your progress. Because it may take several  weeks to see the full effects of this medicine, it is important to continue your treatment as prescribed by your doctor.  Patients and their families should watch out for new or worsening thoughts of suicide or depression. Also watch out for sudden changes in feelings such as feeling anxious, agitated, panicky, irritable, hostile, aggressive, impulsive, severely restless, overly excited and hyperactive, or not being able to sleep. If this happens, especially at the beginning of treatment or after a change in dose, call your health care professional.  You may get drowsy or dizzy. Do not drive, use machinery, or do anything that needs mental alertness until you know how this medicine affects you. Do not stand or sit up quickly, especially if you are an older patient. This reduces the risk of dizzy or fainting spells. Alcohol may interfere with the effect of this medicine. Avoid alcoholic drinks.  Do not treat yourself for coughs, colds, or allergies without asking your doctor or health care professional for advice. Some ingredients can increase possible side effects.  Your mouth may get dry. Chewing sugarless gum or sucking hard candy, and drinking plenty of water will help. Contact your doctor if the problem does not go away or is severe.  This medicine may cause dry eyes and blurred vision. If you wear contact lenses you may feel some discomfort. Lubricating drops may help. See your eye doctor if the problem does not go away or is severe.  This medicine can cause constipation. Try to have a bowel movement at least every 2 to 3 days. If you do not have a bowel movement for 3 days, call your doctor or health care professional.  This medicine can make you more sensitive to the sun. Keep out of the sun. If you cannot avoid being in the sun, wear protective clothing and use sunscreen. Do not use sun lamps or tanning beds/booths.  NOTE:This sheet is a summary. It may not cover all possible information. If you have  questions about this medicine, talk to your doctor, pharmacist, or health care provider. Copyright© 2017 [employer]

## 2020-10-05 NOTE — PROGRESS NOTES
Health Maintenance Due   Topic Date Due    Pneumococcal Vaccine (Highest Risk) (1 of 3 - PCV13) 06/03/1982    Shingles Vaccine (1 of 2) 06/03/2013    Lipid Panel  08/07/2020     Updates were requested from care everywhere.  Chart was reviewed for overdue Proactive [facility] Encounters (RAMO) topics (CRS, Breast Cancer Screening, Eye exam)  Health Maintenance has been updated.  LINKS immunization registry triggered.  Immunizations were reconciled.

## 2020-10-05 NOTE — LETTER
October 5, 2020      Domo Dick MD  [address]  [place] [address]           [facility]  [address] [place] [id number]  Phone: [phone]  Fax: [phone]          Patient: Sachin Gonzalez   MR Number: [id number]   YOB: 1965   Date of Visit: 10/5/2020       Dear Dr. Domo Dick:    Thank you for referring Sachin Gonzalez to me for evaluation. Attached you will find relevant portions of my assessment and plan of care.    If you have questions, please do not hesitate to call me. I look forward to following Sachin Gonzalez along with you.    Sincerely,    Vinh Castillo MD    Enclosure  CC:  No Recipients    If you would like to receive this communication electronically, please contact [email or web address] or [phone] to request more information on [facility] Link access.    For providers and/or their staff who would like to refer a patient to [facility], please contact us through our one-stop-shop provider referral line, [facility], at [phone].    If you feel you have received this communication in error or would no longer like to receive these types of communications, please e-mail [email or web address]

## 2020-10-06 ENCOUNTER — TELEPHONE (OUTPATIENT)
Dept: INTERNAL MEDICINE | Facility: CLINIC | Age: 55
End: 2020-10-06

## 2020-10-06 NOTE — TELEPHONE ENCOUNTER
----- Message from Zahra Lane sent at 10/6/2020  2:43 PM CDT -----  Contact: self/[phone]  Patient would like to have his lab result, and would like to know if he could take the pneumonia shot? Please call back at [phone]. Thanks/ar

## 2020-10-07 DIAGNOSIS — Z23 IMMUNIZATION DUE: Primary | ICD-10-CM

## 2020-10-10 ENCOUNTER — HOSPITAL ENCOUNTER (OUTPATIENT)
Dept: RADIOLOGY | Facility: HOSPITAL | Age: 55
Discharge: HOME | End: 2020-10-10
Attending: PSYCHIATRY & NEUROLOGY
Payer: COMMERCIAL

## 2020-10-10 DIAGNOSIS — J32.9 SINUSITIS, UNSPECIFIED CHRONICITY, UNSPECIFIED LOCATION: ICD-10-CM

## 2020-10-10 DIAGNOSIS — R20.0 NUMBNESS AND TINGLING OF BOTH LOWER EXTREMITIES: ICD-10-CM

## 2020-10-10 DIAGNOSIS — M25.476 EFFUSION OF ANKLE AND FOOT JOINT: ICD-10-CM

## 2020-10-10 DIAGNOSIS — K11.5 PAROTID SIALOLITHIASIS: ICD-10-CM

## 2020-10-10 DIAGNOSIS — I82.619 CEPHALIC VEIN THROMBOSIS: ICD-10-CM

## 2020-10-10 DIAGNOSIS — E78.2 MIXED HYPERLIPIDEMIA: ICD-10-CM

## 2020-10-10 DIAGNOSIS — N48.6 PEYRONIE'S DISEASE: ICD-10-CM

## 2020-10-10 DIAGNOSIS — G44.201 ACUTE INTRACTABLE TENSION-TYPE HEADACHE: ICD-10-CM

## 2020-10-10 DIAGNOSIS — M06.9 RHEUMATOID ARTHRITIS OF HAND, UNSPECIFIED LATERALITY, UNSPECIFIED WHETHER RHEUMATOID FACTOR PRESENT: ICD-10-CM

## 2020-10-10 DIAGNOSIS — R06.02 SHORTNESS OF BREATH: ICD-10-CM

## 2020-10-10 DIAGNOSIS — N52.31 ERECTILE DYSFUNCTION FOLLOWING RADICAL PROSTATECTOMY: ICD-10-CM

## 2020-10-10 DIAGNOSIS — R30.0 DYSURIA: ICD-10-CM

## 2020-10-10 DIAGNOSIS — K11.20 PAROTIDITIS: ICD-10-CM

## 2020-10-10 DIAGNOSIS — G44.229 CHRONIC TENSION-TYPE HEADACHE, NOT INTRACTABLE: ICD-10-CM

## 2020-10-10 DIAGNOSIS — Z90.79 STATUS POST PROSTATECTOMY: ICD-10-CM

## 2020-10-10 DIAGNOSIS — M25.473 EFFUSION OF ANKLE AND FOOT JOINT: ICD-10-CM

## 2020-10-10 DIAGNOSIS — K21.9 GASTROESOPHAGEAL REFLUX DISEASE, UNSPECIFIED WHETHER ESOPHAGITIS PRESENT: ICD-10-CM

## 2020-10-10 DIAGNOSIS — R07.89 CHEST DISCOMFORT: ICD-10-CM

## 2020-10-10 DIAGNOSIS — Z01.810 PREOP CARDIOVASCULAR EXAM: ICD-10-CM

## 2020-10-10 DIAGNOSIS — K62.5 BRBPR (BRIGHT RED BLOOD PER RECTUM): ICD-10-CM

## 2020-10-10 DIAGNOSIS — R22.43 LOCALIZED SWELLING, MASS, OR LUMP OF LOWER EXTREMITY, BILATERAL: ICD-10-CM

## 2020-10-10 DIAGNOSIS — R20.2 NUMBNESS AND TINGLING OF BOTH LOWER EXTREMITIES: ICD-10-CM

## 2020-10-10 DIAGNOSIS — I10 ESSENTIAL HYPERTENSION: ICD-10-CM

## 2020-10-10 DIAGNOSIS — R09.81 SINUS CONGESTION: ICD-10-CM

## 2020-10-10 DIAGNOSIS — R07.89 CHEST PAIN, ATYPICAL: ICD-10-CM

## 2020-10-10 DIAGNOSIS — Z79.60 LONG-TERM USE OF IMMUNOSUPPRESSANT MEDICATION: ICD-10-CM

## 2020-10-10 DIAGNOSIS — R51.9 NONINTRACTABLE HEADACHE, UNSPECIFIED CHRONICITY PATTERN, UNSPECIFIED HEADACHE TYPE: ICD-10-CM

## 2020-10-10 DIAGNOSIS — M48.061 LUMBAR FORAMINAL STENOSIS: ICD-10-CM

## 2020-10-10 DIAGNOSIS — R35.0 URINARY FREQUENCY: ICD-10-CM

## 2020-10-10 DIAGNOSIS — M79.671 RIGHT FOOT PAIN: ICD-10-CM

## 2020-10-10 DIAGNOSIS — M79.18 MYOFASCIAL PAIN: ICD-10-CM

## 2020-10-10 DIAGNOSIS — G44.209 TENSION HEADACHE: ICD-10-CM

## 2020-10-10 DIAGNOSIS — C61 PROSTATE CANCER: ICD-10-CM

## 2020-10-10 DIAGNOSIS — M54.2 NECK PAIN: ICD-10-CM

## 2020-10-10 DIAGNOSIS — S16.1XXD STRAIN OF NECK MUSCLE, SUBSEQUENT ENCOUNTER: ICD-10-CM

## 2020-10-10 DIAGNOSIS — H93.8X3 IRRITATION OF BOTH EARS: ICD-10-CM

## 2020-10-10 PROCEDURE — 70553 MRI BRAIN W WO CONTRAST: ICD-10-PCS | Mod: 26,,, | Performed by: RADIOLOGY

## 2020-10-10 PROCEDURE — 70553 MRI BRAIN STEM W/O & W/DYE: CPT | Mod: 26,,, | Performed by: RADIOLOGY

## 2020-10-10 PROCEDURE — 70553 MRI BRAIN STEM W/O & W/DYE: CPT | Mod: TC

## 2020-10-10 PROCEDURE — 25500020 PHARM REV CODE 255: Performed by: PSYCHIATRY & NEUROLOGY

## 2020-10-10 PROCEDURE — A9585 GADOBUTROL INJECTION: HCPCS | Performed by: PSYCHIATRY & NEUROLOGY

## 2020-10-10 RX ORDER — GADOBUTROL 604.72 MG/ML
10 INJECTION INTRAVENOUS
Status: COMPLETED | OUTPATIENT
Start: 2020-10-10 | End: 2020-10-10

## 2020-10-10 RX ADMIN — GADOBUTROL 10 ML: 604.72 INJECTION INTRAVENOUS at 09:10

## 2020-10-12 ENCOUNTER — TELEPHONE (OUTPATIENT)
Dept: NEUROLOGY | Facility: CLINIC | Age: 55
End: 2020-10-12

## 2020-10-12 NOTE — TELEPHONE ENCOUNTER
Returned pt call , he stated he received his medication from the pharmacy wanted MRI results . Advised him that MD hasn't reviewed them will contact him once available . He verbalized understanding .

## 2020-10-12 NOTE — TELEPHONE ENCOUNTER
----- Message from Jamal Mares sent at 10/12/2020  9:47 AM CDT -----  Regarding: Test results  Type:  Test Results    Who Called:  Pt   Name of Test (Lab/Mammo/Etc):  brain MRI   Date of Test: 10/10/2020  Ordering Provider: leslie   Where the test was performed:  [facility]   Would the patient rather a call back or a response via [facility]? Call back   Best Call Back Number:  [phone] (home)   Additional Information: The patient is requesting further explanation due to not quite understanding the results clearly, please advise.

## 2020-10-12 NOTE — TELEPHONE ENCOUNTER
----- Message from Shanell Carrillo sent at 10/9/2020 12:53 PM CDT -----  Pt would like return call regarding question about medication. Please call back at [phone].  Md Yelena

## 2020-10-12 NOTE — PROGRESS NOTES
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

## 2020-10-14 ENCOUNTER — TELEPHONE (OUTPATIENT)
Dept: INTERNAL MEDICINE | Facility: CLINIC | Age: 55
End: 2020-10-14

## 2020-10-14 NOTE — TELEPHONE ENCOUNTER
----- Message from Sam Nino sent at 10/13/2020  1:17 PM CDT -----  Contact: Pt  Is calling to resched his nurse visit to the 15th in the afternooon and can be reached at [phone]//thanks/dbw

## 2020-10-15 ENCOUNTER — TELEPHONE (OUTPATIENT)
Dept: INTERNAL MEDICINE | Facility: CLINIC | Age: 55
End: 2020-10-15

## 2020-10-15 ENCOUNTER — CLINICAL SUPPORT (OUTPATIENT)
Dept: INTERNAL MEDICINE | Facility: CLINIC | Age: 55
End: 2020-10-15
Payer: COMMERCIAL

## 2020-10-15 PROCEDURE — 90471 PNEUMOCOCCAL CONJUGATE VACCINE 13-VALENT LESS THAN 5YO & GREATER THAN: ICD-10-PCS | Mod: S$GLB,,, | Performed by: FAMILY MEDICINE

## 2020-10-15 PROCEDURE — 90670 PNEUMOCOCCAL CONJUGATE VACCINE 13-VALENT LESS THAN 5YO & GREATER THAN: ICD-10-PCS | Mod: S$GLB,,, | Performed by: FAMILY MEDICINE

## 2020-10-15 PROCEDURE — 90670 PCV13 VACCINE IM: CPT | Mod: S$GLB,,, | Performed by: FAMILY MEDICINE

## 2020-10-15 PROCEDURE — 99999 PR PBB SHADOW E&M-EST. PATIENT-LVL II: ICD-10-PCS | Mod: PBBFAC,,,

## 2020-10-15 PROCEDURE — 99999 PR PBB SHADOW E&M-EST. PATIENT-LVL II: CPT | Mod: PBBFAC,,,

## 2020-10-15 PROCEDURE — 90471 IMMUNIZATION ADMIN: CPT | Mod: S$GLB,,, | Performed by: FAMILY MEDICINE

## 2020-10-15 NOTE — TELEPHONE ENCOUNTER
----- Message from Aleksandra Corona sent at 10/15/2020  1:25 PM CDT -----  Regarding: covid  Contact: patient  Patient wants a covid test but does not have any symptoms, please call him back at [phone]

## 2020-10-15 NOTE — PROGRESS NOTES
Patient here for his pneumonia 13 vaccine per Dr. Dick. Administered as per order and guidelines. See immunizations tab for more information.

## 2020-11-06 ENCOUNTER — TELEPHONE (OUTPATIENT)
Dept: NEUROLOGY | Facility: CLINIC | Age: 55
End: 2020-11-06

## 2020-11-06 NOTE — TELEPHONE ENCOUNTER
----- Message from Puja Ziegler sent at 11/6/2020 10:15 AM CST -----  Regarding: procedure r/s  Good morning,    Pt would like a call back in regards to getting procedure scheduled and can be reached at [phone]        Thanks,  Puja Ziegler

## 2020-11-06 NOTE — TELEPHONE ENCOUNTER
message was sent to the wrong office . [place] [facility] [place] [facility] [place] number and name given from care everywhere for pt .       Dereck Ledezma MD    [address] [facility]    [place] [address] [phone]

## 2020-12-08 ENCOUNTER — TELEPHONE (OUTPATIENT)
Dept: INTERNAL MEDICINE | Facility: CLINIC | Age: 55
End: 2020-12-08

## 2020-12-08 NOTE — TELEPHONE ENCOUNTER
----- Message from Sowmya Quezada sent at 12/8/2020  4:23 PM CST -----  Contact: Sachin Bond is calling in regards to getting blood type. Please call him back at [phone].      Thanks  DD

## 2020-12-08 NOTE — TELEPHONE ENCOUNTER
Informed pt that in order to tell his blood type, we have to draw a specific blood test. He stated that when he establishes care with a new provider, he will request it with his routine labs.

## 2020-12-08 NOTE — TELEPHONE ENCOUNTER
----- Message from Chantal Meredith sent at 12/8/2020 10:26 AM CST -----  Please call pt @ [phone], pt states he need a new list of PCP to choose from., pt misplace letter received.

## 2020-12-16 ENCOUNTER — TELEPHONE (OUTPATIENT)
Dept: RHEUMATOLOGY | Facility: CLINIC | Age: 55
End: 2020-12-16

## 2020-12-16 DIAGNOSIS — Z79.631 LONG TERM METHOTREXATE USER: ICD-10-CM

## 2020-12-16 DIAGNOSIS — M05.79 RHEUMATOID ARTHRITIS INVOLVING MULTIPLE SITES WITH POSITIVE RHEUMATOID FACTOR: Primary | ICD-10-CM

## 2020-12-17 ENCOUNTER — LAB VISIT (OUTPATIENT)
Dept: LAB | Facility: HOSPITAL | Age: 55
End: 2020-12-17
Attending: FAMILY MEDICINE
Payer: COMMERCIAL

## 2020-12-17 ENCOUNTER — OFFICE VISIT (OUTPATIENT)
Dept: INTERNAL MEDICINE | Facility: CLINIC | Age: 55
End: 2020-12-17
Payer: COMMERCIAL

## 2020-12-17 VITALS
HEIGHT: 78 IN | OXYGEN SATURATION: 99 % | BODY MASS INDEX: 27.34 KG/M2 | HEART RATE: 91 BPM | SYSTOLIC BLOOD PRESSURE: 108 MMHG | WEIGHT: 236.31 LBS | DIASTOLIC BLOOD PRESSURE: 78 MMHG | TEMPERATURE: 98 F

## 2020-12-17 DIAGNOSIS — E78.2 MIXED HYPERLIPIDEMIA: ICD-10-CM

## 2020-12-17 DIAGNOSIS — M06.9 RHEUMATOID ARTHRITIS OF HAND, UNSPECIFIED LATERALITY, UNSPECIFIED WHETHER RHEUMATOID FACTOR PRESENT: ICD-10-CM

## 2020-12-17 DIAGNOSIS — Z90.79 STATUS POST PROSTATECTOMY: ICD-10-CM

## 2020-12-17 DIAGNOSIS — N52.31 ERECTILE DYSFUNCTION FOLLOWING RADICAL PROSTATECTOMY: ICD-10-CM

## 2020-12-17 DIAGNOSIS — Z00.00 ANNUAL PHYSICAL EXAM: Primary | ICD-10-CM

## 2020-12-17 DIAGNOSIS — Z79.60 LONG-TERM USE OF IMMUNOSUPPRESSANT MEDICATION: ICD-10-CM

## 2020-12-17 DIAGNOSIS — N48.6 PEYRONIE'S DISEASE: ICD-10-CM

## 2020-12-17 DIAGNOSIS — K21.9 GASTROESOPHAGEAL REFLUX DISEASE WITHOUT ESOPHAGITIS: ICD-10-CM

## 2020-12-17 DIAGNOSIS — Z85.46 HISTORY OF PROSTATE CANCER: ICD-10-CM

## 2020-12-17 DIAGNOSIS — I10 ESSENTIAL HYPERTENSION: ICD-10-CM

## 2020-12-17 PROBLEM — R20.2 NUMBNESS AND TINGLING OF BOTH LOWER EXTREMITIES: Status: RESOLVED | Noted: 2018-05-14 | Resolved: 2020-12-17

## 2020-12-17 PROBLEM — R22.43 LOCALIZED SWELLING, MASS, OR LUMP OF LOWER EXTREMITY, BILATERAL: Status: RESOLVED | Noted: 2018-05-14 | Resolved: 2020-12-17

## 2020-12-17 PROBLEM — H93.8X3 IRRITATION OF BOTH EARS: Status: RESOLVED | Noted: 2017-09-22 | Resolved: 2020-12-17

## 2020-12-17 PROBLEM — R20.0 NUMBNESS AND TINGLING OF BOTH LOWER EXTREMITIES: Status: RESOLVED | Noted: 2018-05-14 | Resolved: 2020-12-17

## 2020-12-17 PROBLEM — R07.89 CHEST PAIN, ATYPICAL: Status: RESOLVED | Noted: 2020-05-13 | Resolved: 2020-12-17

## 2020-12-17 PROBLEM — M79.671 RIGHT FOOT PAIN: Status: RESOLVED | Noted: 2017-09-22 | Resolved: 2020-12-17

## 2020-12-17 PROBLEM — M79.18 MYOFASCIAL PAIN: Status: RESOLVED | Noted: 2017-05-10 | Resolved: 2020-12-17

## 2020-12-17 PROBLEM — K62.5 BRBPR (BRIGHT RED BLOOD PER RECTUM): Status: RESOLVED | Noted: 2018-05-14 | Resolved: 2020-12-17

## 2020-12-17 PROBLEM — S16.1XXA NECK STRAIN: Status: RESOLVED | Noted: 2017-05-01 | Resolved: 2020-12-17

## 2020-12-17 PROBLEM — R09.81 SINUS CONGESTION: Status: RESOLVED | Noted: 2020-03-11 | Resolved: 2020-12-17

## 2020-12-17 PROBLEM — K11.20 PAROTIDITIS: Status: RESOLVED | Noted: 2017-08-08 | Resolved: 2020-12-17

## 2020-12-17 PROBLEM — Z01.810 PREOP CARDIOVASCULAR EXAM: Status: RESOLVED | Noted: 2020-05-13 | Resolved: 2020-12-17

## 2020-12-17 LAB
CHOLEST SERPL-MCNC: 192 MG/DL (ref 120–199)
CHOLEST/HDLC SERPL: 4.4 {RATIO} (ref 2–5)
HDLC SERPL-MCNC: 44 MG/DL (ref 40–75)
HDLC SERPL: 22.9 % (ref 20–50)
LDLC SERPL CALC-MCNC: 125 MG/DL (ref 63–159)
NONHDLC SERPL-MCNC: 148 MG/DL
TRIGL SERPL-MCNC: 115 MG/DL (ref 30–150)

## 2020-12-17 PROCEDURE — 99396 PR PREVENTIVE VISIT,EST,40-64: ICD-10-PCS | Mod: S$GLB,,, | Performed by: FAMILY MEDICINE

## 2020-12-17 PROCEDURE — 3008F BODY MASS INDEX DOCD: CPT | Mod: CPTII,S$GLB,, | Performed by: FAMILY MEDICINE

## 2020-12-17 PROCEDURE — 99396 PREV VISIT EST AGE 40-64: CPT | Mod: S$GLB,,, | Performed by: FAMILY MEDICINE

## 2020-12-17 PROCEDURE — 99999 PR PBB SHADOW E&M-EST. PATIENT-LVL IV: ICD-10-PCS | Mod: PBBFAC,,, | Performed by: FAMILY MEDICINE

## 2020-12-17 PROCEDURE — 3008F PR BODY MASS INDEX (BMI) DOCUMENTED: ICD-10-PCS | Mod: CPTII,S$GLB,, | Performed by: FAMILY MEDICINE

## 2020-12-17 PROCEDURE — 80061 LIPID PANEL: CPT

## 2020-12-17 PROCEDURE — 3074F PR MOST RECENT SYSTOLIC BLOOD PRESSURE < 130 MM HG: ICD-10-PCS | Mod: CPTII,S$GLB,, | Performed by: FAMILY MEDICINE

## 2020-12-17 PROCEDURE — 36415 COLL VENOUS BLD VENIPUNCTURE: CPT

## 2020-12-17 PROCEDURE — 1126F AMNT PAIN NOTED NONE PRSNT: CPT | Mod: S$GLB,,, | Performed by: FAMILY MEDICINE

## 2020-12-17 PROCEDURE — 3074F SYST BP LT 130 MM HG: CPT | Mod: CPTII,S$GLB,, | Performed by: FAMILY MEDICINE

## 2020-12-17 PROCEDURE — 3078F PR MOST RECENT DIASTOLIC BLOOD PRESSURE < 80 MM HG: ICD-10-PCS | Mod: CPTII,S$GLB,, | Performed by: FAMILY MEDICINE

## 2020-12-17 PROCEDURE — 1126F PR PAIN SEVERITY QUANTIFIED, NO PAIN PRESENT: ICD-10-PCS | Mod: S$GLB,,, | Performed by: FAMILY MEDICINE

## 2020-12-17 PROCEDURE — 99999 PR PBB SHADOW E&M-EST. PATIENT-LVL IV: CPT | Mod: PBBFAC,,, | Performed by: FAMILY MEDICINE

## 2020-12-17 PROCEDURE — 3078F DIAST BP <80 MM HG: CPT | Mod: CPTII,S$GLB,, | Performed by: FAMILY MEDICINE

## 2020-12-17 RX ORDER — FAMOTIDINE 40 MG/1
40 TABLET, FILM COATED ORAL DAILY
Qty: 90 TABLET | Refills: 0 | Status: SHIPPED | OUTPATIENT
Start: 2020-12-17 | End: 2021-09-27

## 2020-12-17 RX ORDER — HYDROCHLOROTHIAZIDE 12.5 MG/1
12.5 TABLET ORAL DAILY
Qty: 90 TABLET | Refills: 3 | Status: SHIPPED | OUTPATIENT
Start: 2020-12-17 | End: 2022-05-10

## 2020-12-17 RX ORDER — IBUPROFEN 800 MG/1
800 TABLET ORAL 2 TIMES DAILY
COMMUNITY
Start: 2020-12-09 | End: 2021-12-08 | Stop reason: SDUPTHER

## 2020-12-17 NOTE — TELEPHONE ENCOUNTER
----- Message from Franca Garcia RN sent at 12/16/2020  6:04 PM CST -----  Please put lab orders in epic, and xray orders if needed.

## 2020-12-17 NOTE — PROGRESS NOTES
Subjective:   Patient ID: Sachin Gonzalez is a 55 y.o. male.  Chief Complaint:  Annual Exam    Previous PCP Dr. Dick.    Presents for annual physical exam     Lab done July 2020 showed:  CBC with low white blood cell count and red blood cell count consistent with immunosuppressant therapy.  CMP with normal glucose, kidney, liver, electrolytes.    PSA less than 0.01   No lipid panel was done.    Medical history for:  - Hypertension.  Controlled HCTZ 12.5 mg daily and Toprol-XL 50 mg daily.  Reports compliance.  Denies side effects.  No significant swelling or shortness of breath.    - Mixed hyperlipidemia.  Ten year cardiovascular risk approximately 10%.  Unclear why not on statin.  No chest pain or claudication.    - Rheumatoid arthritis.  Followed by Rheumatology.  On units Bobby therapy.  Pain controlled.  - History prostate cancer status post prostatectomy with Peyronie's disease and erectile dysfunction.  Followed by urology.   - GERD.  Previously controlled on PPI.  However does not want to take PPIs chronically.  Questions what else he can take.  Presently over-the-counter Tums, Maalox, Mylanta is needed is not effective.    - Headaches.  Evaluated by Neurology.  Stable.    - Issue with parotid gland.  Evaluated by ENT.  Postprocedure.  Stable.     Health maintenance needs include:  Shingles vaccine  Pneumovax 23, received Prevnar 13 in October 2020.    No specific complaints or concerns today.      Current Outpatient Medications:     ascorbic acid, vitamin C, (VITAMIN C) 250 MG tablet, Take 250 mg by mouth once daily., Disp: , Rfl:     folic acid (FOLVITE) 1 MG tablet, Take 1 tablet (1,000 mcg total) by mouth once daily., Disp: 100 tablet, Rfl: 2    hydroCHLOROthiazide (HYDRODIURIL) 12.5 MG Tab, Take 1 tablet (12.5 mg total) by mouth once daily., Disp: 90 tablet, Rfl: 3    methotrexate 2.5 MG Tab, TAKE 10 TABLETS EVERY 7 DAYS (Patient taking differently: Take 25 mg by mouth every 7 days. Last dose was  "5/1/20), Disp: 150 tablet, Rfl: 4    metoprolol succinate (TOPROL-XL) 50 MG 24 hr tablet, TAKE 1 TABLET TWICE A DAY. HOLD IF SYSTOLIC BLOOD PRESSURE LESS THAN OR EQUAL  OR HEART RATE IS LESS THAN 60, Disp: 180 tablet, Rfl: 3    zinc sulfate (ZINC-220 ORAL), Take by mouth., Disp: , Rfl:     famotidine (PEPCID) 40 MG tablet, Take 1 tablet (40 mg total) by mouth once daily., Disp: 90 tablet, Rfl: 0    ibuprofen (ADVIL,MOTRIN) 800 MG tablet, Take 800 mg by mouth 2 (two) times daily., Disp: , Rfl:     magnesium 30 mg Tab, Take by mouth every evening., Disp: , Rfl:     Review of Systems   Constitutional: Negative for chills, diaphoresis, fatigue and fever.   HENT: Negative for congestion, dental problem, ear discharge, ear pain, postnasal drip, rhinorrhea, sinus pressure, sinus pain, sore throat and trouble swallowing.    Eyes: Negative for pain, redness and visual disturbance.   Respiratory: Negative for cough, chest tightness, shortness of breath and wheezing.    Cardiovascular: Negative for chest pain, palpitations and leg swelling.   Gastrointestinal: Negative for abdominal pain, constipation, diarrhea, nausea and vomiting.   Endocrine: Negative for cold intolerance, heat intolerance, polydipsia, polyphagia and polyuria.   Genitourinary: Negative for difficulty urinating, dysuria, flank pain, frequency, hematuria and urgency.   Musculoskeletal: Positive for arthralgias, back pain, myalgias and neck pain.   Skin: Negative for rash.   Neurological: Negative for dizziness, tremors, syncope, weakness, light-headedness, numbness and headaches.   Hematological: Negative for adenopathy. Does not bruise/bleed easily.   Psychiatric/Behavioral: Negative for sleep disturbance. The patient is not nervous/anxious.      Objective:   /78 (BP Location: Left arm, Patient Position: Sitting, BP Method: Medium (Manual))   Pulse 91   Temp 98 °F (36.7 °C) (Tympanic)   Ht 6' 7" (2.007 m)   Wt 107.2 kg (236 lb 5.3 oz)   " SpO2 99%   BMI 26.62 kg/m²     Physical Exam  Vitals signs and nursing note reviewed.   Constitutional:       General: He is not in acute distress.     Appearance: Normal appearance. He is well-developed and normal weight.   HENT:      Right Ear: Hearing, tympanic membrane, ear canal and external ear normal.      Left Ear: Hearing, tympanic membrane, ear canal and external ear normal.      Nose: Nose normal. No mucosal edema, congestion or rhinorrhea.      Right Turbinates: Not enlarged or swollen.      Left Turbinates: Not enlarged or swollen.      Right Sinus: No maxillary sinus tenderness or frontal sinus tenderness.      Left Sinus: No maxillary sinus tenderness or frontal sinus tenderness.      Mouth/Throat:      Mouth: No oral lesions.      Pharynx: Oropharynx is clear. Uvula midline.      Tonsils: No tonsillar exudate.   Eyes:      General: No scleral icterus.        Right eye: No discharge.         Left eye: No discharge.      Conjunctiva/sclera: Conjunctivae normal.      Right eye: Right conjunctiva is not injected.      Left eye: Left conjunctiva is not injected.   Neck:      Thyroid: No thyroid mass, thyromegaly or thyroid tenderness.      Vascular: No carotid bruit or JVD.   Cardiovascular:      Rate and Rhythm: Normal rate and regular rhythm.      Pulses:           Radial pulses are 2+ on the right side and 2+ on the left side.      Heart sounds: Normal heart sounds. No murmur. No friction rub. No gallop.    Pulmonary:      Effort: Pulmonary effort is normal. No accessory muscle usage or respiratory distress.      Breath sounds: Normal breath sounds. No wheezing, rhonchi or rales.   Abdominal:      General: There is no distension.      Palpations: Abdomen is soft.      Tenderness: There is no abdominal tenderness. There is no guarding or rebound.   Musculoskeletal:      Right lower leg: No edema.      Left lower leg: No edema.   Lymphadenopathy:      Cervical: No cervical adenopathy.   Skin:      General: Skin is warm and dry.      Findings: No rash.   Neurological:      Mental Status: He is alert and oriented to person, place, and time.      Coordination: Coordination is intact. Coordination normal.      Gait: Gait is intact. Gait normal.   Psychiatric:         Attention and Perception: Attention and perception normal.         Mood and Affect: Mood and affect normal.         Speech: Speech normal.         Behavior: Behavior normal.         Thought Content: Thought content normal.         Cognition and Memory: Cognition and memory normal.         Judgment: Judgment normal.       Assessment:       ICD-10-CM ICD-9-CM   1. Annual physical exam  Z00.00 V70.0   2. Essential hypertension  I10 401.9   3. Mixed hyperlipidemia  E78.2 272.2   4. Gastroesophageal reflux disease without esophagitis  K21.9 530.81   5. Rheumatoid arthritis of hand, unspecified laterality, unspecified whether rheumatoid factor present  M06.9 714.0   6. Long-term use of immunosuppressant medication  Z79.899 V58.69   7. History of prostate cancer  Z85.46 V10.46   8. Status post prostatectomy (06/07/12)  Z90.79 V45.89   9. Erectile dysfunction following radical prostatectomy  N52.31 607.84   10. Peyronie's disease  N48.6 607.85     Plan:   Annual physical exam  Blood pressure normal.  BMI 27.  Overall exam stable.    Labs up-to-date except lipid panel.    Tetanus booster and flu vaccine up-to-date  Shingles vaccine through pharmacy, okay even though immunosuppressed   Pneumovax 23 and next visit     Essential hypertension  -     hydroCHLOROthiazide (HYDRODIURIL) 12.5 MG Tab; Take 1 tablet (12.5 mg total) by mouth once daily.  Dispense: 90 tablet; Refill: 3  Controlled.  BP at goal.  Asymptomatic.    Continue HCTZ 12.5 mg daily   Continue Toprol-XL 50 mg daily.      Mixed hyperlipidemia  -     Lipid Panel; Future; Expected date: 12/17/2020  Discussed potential need for statin if 10 year cardiovascular risk greater than 10%    Gastroesophageal  reflux disease without esophagitis  -     famotidine (PEPCID) 40 MG tablet; Take 1 tablet (40 mg total) by mouth once daily.  Dispense: 90 tablet; Refill: 0  Trial of Pepcid since prefers not to take long-term PPI     Rheumatoid arthritis of hand, unspecified laterality, unspecified whether rheumatoid factor present  Long-term use of immunosuppressant medication  Continue per rheumatology     History of prostate cancer  Status post prostatectomy (06/07/12)  Erectile dysfunction following radical prostatectomy  Peyronie's disease  Continue per urology     Return to clinic 6 months or sooner as needed.

## 2020-12-21 ENCOUNTER — PATIENT OUTREACH (OUTPATIENT)
Dept: ADMINISTRATIVE | Facility: OTHER | Age: 55
End: 2020-12-21

## 2020-12-22 ENCOUNTER — LAB VISIT (OUTPATIENT)
Dept: LAB | Facility: HOSPITAL | Age: 55
End: 2020-12-22
Attending: INTERNAL MEDICINE
Payer: COMMERCIAL

## 2020-12-22 ENCOUNTER — OFFICE VISIT (OUTPATIENT)
Dept: RHEUMATOLOGY | Facility: CLINIC | Age: 55
End: 2020-12-22
Payer: COMMERCIAL

## 2020-12-22 VITALS
WEIGHT: 240.75 LBS | SYSTOLIC BLOOD PRESSURE: 114 MMHG | HEIGHT: 78 IN | HEART RATE: 82 BPM | DIASTOLIC BLOOD PRESSURE: 77 MMHG | BODY MASS INDEX: 27.85 KG/M2

## 2020-12-22 DIAGNOSIS — Z79.631 LONG TERM METHOTREXATE USER: ICD-10-CM

## 2020-12-22 DIAGNOSIS — M15.9 GENERALIZED OSTEOARTHRITIS OF MULTIPLE SITES: ICD-10-CM

## 2020-12-22 DIAGNOSIS — Z55.9 EDUCATIONAL CIRCUMSTANCE: ICD-10-CM

## 2020-12-22 DIAGNOSIS — M65.331 TRIGGER MIDDLE FINGER OF RIGHT HAND: ICD-10-CM

## 2020-12-22 DIAGNOSIS — M05.79 RHEUMATOID ARTHRITIS INVOLVING MULTIPLE SITES WITH POSITIVE RHEUMATOID FACTOR: ICD-10-CM

## 2020-12-22 DIAGNOSIS — D64.9 ANEMIA, UNSPECIFIED TYPE: ICD-10-CM

## 2020-12-22 DIAGNOSIS — M25.50 PAIN IN JOINT INVOLVING MULTIPLE SITES: ICD-10-CM

## 2020-12-22 DIAGNOSIS — M05.79 RHEUMATOID ARTHRITIS INVOLVING MULTIPLE SITES WITH POSITIVE RHEUMATOID FACTOR: Primary | ICD-10-CM

## 2020-12-22 LAB
ALBUMIN SERPL BCP-MCNC: 3.6 G/DL (ref 3.5–5.2)
ALP SERPL-CCNC: 90 U/L (ref 55–135)
ALT SERPL W/O P-5'-P-CCNC: 41 U/L (ref 10–44)
ANION GAP SERPL CALC-SCNC: 6 MMOL/L (ref 8–16)
AST SERPL-CCNC: 39 U/L (ref 10–40)
BASOPHILS # BLD AUTO: 0.04 K/UL (ref 0–0.2)
BASOPHILS NFR BLD: 0.8 % (ref 0–1.9)
BILIRUB SERPL-MCNC: 0.3 MG/DL (ref 0.1–1)
BUN SERPL-MCNC: 21 MG/DL (ref 6–20)
CALCIUM SERPL-MCNC: 9.1 MG/DL (ref 8.7–10.5)
CCP AB SER IA-ACNC: 68.1 U/ML
CHLORIDE SERPL-SCNC: 107 MMOL/L (ref 95–110)
CO2 SERPL-SCNC: 30 MMOL/L (ref 23–29)
CREAT SERPL-MCNC: 1.1 MG/DL (ref 0.5–1.4)
CRP SERPL-MCNC: 3.7 MG/L (ref 0–8.2)
DIFFERENTIAL METHOD: ABNORMAL
EOSINOPHIL # BLD AUTO: 0.2 K/UL (ref 0–0.5)
EOSINOPHIL NFR BLD: 4 % (ref 0–8)
ERYTHROCYTE [DISTWIDTH] IN BLOOD BY AUTOMATED COUNT: 14 % (ref 11.5–14.5)
ERYTHROCYTE [SEDIMENTATION RATE] IN BLOOD BY WESTERGREN METHOD: 16 MM/HR (ref 0–23)
EST. GFR  (AFRICAN AMERICAN): >60 ML/MIN/1.73 M^2
EST. GFR  (NON AFRICAN AMERICAN): >60 ML/MIN/1.73 M^2
GLUCOSE SERPL-MCNC: 101 MG/DL (ref 70–110)
HCT VFR BLD AUTO: 41.2 % (ref 40–54)
HGB BLD-MCNC: 13.3 G/DL (ref 14–18)
IMM GRANULOCYTES # BLD AUTO: 0.01 K/UL (ref 0–0.04)
IMM GRANULOCYTES NFR BLD AUTO: 0.2 % (ref 0–0.5)
LYMPHOCYTES # BLD AUTO: 1.2 K/UL (ref 1–4.8)
LYMPHOCYTES NFR BLD: 24.7 % (ref 18–48)
MCH RBC QN AUTO: 32.6 PG (ref 27–31)
MCHC RBC AUTO-ENTMCNC: 32.3 G/DL (ref 32–36)
MCV RBC AUTO: 101 FL (ref 82–98)
MONOCYTES # BLD AUTO: 0.5 K/UL (ref 0.3–1)
MONOCYTES NFR BLD: 10.3 % (ref 4–15)
NEUTROPHILS # BLD AUTO: 2.9 K/UL (ref 1.8–7.7)
NEUTROPHILS NFR BLD: 60 % (ref 38–73)
NRBC BLD-RTO: 0 /100 WBC
PLATELET # BLD AUTO: 233 K/UL (ref 150–350)
PMV BLD AUTO: 9.6 FL (ref 9.2–12.9)
POTASSIUM SERPL-SCNC: 4.1 MMOL/L (ref 3.5–5.1)
PROT SERPL-MCNC: 7.2 G/DL (ref 6–8.4)
RBC # BLD AUTO: 4.08 M/UL (ref 4.6–6.2)
RHEUMATOID FACT SERPL-ACNC: 13 IU/ML (ref 0–15)
SODIUM SERPL-SCNC: 143 MMOL/L (ref 136–145)
WBC # BLD AUTO: 4.78 K/UL (ref 3.9–12.7)

## 2020-12-22 PROCEDURE — 85025 COMPLETE CBC W/AUTO DIFF WBC: CPT

## 2020-12-22 PROCEDURE — 99999 PR PBB SHADOW E&M-EST. PATIENT-LVL IV: CPT | Mod: PBBFAC,,, | Performed by: INTERNAL MEDICINE

## 2020-12-22 PROCEDURE — 1125F PR PAIN SEVERITY QUANTIFIED, PAIN PRESENT: ICD-10-PCS | Mod: S$GLB,,, | Performed by: INTERNAL MEDICINE

## 2020-12-22 PROCEDURE — 86431 RHEUMATOID FACTOR QUANT: CPT

## 2020-12-22 PROCEDURE — 80053 COMPREHEN METABOLIC PANEL: CPT

## 2020-12-22 PROCEDURE — 3078F DIAST BP <80 MM HG: CPT | Mod: CPTII,S$GLB,, | Performed by: INTERNAL MEDICINE

## 2020-12-22 PROCEDURE — 99999 PR PBB SHADOW E&M-EST. PATIENT-LVL IV: ICD-10-PCS | Mod: PBBFAC,,, | Performed by: INTERNAL MEDICINE

## 2020-12-22 PROCEDURE — 3008F BODY MASS INDEX DOCD: CPT | Mod: CPTII,S$GLB,, | Performed by: INTERNAL MEDICINE

## 2020-12-22 PROCEDURE — 1125F AMNT PAIN NOTED PAIN PRSNT: CPT | Mod: S$GLB,,, | Performed by: INTERNAL MEDICINE

## 2020-12-22 PROCEDURE — 99214 PR OFFICE/OUTPT VISIT, EST, LEVL IV, 30-39 MIN: ICD-10-PCS | Mod: S$GLB,,, | Performed by: INTERNAL MEDICINE

## 2020-12-22 PROCEDURE — 86200 CCP ANTIBODY: CPT

## 2020-12-22 PROCEDURE — 3008F PR BODY MASS INDEX (BMI) DOCUMENTED: ICD-10-PCS | Mod: CPTII,S$GLB,, | Performed by: INTERNAL MEDICINE

## 2020-12-22 PROCEDURE — 3078F PR MOST RECENT DIASTOLIC BLOOD PRESSURE < 80 MM HG: ICD-10-PCS | Mod: CPTII,S$GLB,, | Performed by: INTERNAL MEDICINE

## 2020-12-22 PROCEDURE — 3074F PR MOST RECENT SYSTOLIC BLOOD PRESSURE < 130 MM HG: ICD-10-PCS | Mod: CPTII,S$GLB,, | Performed by: INTERNAL MEDICINE

## 2020-12-22 PROCEDURE — 3074F SYST BP LT 130 MM HG: CPT | Mod: CPTII,S$GLB,, | Performed by: INTERNAL MEDICINE

## 2020-12-22 PROCEDURE — 36415 COLL VENOUS BLD VENIPUNCTURE: CPT

## 2020-12-22 PROCEDURE — 86140 C-REACTIVE PROTEIN: CPT

## 2020-12-22 PROCEDURE — 85652 RBC SED RATE AUTOMATED: CPT

## 2020-12-22 PROCEDURE — 99214 OFFICE O/P EST MOD 30 MIN: CPT | Mod: S$GLB,,, | Performed by: INTERNAL MEDICINE

## 2020-12-22 SDOH — SOCIAL DETERMINANTS OF HEALTH (SDOH): PROBLEMS RELATED TO EDUCATION AND LITERACY, UNSPECIFIED: Z55.9

## 2020-12-22 ASSESSMENT — ROUTINE ASSESSMENT OF PATIENT INDEX DATA (RAPID3)
TOTAL RAPID3 SCORE: 1.89
FATIGUE SCORE: 1.5
PAIN SCORE: 2.5
AM STIFFNESS SCORE: 1, YES
PSYCHOLOGICAL DISTRESS SCORE: 1.1
MDHAQ FUNCTION SCORE: 0.2
PATIENT GLOBAL ASSESSMENT SCORE: 2.5

## 2020-12-22 NOTE — PATIENT INSTRUCTIONS
Try voltaren gel (generic is diclofenac gel).    You can take up to 3,000 mg of acetaminophen/day + 2 ES 3 x/day.    Acetaminophen is safer than ibuprofen    You may want to try a good brand of Glucosamine/Chondroitin (Cosamin ASU or Osteobiflex)    You can also try MSM 1200 mg/day    Daily exercise as discussed.
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
## 2020-12-24 PROBLEM — E78.2 MIXED HYPERLIPIDEMIA: Chronic | Status: ACTIVE | Noted: 2020-05-13

## 2021-02-23 ENCOUNTER — PATIENT MESSAGE (OUTPATIENT)
Dept: RHEUMATOLOGY | Facility: CLINIC | Age: 56
End: 2021-02-23

## 2021-03-10 ENCOUNTER — OFFICE VISIT (OUTPATIENT)
Dept: UROLOGY | Facility: CLINIC | Age: 56
End: 2021-03-10
Payer: COMMERCIAL

## 2021-03-10 ENCOUNTER — LAB VISIT (OUTPATIENT)
Dept: LAB | Facility: HOSPITAL | Age: 56
End: 2021-03-10
Attending: UROLOGY
Payer: COMMERCIAL

## 2021-03-10 DIAGNOSIS — R31.9 HEMATURIA, UNSPECIFIED TYPE: ICD-10-CM

## 2021-03-10 DIAGNOSIS — R31.9 HEMATURIA, UNSPECIFIED TYPE: Primary | ICD-10-CM

## 2021-03-10 LAB
CREAT SERPL-MCNC: 1.3 MG/DL (ref 0.5–1.4)
EST. GFR  (AFRICAN AMERICAN): >60 ML/MIN/1.73 M^2
EST. GFR  (NON AFRICAN AMERICAN): >60 ML/MIN/1.73 M^2

## 2021-03-10 PROCEDURE — 99204 PR OFFICE/OUTPT VISIT, NEW, LEVL IV, 45-59 MIN: ICD-10-PCS | Mod: S$GLB,,, | Performed by: UROLOGY

## 2021-03-10 PROCEDURE — 36415 COLL VENOUS BLD VENIPUNCTURE: CPT | Performed by: UROLOGY

## 2021-03-10 PROCEDURE — 82565 ASSAY OF CREATININE: CPT | Performed by: UROLOGY

## 2021-03-10 PROCEDURE — 87086 URINE CULTURE/COLONY COUNT: CPT | Performed by: UROLOGY

## 2021-03-10 PROCEDURE — 99204 OFFICE O/P NEW MOD 45 MIN: CPT | Mod: S$GLB,,, | Performed by: UROLOGY

## 2021-03-10 PROCEDURE — 81001 URINALYSIS AUTO W/SCOPE: CPT | Performed by: UROLOGY

## 2021-03-11 LAB
MICROSCOPIC COMMENT: NORMAL
WBC #/AREA URNS AUTO: 1 /HPF (ref 0–5)

## 2021-03-12 ENCOUNTER — TELEPHONE (OUTPATIENT)
Dept: UROLOGY | Facility: CLINIC | Age: 56
End: 2021-03-12

## 2021-03-12 LAB — BACTERIA UR CULT: NO GROWTH

## 2021-03-16 ENCOUNTER — OFFICE VISIT (OUTPATIENT)
Dept: GASTROENTEROLOGY | Facility: CLINIC | Age: 56
End: 2021-03-16
Payer: COMMERCIAL

## 2021-03-16 ENCOUNTER — LAB VISIT (OUTPATIENT)
Dept: LAB | Facility: HOSPITAL | Age: 56
End: 2021-03-16
Attending: INTERNAL MEDICINE
Payer: COMMERCIAL

## 2021-03-16 VITALS
BODY MASS INDEX: 26.94 KG/M2 | WEIGHT: 232.81 LBS | HEART RATE: 88 BPM | SYSTOLIC BLOOD PRESSURE: 111 MMHG | DIASTOLIC BLOOD PRESSURE: 70 MMHG | HEIGHT: 78 IN

## 2021-03-16 DIAGNOSIS — K21.9 GASTROESOPHAGEAL REFLUX DISEASE, UNSPECIFIED WHETHER ESOPHAGITIS PRESENT: ICD-10-CM

## 2021-03-16 DIAGNOSIS — K92.1 MELENA: ICD-10-CM

## 2021-03-16 DIAGNOSIS — K29.60 BILE REFLUX GASTRITIS: Primary | ICD-10-CM

## 2021-03-16 LAB
ANION GAP SERPL CALC-SCNC: 9 MMOL/L (ref 8–16)
BASOPHILS # BLD AUTO: 0.04 K/UL (ref 0–0.2)
BASOPHILS NFR BLD: 0.8 % (ref 0–1.9)
BUN SERPL-MCNC: 20 MG/DL (ref 6–20)
CALCIUM SERPL-MCNC: 8.7 MG/DL (ref 8.7–10.5)
CHLORIDE SERPL-SCNC: 104 MMOL/L (ref 95–110)
CO2 SERPL-SCNC: 27 MMOL/L (ref 23–29)
CREAT SERPL-MCNC: 1.2 MG/DL (ref 0.5–1.4)
DIFFERENTIAL METHOD: ABNORMAL
EOSINOPHIL # BLD AUTO: 0.2 K/UL (ref 0–0.5)
EOSINOPHIL NFR BLD: 3.2 % (ref 0–8)
ERYTHROCYTE [DISTWIDTH] IN BLOOD BY AUTOMATED COUNT: 13.3 % (ref 11.5–14.5)
EST. GFR  (AFRICAN AMERICAN): >60 ML/MIN/1.73 M^2
EST. GFR  (NON AFRICAN AMERICAN): >60 ML/MIN/1.73 M^2
GLUCOSE SERPL-MCNC: 83 MG/DL (ref 70–110)
HCT VFR BLD AUTO: 39.4 % (ref 40–54)
HGB BLD-MCNC: 13.1 G/DL (ref 14–18)
IMM GRANULOCYTES # BLD AUTO: 0.02 K/UL (ref 0–0.04)
IMM GRANULOCYTES NFR BLD AUTO: 0.4 % (ref 0–0.5)
LYMPHOCYTES # BLD AUTO: 1.2 K/UL (ref 1–4.8)
LYMPHOCYTES NFR BLD: 23.9 % (ref 18–48)
MCH RBC QN AUTO: 32.3 PG (ref 27–31)
MCHC RBC AUTO-ENTMCNC: 33.2 G/DL (ref 32–36)
MCV RBC AUTO: 97 FL (ref 82–98)
MONOCYTES # BLD AUTO: 0.7 K/UL (ref 0.3–1)
MONOCYTES NFR BLD: 13.7 % (ref 4–15)
NEUTROPHILS # BLD AUTO: 2.9 K/UL (ref 1.8–7.7)
NEUTROPHILS NFR BLD: 58 % (ref 38–73)
NRBC BLD-RTO: 0 /100 WBC
PLATELET # BLD AUTO: 262 K/UL (ref 150–350)
PMV BLD AUTO: 9.3 FL (ref 9.2–12.9)
POTASSIUM SERPL-SCNC: 4.1 MMOL/L (ref 3.5–5.1)
RBC # BLD AUTO: 4.05 M/UL (ref 4.6–6.2)
SODIUM SERPL-SCNC: 140 MMOL/L (ref 136–145)
WBC # BLD AUTO: 4.97 K/UL (ref 3.9–12.7)

## 2021-03-16 PROCEDURE — 1126F AMNT PAIN NOTED NONE PRSNT: CPT | Mod: S$GLB,,, | Performed by: INTERNAL MEDICINE

## 2021-03-16 PROCEDURE — 99999 PR PBB SHADOW E&M-EST. PATIENT-LVL III: ICD-10-PCS | Mod: PBBFAC,,, | Performed by: INTERNAL MEDICINE

## 2021-03-16 PROCEDURE — 99999 PR PBB SHADOW E&M-EST. PATIENT-LVL III: CPT | Mod: PBBFAC,,, | Performed by: INTERNAL MEDICINE

## 2021-03-16 PROCEDURE — 99214 PR OFFICE/OUTPT VISIT, EST, LEVL IV, 30-39 MIN: ICD-10-PCS | Mod: S$GLB,,, | Performed by: INTERNAL MEDICINE

## 2021-03-16 PROCEDURE — 1126F PR PAIN SEVERITY QUANTIFIED, NO PAIN PRESENT: ICD-10-PCS | Mod: S$GLB,,, | Performed by: INTERNAL MEDICINE

## 2021-03-16 PROCEDURE — 99214 OFFICE O/P EST MOD 30 MIN: CPT | Mod: S$GLB,,, | Performed by: INTERNAL MEDICINE

## 2021-03-16 PROCEDURE — 3008F BODY MASS INDEX DOCD: CPT | Mod: CPTII,S$GLB,, | Performed by: INTERNAL MEDICINE

## 2021-03-16 PROCEDURE — 3008F PR BODY MASS INDEX (BMI) DOCUMENTED: ICD-10-PCS | Mod: CPTII,S$GLB,, | Performed by: INTERNAL MEDICINE

## 2021-03-16 PROCEDURE — 85025 COMPLETE CBC W/AUTO DIFF WBC: CPT | Performed by: INTERNAL MEDICINE

## 2021-03-16 PROCEDURE — 36415 COLL VENOUS BLD VENIPUNCTURE: CPT | Performed by: INTERNAL MEDICINE

## 2021-03-16 PROCEDURE — 80048 BASIC METABOLIC PNL TOTAL CA: CPT | Performed by: INTERNAL MEDICINE

## 2021-03-16 RX ORDER — PANTOPRAZOLE SODIUM 40 MG/1
40 TABLET, DELAYED RELEASE ORAL DAILY
Qty: 30 TABLET | Refills: 3 | Status: SHIPPED | OUTPATIENT
Start: 2021-03-16 | End: 2022-02-15

## 2021-03-16 RX ORDER — ASCORBIC ACID 125 MG
TABLET,CHEWABLE ORAL
COMMUNITY

## 2021-03-16 RX ORDER — SUCRALFATE 1 G/1
1 TABLET ORAL 3 TIMES DAILY PRN
Qty: 90 TABLET | Refills: 1 | Status: SHIPPED | OUTPATIENT
Start: 2021-03-16 | End: 2023-06-06 | Stop reason: SDUPTHER

## 2021-03-17 ENCOUNTER — HOSPITAL ENCOUNTER (OUTPATIENT)
Dept: RADIOLOGY | Facility: HOSPITAL | Age: 56
Discharge: HOME | End: 2021-03-17
Attending: UROLOGY
Payer: COMMERCIAL

## 2021-03-17 ENCOUNTER — PATIENT MESSAGE (OUTPATIENT)
Dept: GASTROENTEROLOGY | Facility: HOSPITAL | Age: 56
End: 2021-03-17

## 2021-03-17 DIAGNOSIS — R31.9 HEMATURIA, UNSPECIFIED TYPE: ICD-10-CM

## 2021-03-17 DIAGNOSIS — K21.9 GASTROESOPHAGEAL REFLUX DISEASE, UNSPECIFIED WHETHER ESOPHAGITIS PRESENT: Primary | ICD-10-CM

## 2021-03-17 PROCEDURE — 74178 CT ABD&PLV WO CNTR FLWD CNTR: CPT | Mod: TC

## 2021-03-17 PROCEDURE — 25500020 PHARM REV CODE 255: Performed by: UROLOGY

## 2021-03-17 RX ADMIN — IOHEXOL 125 ML: 350 INJECTION, SOLUTION INTRAVENOUS at 03:03

## 2021-03-18 ENCOUNTER — PATIENT OUTREACH (OUTPATIENT)
Dept: ADMINISTRATIVE | Facility: OTHER | Age: 56
End: 2021-03-18

## 2021-03-22 ENCOUNTER — TELEPHONE (OUTPATIENT)
Dept: UROLOGY | Facility: CLINIC | Age: 56
End: 2021-03-22

## 2021-03-22 ENCOUNTER — PROCEDURE VISIT (OUTPATIENT)
Dept: UROLOGY | Facility: CLINIC | Age: 56
End: 2021-03-22
Payer: COMMERCIAL

## 2021-03-22 VITALS — WEIGHT: 233.44 LBS | BODY MASS INDEX: 27.01 KG/M2 | HEIGHT: 78 IN

## 2021-03-22 DIAGNOSIS — R31.9 HEMATURIA, UNSPECIFIED TYPE: Primary | ICD-10-CM

## 2021-03-22 PROCEDURE — 52000 PR CYSTOURETHROSCOPY: ICD-10-PCS | Mod: S$GLB,,, | Performed by: UROLOGY

## 2021-03-22 PROCEDURE — 52000 CYSTOURETHROSCOPY: CPT | Mod: S$GLB,,, | Performed by: UROLOGY

## 2021-03-22 RX ORDER — LIDOCAINE HYDROCHLORIDE 20 MG/ML
JELLY TOPICAL
Status: COMPLETED | OUTPATIENT
Start: 2021-03-22 | End: 2021-03-22

## 2021-03-22 RX ADMIN — LIDOCAINE HYDROCHLORIDE: 20 JELLY TOPICAL at 02:03

## 2021-03-23 ENCOUNTER — TELEPHONE (OUTPATIENT)
Dept: UROLOGY | Facility: CLINIC | Age: 56
End: 2021-03-23

## 2021-03-25 ENCOUNTER — TELEPHONE (OUTPATIENT)
Dept: INTERNAL MEDICINE | Facility: CLINIC | Age: 56
End: 2021-03-25

## 2021-03-25 DIAGNOSIS — R51.9 ACUTE NONINTRACTABLE HEADACHE, UNSPECIFIED HEADACHE TYPE: ICD-10-CM

## 2021-03-25 DIAGNOSIS — M79.10 MUSCLE PAIN: Primary | ICD-10-CM

## 2021-03-26 ENCOUNTER — TELEPHONE (OUTPATIENT)
Dept: GASTROENTEROLOGY | Facility: CLINIC | Age: 56
End: 2021-03-26

## 2021-03-26 ENCOUNTER — TELEPHONE (OUTPATIENT)
Dept: INTERNAL MEDICINE | Facility: CLINIC | Age: 56
End: 2021-03-26

## 2021-03-26 DIAGNOSIS — Z01.812 PRE-PROCEDURE LAB EXAM: ICD-10-CM

## 2021-03-26 DIAGNOSIS — Z12.11 SCREENING FOR COLON CANCER: Primary | ICD-10-CM

## 2021-03-29 ENCOUNTER — OFFICE VISIT (OUTPATIENT)
Dept: PODIATRY | Facility: CLINIC | Age: 56
End: 2021-03-29
Payer: COMMERCIAL

## 2021-03-29 ENCOUNTER — LAB VISIT (OUTPATIENT)
Dept: LAB | Facility: HOSPITAL | Age: 56
End: 2021-03-29
Attending: FAMILY MEDICINE
Payer: COMMERCIAL

## 2021-03-29 ENCOUNTER — LAB VISIT (OUTPATIENT)
Dept: INTERNAL MEDICINE | Facility: CLINIC | Age: 56
End: 2021-03-29
Payer: COMMERCIAL

## 2021-03-29 VITALS
DIASTOLIC BLOOD PRESSURE: 80 MMHG | HEART RATE: 85 BPM | WEIGHT: 232.81 LBS | SYSTOLIC BLOOD PRESSURE: 122 MMHG | HEIGHT: 78 IN | BODY MASS INDEX: 26.94 KG/M2

## 2021-03-29 DIAGNOSIS — M05.79 RHEUMATOID ARTHRITIS INVOLVING MULTIPLE SITES WITH POSITIVE RHEUMATOID FACTOR: ICD-10-CM

## 2021-03-29 DIAGNOSIS — R20.0 NUMBNESS AND TINGLING OF BOTH LOWER EXTREMITIES: ICD-10-CM

## 2021-03-29 DIAGNOSIS — G60.9 IDIOPATHIC PERIPHERAL NEUROPATHY: Primary | ICD-10-CM

## 2021-03-29 DIAGNOSIS — R20.2 NUMBNESS AND TINGLING OF BOTH LOWER EXTREMITIES: ICD-10-CM

## 2021-03-29 DIAGNOSIS — R51.9 ACUTE NONINTRACTABLE HEADACHE, UNSPECIFIED HEADACHE TYPE: ICD-10-CM

## 2021-03-29 DIAGNOSIS — Z79.631 LONG TERM METHOTREXATE USER: ICD-10-CM

## 2021-03-29 DIAGNOSIS — M79.10 MUSCLE PAIN: ICD-10-CM

## 2021-03-29 DIAGNOSIS — B35.1 ONYCHOMYCOSIS OF TOENAIL: ICD-10-CM

## 2021-03-29 LAB
ANISOCYTOSIS BLD QL SMEAR: SLIGHT
ANISOCYTOSIS BLD QL SMEAR: SLIGHT
BASOPHILS # BLD AUTO: 0.03 K/UL (ref 0–0.2)
BASOPHILS # BLD AUTO: 0.03 K/UL (ref 0–0.2)
BASOPHILS NFR BLD: 0.8 % (ref 0–1.9)
BASOPHILS NFR BLD: 0.8 % (ref 0–1.9)
DIFFERENTIAL METHOD: ABNORMAL
DIFFERENTIAL METHOD: ABNORMAL
EOSINOPHIL # BLD AUTO: 0.1 K/UL (ref 0–0.5)
EOSINOPHIL # BLD AUTO: 0.1 K/UL (ref 0–0.5)
EOSINOPHIL NFR BLD: 3.3 % (ref 0–8)
EOSINOPHIL NFR BLD: 3.3 % (ref 0–8)
ERYTHROCYTE [DISTWIDTH] IN BLOOD BY AUTOMATED COUNT: 13.8 % (ref 11.5–14.5)
ERYTHROCYTE [DISTWIDTH] IN BLOOD BY AUTOMATED COUNT: 13.8 % (ref 11.5–14.5)
ERYTHROCYTE [SEDIMENTATION RATE] IN BLOOD BY WESTERGREN METHOD: 10 MM/HR (ref 0–10)
ERYTHROCYTE [SEDIMENTATION RATE] IN BLOOD BY WESTERGREN METHOD: 10 MM/HR (ref 0–10)
HCT VFR BLD AUTO: 39.6 % (ref 40–54)
HCT VFR BLD AUTO: 39.6 % (ref 40–54)
HGB BLD-MCNC: 13 G/DL (ref 14–18)
HGB BLD-MCNC: 13 G/DL (ref 14–18)
HYPOCHROMIA BLD QL SMEAR: ABNORMAL
HYPOCHROMIA BLD QL SMEAR: ABNORMAL
IMM GRANULOCYTES # BLD AUTO: 0.01 K/UL (ref 0–0.04)
IMM GRANULOCYTES # BLD AUTO: 0.01 K/UL (ref 0–0.04)
IMM GRANULOCYTES NFR BLD AUTO: 0.3 % (ref 0–0.5)
IMM GRANULOCYTES NFR BLD AUTO: 0.3 % (ref 0–0.5)
LYMPHOCYTES # BLD AUTO: 1.1 K/UL (ref 1–4.8)
LYMPHOCYTES # BLD AUTO: 1.1 K/UL (ref 1–4.8)
LYMPHOCYTES NFR BLD: 27.6 % (ref 18–48)
LYMPHOCYTES NFR BLD: 27.6 % (ref 18–48)
MCH RBC QN AUTO: 32.4 PG (ref 27–31)
MCH RBC QN AUTO: 32.4 PG (ref 27–31)
MCHC RBC AUTO-ENTMCNC: 32.8 G/DL (ref 32–36)
MCHC RBC AUTO-ENTMCNC: 32.8 G/DL (ref 32–36)
MCV RBC AUTO: 99 FL (ref 82–98)
MCV RBC AUTO: 99 FL (ref 82–98)
MONOCYTES # BLD AUTO: 0.3 K/UL (ref 0.3–1)
MONOCYTES # BLD AUTO: 0.3 K/UL (ref 0.3–1)
MONOCYTES NFR BLD: 7.7 % (ref 4–15)
MONOCYTES NFR BLD: 7.7 % (ref 4–15)
NEUTROPHILS # BLD AUTO: 2.4 K/UL (ref 1.8–7.7)
NEUTROPHILS # BLD AUTO: 2.4 K/UL (ref 1.8–7.7)
NEUTROPHILS NFR BLD: 60.3 % (ref 38–73)
NEUTROPHILS NFR BLD: 60.3 % (ref 38–73)
NRBC BLD-RTO: 0 /100 WBC
NRBC BLD-RTO: 0 /100 WBC
PLATELET # BLD AUTO: 262 K/UL (ref 150–450)
PLATELET # BLD AUTO: 262 K/UL (ref 150–450)
PLATELET BLD QL SMEAR: ABNORMAL
PLATELET BLD QL SMEAR: ABNORMAL
PMV BLD AUTO: 10.1 FL (ref 9.2–12.9)
PMV BLD AUTO: 10.1 FL (ref 9.2–12.9)
RBC # BLD AUTO: 4.01 M/UL (ref 4.6–6.2)
RBC # BLD AUTO: 4.01 M/UL (ref 4.6–6.2)
WBC # BLD AUTO: 3.91 K/UL (ref 3.9–12.7)
WBC # BLD AUTO: 3.91 K/UL (ref 3.9–12.7)

## 2021-03-29 PROCEDURE — 3008F BODY MASS INDEX DOCD: CPT | Mod: CPTII,S$GLB,, | Performed by: PODIATRIST

## 2021-03-29 PROCEDURE — U0003 INFECTIOUS AGENT DETECTION BY NUCLEIC ACID (DNA OR RNA); SEVERE ACUTE RESPIRATORY SYNDROME CORONAVIRUS 2 (SARS-COV-2) (CORONAVIRUS DISEASE [COVID-19]), AMPLIFIED PROBE TECHNIQUE, MAKING USE OF HIGH THROUGHPUT TECHNOLOGIES AS DESCRIBED BY CMS-2020-01-R: HCPCS | Performed by: FAMILY MEDICINE

## 2021-03-29 PROCEDURE — 99213 PR OFFICE/OUTPT VISIT, EST, LEVL III, 20-29 MIN: ICD-10-PCS | Mod: S$GLB,,, | Performed by: PODIATRIST

## 2021-03-29 PROCEDURE — U0005 INFEC AGEN DETEC AMPLI PROBE: HCPCS | Performed by: FAMILY MEDICINE

## 2021-03-29 PROCEDURE — 99999 PR PBB SHADOW E&M-EST. PATIENT-LVL III: ICD-10-PCS | Mod: PBBFAC,,, | Performed by: PODIATRIST

## 2021-03-29 PROCEDURE — 3074F SYST BP LT 130 MM HG: CPT | Mod: CPTII,S$GLB,, | Performed by: PODIATRIST

## 2021-03-29 PROCEDURE — 3079F DIAST BP 80-89 MM HG: CPT | Mod: CPTII,S$GLB,, | Performed by: PODIATRIST

## 2021-03-29 PROCEDURE — 80053 COMPREHEN METABOLIC PANEL: CPT | Performed by: INTERNAL MEDICINE

## 2021-03-29 PROCEDURE — 86140 C-REACTIVE PROTEIN: CPT | Performed by: INTERNAL MEDICINE

## 2021-03-29 PROCEDURE — 1125F PR PAIN SEVERITY QUANTIFIED, PAIN PRESENT: ICD-10-PCS | Mod: S$GLB,,, | Performed by: PODIATRIST

## 2021-03-29 PROCEDURE — 1125F AMNT PAIN NOTED PAIN PRSNT: CPT | Mod: S$GLB,,, | Performed by: PODIATRIST

## 2021-03-29 PROCEDURE — 3079F PR MOST RECENT DIASTOLIC BLOOD PRESSURE 80-89 MM HG: ICD-10-PCS | Mod: CPTII,S$GLB,, | Performed by: PODIATRIST

## 2021-03-29 PROCEDURE — 3074F PR MOST RECENT SYSTOLIC BLOOD PRESSURE < 130 MM HG: ICD-10-PCS | Mod: CPTII,S$GLB,, | Performed by: PODIATRIST

## 2021-03-29 PROCEDURE — 99213 OFFICE O/P EST LOW 20 MIN: CPT | Mod: S$GLB,,, | Performed by: PODIATRIST

## 2021-03-29 PROCEDURE — 36415 COLL VENOUS BLD VENIPUNCTURE: CPT | Performed by: INTERNAL MEDICINE

## 2021-03-29 PROCEDURE — 99999 PR PBB SHADOW E&M-EST. PATIENT-LVL III: CPT | Mod: PBBFAC,,, | Performed by: PODIATRIST

## 2021-03-29 PROCEDURE — 85025 COMPLETE CBC W/AUTO DIFF WBC: CPT | Performed by: INTERNAL MEDICINE

## 2021-03-29 PROCEDURE — 3008F PR BODY MASS INDEX (BMI) DOCUMENTED: ICD-10-PCS | Mod: CPTII,S$GLB,, | Performed by: PODIATRIST

## 2021-03-29 PROCEDURE — 85651 RBC SED RATE NONAUTOMATED: CPT | Performed by: INTERNAL MEDICINE

## 2021-03-30 ENCOUNTER — TELEPHONE (OUTPATIENT)
Dept: GASTROENTEROLOGY | Facility: CLINIC | Age: 56
End: 2021-03-30

## 2021-03-30 DIAGNOSIS — Z01.812 PRE-PROCEDURE LAB EXAM: ICD-10-CM

## 2021-03-30 LAB
ALBUMIN SERPL BCP-MCNC: 3.8 G/DL (ref 3.5–5.2)
ALBUMIN SERPL BCP-MCNC: 3.8 G/DL (ref 3.5–5.2)
ALP SERPL-CCNC: 76 U/L (ref 55–135)
ALP SERPL-CCNC: 76 U/L (ref 55–135)
ALT SERPL W/O P-5'-P-CCNC: 25 U/L (ref 10–44)
ALT SERPL W/O P-5'-P-CCNC: 25 U/L (ref 10–44)
ANION GAP SERPL CALC-SCNC: 9 MMOL/L (ref 8–16)
ANION GAP SERPL CALC-SCNC: 9 MMOL/L (ref 8–16)
AST SERPL-CCNC: 34 U/L (ref 10–40)
AST SERPL-CCNC: 34 U/L (ref 10–40)
BILIRUB SERPL-MCNC: 0.6 MG/DL (ref 0.1–1)
BILIRUB SERPL-MCNC: 0.6 MG/DL (ref 0.1–1)
BUN SERPL-MCNC: 15 MG/DL (ref 6–20)
BUN SERPL-MCNC: 15 MG/DL (ref 6–20)
CALCIUM SERPL-MCNC: 9.2 MG/DL (ref 8.7–10.5)
CALCIUM SERPL-MCNC: 9.2 MG/DL (ref 8.7–10.5)
CHLORIDE SERPL-SCNC: 104 MMOL/L (ref 95–110)
CHLORIDE SERPL-SCNC: 104 MMOL/L (ref 95–110)
CO2 SERPL-SCNC: 27 MMOL/L (ref 23–29)
CO2 SERPL-SCNC: 27 MMOL/L (ref 23–29)
CREAT SERPL-MCNC: 1.1 MG/DL (ref 0.5–1.4)
CREAT SERPL-MCNC: 1.1 MG/DL (ref 0.5–1.4)
CRP SERPL-MCNC: 7.5 MG/L (ref 0–8.2)
CRP SERPL-MCNC: 7.5 MG/L (ref 0–8.2)
EST. GFR  (AFRICAN AMERICAN): >60 ML/MIN/1.73 M^2
EST. GFR  (AFRICAN AMERICAN): >60 ML/MIN/1.73 M^2
EST. GFR  (NON AFRICAN AMERICAN): >60 ML/MIN/1.73 M^2
EST. GFR  (NON AFRICAN AMERICAN): >60 ML/MIN/1.73 M^2
GLUCOSE SERPL-MCNC: 73 MG/DL (ref 70–110)
GLUCOSE SERPL-MCNC: 73 MG/DL (ref 70–110)
POTASSIUM SERPL-SCNC: 4.1 MMOL/L (ref 3.5–5.1)
POTASSIUM SERPL-SCNC: 4.1 MMOL/L (ref 3.5–5.1)
PROT SERPL-MCNC: 7.5 G/DL (ref 6–8.4)
PROT SERPL-MCNC: 7.5 G/DL (ref 6–8.4)
SARS-COV-2 RNA RESP QL NAA+PROBE: NOT DETECTED
SODIUM SERPL-SCNC: 140 MMOL/L (ref 136–145)
SODIUM SERPL-SCNC: 140 MMOL/L (ref 136–145)

## 2021-03-30 RX ORDER — SODIUM, POTASSIUM,MAG SULFATES 17.5-3.13G
1 SOLUTION, RECONSTITUTED, ORAL ORAL DAILY
Qty: 1 KIT | Refills: 0 | Status: SHIPPED | OUTPATIENT
Start: 2021-03-30 | End: 2021-04-01

## 2021-03-31 ENCOUNTER — TELEPHONE (OUTPATIENT)
Dept: ENDOSCOPY | Facility: HOSPITAL | Age: 56
End: 2021-03-31

## 2021-03-31 ENCOUNTER — TELEPHONE (OUTPATIENT)
Dept: GASTROENTEROLOGY | Facility: CLINIC | Age: 56
End: 2021-03-31

## 2021-04-05 ENCOUNTER — ANESTHESIA EVENT (OUTPATIENT)
Dept: ENDOSCOPY | Facility: HOSPITAL | Age: 56
End: 2021-04-05
Payer: COMMERCIAL

## 2021-04-05 ENCOUNTER — HOSPITAL ENCOUNTER (OUTPATIENT)
Facility: HOSPITAL | Age: 56
Discharge: HOME | End: 2021-04-05
Attending: INTERNAL MEDICINE | Admitting: INTERNAL MEDICINE
Payer: COMMERCIAL

## 2021-04-05 ENCOUNTER — ANESTHESIA (OUTPATIENT)
Dept: ENDOSCOPY | Facility: HOSPITAL | Age: 56
End: 2021-04-05
Payer: COMMERCIAL

## 2021-04-05 VITALS
SYSTOLIC BLOOD PRESSURE: 119 MMHG | HEIGHT: 78 IN | TEMPERATURE: 97 F | RESPIRATION RATE: 12 BRPM | WEIGHT: 232 LBS | HEART RATE: 62 BPM | BODY MASS INDEX: 26.84 KG/M2 | OXYGEN SATURATION: 100 % | DIASTOLIC BLOOD PRESSURE: 85 MMHG

## 2021-04-05 DIAGNOSIS — K92.1 MELENA: ICD-10-CM

## 2021-04-05 LAB — SARS-COV-2 RDRP RESP QL NAA+PROBE: NEGATIVE

## 2021-04-05 PROCEDURE — 43239 PR EGD, FLEX, W/BIOPSY, SGL/MULTI: ICD-10-PCS | Mod: ,,, | Performed by: INTERNAL MEDICINE

## 2021-04-05 PROCEDURE — 88305 TISSUE EXAM BY PATHOLOGIST: CPT | Performed by: STUDENT IN AN ORGANIZED HEALTH CARE EDUCATION/TRAINING PROGRAM

## 2021-04-05 PROCEDURE — 63600175 PHARM REV CODE 636 W HCPCS: Performed by: NURSE ANESTHETIST, CERTIFIED REGISTERED

## 2021-04-05 PROCEDURE — U0002 COVID-19 LAB TEST NON-CDC: HCPCS | Performed by: INTERNAL MEDICINE

## 2021-04-05 PROCEDURE — 37000008 HC ANESTHESIA 1ST 15 MINUTES: Performed by: INTERNAL MEDICINE

## 2021-04-05 PROCEDURE — 25000003 PHARM REV CODE 250: Performed by: INTERNAL MEDICINE

## 2021-04-05 PROCEDURE — 88305 TISSUE EXAM BY PATHOLOGIST: ICD-10-PCS | Mod: 26,,, | Performed by: STUDENT IN AN ORGANIZED HEALTH CARE EDUCATION/TRAINING PROGRAM

## 2021-04-05 PROCEDURE — 88305 TISSUE EXAM BY PATHOLOGIST: CPT | Mod: 26,,, | Performed by: STUDENT IN AN ORGANIZED HEALTH CARE EDUCATION/TRAINING PROGRAM

## 2021-04-05 PROCEDURE — 25000003 PHARM REV CODE 250: Performed by: NURSE ANESTHETIST, CERTIFIED REGISTERED

## 2021-04-05 PROCEDURE — 27201012 HC FORCEPS, HOT/COLD, DISP: Performed by: INTERNAL MEDICINE

## 2021-04-05 PROCEDURE — 43239 EGD BIOPSY SINGLE/MULTIPLE: CPT | Performed by: INTERNAL MEDICINE

## 2021-04-05 PROCEDURE — 37000009 HC ANESTHESIA EA ADD 15 MINS: Performed by: INTERNAL MEDICINE

## 2021-04-05 PROCEDURE — 43239 EGD BIOPSY SINGLE/MULTIPLE: CPT | Mod: ,,, | Performed by: INTERNAL MEDICINE

## 2021-04-05 RX ORDER — SODIUM CHLORIDE 0.9 % (FLUSH) 0.9 %
10 SYRINGE (ML) INJECTION
Status: DISCONTINUED | OUTPATIENT
Start: 2021-04-05 | End: 2021-04-05 | Stop reason: HOSPADM

## 2021-04-05 RX ORDER — FENTANYL CITRATE 50 UG/ML
INJECTION, SOLUTION INTRAMUSCULAR; INTRAVENOUS
Status: DISCONTINUED | OUTPATIENT
Start: 2021-04-05 | End: 2021-04-05

## 2021-04-05 RX ORDER — SODIUM CHLORIDE 9 MG/ML
INJECTION, SOLUTION INTRAVENOUS CONTINUOUS
Status: DISCONTINUED | OUTPATIENT
Start: 2021-04-05 | End: 2021-04-05 | Stop reason: HOSPADM

## 2021-04-05 RX ORDER — LIDOCAINE HYDROCHLORIDE 10 MG/ML
INJECTION, SOLUTION EPIDURAL; INFILTRATION; INTRACAUDAL; PERINEURAL
Status: DISCONTINUED | OUTPATIENT
Start: 2021-04-05 | End: 2021-04-05

## 2021-04-05 RX ORDER — PROPOFOL 10 MG/ML
VIAL (ML) INTRAVENOUS
Status: DISCONTINUED | OUTPATIENT
Start: 2021-04-05 | End: 2021-04-05

## 2021-04-05 RX ADMIN — PROPOFOL 80 MG: 10 INJECTION, EMULSION INTRAVENOUS at 01:04

## 2021-04-05 RX ADMIN — SODIUM CHLORIDE: 0.9 INJECTION, SOLUTION INTRAVENOUS at 12:04

## 2021-04-05 RX ADMIN — FENTANYL CITRATE 100 MCG: 50 INJECTION, SOLUTION INTRAMUSCULAR; INTRAVENOUS at 01:04

## 2021-04-05 RX ADMIN — LIDOCAINE HYDROCHLORIDE 50 MG: 10 INJECTION, SOLUTION EPIDURAL; INFILTRATION; INTRACAUDAL at 01:04

## 2021-04-06 ENCOUNTER — TELEPHONE (OUTPATIENT)
Dept: ENDOSCOPY | Facility: HOSPITAL | Age: 56
End: 2021-04-06

## 2021-04-08 LAB
FINAL PATHOLOGIC DIAGNOSIS: NORMAL
GROSS: NORMAL
Lab: NORMAL
MICROSCOPIC EXAM: NORMAL

## 2021-04-13 ENCOUNTER — TELEPHONE (OUTPATIENT)
Dept: GASTROENTEROLOGY | Facility: CLINIC | Age: 56
End: 2021-04-13

## 2021-04-19 ENCOUNTER — LAB VISIT (OUTPATIENT)
Dept: OTOLARYNGOLOGY | Facility: CLINIC | Age: 56
End: 2021-04-19
Payer: COMMERCIAL

## 2021-04-19 ENCOUNTER — TELEPHONE (OUTPATIENT)
Dept: GASTROENTEROLOGY | Facility: CLINIC | Age: 56
End: 2021-04-19

## 2021-04-19 ENCOUNTER — TELEPHONE (OUTPATIENT)
Dept: ENDOSCOPY | Facility: HOSPITAL | Age: 56
End: 2021-04-19

## 2021-04-19 DIAGNOSIS — Z01.812 PRE-PROCEDURE LAB EXAM: ICD-10-CM

## 2021-04-19 PROCEDURE — U0003 INFECTIOUS AGENT DETECTION BY NUCLEIC ACID (DNA OR RNA); SEVERE ACUTE RESPIRATORY SYNDROME CORONAVIRUS 2 (SARS-COV-2) (CORONAVIRUS DISEASE [COVID-19]), AMPLIFIED PROBE TECHNIQUE, MAKING USE OF HIGH THROUGHPUT TECHNOLOGIES AS DESCRIBED BY CMS-2020-01-R: HCPCS | Performed by: INTERNAL MEDICINE

## 2021-04-19 PROCEDURE — U0005 INFEC AGEN DETEC AMPLI PROBE: HCPCS | Performed by: INTERNAL MEDICINE

## 2021-04-19 RX ORDER — SODIUM, POTASSIUM,MAG SULFATES 17.5-3.13G
1 SOLUTION, RECONSTITUTED, ORAL ORAL DAILY
Qty: 1 KIT | Refills: 0 | Status: ON HOLD | OUTPATIENT
Start: 2021-04-19 | End: 2021-04-21 | Stop reason: CLARIF

## 2021-04-20 ENCOUNTER — TELEPHONE (OUTPATIENT)
Dept: ENDOSCOPY | Facility: HOSPITAL | Age: 56
End: 2021-04-20

## 2021-04-20 ENCOUNTER — TELEPHONE (OUTPATIENT)
Dept: GASTROENTEROLOGY | Facility: CLINIC | Age: 56
End: 2021-04-20

## 2021-04-20 LAB — SARS-COV-2 RNA RESP QL NAA+PROBE: NOT DETECTED

## 2021-04-21 ENCOUNTER — HOSPITAL ENCOUNTER (OUTPATIENT)
Facility: HOSPITAL | Age: 56
Discharge: HOME | End: 2021-04-21
Attending: INTERNAL MEDICINE | Admitting: INTERNAL MEDICINE
Payer: COMMERCIAL

## 2021-04-21 ENCOUNTER — ANESTHESIA (OUTPATIENT)
Dept: ENDOSCOPY | Facility: HOSPITAL | Age: 56
End: 2021-04-21
Payer: COMMERCIAL

## 2021-04-21 ENCOUNTER — ANESTHESIA EVENT (OUTPATIENT)
Dept: ENDOSCOPY | Facility: HOSPITAL | Age: 56
End: 2021-04-21
Payer: COMMERCIAL

## 2021-04-21 VITALS
SYSTOLIC BLOOD PRESSURE: 115 MMHG | DIASTOLIC BLOOD PRESSURE: 72 MMHG | WEIGHT: 232 LBS | BODY MASS INDEX: 26.84 KG/M2 | HEART RATE: 60 BPM | HEIGHT: 78 IN | RESPIRATION RATE: 14 BRPM | TEMPERATURE: 98 F | OXYGEN SATURATION: 100 %

## 2021-04-21 DIAGNOSIS — Z12.11 SCREEN FOR COLON CANCER: ICD-10-CM

## 2021-04-21 PROCEDURE — G0121 COLON CA SCRN NOT HI RSK IND: ICD-10-PCS | Mod: ,,, | Performed by: INTERNAL MEDICINE

## 2021-04-21 PROCEDURE — G0121 COLON CA SCRN NOT HI RSK IND: HCPCS | Mod: ,,, | Performed by: INTERNAL MEDICINE

## 2021-04-21 PROCEDURE — 37000008 HC ANESTHESIA 1ST 15 MINUTES: Performed by: INTERNAL MEDICINE

## 2021-04-21 PROCEDURE — 25000003 PHARM REV CODE 250: Performed by: INTERNAL MEDICINE

## 2021-04-21 PROCEDURE — G0121 COLON CA SCRN NOT HI RSK IND: HCPCS | Performed by: INTERNAL MEDICINE

## 2021-04-21 PROCEDURE — 37000009 HC ANESTHESIA EA ADD 15 MINS: Performed by: INTERNAL MEDICINE

## 2021-04-21 PROCEDURE — 25000003 PHARM REV CODE 250: Performed by: NURSE ANESTHETIST, CERTIFIED REGISTERED

## 2021-04-21 PROCEDURE — 63600175 PHARM REV CODE 636 W HCPCS: Performed by: NURSE ANESTHETIST, CERTIFIED REGISTERED

## 2021-04-21 RX ORDER — LIDOCAINE HCL/PF 100 MG/5ML
SYRINGE (ML) INTRAVENOUS
Status: DISCONTINUED | OUTPATIENT
Start: 2021-04-21 | End: 2021-04-21

## 2021-04-21 RX ORDER — SODIUM CHLORIDE 9 MG/ML
INJECTION, SOLUTION INTRAVENOUS CONTINUOUS
Status: DISCONTINUED | OUTPATIENT
Start: 2021-04-21 | End: 2021-04-21 | Stop reason: HOSPADM

## 2021-04-21 RX ORDER — SODIUM CHLORIDE 0.9 % (FLUSH) 0.9 %
10 SYRINGE (ML) INJECTION
Status: DISCONTINUED | OUTPATIENT
Start: 2021-04-21 | End: 2021-04-21 | Stop reason: HOSPADM

## 2021-04-21 RX ORDER — PROPOFOL 10 MG/ML
VIAL (ML) INTRAVENOUS CONTINUOUS PRN
Status: DISCONTINUED | OUTPATIENT
Start: 2021-04-21 | End: 2021-04-21

## 2021-04-21 RX ORDER — PROPOFOL 10 MG/ML
VIAL (ML) INTRAVENOUS
Status: DISCONTINUED | OUTPATIENT
Start: 2021-04-21 | End: 2021-04-21

## 2021-04-21 RX ADMIN — SODIUM CHLORIDE: 0.9 INJECTION, SOLUTION INTRAVENOUS at 10:04

## 2021-04-21 RX ADMIN — PROPOFOL 150 MCG/KG/MIN: 10 INJECTION, EMULSION INTRAVENOUS at 11:04

## 2021-04-21 RX ADMIN — LIDOCAINE HYDROCHLORIDE 50 MG: 20 INJECTION, SOLUTION INTRAVENOUS at 11:04

## 2021-04-21 RX ADMIN — PROPOFOL 80 MG: 10 INJECTION, EMULSION INTRAVENOUS at 11:04

## 2021-04-22 ENCOUNTER — TELEPHONE (OUTPATIENT)
Dept: ENDOSCOPY | Facility: HOSPITAL | Age: 56
End: 2021-04-22

## 2021-04-28 ENCOUNTER — PATIENT OUTREACH (OUTPATIENT)
Dept: ADMINISTRATIVE | Facility: OTHER | Age: 56
End: 2021-04-28

## 2021-05-11 ENCOUNTER — HOSPITAL ENCOUNTER (OUTPATIENT)
Dept: RADIOLOGY | Facility: HOSPITAL | Age: 56
Discharge: HOME | End: 2021-05-11
Attending: INTERNAL MEDICINE
Payer: COMMERCIAL

## 2021-05-11 ENCOUNTER — OFFICE VISIT (OUTPATIENT)
Dept: PODIATRY | Facility: CLINIC | Age: 56
End: 2021-05-11
Payer: COMMERCIAL

## 2021-05-11 VITALS
HEIGHT: 78 IN | SYSTOLIC BLOOD PRESSURE: 106 MMHG | WEIGHT: 232 LBS | DIASTOLIC BLOOD PRESSURE: 76 MMHG | BODY MASS INDEX: 26.84 KG/M2 | HEART RATE: 83 BPM

## 2021-05-11 DIAGNOSIS — M25.50 PAIN IN JOINT INVOLVING MULTIPLE SITES: ICD-10-CM

## 2021-05-11 DIAGNOSIS — M05.79 RHEUMATOID ARTHRITIS INVOLVING MULTIPLE SITES WITH POSITIVE RHEUMATOID FACTOR: ICD-10-CM

## 2021-05-11 DIAGNOSIS — B35.1 ONYCHOMYCOSIS OF TOENAIL: Primary | ICD-10-CM

## 2021-05-11 PROCEDURE — 99499 UNLISTED E&M SERVICE: CPT | Mod: ,,, | Performed by: PODIATRIST

## 2021-05-11 PROCEDURE — 77077 JOINT SURVEY SINGLE VIEW: CPT | Mod: 26,,, | Performed by: RADIOLOGY

## 2021-05-11 PROCEDURE — 99999 PR PBB SHADOW E&M-EST. PATIENT-LVL III: CPT | Mod: PBBFAC,,, | Performed by: PODIATRIST

## 2021-05-11 PROCEDURE — 1125F PR PAIN SEVERITY QUANTIFIED, PAIN PRESENT: ICD-10-PCS | Mod: ,,, | Performed by: PODIATRIST

## 2021-05-11 PROCEDURE — 73562 X-RAY EXAM OF KNEE 3: CPT | Mod: TC,50

## 2021-05-11 PROCEDURE — 17999 PR NON-COVERED FOOT CARE: ICD-10-PCS | Mod: CSM,S$GLB,, | Performed by: PODIATRIST

## 2021-05-11 PROCEDURE — 77077 XR ARTHRITIS SURVEY: ICD-10-PCS | Mod: 26,,, | Performed by: RADIOLOGY

## 2021-05-11 PROCEDURE — 3008F PR BODY MASS INDEX (BMI) DOCUMENTED: ICD-10-PCS | Mod: CPTII,,, | Performed by: PODIATRIST

## 2021-05-11 PROCEDURE — 17999 UNLISTD PX SKN MUC MEMB SUBQ: CPT | Mod: CSM,S$GLB,, | Performed by: PODIATRIST

## 2021-05-11 PROCEDURE — 99499 NO LOS: ICD-10-PCS | Mod: ,,, | Performed by: PODIATRIST

## 2021-05-11 PROCEDURE — 3008F BODY MASS INDEX DOCD: CPT | Mod: CPTII,,, | Performed by: PODIATRIST

## 2021-05-11 PROCEDURE — 73562 XR KNEE ORTHO BILAT: ICD-10-PCS | Mod: 26,,, | Performed by: RADIOLOGY

## 2021-05-11 PROCEDURE — 1125F AMNT PAIN NOTED PAIN PRSNT: CPT | Mod: ,,, | Performed by: PODIATRIST

## 2021-05-11 PROCEDURE — 99999 PR PBB SHADOW E&M-EST. PATIENT-LVL III: ICD-10-PCS | Mod: PBBFAC,,, | Performed by: PODIATRIST

## 2021-05-11 PROCEDURE — 73562 X-RAY EXAM OF KNEE 3: CPT | Mod: 26,,, | Performed by: RADIOLOGY

## 2021-05-11 PROCEDURE — 77077 JOINT SURVEY SINGLE VIEW: CPT | Mod: TC

## 2021-05-24 NOTE — TELEPHONE ENCOUNTER
Gave pt result of PSA. He verbalized understanding. He would like to get his pneumonia vaccine. I informed him that I would get the order from the provider and someone would call him back to schedule a nurse visit.   Negative

## 2021-06-10 ENCOUNTER — HOSPITAL ENCOUNTER (OUTPATIENT)
Dept: RADIOLOGY | Facility: HOSPITAL | Age: 56
Discharge: HOME | End: 2021-06-10
Attending: INTERNAL MEDICINE
Payer: COMMERCIAL

## 2021-06-10 ENCOUNTER — OFFICE VISIT (OUTPATIENT)
Dept: RHEUMATOLOGY | Facility: CLINIC | Age: 56
End: 2021-06-10
Payer: COMMERCIAL

## 2021-06-10 VITALS
HEIGHT: 78 IN | HEART RATE: 79 BPM | SYSTOLIC BLOOD PRESSURE: 125 MMHG | DIASTOLIC BLOOD PRESSURE: 83 MMHG | BODY MASS INDEX: 25.56 KG/M2 | WEIGHT: 220.88 LBS

## 2021-06-10 DIAGNOSIS — M25.512 CHRONIC PAIN OF BOTH SHOULDERS: ICD-10-CM

## 2021-06-10 DIAGNOSIS — M05.79 RHEUMATOID ARTHRITIS INVOLVING MULTIPLE SITES WITH POSITIVE RHEUMATOID FACTOR: Primary | ICD-10-CM

## 2021-06-10 DIAGNOSIS — M15.9 GENERALIZED OSTEOARTHRITIS OF MULTIPLE SITES: ICD-10-CM

## 2021-06-10 DIAGNOSIS — M05.79 RHEUMATOID ARTHRITIS INVOLVING MULTIPLE SITES WITH POSITIVE RHEUMATOID FACTOR: ICD-10-CM

## 2021-06-10 DIAGNOSIS — G89.29 CHRONIC PAIN OF BOTH SHOULDERS: ICD-10-CM

## 2021-06-10 DIAGNOSIS — M65.311 TRIGGER FINGER OF RIGHT THUMB: ICD-10-CM

## 2021-06-10 DIAGNOSIS — D64.9 ANEMIA, UNSPECIFIED TYPE: ICD-10-CM

## 2021-06-10 DIAGNOSIS — M54.2 CERVICALGIA: ICD-10-CM

## 2021-06-10 DIAGNOSIS — Z55.9 EDUCATIONAL CIRCUMSTANCE: ICD-10-CM

## 2021-06-10 DIAGNOSIS — M25.511 CHRONIC PAIN OF BOTH SHOULDERS: ICD-10-CM

## 2021-06-10 DIAGNOSIS — Z79.631 LONG TERM METHOTREXATE USER: ICD-10-CM

## 2021-06-10 DIAGNOSIS — K11.20 PAROTITIS: ICD-10-CM

## 2021-06-10 PROCEDURE — 73630 XR FOOT COMPLETE 3 VIEW BILATERAL: ICD-10-PCS | Mod: 26,,, | Performed by: RADIOLOGY

## 2021-06-10 PROCEDURE — 72050 XR CERVICAL SPINE COMPLETE 5 VIEW: ICD-10-PCS | Mod: 26,,, | Performed by: RADIOLOGY

## 2021-06-10 PROCEDURE — 3008F PR BODY MASS INDEX (BMI) DOCUMENTED: ICD-10-PCS | Mod: CPTII,S$GLB,, | Performed by: INTERNAL MEDICINE

## 2021-06-10 PROCEDURE — 72050 X-RAY EXAM NECK SPINE 4/5VWS: CPT | Mod: TC

## 2021-06-10 PROCEDURE — 1125F PR PAIN SEVERITY QUANTIFIED, PAIN PRESENT: ICD-10-PCS | Mod: S$GLB,,, | Performed by: INTERNAL MEDICINE

## 2021-06-10 PROCEDURE — 73630 X-RAY EXAM OF FOOT: CPT | Mod: 26,,, | Performed by: RADIOLOGY

## 2021-06-10 PROCEDURE — 3008F BODY MASS INDEX DOCD: CPT | Mod: CPTII,S$GLB,, | Performed by: INTERNAL MEDICINE

## 2021-06-10 PROCEDURE — 99215 OFFICE O/P EST HI 40 MIN: CPT | Mod: S$GLB,,, | Performed by: INTERNAL MEDICINE

## 2021-06-10 PROCEDURE — 73030 X-RAY EXAM OF SHOULDER: CPT | Mod: TC,50

## 2021-06-10 PROCEDURE — 73630 X-RAY EXAM OF FOOT: CPT | Mod: TC,50

## 2021-06-10 PROCEDURE — 73030 X-RAY EXAM OF SHOULDER: CPT | Mod: 26,,, | Performed by: RADIOLOGY

## 2021-06-10 PROCEDURE — 99215 PR OFFICE/OUTPT VISIT, EST, LEVL V, 40-54 MIN: ICD-10-PCS | Mod: S$GLB,,, | Performed by: INTERNAL MEDICINE

## 2021-06-10 PROCEDURE — 99999 PR PBB SHADOW E&M-EST. PATIENT-LVL III: ICD-10-PCS | Mod: PBBFAC,,, | Performed by: INTERNAL MEDICINE

## 2021-06-10 PROCEDURE — 1125F AMNT PAIN NOTED PAIN PRSNT: CPT | Mod: S$GLB,,, | Performed by: INTERNAL MEDICINE

## 2021-06-10 PROCEDURE — 99999 PR PBB SHADOW E&M-EST. PATIENT-LVL III: CPT | Mod: PBBFAC,,, | Performed by: INTERNAL MEDICINE

## 2021-06-10 PROCEDURE — 73030 XR SHOULDER COMPLETE 2 OR MORE VIEWS BILATERAL: ICD-10-PCS | Mod: 26,,, | Performed by: RADIOLOGY

## 2021-06-10 PROCEDURE — 72050 X-RAY EXAM NECK SPINE 4/5VWS: CPT | Mod: 26,,, | Performed by: RADIOLOGY

## 2021-06-10 SDOH — SOCIAL DETERMINANTS OF HEALTH (SDOH): PROBLEMS RELATED TO EDUCATION AND LITERACY, UNSPECIFIED: Z55.9

## 2021-06-22 ENCOUNTER — PATIENT MESSAGE (OUTPATIENT)
Dept: RHEUMATOLOGY | Facility: CLINIC | Age: 56
End: 2021-06-22

## 2021-07-15 DIAGNOSIS — K11.20 SIALOADENITIS: Primary | ICD-10-CM

## 2021-07-20 ENCOUNTER — HOSPITAL ENCOUNTER (OUTPATIENT)
Dept: RADIOLOGY | Facility: HOSPITAL | Age: 56
Discharge: HOME | End: 2021-07-20
Attending: OTOLARYNGOLOGY
Payer: COMMERCIAL

## 2021-07-20 DIAGNOSIS — K11.20 SIALOADENITIS: ICD-10-CM

## 2021-07-20 PROCEDURE — 70486 CT MAXILLOFACIAL W/O DYE: CPT | Mod: TC

## 2021-07-20 PROCEDURE — 70486 CT MAXILLOFACIAL W/O DYE: CPT | Mod: 26,,, | Performed by: RADIOLOGY

## 2021-07-20 PROCEDURE — 70486 CT MAXILLOFACIAL WITHOUT CONTRAST: ICD-10-PCS | Mod: 26,,, | Performed by: RADIOLOGY

## 2021-07-21 ENCOUNTER — PATIENT OUTREACH (OUTPATIENT)
Dept: ADMINISTRATIVE | Facility: OTHER | Age: 56
End: 2021-07-21

## 2021-07-22 ENCOUNTER — OFFICE VISIT (OUTPATIENT)
Dept: OTOLARYNGOLOGY | Facility: CLINIC | Age: 56
End: 2021-07-22
Payer: COMMERCIAL

## 2021-07-22 VITALS
SYSTOLIC BLOOD PRESSURE: 112 MMHG | WEIGHT: 226.19 LBS | BODY MASS INDEX: 25.48 KG/M2 | DIASTOLIC BLOOD PRESSURE: 68 MMHG | TEMPERATURE: 98 F | HEART RATE: 60 BPM

## 2021-07-22 DIAGNOSIS — K11.5 PAROTID SIALOLITHIASIS: Primary | ICD-10-CM

## 2021-07-22 PROCEDURE — 3008F PR BODY MASS INDEX (BMI) DOCUMENTED: ICD-10-PCS | Mod: CPTII,S$GLB,, | Performed by: OTOLARYNGOLOGY

## 2021-07-22 PROCEDURE — 99999 PR PBB SHADOW E&M-EST. PATIENT-LVL III: ICD-10-PCS | Mod: PBBFAC,,, | Performed by: OTOLARYNGOLOGY

## 2021-07-22 PROCEDURE — 99214 PR OFFICE/OUTPT VISIT, EST, LEVL IV, 30-39 MIN: ICD-10-PCS | Mod: S$GLB,,, | Performed by: OTOLARYNGOLOGY

## 2021-07-22 PROCEDURE — 1126F AMNT PAIN NOTED NONE PRSNT: CPT | Mod: CPTII,S$GLB,, | Performed by: OTOLARYNGOLOGY

## 2021-07-22 PROCEDURE — 3078F PR MOST RECENT DIASTOLIC BLOOD PRESSURE < 80 MM HG: ICD-10-PCS | Mod: CPTII,S$GLB,, | Performed by: OTOLARYNGOLOGY

## 2021-07-22 PROCEDURE — 99999 PR PBB SHADOW E&M-EST. PATIENT-LVL III: CPT | Mod: PBBFAC,,, | Performed by: OTOLARYNGOLOGY

## 2021-07-22 PROCEDURE — 99214 OFFICE O/P EST MOD 30 MIN: CPT | Mod: S$GLB,,, | Performed by: OTOLARYNGOLOGY

## 2021-07-22 PROCEDURE — 3008F BODY MASS INDEX DOCD: CPT | Mod: CPTII,S$GLB,, | Performed by: OTOLARYNGOLOGY

## 2021-07-22 PROCEDURE — 3074F SYST BP LT 130 MM HG: CPT | Mod: CPTII,S$GLB,, | Performed by: OTOLARYNGOLOGY

## 2021-07-22 PROCEDURE — 3074F PR MOST RECENT SYSTOLIC BLOOD PRESSURE < 130 MM HG: ICD-10-PCS | Mod: CPTII,S$GLB,, | Performed by: OTOLARYNGOLOGY

## 2021-07-22 PROCEDURE — 3078F DIAST BP <80 MM HG: CPT | Mod: CPTII,S$GLB,, | Performed by: OTOLARYNGOLOGY

## 2021-07-22 PROCEDURE — 1126F PR PAIN SEVERITY QUANTIFIED, NO PAIN PRESENT: ICD-10-PCS | Mod: CPTII,S$GLB,, | Performed by: OTOLARYNGOLOGY

## 2021-07-26 ENCOUNTER — TELEPHONE (OUTPATIENT)
Dept: OTOLARYNGOLOGY | Facility: CLINIC | Age: 56
End: 2021-07-26

## 2021-07-27 ENCOUNTER — OFFICE VISIT (OUTPATIENT)
Dept: PODIATRY | Facility: CLINIC | Age: 56
End: 2021-07-27
Payer: COMMERCIAL

## 2021-07-27 ENCOUNTER — TELEPHONE (OUTPATIENT)
Dept: OTOLARYNGOLOGY | Facility: CLINIC | Age: 56
End: 2021-07-27

## 2021-07-27 VITALS
WEIGHT: 226 LBS | HEART RATE: 91 BPM | SYSTOLIC BLOOD PRESSURE: 101 MMHG | DIASTOLIC BLOOD PRESSURE: 65 MMHG | HEIGHT: 78 IN | BODY MASS INDEX: 26.15 KG/M2

## 2021-07-27 DIAGNOSIS — B35.1 ONYCHOMYCOSIS OF TOENAIL: Primary | ICD-10-CM

## 2021-07-27 PROCEDURE — 99999 PR PBB SHADOW E&M-EST. PATIENT-LVL III: ICD-10-PCS | Mod: PBBFAC,,, | Performed by: PODIATRIST

## 2021-07-27 PROCEDURE — 3008F PR BODY MASS INDEX (BMI) DOCUMENTED: ICD-10-PCS | Mod: CPTII,,, | Performed by: PODIATRIST

## 2021-07-27 PROCEDURE — 99499 NO LOS: ICD-10-PCS | Mod: ,,, | Performed by: PODIATRIST

## 2021-07-27 PROCEDURE — 17999 PR NON-COVERED FOOT CARE: ICD-10-PCS | Mod: CSM,S$GLB,, | Performed by: PODIATRIST

## 2021-07-27 PROCEDURE — 1159F PR MEDICATION LIST DOCUMENTED IN MEDICAL RECORD: ICD-10-PCS | Mod: CPTII,,, | Performed by: PODIATRIST

## 2021-07-27 PROCEDURE — 3074F SYST BP LT 130 MM HG: CPT | Mod: CPTII,,, | Performed by: PODIATRIST

## 2021-07-27 PROCEDURE — 3008F BODY MASS INDEX DOCD: CPT | Mod: CPTII,,, | Performed by: PODIATRIST

## 2021-07-27 PROCEDURE — 1126F AMNT PAIN NOTED NONE PRSNT: CPT | Mod: CPTII,,, | Performed by: PODIATRIST

## 2021-07-27 PROCEDURE — 1160F RVW MEDS BY RX/DR IN RCRD: CPT | Mod: CPTII,,, | Performed by: PODIATRIST

## 2021-07-27 PROCEDURE — 1126F PR PAIN SEVERITY QUANTIFIED, NO PAIN PRESENT: ICD-10-PCS | Mod: CPTII,,, | Performed by: PODIATRIST

## 2021-07-27 PROCEDURE — 99999 PR PBB SHADOW E&M-EST. PATIENT-LVL III: CPT | Mod: PBBFAC,,, | Performed by: PODIATRIST

## 2021-07-27 PROCEDURE — 3078F DIAST BP <80 MM HG: CPT | Mod: CPTII,,, | Performed by: PODIATRIST

## 2021-07-27 PROCEDURE — 3078F PR MOST RECENT DIASTOLIC BLOOD PRESSURE < 80 MM HG: ICD-10-PCS | Mod: CPTII,,, | Performed by: PODIATRIST

## 2021-07-27 PROCEDURE — 17999 UNLISTD PX SKN MUC MEMB SUBQ: CPT | Mod: CSM,S$GLB,, | Performed by: PODIATRIST

## 2021-07-27 PROCEDURE — 1159F MED LIST DOCD IN RCRD: CPT | Mod: CPTII,,, | Performed by: PODIATRIST

## 2021-07-27 PROCEDURE — 99499 UNLISTED E&M SERVICE: CPT | Mod: ,,, | Performed by: PODIATRIST

## 2021-07-27 PROCEDURE — 1160F PR REVIEW ALL MEDS BY PRESCRIBER/CLIN PHARMACIST DOCUMENTED: ICD-10-PCS | Mod: CPTII,,, | Performed by: PODIATRIST

## 2021-07-27 PROCEDURE — 3074F PR MOST RECENT SYSTOLIC BLOOD PRESSURE < 130 MM HG: ICD-10-PCS | Mod: CPTII,,, | Performed by: PODIATRIST

## 2021-07-29 NOTE — TELEPHONE ENCOUNTER
----- Message from Rachelle Mcclellan sent at 7/2/2020 10:48 AM CDT -----  Regarding: resutls  Type:  Test Results    Who Called: pt  Name of Test (Lab/Mammo/Etc): lab/xray  Date of Test:   Ordering Provider: Kapil  Where the test was performed:   Would the patient rather a call back or a response via [facility]? Call back  Best Call Back Number: [phone]  Additional Information:  Please call back        Body Location Override (Optional - Billing Will Still Be Based On Selected Body Map Location If Applicable): left superior breast Detail Level: Detailed Size Of Lesion In Cm (Optional): 0 Body Location Override (Optional - Billing Will Still Be Based On Selected Body Map Location If Applicable): left dorsal nose Size Of Lesion: .6 x .6 cm Body Location Override (Optional - Billing Will Still Be Based On Selected Body Map Location If Applicable): right tip of shoulder

## 2021-08-05 ENCOUNTER — TELEPHONE (OUTPATIENT)
Dept: OTOLARYNGOLOGY | Facility: CLINIC | Age: 56
End: 2021-08-05

## 2021-08-06 ENCOUNTER — TELEPHONE (OUTPATIENT)
Dept: GASTROENTEROLOGY | Facility: CLINIC | Age: 56
End: 2021-08-06

## 2021-08-10 ENCOUNTER — OFFICE VISIT (OUTPATIENT)
Dept: PODIATRY | Facility: CLINIC | Age: 56
End: 2021-08-10
Payer: COMMERCIAL

## 2021-08-10 VITALS
SYSTOLIC BLOOD PRESSURE: 121 MMHG | WEIGHT: 226 LBS | BODY MASS INDEX: 26.15 KG/M2 | DIASTOLIC BLOOD PRESSURE: 72 MMHG | HEART RATE: 82 BPM | HEIGHT: 78 IN

## 2021-08-10 DIAGNOSIS — G60.9 IDIOPATHIC PERIPHERAL NEUROPATHY: ICD-10-CM

## 2021-08-10 DIAGNOSIS — M77.41 METATARSALGIA OF BOTH FEET: Primary | ICD-10-CM

## 2021-08-10 DIAGNOSIS — M77.42 METATARSALGIA OF BOTH FEET: Primary | ICD-10-CM

## 2021-08-10 DIAGNOSIS — M72.2 PLANTAR FASCIITIS: ICD-10-CM

## 2021-08-10 PROCEDURE — 1159F PR MEDICATION LIST DOCUMENTED IN MEDICAL RECORD: ICD-10-PCS | Mod: CPTII,S$GLB,, | Performed by: PODIATRIST

## 2021-08-10 PROCEDURE — 1160F PR REVIEW ALL MEDS BY PRESCRIBER/CLIN PHARMACIST DOCUMENTED: ICD-10-PCS | Mod: CPTII,S$GLB,, | Performed by: PODIATRIST

## 2021-08-10 PROCEDURE — 3078F DIAST BP <80 MM HG: CPT | Mod: CPTII,S$GLB,, | Performed by: PODIATRIST

## 2021-08-10 PROCEDURE — 99212 OFFICE O/P EST SF 10 MIN: CPT | Mod: S$GLB,,, | Performed by: PODIATRIST

## 2021-08-10 PROCEDURE — 1125F AMNT PAIN NOTED PAIN PRSNT: CPT | Mod: CPTII,S$GLB,, | Performed by: PODIATRIST

## 2021-08-10 PROCEDURE — 3078F PR MOST RECENT DIASTOLIC BLOOD PRESSURE < 80 MM HG: ICD-10-PCS | Mod: CPTII,S$GLB,, | Performed by: PODIATRIST

## 2021-08-10 PROCEDURE — 3074F SYST BP LT 130 MM HG: CPT | Mod: CPTII,S$GLB,, | Performed by: PODIATRIST

## 2021-08-10 PROCEDURE — 99212 PR OFFICE/OUTPT VISIT, EST, LEVL II, 10-19 MIN: ICD-10-PCS | Mod: S$GLB,,, | Performed by: PODIATRIST

## 2021-08-10 PROCEDURE — 99999 PR PBB SHADOW E&M-EST. PATIENT-LVL III: ICD-10-PCS | Mod: PBBFAC,,, | Performed by: PODIATRIST

## 2021-08-10 PROCEDURE — 1159F MED LIST DOCD IN RCRD: CPT | Mod: CPTII,S$GLB,, | Performed by: PODIATRIST

## 2021-08-10 PROCEDURE — 1160F RVW MEDS BY RX/DR IN RCRD: CPT | Mod: CPTII,S$GLB,, | Performed by: PODIATRIST

## 2021-08-10 PROCEDURE — 1125F PR PAIN SEVERITY QUANTIFIED, PAIN PRESENT: ICD-10-PCS | Mod: CPTII,S$GLB,, | Performed by: PODIATRIST

## 2021-08-10 PROCEDURE — 3074F PR MOST RECENT SYSTOLIC BLOOD PRESSURE < 130 MM HG: ICD-10-PCS | Mod: CPTII,S$GLB,, | Performed by: PODIATRIST

## 2021-08-10 PROCEDURE — 99999 PR PBB SHADOW E&M-EST. PATIENT-LVL III: CPT | Mod: PBBFAC,,, | Performed by: PODIATRIST

## 2021-08-10 PROCEDURE — 3008F PR BODY MASS INDEX (BMI) DOCUMENTED: ICD-10-PCS | Mod: CPTII,S$GLB,, | Performed by: PODIATRIST

## 2021-08-10 PROCEDURE — 3008F BODY MASS INDEX DOCD: CPT | Mod: CPTII,S$GLB,, | Performed by: PODIATRIST

## 2021-08-20 ENCOUNTER — TELEPHONE (OUTPATIENT)
Dept: GASTROENTEROLOGY | Facility: CLINIC | Age: 56
End: 2021-08-20

## 2021-08-20 ENCOUNTER — TELEPHONE (OUTPATIENT)
Dept: ORTHOPEDICS | Facility: CLINIC | Age: 56
End: 2021-08-20

## 2021-08-20 DIAGNOSIS — M79.646 THUMB PAIN, UNSPECIFIED LATERALITY: Primary | ICD-10-CM

## 2021-09-07 ENCOUNTER — HOSPITAL ENCOUNTER (OUTPATIENT)
Dept: RADIOLOGY | Facility: HOSPITAL | Age: 56
Discharge: HOME | End: 2021-09-07
Attending: STUDENT IN AN ORGANIZED HEALTH CARE EDUCATION/TRAINING PROGRAM
Payer: COMMERCIAL

## 2021-09-07 ENCOUNTER — OFFICE VISIT (OUTPATIENT)
Dept: ORTHOPEDICS | Facility: CLINIC | Age: 56
End: 2021-09-07
Payer: COMMERCIAL

## 2021-09-07 ENCOUNTER — HOSPITAL ENCOUNTER (OUTPATIENT)
Dept: RADIOLOGY | Facility: HOSPITAL | Age: 56
Discharge: HOME | End: 2021-09-07
Attending: ORTHOPAEDIC SURGERY
Payer: COMMERCIAL

## 2021-09-07 VITALS
HEART RATE: 72 BPM | DIASTOLIC BLOOD PRESSURE: 78 MMHG | RESPIRATION RATE: 20 BRPM | BODY MASS INDEX: 26.42 KG/M2 | HEIGHT: 78 IN | WEIGHT: 228.38 LBS | SYSTOLIC BLOOD PRESSURE: 120 MMHG

## 2021-09-07 DIAGNOSIS — M79.646 THUMB PAIN, UNSPECIFIED LATERALITY: ICD-10-CM

## 2021-09-07 DIAGNOSIS — M65.4 RADIAL STYLOID TENOSYNOVITIS (DE QUERVAIN): ICD-10-CM

## 2021-09-07 DIAGNOSIS — M25.532 LEFT WRIST PAIN: ICD-10-CM

## 2021-09-07 DIAGNOSIS — M65.30 TRIGGER FINGER, ACQUIRED: Primary | ICD-10-CM

## 2021-09-07 DIAGNOSIS — M25.532 LEFT WRIST PAIN: Primary | ICD-10-CM

## 2021-09-07 PROCEDURE — 99999 PR PBB SHADOW E&M-EST. PATIENT-LVL III: ICD-10-PCS | Mod: PBBFAC,,, | Performed by: ORTHOPAEDIC SURGERY

## 2021-09-07 PROCEDURE — 1160F RVW MEDS BY RX/DR IN RCRD: CPT | Mod: CPTII,S$GLB,, | Performed by: ORTHOPAEDIC SURGERY

## 2021-09-07 PROCEDURE — 20550 NJX 1 TENDON SHEATH/LIGAMENT: CPT | Mod: 51,LT,S$GLB, | Performed by: ORTHOPAEDIC SURGERY

## 2021-09-07 PROCEDURE — 20550 TENDON SHEATH: ICD-10-PCS | Mod: 51,LT,S$GLB, | Performed by: ORTHOPAEDIC SURGERY

## 2021-09-07 PROCEDURE — 73140 XR FINGER 2 OR MORE VIEWS RIGHT: ICD-10-PCS | Mod: 26,RT,, | Performed by: RADIOLOGY

## 2021-09-07 PROCEDURE — 1159F MED LIST DOCD IN RCRD: CPT | Mod: CPTII,S$GLB,, | Performed by: ORTHOPAEDIC SURGERY

## 2021-09-07 PROCEDURE — 3074F SYST BP LT 130 MM HG: CPT | Mod: CPTII,S$GLB,, | Performed by: ORTHOPAEDIC SURGERY

## 2021-09-07 PROCEDURE — 3008F BODY MASS INDEX DOCD: CPT | Mod: CPTII,S$GLB,, | Performed by: ORTHOPAEDIC SURGERY

## 2021-09-07 PROCEDURE — 99203 OFFICE O/P NEW LOW 30 MIN: CPT | Mod: 25,S$GLB,, | Performed by: ORTHOPAEDIC SURGERY

## 2021-09-07 PROCEDURE — 99203 PR OFFICE/OUTPT VISIT, NEW, LEVL III, 30-44 MIN: ICD-10-PCS | Mod: 25,S$GLB,, | Performed by: ORTHOPAEDIC SURGERY

## 2021-09-07 PROCEDURE — 3074F PR MOST RECENT SYSTOLIC BLOOD PRESSURE < 130 MM HG: ICD-10-PCS | Mod: CPTII,S$GLB,, | Performed by: ORTHOPAEDIC SURGERY

## 2021-09-07 PROCEDURE — 73140 X-RAY EXAM OF FINGER(S): CPT | Mod: TC,RT

## 2021-09-07 PROCEDURE — 99999 PR PBB SHADOW E&M-EST. PATIENT-LVL III: CPT | Mod: PBBFAC,,, | Performed by: ORTHOPAEDIC SURGERY

## 2021-09-07 PROCEDURE — 73110 X-RAY EXAM OF WRIST: CPT | Mod: 26,LT,, | Performed by: RADIOLOGY

## 2021-09-07 PROCEDURE — 73140 X-RAY EXAM OF FINGER(S): CPT | Mod: 26,RT,, | Performed by: RADIOLOGY

## 2021-09-07 PROCEDURE — 1159F PR MEDICATION LIST DOCUMENTED IN MEDICAL RECORD: ICD-10-PCS | Mod: CPTII,S$GLB,, | Performed by: ORTHOPAEDIC SURGERY

## 2021-09-07 PROCEDURE — 3078F DIAST BP <80 MM HG: CPT | Mod: CPTII,S$GLB,, | Performed by: ORTHOPAEDIC SURGERY

## 2021-09-07 PROCEDURE — 3078F PR MOST RECENT DIASTOLIC BLOOD PRESSURE < 80 MM HG: ICD-10-PCS | Mod: CPTII,S$GLB,, | Performed by: ORTHOPAEDIC SURGERY

## 2021-09-07 PROCEDURE — 73110 X-RAY EXAM OF WRIST: CPT | Mod: TC,LT

## 2021-09-07 PROCEDURE — 73110 XR WRIST COMPLETE 3 VIEWS LEFT: ICD-10-PCS | Mod: 26,LT,, | Performed by: RADIOLOGY

## 2021-09-07 PROCEDURE — 3008F PR BODY MASS INDEX (BMI) DOCUMENTED: ICD-10-PCS | Mod: CPTII,S$GLB,, | Performed by: ORTHOPAEDIC SURGERY

## 2021-09-07 PROCEDURE — 1160F PR REVIEW ALL MEDS BY PRESCRIBER/CLIN PHARMACIST DOCUMENTED: ICD-10-PCS | Mod: CPTII,S$GLB,, | Performed by: ORTHOPAEDIC SURGERY

## 2021-09-07 RX ORDER — TRIAMCINOLONE ACETONIDE 40 MG/ML
40 INJECTION, SUSPENSION INTRA-ARTICULAR; INTRAMUSCULAR
Status: DISCONTINUED | OUTPATIENT
Start: 2021-09-07 | End: 2021-09-07 | Stop reason: HOSPADM

## 2021-09-07 RX ADMIN — TRIAMCINOLONE ACETONIDE 40 MG: 40 INJECTION, SUSPENSION INTRA-ARTICULAR; INTRAMUSCULAR at 03:09

## 2021-09-08 NOTE — TELEPHONE ENCOUNTER
----- Message from Rachelle Mcclellan sent at 6/10/2020  8:54 AM CDT -----  Contact: self  Pt is calling in requesting to speak with Dr's staff to make aware of some of his current symptoms and to also find out what he needs to do. Please call back [phone]  Thanks.   room air

## 2021-09-13 ENCOUNTER — TELEPHONE (OUTPATIENT)
Dept: INTERNAL MEDICINE | Facility: CLINIC | Age: 56
End: 2021-09-13

## 2021-09-14 ENCOUNTER — TELEPHONE (OUTPATIENT)
Dept: INTERNAL MEDICINE | Facility: CLINIC | Age: 56
End: 2021-09-14

## 2021-09-14 ENCOUNTER — INFUSION (OUTPATIENT)
Dept: INFECTIOUS DISEASES | Facility: HOSPITAL | Age: 56
End: 2021-09-14
Attending: FAMILY MEDICINE
Payer: COMMERCIAL

## 2021-09-14 VITALS
TEMPERATURE: 98 F | SYSTOLIC BLOOD PRESSURE: 111 MMHG | OXYGEN SATURATION: 97 % | HEART RATE: 87 BPM | RESPIRATION RATE: 16 BRPM | DIASTOLIC BLOOD PRESSURE: 71 MMHG

## 2021-09-14 DIAGNOSIS — U07.1 UPPER RESPIRATORY TRACT INFECTION DUE TO COVID-19 VIRUS: Primary | ICD-10-CM

## 2021-09-14 DIAGNOSIS — J06.9 UPPER RESPIRATORY TRACT INFECTION DUE TO COVID-19 VIRUS: ICD-10-CM

## 2021-09-14 DIAGNOSIS — U07.1 COVID-19: Primary | ICD-10-CM

## 2021-09-14 DIAGNOSIS — J06.9 UPPER RESPIRATORY TRACT INFECTION DUE TO COVID-19 VIRUS: Primary | ICD-10-CM

## 2021-09-14 DIAGNOSIS — U07.1 UPPER RESPIRATORY TRACT INFECTION DUE TO COVID-19 VIRUS: ICD-10-CM

## 2021-09-14 PROCEDURE — M0243 CASIRIVI AND IMDEVI INFUSION: HCPCS | Performed by: INTERNAL MEDICINE

## 2021-09-14 PROCEDURE — 63600175 PHARM REV CODE 636 W HCPCS: Performed by: INTERNAL MEDICINE

## 2021-09-14 PROCEDURE — 25000003 PHARM REV CODE 250: Performed by: INTERNAL MEDICINE

## 2021-09-14 RX ORDER — ALBUTEROL SULFATE 90 UG/1
2 AEROSOL, METERED RESPIRATORY (INHALATION)
Status: DISCONTINUED | OUTPATIENT
Start: 2021-09-14 | End: 2021-09-27

## 2021-09-14 RX ORDER — EPINEPHRINE 0.3 MG/.3ML
0.3 INJECTION SUBCUTANEOUS
Status: DISCONTINUED | OUTPATIENT
Start: 2021-09-14 | End: 2021-09-27

## 2021-09-14 RX ORDER — DIPHENHYDRAMINE HYDROCHLORIDE 50 MG/ML
25 INJECTION INTRAMUSCULAR; INTRAVENOUS ONCE AS NEEDED
Status: DISCONTINUED | OUTPATIENT
Start: 2021-09-14 | End: 2021-09-27

## 2021-09-14 RX ORDER — SODIUM CHLORIDE 0.9 % (FLUSH) 0.9 %
10 SYRINGE (ML) INJECTION
Status: DISCONTINUED | OUTPATIENT
Start: 2021-09-14 | End: 2021-09-27

## 2021-09-14 RX ORDER — ACETAMINOPHEN 325 MG/1
650 TABLET ORAL ONCE AS NEEDED
Status: DISCONTINUED | OUTPATIENT
Start: 2021-09-14 | End: 2021-09-27

## 2021-09-14 RX ORDER — ONDANSETRON 4 MG/1
4 TABLET, ORALLY DISINTEGRATING ORAL ONCE AS NEEDED
Status: DISCONTINUED | OUTPATIENT
Start: 2021-09-14 | End: 2021-09-27

## 2021-09-14 RX ADMIN — CASIRIVIMAB AND IMDEVIMAB 600 MG: 600; 600 INJECTION, SOLUTION, CONCENTRATE INTRAVENOUS at 02:09

## 2021-09-20 ENCOUNTER — TELEPHONE (OUTPATIENT)
Dept: INTERNAL MEDICINE | Facility: CLINIC | Age: 56
End: 2021-09-20

## 2021-09-22 ENCOUNTER — TELEPHONE (OUTPATIENT)
Dept: INTERNAL MEDICINE | Facility: CLINIC | Age: 56
End: 2021-09-22

## 2021-09-23 ENCOUNTER — TELEPHONE (OUTPATIENT)
Dept: INTERNAL MEDICINE | Facility: CLINIC | Age: 56
End: 2021-09-23

## 2021-09-27 ENCOUNTER — PATIENT OUTREACH (OUTPATIENT)
Dept: ADMINISTRATIVE | Facility: OTHER | Age: 56
End: 2021-09-27

## 2021-09-27 ENCOUNTER — OFFICE VISIT (OUTPATIENT)
Dept: INTERNAL MEDICINE | Facility: CLINIC | Age: 56
End: 2021-09-27
Payer: COMMERCIAL

## 2021-09-27 VITALS
TEMPERATURE: 99 F | SYSTOLIC BLOOD PRESSURE: 117 MMHG | BODY MASS INDEX: 25.71 KG/M2 | HEART RATE: 89 BPM | HEIGHT: 78 IN | DIASTOLIC BLOOD PRESSURE: 78 MMHG | OXYGEN SATURATION: 99 % | WEIGHT: 222.25 LBS

## 2021-09-27 DIAGNOSIS — Z86.16 HISTORY OF COVID-19: Primary | ICD-10-CM

## 2021-09-27 DIAGNOSIS — Z02.9 ADMINISTRATIVE ENCOUNTER: ICD-10-CM

## 2021-09-27 DIAGNOSIS — Z23 NEED FOR INFLUENZA VACCINATION: ICD-10-CM

## 2021-09-27 PROCEDURE — 3078F DIAST BP <80 MM HG: CPT | Mod: CPTII,S$GLB,, | Performed by: FAMILY MEDICINE

## 2021-09-27 PROCEDURE — 99213 OFFICE O/P EST LOW 20 MIN: CPT | Mod: 25,S$GLB,, | Performed by: FAMILY MEDICINE

## 2021-09-27 PROCEDURE — 90686 FLU VACCINE (QUAD) GREATER THAN OR EQUAL TO 3YO PRESERVATIVE FREE IM: ICD-10-PCS | Mod: S$GLB,,, | Performed by: FAMILY MEDICINE

## 2021-09-27 PROCEDURE — 3078F PR MOST RECENT DIASTOLIC BLOOD PRESSURE < 80 MM HG: ICD-10-PCS | Mod: CPTII,S$GLB,, | Performed by: FAMILY MEDICINE

## 2021-09-27 PROCEDURE — 3008F BODY MASS INDEX DOCD: CPT | Mod: CPTII,S$GLB,, | Performed by: FAMILY MEDICINE

## 2021-09-27 PROCEDURE — 90471 IMMUNIZATION ADMIN: CPT | Mod: S$GLB,,, | Performed by: FAMILY MEDICINE

## 2021-09-27 PROCEDURE — 3074F SYST BP LT 130 MM HG: CPT | Mod: CPTII,S$GLB,, | Performed by: FAMILY MEDICINE

## 2021-09-27 PROCEDURE — 99213 PR OFFICE/OUTPT VISIT, EST, LEVL III, 20-29 MIN: ICD-10-PCS | Mod: 25,S$GLB,, | Performed by: FAMILY MEDICINE

## 2021-09-27 PROCEDURE — 90471 FLU VACCINE (QUAD) GREATER THAN OR EQUAL TO 3YO PRESERVATIVE FREE IM: ICD-10-PCS | Mod: S$GLB,,, | Performed by: FAMILY MEDICINE

## 2021-09-27 PROCEDURE — 3074F PR MOST RECENT SYSTOLIC BLOOD PRESSURE < 130 MM HG: ICD-10-PCS | Mod: CPTII,S$GLB,, | Performed by: FAMILY MEDICINE

## 2021-09-27 PROCEDURE — 99999 PR PBB SHADOW E&M-EST. PATIENT-LVL III: ICD-10-PCS | Mod: PBBFAC,,, | Performed by: FAMILY MEDICINE

## 2021-09-27 PROCEDURE — 99999 PR PBB SHADOW E&M-EST. PATIENT-LVL III: CPT | Mod: PBBFAC,,, | Performed by: FAMILY MEDICINE

## 2021-09-27 PROCEDURE — 90686 IIV4 VACC NO PRSV 0.5 ML IM: CPT | Mod: S$GLB,,, | Performed by: FAMILY MEDICINE

## 2021-09-27 PROCEDURE — 3008F PR BODY MASS INDEX (BMI) DOCUMENTED: ICD-10-PCS | Mod: CPTII,S$GLB,, | Performed by: FAMILY MEDICINE

## 2021-09-28 ENCOUNTER — OFFICE VISIT (OUTPATIENT)
Dept: PODIATRY | Facility: CLINIC | Age: 56
End: 2021-09-28
Payer: COMMERCIAL

## 2021-09-28 VITALS
SYSTOLIC BLOOD PRESSURE: 119 MMHG | HEART RATE: 77 BPM | BODY MASS INDEX: 25.69 KG/M2 | HEIGHT: 78 IN | DIASTOLIC BLOOD PRESSURE: 75 MMHG | WEIGHT: 222 LBS

## 2021-09-28 DIAGNOSIS — G60.9 IDIOPATHIC PERIPHERAL NEUROPATHY: Primary | ICD-10-CM

## 2021-09-28 DIAGNOSIS — B35.1 ONYCHOMYCOSIS OF TOENAIL: ICD-10-CM

## 2021-09-28 PROCEDURE — 1159F MED LIST DOCD IN RCRD: CPT | Mod: CPTII,S$GLB,, | Performed by: PODIATRIST

## 2021-09-28 PROCEDURE — 99999 PR PBB SHADOW E&M-EST. PATIENT-LVL IV: CPT | Mod: PBBFAC,,, | Performed by: PODIATRIST

## 2021-09-28 PROCEDURE — 99999 PR PBB SHADOW E&M-EST. PATIENT-LVL IV: ICD-10-PCS | Mod: PBBFAC,,, | Performed by: PODIATRIST

## 2021-09-28 PROCEDURE — 3008F PR BODY MASS INDEX (BMI) DOCUMENTED: ICD-10-PCS | Mod: CPTII,S$GLB,, | Performed by: PODIATRIST

## 2021-09-28 PROCEDURE — 1159F PR MEDICATION LIST DOCUMENTED IN MEDICAL RECORD: ICD-10-PCS | Mod: CPTII,S$GLB,, | Performed by: PODIATRIST

## 2021-09-28 PROCEDURE — 3008F BODY MASS INDEX DOCD: CPT | Mod: CPTII,S$GLB,, | Performed by: PODIATRIST

## 2021-09-28 PROCEDURE — 3074F SYST BP LT 130 MM HG: CPT | Mod: CPTII,S$GLB,, | Performed by: PODIATRIST

## 2021-09-28 PROCEDURE — 1160F RVW MEDS BY RX/DR IN RCRD: CPT | Mod: CPTII,S$GLB,, | Performed by: PODIATRIST

## 2021-09-28 PROCEDURE — 3074F PR MOST RECENT SYSTOLIC BLOOD PRESSURE < 130 MM HG: ICD-10-PCS | Mod: CPTII,S$GLB,, | Performed by: PODIATRIST

## 2021-09-28 PROCEDURE — 1160F PR REVIEW ALL MEDS BY PRESCRIBER/CLIN PHARMACIST DOCUMENTED: ICD-10-PCS | Mod: CPTII,S$GLB,, | Performed by: PODIATRIST

## 2021-09-28 PROCEDURE — 99212 PR OFFICE/OUTPT VISIT, EST, LEVL II, 10-19 MIN: ICD-10-PCS | Mod: S$GLB,,, | Performed by: PODIATRIST

## 2021-09-28 PROCEDURE — 3078F PR MOST RECENT DIASTOLIC BLOOD PRESSURE < 80 MM HG: ICD-10-PCS | Mod: CPTII,S$GLB,, | Performed by: PODIATRIST

## 2021-09-28 PROCEDURE — 3078F DIAST BP <80 MM HG: CPT | Mod: CPTII,S$GLB,, | Performed by: PODIATRIST

## 2021-09-28 PROCEDURE — 99212 OFFICE O/P EST SF 10 MIN: CPT | Mod: S$GLB,,, | Performed by: PODIATRIST

## 2021-10-05 ENCOUNTER — TELEPHONE (OUTPATIENT)
Dept: INTERNAL MEDICINE | Facility: CLINIC | Age: 56
End: 2021-10-05

## 2021-10-06 ENCOUNTER — TELEPHONE (OUTPATIENT)
Dept: INTERNAL MEDICINE | Facility: CLINIC | Age: 56
End: 2021-10-06

## 2021-10-07 ENCOUNTER — TELEPHONE (OUTPATIENT)
Dept: INTERNAL MEDICINE | Facility: CLINIC | Age: 56
End: 2021-10-07

## 2021-11-09 ENCOUNTER — TELEPHONE (OUTPATIENT)
Dept: PODIATRY | Facility: CLINIC | Age: 56
End: 2021-11-09
Payer: COMMERCIAL

## 2021-11-10 ENCOUNTER — OFFICE VISIT (OUTPATIENT)
Dept: PODIATRY | Facility: CLINIC | Age: 56
End: 2021-11-10
Payer: COMMERCIAL

## 2021-11-10 ENCOUNTER — HOSPITAL ENCOUNTER (OUTPATIENT)
Dept: RADIOLOGY | Facility: HOSPITAL | Age: 56
Discharge: HOME | End: 2021-11-10
Attending: PODIATRIST
Payer: COMMERCIAL

## 2021-11-10 VITALS — WEIGHT: 222 LBS | HEIGHT: 78 IN | BODY MASS INDEX: 25.69 KG/M2

## 2021-11-10 DIAGNOSIS — M79.674 TOE PAIN, BILATERAL: Primary | ICD-10-CM

## 2021-11-10 DIAGNOSIS — M79.674 TOE PAIN, BILATERAL: ICD-10-CM

## 2021-11-10 DIAGNOSIS — M79.675 TOE PAIN, BILATERAL: Primary | ICD-10-CM

## 2021-11-10 DIAGNOSIS — M77.42 METATARSALGIA OF BOTH FEET: ICD-10-CM

## 2021-11-10 DIAGNOSIS — Z87.39 HISTORY OF RHEUMATOID ARTHRITIS: ICD-10-CM

## 2021-11-10 DIAGNOSIS — M77.41 METATARSALGIA OF BOTH FEET: ICD-10-CM

## 2021-11-10 DIAGNOSIS — L84 CORN OR CALLUS: ICD-10-CM

## 2021-11-10 DIAGNOSIS — M79.675 TOE PAIN, BILATERAL: ICD-10-CM

## 2021-11-10 PROCEDURE — 73630 X-RAY EXAM OF FOOT: CPT | Mod: TC,50

## 2021-11-10 PROCEDURE — 1159F PR MEDICATION LIST DOCUMENTED IN MEDICAL RECORD: ICD-10-PCS | Mod: CPTII,S$GLB,, | Performed by: PODIATRIST

## 2021-11-10 PROCEDURE — 99999 PR PBB SHADOW E&M-EST. PATIENT-LVL III: ICD-10-PCS | Mod: PBBFAC,,, | Performed by: PODIATRIST

## 2021-11-10 PROCEDURE — 1160F RVW MEDS BY RX/DR IN RCRD: CPT | Mod: CPTII,S$GLB,, | Performed by: PODIATRIST

## 2021-11-10 PROCEDURE — 73630 XR FOOT COMPLETE 3 VIEW BILATERAL: ICD-10-PCS | Mod: 26,,, | Performed by: RADIOLOGY

## 2021-11-10 PROCEDURE — 99999 PR PBB SHADOW E&M-EST. PATIENT-LVL III: CPT | Mod: PBBFAC,,, | Performed by: PODIATRIST

## 2021-11-10 PROCEDURE — 3008F BODY MASS INDEX DOCD: CPT | Mod: CPTII,S$GLB,, | Performed by: PODIATRIST

## 2021-11-10 PROCEDURE — 1159F MED LIST DOCD IN RCRD: CPT | Mod: CPTII,S$GLB,, | Performed by: PODIATRIST

## 2021-11-10 PROCEDURE — 3008F PR BODY MASS INDEX (BMI) DOCUMENTED: ICD-10-PCS | Mod: CPTII,S$GLB,, | Performed by: PODIATRIST

## 2021-11-10 PROCEDURE — 99213 PR OFFICE/OUTPT VISIT, EST, LEVL III, 20-29 MIN: ICD-10-PCS | Mod: S$GLB,,, | Performed by: PODIATRIST

## 2021-11-10 PROCEDURE — 73630 X-RAY EXAM OF FOOT: CPT | Mod: 26,,, | Performed by: RADIOLOGY

## 2021-11-10 PROCEDURE — 99213 OFFICE O/P EST LOW 20 MIN: CPT | Mod: S$GLB,,, | Performed by: PODIATRIST

## 2021-11-10 PROCEDURE — 1160F PR REVIEW ALL MEDS BY PRESCRIBER/CLIN PHARMACIST DOCUMENTED: ICD-10-PCS | Mod: CPTII,S$GLB,, | Performed by: PODIATRIST

## 2021-11-18 ENCOUNTER — PATIENT OUTREACH (OUTPATIENT)
Dept: ADMINISTRATIVE | Facility: OTHER | Age: 56
End: 2021-11-18
Payer: COMMERCIAL

## 2021-11-22 ENCOUNTER — OFFICE VISIT (OUTPATIENT)
Dept: PODIATRY | Facility: CLINIC | Age: 56
End: 2021-11-22
Payer: COMMERCIAL

## 2021-11-22 VITALS — BODY MASS INDEX: 25.69 KG/M2 | HEIGHT: 78 IN | WEIGHT: 222 LBS

## 2021-11-22 DIAGNOSIS — B35.1 ONYCHOMYCOSIS OF TOENAIL: Primary | ICD-10-CM

## 2021-11-22 PROCEDURE — 17999 UNLISTD PX SKN MUC MEMB SUBQ: CPT | Mod: CSM,S$GLB,, | Performed by: PODIATRIST

## 2021-11-22 PROCEDURE — 99499 NO LOS: ICD-10-PCS | Mod: ,,, | Performed by: PODIATRIST

## 2021-11-22 PROCEDURE — 99999 PR PBB SHADOW E&M-EST. PATIENT-LVL III: ICD-10-PCS | Mod: PBBFAC,,, | Performed by: PODIATRIST

## 2021-11-22 PROCEDURE — 99999 PR PBB SHADOW E&M-EST. PATIENT-LVL III: CPT | Mod: PBBFAC,,, | Performed by: PODIATRIST

## 2021-11-22 PROCEDURE — 99499 UNLISTED E&M SERVICE: CPT | Mod: ,,, | Performed by: PODIATRIST

## 2021-11-22 PROCEDURE — 17999 PR NON-COVERED FOOT CARE: ICD-10-PCS | Mod: CSM,S$GLB,, | Performed by: PODIATRIST

## 2021-12-08 ENCOUNTER — LAB VISIT (OUTPATIENT)
Dept: LAB | Facility: HOSPITAL | Age: 56
End: 2021-12-08
Attending: UROLOGY
Payer: COMMERCIAL

## 2021-12-08 ENCOUNTER — OFFICE VISIT (OUTPATIENT)
Dept: UROLOGY | Facility: CLINIC | Age: 56
End: 2021-12-08
Payer: COMMERCIAL

## 2021-12-08 ENCOUNTER — IMMUNIZATION (OUTPATIENT)
Dept: PRIMARY CARE CLINIC | Facility: CLINIC | Age: 56
End: 2021-12-08
Payer: COMMERCIAL

## 2021-12-08 VITALS
TEMPERATURE: 98 F | BODY MASS INDEX: 26.4 KG/M2 | WEIGHT: 228.19 LBS | HEART RATE: 78 BPM | DIASTOLIC BLOOD PRESSURE: 76 MMHG | SYSTOLIC BLOOD PRESSURE: 108 MMHG | HEIGHT: 78 IN

## 2021-12-08 DIAGNOSIS — N35.912 STRICTURE OF BULBOUS URETHRA IN MALE, UNSPECIFIED STRICTURE TYPE: ICD-10-CM

## 2021-12-08 DIAGNOSIS — R31.0 GROSS HEMATURIA: ICD-10-CM

## 2021-12-08 DIAGNOSIS — M77.42 METATARSALGIA OF BOTH FEET: Primary | ICD-10-CM

## 2021-12-08 DIAGNOSIS — N52.9 ERECTILE DYSFUNCTION, UNSPECIFIED ERECTILE DYSFUNCTION TYPE: ICD-10-CM

## 2021-12-08 DIAGNOSIS — Z23 NEED FOR VACCINATION: Primary | ICD-10-CM

## 2021-12-08 DIAGNOSIS — C61 PROSTATE CANCER: ICD-10-CM

## 2021-12-08 DIAGNOSIS — M77.41 METATARSALGIA OF BOTH FEET: Primary | ICD-10-CM

## 2021-12-08 DIAGNOSIS — C61 PROSTATE CANCER: Primary | ICD-10-CM

## 2021-12-08 LAB — COMPLEXED PSA SERPL-MCNC: <0.01 NG/ML (ref 0–4)

## 2021-12-08 PROCEDURE — 84153 ASSAY OF PSA TOTAL: CPT | Performed by: UROLOGY

## 2021-12-08 PROCEDURE — 99999 PR PBB SHADOW E&M-EST. PATIENT-LVL IV: ICD-10-PCS | Mod: PBBFAC,,, | Performed by: UROLOGY

## 2021-12-08 PROCEDURE — 36415 COLL VENOUS BLD VENIPUNCTURE: CPT | Performed by: UROLOGY

## 2021-12-08 PROCEDURE — 99214 OFFICE O/P EST MOD 30 MIN: CPT | Mod: S$GLB,,, | Performed by: UROLOGY

## 2021-12-08 PROCEDURE — 99999 PR PBB SHADOW E&M-EST. PATIENT-LVL IV: CPT | Mod: PBBFAC,,, | Performed by: UROLOGY

## 2021-12-08 PROCEDURE — 0004A COVID-19, MRNA, LNP-S, PF, 30 MCG/0.3 ML DOSE VACCINE: CPT | Mod: CV19,PBBFAC | Performed by: FAMILY MEDICINE

## 2021-12-08 PROCEDURE — 99214 PR OFFICE/OUTPT VISIT, EST, LEVL IV, 30-39 MIN: ICD-10-PCS | Mod: S$GLB,,, | Performed by: UROLOGY

## 2021-12-08 RX ORDER — IBUPROFEN 800 MG/1
800 TABLET ORAL 2 TIMES DAILY
Qty: 60 TABLET | Refills: 1 | Status: SHIPPED | OUTPATIENT
Start: 2021-12-08 | End: 2022-02-01 | Stop reason: SDUPTHER

## 2021-12-16 ENCOUNTER — OFFICE VISIT (OUTPATIENT)
Dept: OTOLARYNGOLOGY | Facility: CLINIC | Age: 56
End: 2021-12-16
Payer: COMMERCIAL

## 2021-12-16 VITALS — WEIGHT: 220 LBS | BODY MASS INDEX: 24.78 KG/M2

## 2021-12-16 DIAGNOSIS — K11.5 PAROTID SIALOLITHIASIS: Primary | ICD-10-CM

## 2021-12-16 PROCEDURE — 99999 PR PBB SHADOW E&M-EST. PATIENT-LVL III: CPT | Mod: PBBFAC,,, | Performed by: OTOLARYNGOLOGY

## 2021-12-16 PROCEDURE — 99213 OFFICE O/P EST LOW 20 MIN: CPT | Mod: S$GLB,,, | Performed by: OTOLARYNGOLOGY

## 2021-12-16 PROCEDURE — 99999 PR PBB SHADOW E&M-EST. PATIENT-LVL III: ICD-10-PCS | Mod: PBBFAC,,, | Performed by: OTOLARYNGOLOGY

## 2021-12-16 PROCEDURE — 99213 PR OFFICE/OUTPT VISIT, EST, LEVL III, 20-29 MIN: ICD-10-PCS | Mod: S$GLB,,, | Performed by: OTOLARYNGOLOGY

## 2022-01-24 ENCOUNTER — PATIENT OUTREACH (OUTPATIENT)
Dept: ADMINISTRATIVE | Facility: OTHER | Age: 57
End: 2022-01-24
Payer: COMMERCIAL

## 2022-01-25 ENCOUNTER — OFFICE VISIT (OUTPATIENT)
Dept: PODIATRY | Facility: CLINIC | Age: 57
End: 2022-01-25
Payer: COMMERCIAL

## 2022-01-25 VITALS
SYSTOLIC BLOOD PRESSURE: 118 MMHG | BODY MASS INDEX: 25.45 KG/M2 | HEIGHT: 78 IN | DIASTOLIC BLOOD PRESSURE: 77 MMHG | HEART RATE: 79 BPM | WEIGHT: 220 LBS

## 2022-01-25 DIAGNOSIS — M79.674 TOE PAIN, BILATERAL: ICD-10-CM

## 2022-01-25 DIAGNOSIS — B35.1 ONYCHOMYCOSIS OF TOENAIL: Primary | ICD-10-CM

## 2022-01-25 DIAGNOSIS — M79.675 TOE PAIN, BILATERAL: ICD-10-CM

## 2022-01-25 PROCEDURE — 3078F PR MOST RECENT DIASTOLIC BLOOD PRESSURE < 80 MM HG: ICD-10-PCS | Mod: CPTII,S$GLB,, | Performed by: PODIATRIST

## 2022-01-25 PROCEDURE — 1159F PR MEDICATION LIST DOCUMENTED IN MEDICAL RECORD: ICD-10-PCS | Mod: CPTII,S$GLB,, | Performed by: PODIATRIST

## 2022-01-25 PROCEDURE — 3074F PR MOST RECENT SYSTOLIC BLOOD PRESSURE < 130 MM HG: ICD-10-PCS | Mod: CPTII,S$GLB,, | Performed by: PODIATRIST

## 2022-01-25 PROCEDURE — 99999 PR PBB SHADOW E&M-EST. PATIENT-LVL IV: CPT | Mod: PBBFAC,,, | Performed by: PODIATRIST

## 2022-01-25 PROCEDURE — 1160F RVW MEDS BY RX/DR IN RCRD: CPT | Mod: CPTII,S$GLB,, | Performed by: PODIATRIST

## 2022-01-25 PROCEDURE — 99999 PR PBB SHADOW E&M-EST. PATIENT-LVL IV: ICD-10-PCS | Mod: PBBFAC,,, | Performed by: PODIATRIST

## 2022-01-25 PROCEDURE — 3074F SYST BP LT 130 MM HG: CPT | Mod: CPTII,S$GLB,, | Performed by: PODIATRIST

## 2022-01-25 PROCEDURE — 99499 NO LOS: ICD-10-PCS | Mod: ,,, | Performed by: PODIATRIST

## 2022-01-25 PROCEDURE — 3008F BODY MASS INDEX DOCD: CPT | Mod: CPTII,S$GLB,, | Performed by: PODIATRIST

## 2022-01-25 PROCEDURE — 1159F MED LIST DOCD IN RCRD: CPT | Mod: CPTII,S$GLB,, | Performed by: PODIATRIST

## 2022-01-25 PROCEDURE — 17999 UNLISTD PX SKN MUC MEMB SUBQ: CPT | Mod: CSM,S$GLB,, | Performed by: PODIATRIST

## 2022-01-25 PROCEDURE — 3078F DIAST BP <80 MM HG: CPT | Mod: CPTII,S$GLB,, | Performed by: PODIATRIST

## 2022-01-25 PROCEDURE — 17999 PR NON-COVERED FOOT CARE: ICD-10-PCS | Mod: CSM,S$GLB,, | Performed by: PODIATRIST

## 2022-01-25 PROCEDURE — 1160F PR REVIEW ALL MEDS BY PRESCRIBER/CLIN PHARMACIST DOCUMENTED: ICD-10-PCS | Mod: CPTII,S$GLB,, | Performed by: PODIATRIST

## 2022-01-25 PROCEDURE — 3008F PR BODY MASS INDEX (BMI) DOCUMENTED: ICD-10-PCS | Mod: CPTII,S$GLB,, | Performed by: PODIATRIST

## 2022-01-25 PROCEDURE — 99499 UNLISTED E&M SERVICE: CPT | Mod: ,,, | Performed by: PODIATRIST

## 2022-01-31 ENCOUNTER — TELEPHONE (OUTPATIENT)
Dept: PODIATRY | Facility: CLINIC | Age: 57
End: 2022-01-31
Payer: COMMERCIAL

## 2022-01-31 NOTE — TELEPHONE ENCOUNTER
Returned phone call. No answer. Could not leave message due to mail box not being set up.    ----- Message from Tamara Espinoza sent at 1/31/2022  2:23 PM CST -----  Contact: Patient  Patient called to consult with nurse or staff regarding medication for ibuprofen. He would like the medication called in to his pharmacy if possible and would like a call back. He can be reached at [phone]. Thanks/MR

## 2022-02-01 DIAGNOSIS — M77.41 METATARSALGIA OF BOTH FEET: ICD-10-CM

## 2022-02-01 DIAGNOSIS — M77.42 METATARSALGIA OF BOTH FEET: ICD-10-CM

## 2022-02-01 RX ORDER — IBUPROFEN 800 MG/1
800 TABLET ORAL EVERY 8 HOURS PRN
Qty: 90 TABLET | Refills: 0 | Status: SHIPPED | OUTPATIENT
Start: 2022-02-01 | End: 2022-11-10

## 2022-02-01 NOTE — TELEPHONE ENCOUNTER
----- Message from Daksha Bethea sent at 2/1/2022 11:34 AM CST -----  Contact: [phone]  Requesting an RX refill or new RX.  Is this a refill or new RX: refill  RX name and strength (copy/paste from chart):  ibuprofen (ADVIL,MOTRIN) 800 MG tablet  Is this a 30 day or 90 day RX: 30  Pharmacy name and phone # (copy/paste from chart):    [facility] [address] [address]  [address] [facility] [address]  Phone: [phone] Fax: [phone]  The doctors have asked that we provide their patients with the following 2 reminders -- prescription refills can take up to 72 hours, and a friendly reminder that in the future you can use your [facility] account to request refills: yes

## 2022-02-03 ENCOUNTER — PATIENT MESSAGE (OUTPATIENT)
Dept: RHEUMATOLOGY | Facility: CLINIC | Age: 57
End: 2022-02-03
Payer: COMMERCIAL
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
## 2022-02-11 ENCOUNTER — TELEPHONE (OUTPATIENT)
Dept: RHEUMATOLOGY | Facility: CLINIC | Age: 57
End: 2022-02-11
Payer: COMMERCIAL

## 2022-02-11 ENCOUNTER — PATIENT MESSAGE (OUTPATIENT)
Dept: RHEUMATOLOGY | Facility: CLINIC | Age: 57
End: 2022-02-11
Payer: COMMERCIAL

## 2022-02-11 NOTE — TELEPHONE ENCOUNTER
-Unable to reach patient or leave message. Patient voicemail is not set up to leave a message.     Thank you,   Irene     ----- Message from Francy Arana MA sent at 2/4/2022  2:47 PM CST -----  Contact: Sachin    ----- Message -----  From: Sowmya Quezada  Sent: 2/4/2022   1:19 PM CST  To: Manuel SALAZAR Staff    Pt is requesting a call to see if he can change appt time on the 15th. Please call him back at [phone].        Thanks  DD

## 2022-02-15 ENCOUNTER — OFFICE VISIT (OUTPATIENT)
Dept: RHEUMATOLOGY | Facility: CLINIC | Age: 57
End: 2022-02-15
Payer: COMMERCIAL

## 2022-02-15 ENCOUNTER — OFFICE VISIT (OUTPATIENT)
Dept: OTOLARYNGOLOGY | Facility: CLINIC | Age: 57
End: 2022-02-15
Payer: COMMERCIAL

## 2022-02-15 VITALS
DIASTOLIC BLOOD PRESSURE: 79 MMHG | HEIGHT: 78 IN | BODY MASS INDEX: 27.29 KG/M2 | SYSTOLIC BLOOD PRESSURE: 115 MMHG | HEART RATE: 77 BPM | WEIGHT: 235.88 LBS

## 2022-02-15 VITALS
DIASTOLIC BLOOD PRESSURE: 75 MMHG | TEMPERATURE: 99 F | SYSTOLIC BLOOD PRESSURE: 114 MMHG | WEIGHT: 232.81 LBS | HEART RATE: 86 BPM | BODY MASS INDEX: 26.23 KG/M2

## 2022-02-15 DIAGNOSIS — K11.5 PAROTID SIALOLITHIASIS: Primary | ICD-10-CM

## 2022-02-15 DIAGNOSIS — M25.50 PAIN IN JOINT INVOLVING MULTIPLE SITES: ICD-10-CM

## 2022-02-15 DIAGNOSIS — Z79.631 LONG TERM METHOTREXATE USER: ICD-10-CM

## 2022-02-15 DIAGNOSIS — M15.9 GENERALIZED OSTEOARTHRITIS OF MULTIPLE SITES: ICD-10-CM

## 2022-02-15 DIAGNOSIS — Z55.9 EDUCATIONAL CIRCUMSTANCE: ICD-10-CM

## 2022-02-15 DIAGNOSIS — M05.79 RHEUMATOID ARTHRITIS INVOLVING MULTIPLE SITES WITH POSITIVE RHEUMATOID FACTOR: Primary | ICD-10-CM

## 2022-02-15 PROCEDURE — 99999 PR PBB SHADOW E&M-EST. PATIENT-LVL IV: ICD-10-PCS | Mod: PBBFAC,,, | Performed by: INTERNAL MEDICINE

## 2022-02-15 PROCEDURE — 3008F BODY MASS INDEX DOCD: CPT | Mod: CPTII,S$GLB,, | Performed by: INTERNAL MEDICINE

## 2022-02-15 PROCEDURE — 3074F SYST BP LT 130 MM HG: CPT | Mod: CPTII,S$GLB,, | Performed by: OTOLARYNGOLOGY

## 2022-02-15 PROCEDURE — 99215 OFFICE O/P EST HI 40 MIN: CPT | Mod: S$GLB,,, | Performed by: INTERNAL MEDICINE

## 2022-02-15 PROCEDURE — 1160F PR REVIEW ALL MEDS BY PRESCRIBER/CLIN PHARMACIST DOCUMENTED: ICD-10-PCS | Mod: CPTII,S$GLB,, | Performed by: OTOLARYNGOLOGY

## 2022-02-15 PROCEDURE — 99999 PR PBB SHADOW E&M-EST. PATIENT-LVL III: ICD-10-PCS | Mod: PBBFAC,,, | Performed by: OTOLARYNGOLOGY

## 2022-02-15 PROCEDURE — 1159F MED LIST DOCD IN RCRD: CPT | Mod: CPTII,S$GLB,, | Performed by: INTERNAL MEDICINE

## 2022-02-15 PROCEDURE — 1160F RVW MEDS BY RX/DR IN RCRD: CPT | Mod: CPTII,S$GLB,, | Performed by: OTOLARYNGOLOGY

## 2022-02-15 PROCEDURE — 99213 OFFICE O/P EST LOW 20 MIN: CPT | Mod: S$GLB,,, | Performed by: OTOLARYNGOLOGY

## 2022-02-15 PROCEDURE — 3078F DIAST BP <80 MM HG: CPT | Mod: CPTII,S$GLB,, | Performed by: OTOLARYNGOLOGY

## 2022-02-15 PROCEDURE — 1159F MED LIST DOCD IN RCRD: CPT | Mod: CPTII,S$GLB,, | Performed by: OTOLARYNGOLOGY

## 2022-02-15 PROCEDURE — 3008F BODY MASS INDEX DOCD: CPT | Mod: CPTII,S$GLB,, | Performed by: OTOLARYNGOLOGY

## 2022-02-15 PROCEDURE — 1160F PR REVIEW ALL MEDS BY PRESCRIBER/CLIN PHARMACIST DOCUMENTED: ICD-10-PCS | Mod: CPTII,S$GLB,, | Performed by: INTERNAL MEDICINE

## 2022-02-15 PROCEDURE — 99215 PR OFFICE/OUTPT VISIT, EST, LEVL V, 40-54 MIN: ICD-10-PCS | Mod: S$GLB,,, | Performed by: INTERNAL MEDICINE

## 2022-02-15 PROCEDURE — 3074F SYST BP LT 130 MM HG: CPT | Mod: CPTII,S$GLB,, | Performed by: INTERNAL MEDICINE

## 2022-02-15 PROCEDURE — 99999 PR PBB SHADOW E&M-EST. PATIENT-LVL IV: CPT | Mod: PBBFAC,,, | Performed by: INTERNAL MEDICINE

## 2022-02-15 PROCEDURE — 3078F DIAST BP <80 MM HG: CPT | Mod: CPTII,S$GLB,, | Performed by: INTERNAL MEDICINE

## 2022-02-15 PROCEDURE — 1159F PR MEDICATION LIST DOCUMENTED IN MEDICAL RECORD: ICD-10-PCS | Mod: CPTII,S$GLB,, | Performed by: INTERNAL MEDICINE

## 2022-02-15 PROCEDURE — 3008F PR BODY MASS INDEX (BMI) DOCUMENTED: ICD-10-PCS | Mod: CPTII,S$GLB,, | Performed by: INTERNAL MEDICINE

## 2022-02-15 PROCEDURE — 99999 PR PBB SHADOW E&M-EST. PATIENT-LVL III: CPT | Mod: PBBFAC,,, | Performed by: OTOLARYNGOLOGY

## 2022-02-15 PROCEDURE — 3078F PR MOST RECENT DIASTOLIC BLOOD PRESSURE < 80 MM HG: ICD-10-PCS | Mod: CPTII,S$GLB,, | Performed by: OTOLARYNGOLOGY

## 2022-02-15 PROCEDURE — 3078F PR MOST RECENT DIASTOLIC BLOOD PRESSURE < 80 MM HG: ICD-10-PCS | Mod: CPTII,S$GLB,, | Performed by: INTERNAL MEDICINE

## 2022-02-15 PROCEDURE — 3074F PR MOST RECENT SYSTOLIC BLOOD PRESSURE < 130 MM HG: ICD-10-PCS | Mod: CPTII,S$GLB,, | Performed by: INTERNAL MEDICINE

## 2022-02-15 PROCEDURE — 1160F RVW MEDS BY RX/DR IN RCRD: CPT | Mod: CPTII,S$GLB,, | Performed by: INTERNAL MEDICINE

## 2022-02-15 PROCEDURE — 1159F PR MEDICATION LIST DOCUMENTED IN MEDICAL RECORD: ICD-10-PCS | Mod: CPTII,S$GLB,, | Performed by: OTOLARYNGOLOGY

## 2022-02-15 PROCEDURE — 99213 PR OFFICE/OUTPT VISIT, EST, LEVL III, 20-29 MIN: ICD-10-PCS | Mod: S$GLB,,, | Performed by: OTOLARYNGOLOGY

## 2022-02-15 PROCEDURE — 3074F PR MOST RECENT SYSTOLIC BLOOD PRESSURE < 130 MM HG: ICD-10-PCS | Mod: CPTII,S$GLB,, | Performed by: OTOLARYNGOLOGY

## 2022-02-15 PROCEDURE — 3008F PR BODY MASS INDEX (BMI) DOCUMENTED: ICD-10-PCS | Mod: CPTII,S$GLB,, | Performed by: OTOLARYNGOLOGY

## 2022-02-15 SDOH — SOCIAL DETERMINANTS OF HEALTH (SDOH): PROBLEMS RELATED TO EDUCATION AND LITERACY, UNSPECIFIED: Z55.9

## 2022-02-15 ASSESSMENT — ROUTINE ASSESSMENT OF PATIENT INDEX DATA (RAPID3)
AM STIFFNESS SCORE: 1, YES
PAIN SCORE: 5
MDHAQ FUNCTION SCORE: 0.3
FATIGUE SCORE: 5
WHEN YOU AWAKENED IN THE MORNING OVER THE LAST WEEK, PLEASE INDICATE THE AMOUNT OF TIME IT TAKES UNTIL YOU ARE AS LIMBER AS YOU WILL BE FOR THE DAY: 10 MIN
PATIENT GLOBAL ASSESSMENT SCORE: 3.5
TOTAL RAPID3 SCORE: 3.17
PSYCHOLOGICAL DISTRESS SCORE: 2.2

## 2022-02-15 NOTE — PATIENT INSTRUCTIONS
Read on fibromyalgia in men on the handouts provided.    Begin a program of daily, low impact, dedicated, aerobic exercise.    Also: Chair Yoga and/or Nishant Chi will help.    Do not eat late. Leave at least 4 hrs before going to bed.    Let us know who your PCP is so we can communicate with her or him.    Cut down on ibuprofen.    You can substitute acetaminophen up to 3,000 mg/day.    Therma Care Pads may help.

## 2022-02-21 NOTE — PROGRESS NOTES
Chief Complaint   Patient presents with    parotid glands       HPI   56 y.o. male presents from Dr. Wheeler with a history of right parotid sialolithiasis.  He has previously undergone salivary endoscopy.  The stone was not accessible via this endoscopic approach.  He reports recurrent swelling and pain in the right parotid gland.  He presents today to discuss treatment options.      Review of Systems   Constitutional: Negative for fatigue and unexpected weight change.   HENT: Per HPI.  Eyes: Negative for visual disturbance.   Respiratory: Negative for shortness of breath, hemoptysis   Cardiovascular: Negative for chest pain and palpitations.   Musculoskeletal: Negative for decreased ROM, back pain.   Skin: Negative for rash, sunburn, itching.   Neurological: Negative for dizziness and seizures.   Hematological: Negative for adenopathy. Does not bruise/bleed easily.   Endocrine: Negative for rapid weight loss/weight gain, heat/cold intolerance.     Past Medical History   Patient Active Problem List   Diagnosis    History of prostate cancer    RA (rheumatoid arthritis)    Status post prostatectomy (06/07/12)    Erectile dysfunction    Peyronie's disease    Long-term use of immunosuppressant medication    Cephalic vein thrombosis-post op    Essential hypertension    Tension-type headache, not intractable    Lumbar foraminal stenosis    Parotid sialolithiasis    Gastroesophageal reflux disease without esophagitis    Mixed hyperlipidemia    Melena    Screen for colon cancer    Prostate cancer    Gross hematuria    Stricture of bulbous urethra in male           Past Surgical History   Past Surgical History:   Procedure Laterality Date    COLONOSCOPY N/A 4/21/2021    Procedure: COLONOSCOPY covid test scheduled on 4/19/21;  Surgeon: Darrell Worthington MD;  Location: [facility];  Service: Endoscopy;  Laterality: N/A;    ESOPHAGOGASTRODUODENOSCOPY      ESOPHAGOGASTRODUODENOSCOPY N/A 4/5/2021    Procedure:  EGD (ESOPHAGOGASTRODUODENOSCOPY) Rapid Covid test needed;  Surgeon: Darrell Worthington MD;  Location: [facility];  Service: Endoscopy;  Laterality: N/A;    PROSTATECTOMY  2011         Family History   Family History   Problem Relation Age of Onset    Stroke Father     Alcohol abuse Father     Arthritis Mother     Hypertension Mother     Anxiety disorder Mother     No Known Problems Sister     Kidney disease Brother            Social History   .  Social History     Socioeconomic History    Marital status: Single   Tobacco Use    Smoking status: Never Smoker    Smokeless tobacco: Never Used   Substance and Sexual Activity    Alcohol use: No    Drug use: No    Sexual activity: Yes     Partners: Female         Allergies   Review of patient's allergies indicates:   Allergen Reactions    No known allergies            Physical Exam     Vitals:    02/15/22 1507   BP: 114/75   Pulse: 86   Temp: 98.6 °F (37 °C)         Body mass index is 26.23 kg/m².      General: AOx3, NAD   Respiratory:  Symmetric chest rise, normal effort  Neck: No scars.  No cervical lymphadenopathy, thyromegaly or thyroid nodules.  Normal range of motion.    Face: House Brackmann I bilaterally.  Firm, tender nodule within the right parotid possibly consistent palpable stone.    Neck CT reviewed.    Assessment/Plan  Problem List Items Addressed This Visit        ENT    Parotid sialolithiasis - Primary     Will likely need open removal of stone.  CT neck ordered for surgical planning.  I will contact him with the results.           Relevant Orders    CT Soft Tissue Neck With Contrast

## 2022-02-21 NOTE — ASSESSMENT & PLAN NOTE
Will likely need open removal of stone.  CT neck ordered for surgical planning.  I will contact him with the results.

## 2022-02-22 ENCOUNTER — HOSPITAL ENCOUNTER (OUTPATIENT)
Dept: RADIOLOGY | Facility: HOSPITAL | Age: 57
Discharge: HOME | End: 2022-02-22
Attending: OTOLARYNGOLOGY
Payer: COMMERCIAL

## 2022-02-22 DIAGNOSIS — K11.5 PAROTID SIALOLITHIASIS: ICD-10-CM

## 2022-02-22 PROCEDURE — 70491 CT SOFT TISSUE NECK W/DYE: CPT | Mod: TC

## 2022-02-22 PROCEDURE — 25500020 PHARM REV CODE 255: Performed by: OTOLARYNGOLOGY

## 2022-02-22 RX ADMIN — IOHEXOL 100 ML: 350 INJECTION, SOLUTION INTRAVENOUS at 05:02

## 2022-02-23 ENCOUNTER — PATIENT MESSAGE (OUTPATIENT)
Dept: RHEUMATOLOGY | Facility: CLINIC | Age: 57
End: 2022-02-23
Payer: COMMERCIAL

## 2022-02-23 ENCOUNTER — TELEPHONE (OUTPATIENT)
Dept: INTERNAL MEDICINE | Facility: CLINIC | Age: 57
End: 2022-02-23
Payer: COMMERCIAL

## 2022-02-23 ENCOUNTER — TELEPHONE (OUTPATIENT)
Dept: OTOLARYNGOLOGY | Facility: CLINIC | Age: 57
End: 2022-02-23
Payer: COMMERCIAL

## 2022-02-23 ENCOUNTER — TELEPHONE (OUTPATIENT)
Dept: RHEUMATOLOGY | Facility: CLINIC | Age: 57
End: 2022-02-23
Payer: COMMERCIAL

## 2022-02-23 DIAGNOSIS — D84.9 IMMUNOSUPPRESSED STATUS: ICD-10-CM

## 2022-02-23 DIAGNOSIS — Z79.631 LONG TERM METHOTREXATE USER: ICD-10-CM

## 2022-02-23 DIAGNOSIS — R79.89 ABNORMAL LIVER FUNCTION TESTS: Primary | ICD-10-CM

## 2022-02-23 NOTE — TELEPHONE ENCOUNTER
----- Message from Jose L aWrd MD sent at 2/23/2022  9:58 AM CST -----  Patient is overdue for his annual physical exam  Please contact him and schedule for next available appointment  Review and discuss results with him that time

## 2022-02-23 NOTE — TELEPHONE ENCOUNTER
Called to  Tell patient he has to get a new pcp, because Dr. Ward states, patient is no longer under his care.  As per Dr. Jackson-patient need to establish care with a new pcp to follow his abnormal liver function tests,  Unable to leave message-voice mail full.

## 2022-02-23 NOTE — TELEPHONE ENCOUNTER
----- Message from Jose L Ward MD sent at 2/23/2022  9:58 AM CST -----  Patient is overdue for his annual physical exam  Please contact him and schedule for next available appointment  Review and discuss results with him that time

## 2022-02-23 NOTE — TELEPHONE ENCOUNTER
Contacted patient  States no longer under my care  Did not provide name of new primary care physician

## 2022-02-24 ENCOUNTER — OFFICE VISIT (OUTPATIENT)
Dept: INTERNAL MEDICINE | Facility: CLINIC | Age: 57
End: 2022-02-24
Payer: COMMERCIAL

## 2022-02-24 VITALS
OXYGEN SATURATION: 98 % | DIASTOLIC BLOOD PRESSURE: 78 MMHG | BODY MASS INDEX: 26.53 KG/M2 | SYSTOLIC BLOOD PRESSURE: 112 MMHG | HEIGHT: 78 IN | RESPIRATION RATE: 16 BRPM | WEIGHT: 229.25 LBS | HEART RATE: 98 BPM | TEMPERATURE: 98 F

## 2022-02-24 DIAGNOSIS — M54.50 ACUTE BILATERAL LOW BACK PAIN WITHOUT SCIATICA: ICD-10-CM

## 2022-02-24 DIAGNOSIS — L02.02 FURUNCLE OF CHEEK: Primary | ICD-10-CM

## 2022-02-24 PROCEDURE — 99999 PR PBB SHADOW E&M-EST. PATIENT-LVL IV: ICD-10-PCS | Mod: PBBFAC,,, | Performed by: INTERNAL MEDICINE

## 2022-02-24 PROCEDURE — 99214 PR OFFICE/OUTPT VISIT, EST, LEVL IV, 30-39 MIN: ICD-10-PCS | Mod: S$GLB,,, | Performed by: INTERNAL MEDICINE

## 2022-02-24 PROCEDURE — 99999 PR PBB SHADOW E&M-EST. PATIENT-LVL IV: CPT | Mod: PBBFAC,,, | Performed by: INTERNAL MEDICINE

## 2022-02-24 PROCEDURE — 3078F DIAST BP <80 MM HG: CPT | Mod: CPTII,S$GLB,, | Performed by: INTERNAL MEDICINE

## 2022-02-24 PROCEDURE — 3074F PR MOST RECENT SYSTOLIC BLOOD PRESSURE < 130 MM HG: ICD-10-PCS | Mod: CPTII,S$GLB,, | Performed by: INTERNAL MEDICINE

## 2022-02-24 PROCEDURE — 99214 OFFICE O/P EST MOD 30 MIN: CPT | Mod: S$GLB,,, | Performed by: INTERNAL MEDICINE

## 2022-02-24 PROCEDURE — 3074F SYST BP LT 130 MM HG: CPT | Mod: CPTII,S$GLB,, | Performed by: INTERNAL MEDICINE

## 2022-02-24 PROCEDURE — 3078F PR MOST RECENT DIASTOLIC BLOOD PRESSURE < 80 MM HG: ICD-10-PCS | Mod: CPTII,S$GLB,, | Performed by: INTERNAL MEDICINE

## 2022-02-24 PROCEDURE — 1159F MED LIST DOCD IN RCRD: CPT | Mod: CPTII,S$GLB,, | Performed by: INTERNAL MEDICINE

## 2022-02-24 PROCEDURE — 3008F BODY MASS INDEX DOCD: CPT | Mod: CPTII,S$GLB,, | Performed by: INTERNAL MEDICINE

## 2022-02-24 PROCEDURE — 3008F PR BODY MASS INDEX (BMI) DOCUMENTED: ICD-10-PCS | Mod: CPTII,S$GLB,, | Performed by: INTERNAL MEDICINE

## 2022-02-24 PROCEDURE — 1159F PR MEDICATION LIST DOCUMENTED IN MEDICAL RECORD: ICD-10-PCS | Mod: CPTII,S$GLB,, | Performed by: INTERNAL MEDICINE

## 2022-02-24 RX ORDER — CYCLOBENZAPRINE HCL 10 MG
TABLET ORAL
Qty: 30 TABLET | Refills: 0 | Status: SHIPPED | OUTPATIENT
Start: 2022-02-24 | End: 2022-05-10

## 2022-02-24 RX ORDER — NAPROXEN 500 MG/1
500 TABLET ORAL 2 TIMES DAILY WITH MEALS
Qty: 15 TABLET | Refills: 0 | Status: SHIPPED | OUTPATIENT
Start: 2022-02-24 | End: 2022-05-04 | Stop reason: SDUPTHER

## 2022-02-24 RX ORDER — SULFAMETHOXAZOLE AND TRIMETHOPRIM 800; 160 MG/1; MG/1
1 TABLET ORAL 2 TIMES DAILY
Qty: 14 TABLET | Refills: 0 | Status: SHIPPED | OUTPATIENT
Start: 2022-02-24 | End: 2022-03-03

## 2022-02-24 NOTE — PROGRESS NOTES
Subjective:      Patient ID: Sachin Gonzalez is a 56 y.o. male.    Chief Complaint: Lesion    Back Pain  This is a new problem. The current episode started 1 to 4 weeks ago. The problem has been waxing and waning since onset. The pain is present in the lumbar spine (michael). The quality of the pain is described as aching. The pain does not radiate. The pain is moderate. The pain is the same all the time. The symptoms are aggravated by bending and twisting. Stiffness is present all day. Pertinent negatives include no abdominal pain, bladder incontinence, bowel incontinence, chest pain, dysuria, fever, leg pain, numbness, paresis, tingling or weakness. He has tried nothing for the symptoms. The treatment provided no relief.     55 yo with   Patient Active Problem List   Diagnosis    History of prostate cancer    RA (rheumatoid arthritis)    Status post prostatectomy (06/07/12)    Erectile dysfunction    Peyronie's disease    Long-term use of immunosuppressant medication    Cephalic vein thrombosis-post op    Essential hypertension    Tension-type headache, not intractable    Lumbar foraminal stenosis    Parotid sialolithiasis    Gastroesophageal reflux disease without esophagitis    Mixed hyperlipidemia    Melena    Screen for colon cancer    Prostate cancer    Gross hematuria    Stricture of bulbous urethra in male     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     Here today c/o boil to right cheek for one week. Has been using warm compresses. Small amount of drainage this morning.      Review of Systems   Constitutional: Negative for chills, diaphoresis and fever.   HENT: Negative for ear pain and sore throat.    Respiratory: Negative for cough, shortness of breath and  "wheezing.    Cardiovascular: Negative for chest pain.   Gastrointestinal: Negative for abdominal pain, blood in stool and bowel incontinence.   Genitourinary: Negative for bladder incontinence, dysuria and hematuria.   Musculoskeletal: Positive for back pain.   Neurological: Negative for tingling, seizures, syncope, weakness and numbness.     Objective:   /78 (BP Location: Left arm, Patient Position: Sitting, BP Method: Large (Manual))   Pulse 98   Temp 98.3 °F (36.8 °C) (Oral)   Resp 16   Ht 6' 7" (2.007 m)   Wt 104 kg (229 lb 4.5 oz)   SpO2 98%   BMI 25.83 kg/m²     Physical Exam  Constitutional:       General: He is awake.      Appearance: Normal appearance.   HENT:      Head: Normocephalic and atraumatic.     Eyes:      Conjunctiva/sclera: Conjunctivae normal.   Pulmonary:      Effort: Pulmonary effort is normal.   Musculoskeletal:      Cervical back: Normal range of motion.      Lumbar back: No swelling, edema, tenderness or bony tenderness. Normal range of motion. Negative right straight leg raise test and negative left straight leg raise test.   Neurological:      Mental Status: He is alert. Mental status is at baseline.   Psychiatric:         Mood and Affect: Mood normal.         Behavior: Behavior normal. Behavior is cooperative.         Assessment:     1. Furuncle of cheek    2. Acute bilateral low back pain without sciatica      Plan:   Furuncle of cheek  -     sulfamethoxazole-trimethoprim 800-160mg (BACTRIM DS) 800-160 mg Tab; Take 1 tablet by mouth 2 (two) times daily. for 7 days  Dispense: 14 tablet; Refill: 0    Acute bilateral low back pain without sciatica  -     naproxen (NAPROSYN) 500 MG tablet; Take 1 tablet (500 mg total) by mouth 2 (two) times daily with meals. For pain and inflammation  Dispense: 15 tablet; Refill: 0  -     cyclobenzaprine (FLEXERIL) 10 MG tablet; 1/2 to one tab po qhs prn muscle spasm  Dispense: 30 tablet; Refill: 0    advised UC or ER for possible lancing if no " improvement or any worsening.     Lab Frequency Next Occurrence   COVID-19 Routine Screening Once 03/26/2021   Hepatitis B Core Antibody, Total Once 02/23/2022   Hepatitis B Surface Ab, Qualitative Once 02/23/2022   Hepatitis B Surface Antigen Once 02/23/2022   Hepatitis C Antibody Once 02/23/2022   HEPATIC FUNCTION PANEL Once 02/23/2022   Ambulatory referral/consult Order for Evusheld Once 02/24/2022   CBC Auto Differential     Comprehensive Metabolic Panel     C-Reactive Protein     Sedimentation rate     CBC Auto Differential     Comprehensive Metabolic Panel     C-Reactive Protein     Sedimentation rate         Problem List Items Addressed This Visit    None     Visit Diagnoses     Furuncle of cheek    -  Primary    Relevant Medications    sulfamethoxazole-trimethoprim 800-160mg (BACTRIM DS) 800-160 mg Tab    Acute bilateral low back pain without sciatica        Relevant Medications    naproxen (NAPROSYN) 500 MG tablet    cyclobenzaprine (FLEXERIL) 10 MG tablet          Follow up if symptoms worsen or fail to improve.

## 2022-02-28 ENCOUNTER — TELEPHONE (OUTPATIENT)
Dept: RHEUMATOLOGY | Facility: CLINIC | Age: 57
End: 2022-02-28
Payer: COMMERCIAL

## 2022-02-28 NOTE — TELEPHONE ENCOUNTER
As per Dr. Jackson-Please give him the message that I sent to him. He needs to relay message to his PCP or get one.    Patient verbalizes understanding.  Lab appointment scheduled.

## 2022-03-04 ENCOUNTER — PATIENT MESSAGE (OUTPATIENT)
Dept: OTOLARYNGOLOGY | Facility: CLINIC | Age: 57
End: 2022-03-04
Payer: COMMERCIAL

## 2022-03-15 ENCOUNTER — PATIENT OUTREACH (OUTPATIENT)
Dept: ADMINISTRATIVE | Facility: OTHER | Age: 57
End: 2022-03-15
Payer: COMMERCIAL

## 2022-03-16 ENCOUNTER — OFFICE VISIT (OUTPATIENT)
Dept: DERMATOLOGY | Facility: CLINIC | Age: 57
End: 2022-03-16
Payer: COMMERCIAL

## 2022-03-16 ENCOUNTER — LAB VISIT (OUTPATIENT)
Dept: LAB | Facility: HOSPITAL | Age: 57
End: 2022-03-16
Attending: INTERNAL MEDICINE
Payer: COMMERCIAL

## 2022-03-16 DIAGNOSIS — Z79.631 LONG TERM METHOTREXATE USER: ICD-10-CM

## 2022-03-16 DIAGNOSIS — R79.89 ABNORMAL LIVER FUNCTION TESTS: ICD-10-CM

## 2022-03-16 DIAGNOSIS — D48.5 NEOPLASM OF UNCERTAIN BEHAVIOR OF SKIN: Primary | ICD-10-CM

## 2022-03-16 LAB
ALBUMIN SERPL BCP-MCNC: 3.8 G/DL (ref 3.5–5.2)
ALP SERPL-CCNC: 85 U/L (ref 55–135)
ALT SERPL W/O P-5'-P-CCNC: 46 U/L (ref 10–44)
AST SERPL-CCNC: 43 U/L (ref 10–40)
BILIRUB DIRECT SERPL-MCNC: 0.1 MG/DL (ref 0.1–0.3)
BILIRUB SERPL-MCNC: 0.4 MG/DL (ref 0.1–1)
PROT SERPL-MCNC: 7.6 G/DL (ref 6–8.4)

## 2022-03-16 PROCEDURE — 1159F PR MEDICATION LIST DOCUMENTED IN MEDICAL RECORD: ICD-10-PCS | Mod: CPTII,S$GLB,, | Performed by: STUDENT IN AN ORGANIZED HEALTH CARE EDUCATION/TRAINING PROGRAM

## 2022-03-16 PROCEDURE — 11104 PUNCH BX SKIN SINGLE LESION: CPT | Mod: S$GLB,,, | Performed by: STUDENT IN AN ORGANIZED HEALTH CARE EDUCATION/TRAINING PROGRAM

## 2022-03-16 PROCEDURE — 88304 PR  SURG PATH,LEVEL III: ICD-10-PCS | Mod: 26,,, | Performed by: PATHOLOGY

## 2022-03-16 PROCEDURE — 86803 HEPATITIS C AB TEST: CPT | Performed by: INTERNAL MEDICINE

## 2022-03-16 PROCEDURE — 88304 TISSUE EXAM BY PATHOLOGIST: CPT | Performed by: PATHOLOGY

## 2022-03-16 PROCEDURE — 99999 PR PBB SHADOW E&M-EST. PATIENT-LVL III: CPT | Mod: PBBFAC,,, | Performed by: STUDENT IN AN ORGANIZED HEALTH CARE EDUCATION/TRAINING PROGRAM

## 2022-03-16 PROCEDURE — 86706 HEP B SURFACE ANTIBODY: CPT | Performed by: INTERNAL MEDICINE

## 2022-03-16 PROCEDURE — 86704 HEP B CORE ANTIBODY TOTAL: CPT | Performed by: INTERNAL MEDICINE

## 2022-03-16 PROCEDURE — 99999 PR PBB SHADOW E&M-EST. PATIENT-LVL III: ICD-10-PCS | Mod: PBBFAC,,, | Performed by: STUDENT IN AN ORGANIZED HEALTH CARE EDUCATION/TRAINING PROGRAM

## 2022-03-16 PROCEDURE — 36415 COLL VENOUS BLD VENIPUNCTURE: CPT | Performed by: INTERNAL MEDICINE

## 2022-03-16 PROCEDURE — 99499 NO LOS: ICD-10-PCS | Mod: S$GLB,,, | Performed by: STUDENT IN AN ORGANIZED HEALTH CARE EDUCATION/TRAINING PROGRAM

## 2022-03-16 PROCEDURE — 87340 HEPATITIS B SURFACE AG IA: CPT | Performed by: INTERNAL MEDICINE

## 2022-03-16 PROCEDURE — 1160F PR REVIEW ALL MEDS BY PRESCRIBER/CLIN PHARMACIST DOCUMENTED: ICD-10-PCS | Mod: CPTII,S$GLB,, | Performed by: STUDENT IN AN ORGANIZED HEALTH CARE EDUCATION/TRAINING PROGRAM

## 2022-03-16 PROCEDURE — 1159F MED LIST DOCD IN RCRD: CPT | Mod: CPTII,S$GLB,, | Performed by: STUDENT IN AN ORGANIZED HEALTH CARE EDUCATION/TRAINING PROGRAM

## 2022-03-16 PROCEDURE — 99499 UNLISTED E&M SERVICE: CPT | Mod: S$GLB,,, | Performed by: STUDENT IN AN ORGANIZED HEALTH CARE EDUCATION/TRAINING PROGRAM

## 2022-03-16 PROCEDURE — 80076 HEPATIC FUNCTION PANEL: CPT | Performed by: INTERNAL MEDICINE

## 2022-03-16 PROCEDURE — 11104 PR PUNCH BIOPSY, SKIN, SINGLE LESION: ICD-10-PCS | Mod: S$GLB,,, | Performed by: STUDENT IN AN ORGANIZED HEALTH CARE EDUCATION/TRAINING PROGRAM

## 2022-03-16 PROCEDURE — 88304 TISSUE EXAM BY PATHOLOGIST: CPT | Mod: 26,,, | Performed by: PATHOLOGY

## 2022-03-16 PROCEDURE — 1160F RVW MEDS BY RX/DR IN RCRD: CPT | Mod: CPTII,S$GLB,, | Performed by: STUDENT IN AN ORGANIZED HEALTH CARE EDUCATION/TRAINING PROGRAM

## 2022-03-16 NOTE — PROGRESS NOTES
Patient Information  Name: Sachin Gonzalez  : 1965  MRN: [id number]     Referring Physician:  Dr. Sanchez   Primary Care Physician:  Dr. Jose L Ward MD   Date of Visit: 2022      Subjective:       Sachin Gonzalez is a 56 y.o. male who presents for   Chief Complaint   Patient presents with    Spot     Spot on face      HPI  Patient with new complaint of lesion(s)  Location: right jawline  Duration: few months  Symptoms: draining  Relieving factors/Previous treatments: none    Patient was last seen:Visit date not found     Prior notes by myself reviewed.   Clinical documentation obtained by nursing staff reviewed.    Review of Systems   Skin: Negative for itching and rash.        Objective:    Physical Exam   Constitutional: He appears well-developed and well-nourished. No distress.   Neurological: He is alert and oriented to person, place, and time. He is not disoriented.   Psychiatric: He has a normal mood and affect.   Skin:   Areas Examined (abnormalities noted in diagram):   Head / Face Inspection Performed              Diagram Legend     Erythematous scaling macule/papule c/w actinic keratosis       Vascular papule c/w angioma      Pigmented verrucoid papule/plaque c/w seborrheic keratosis      Yellow umbilicated papule c/w sebaceous hyperplasia      Irregularly shaped tan macule c/w lentigo     1-2 mm smooth white papules consistent with Milia      Movable subcutaneous cyst with punctum c/w epidermal inclusion cyst      Subcutaneous movable cyst c/w pilar cyst      Firm pink to brown papule c/w dermatofibroma      Pedunculated fleshy papule(s) c/w skin tag(s)      Evenly pigmented macule c/w junctional nevus     Mildly variegated pigmented, slightly irregular-bordered macule c/w mildly atypical nevus      Flesh colored to evenly pigmented papule c/w intradermal nevus       Pink pearly papule/plaque c/w basal cell carcinoma      Erythematous hyperkeratotic cursted plaque c/w SCC      Surgical  scar with no sign of skin cancer recurrence      Open and closed comedones      Inflammatory papules and pustules      Verrucoid papule consistent consistent with wart     Erythematous eczematous patches and plaques     Dystrophic onycholytic nail with subungual debris c/w onychomycosis     Umbilicated papule    Erythematous-base heme-crusted tan verrucoid plaque consistent with inflamed seborrheic keratosis     Erythematous Silvery Scaling Plaque c/w Psoriasis     See annotation          [] Data reviewed  [] Independent review of test  [] Management discussed with another provider    Assessment / Plan:      Pathology Orders:     Normal Orders This Visit    Specimen to Pathology, Dermatology     Questions:    Procedure Type: Dermatology and skin neoplasms    Number of Specimens: 1    ------------------------: -------------------------    Spec 1 Procedure: Biopsy    Spec 1 Clinical Impression: folliculitis vs EIC    Spec 1 Source: right jawline    Release to patient: Immediate        Neoplasm of uncertain behavior of skin  -     Specimen to Pathology, Dermatology  Punch biopsy procedure note:  Punch biopsy performed after verbal consent obtained. Area marked and prepped with alcohol. Approximately 1cc of 1% lidocaine with epinephrine injected. 6 mm disposable punch used to remove lesion. Hemostasis obtained and biopsy site closed with 1 - 2 prolene sutures. Wound care instructions reviewed with patient and handout given.             LOS NUMBER AND COMPLEXITY OF PROBLEMS    COMPLEXITY OF DATA RISK TOTAL TIME (m)   96301  08358 [] 1 self-limited or minor problem [] Minimal to none [] No treatment recommended or patient to monitor 15-29  10-19   65078  91008 Low  [] 2 or > self limited or minor problems  [] 1 stable chronic illness  [] 1 acute, uncomplicated illness or injury Limited (2)  [] Prior external notes from each unique source  [] Review result of each unique test  [] Order each unique test []  Low  OTC  medications, minor skin biopsy 30-44  20-29   29259  18784 Moderate  []  1 or > chronic illness with progression, exacerbation or SE of treatment  []  2 or more stable chronic illnesses  []  1 acute illness with systemic symptoms  []  1 acute complicated injury  []  1 undiagnosed new problem with uncertain prognosis Moderate (1/3 below)  []  3 or more data items        *Now includes assessment requiring independent historian  []  Independent interpretation of a test  []  Discuss management/test with another provider Moderate  []  Prescription drug mgmt  []  Minor surgery with risk discussed  []  Mgmt limited by social determinates 45-59  30-39   17233  49774 High  []  1 or more chronic illness with severe exacerbation, progression or SE of treatment  []  1 acute or chronic illness/injury that poses a threat to life or bodily function Extensive (2/3 below)  []  3 or more data items        *Now includes assessment requiring independent historian.  []  Independent interpretation of a test  []  Discuss management/test with another provider High  []  Major surgery with risk discussed  []  Drug therapy requiring intensive monitoring for toxicity  []  Hospitalization  []  Decision for DNR 60-74  40-54      No follow-ups on file.    Noris Luo MD, FAAD  [facility]

## 2022-03-16 NOTE — PATIENT INSTRUCTIONS

## 2022-03-18 LAB
HBV CORE AB SERPL QL IA: POSITIVE
HBV SURFACE AB SER-ACNC: POSITIVE M[IU]/ML
HBV SURFACE AG SERPL QL IA: NEGATIVE
HCV AB SERPL QL IA: NEGATIVE

## 2022-03-19 ENCOUNTER — PATIENT MESSAGE (OUTPATIENT)
Dept: RHEUMATOLOGY | Facility: CLINIC | Age: 57
End: 2022-03-19
Payer: COMMERCIAL

## 2022-03-19 DIAGNOSIS — R76.8 HEPATITIS B CORE ANTIBODY POSITIVE: ICD-10-CM

## 2022-03-19 DIAGNOSIS — R79.89 ABNORMAL LFTS (LIVER FUNCTION TESTS): Primary | ICD-10-CM

## 2022-03-23 ENCOUNTER — OFFICE VISIT (OUTPATIENT)
Dept: HEPATOLOGY | Facility: CLINIC | Age: 57
End: 2022-03-23
Payer: COMMERCIAL

## 2022-03-23 ENCOUNTER — CLINICAL SUPPORT (OUTPATIENT)
Dept: DERMATOLOGY | Facility: CLINIC | Age: 57
End: 2022-03-23
Payer: COMMERCIAL

## 2022-03-23 VITALS
BODY MASS INDEX: 26.75 KG/M2 | HEIGHT: 78 IN | SYSTOLIC BLOOD PRESSURE: 120 MMHG | WEIGHT: 231.25 LBS | DIASTOLIC BLOOD PRESSURE: 80 MMHG | HEART RATE: 78 BPM

## 2022-03-23 DIAGNOSIS — R79.89 ABNORMAL LFTS: Primary | ICD-10-CM

## 2022-03-23 DIAGNOSIS — Z48.02 VISIT FOR SUTURE REMOVAL: Primary | ICD-10-CM

## 2022-03-23 PROCEDURE — 3074F PR MOST RECENT SYSTOLIC BLOOD PRESSURE < 130 MM HG: ICD-10-PCS | Mod: CPTII,S$GLB,, | Performed by: INTERNAL MEDICINE

## 2022-03-23 PROCEDURE — 99205 PR OFFICE/OUTPT VISIT, NEW, LEVL V, 60-74 MIN: ICD-10-PCS | Mod: S$GLB,,, | Performed by: INTERNAL MEDICINE

## 2022-03-23 PROCEDURE — 99024 PR POST-OP FOLLOW-UP VISIT: ICD-10-PCS | Mod: S$GLB,,, | Performed by: STUDENT IN AN ORGANIZED HEALTH CARE EDUCATION/TRAINING PROGRAM

## 2022-03-23 PROCEDURE — 1159F PR MEDICATION LIST DOCUMENTED IN MEDICAL RECORD: ICD-10-PCS | Mod: CPTII,S$GLB,, | Performed by: INTERNAL MEDICINE

## 2022-03-23 PROCEDURE — 3074F SYST BP LT 130 MM HG: CPT | Mod: CPTII,S$GLB,, | Performed by: INTERNAL MEDICINE

## 2022-03-23 PROCEDURE — 1159F MED LIST DOCD IN RCRD: CPT | Mod: CPTII,S$GLB,, | Performed by: INTERNAL MEDICINE

## 2022-03-23 PROCEDURE — 3079F DIAST BP 80-89 MM HG: CPT | Mod: CPTII,S$GLB,, | Performed by: INTERNAL MEDICINE

## 2022-03-23 PROCEDURE — 99205 OFFICE O/P NEW HI 60 MIN: CPT | Mod: S$GLB,,, | Performed by: INTERNAL MEDICINE

## 2022-03-23 PROCEDURE — 3079F PR MOST RECENT DIASTOLIC BLOOD PRESSURE 80-89 MM HG: ICD-10-PCS | Mod: CPTII,S$GLB,, | Performed by: INTERNAL MEDICINE

## 2022-03-23 PROCEDURE — 99999 PR PBB SHADOW E&M-EST. PATIENT-LVL IV: ICD-10-PCS | Mod: PBBFAC,,, | Performed by: INTERNAL MEDICINE

## 2022-03-23 PROCEDURE — 3008F BODY MASS INDEX DOCD: CPT | Mod: CPTII,S$GLB,, | Performed by: INTERNAL MEDICINE

## 2022-03-23 PROCEDURE — 99999 PR PBB SHADOW E&M-EST. PATIENT-LVL IV: CPT | Mod: PBBFAC,,, | Performed by: INTERNAL MEDICINE

## 2022-03-23 PROCEDURE — 3008F PR BODY MASS INDEX (BMI) DOCUMENTED: ICD-10-PCS | Mod: CPTII,S$GLB,, | Performed by: INTERNAL MEDICINE

## 2022-03-23 PROCEDURE — 99024 POSTOP FOLLOW-UP VISIT: CPT | Mod: S$GLB,,, | Performed by: STUDENT IN AN ORGANIZED HEALTH CARE EDUCATION/TRAINING PROGRAM

## 2022-03-23 NOTE — PROGRESS NOTES
Subjective:     Sachin Gonzalez is here for evaluation of abnormal LFTs    History of Present Illness:  Sachin Gonzalez is a 56 YM with known history of sero+ve RA, has been on methotrexate for approximately 10 years. He noted to have abnormal AST/ALT in feb of 2022. He had Covid-19 in 9/2021. He was not aware of exposure to Hep B. He denies use of ETOH.    Duration of abnormality  Medications/OTC/Herbal- none, uses ibuprofen for joint pains  ETOH-none  Metabolic issues- HTN  BMI-26  Family Hx-none      Review of Systems   Constitutional: Negative for fatigue and fever.   Gastrointestinal: Negative for abdominal distention, abdominal pain, blood in stool, nausea and vomiting.   Musculoskeletal: Positive for arthralgias and joint swelling. Negative for myalgias.   Hematological: Does not bruise/bleed easily.   Psychiatric/Behavioral: Positive for sleep disturbance.       Objective:     Physical Exam  Constitutional:       Appearance: Normal appearance. He is normal weight.   Eyes:      General: No scleral icterus.  Abdominal:      General: Abdomen is flat. There is no distension.      Palpations: Abdomen is soft. There is no mass.      Tenderness: There is no abdominal tenderness.   Musculoskeletal:      Right lower leg: No edema.      Left lower leg: No edema.   Skin:     Coloration: Skin is not jaundiced.      Findings: No erythema.   Neurological:      Mental Status: He is alert and oriented to person, place, and time.   Psychiatric:         Thought Content: Thought content normal.         Judgment: Judgment normal.         Computed MELD-Na score unavailable. Necessary lab results were not found in the last year.  Computed MELD score unavailable. Necessary lab results were not found in the last year.    WBC   Date Value Ref Range Status   02/22/2022 5.11 3.90 - 12.70 K/uL Final     Hemoglobin   Date Value Ref Range Status   02/22/2022 13.8 (L) 14.0 - 18.0 g/dL Final     Hematocrit   Date Value Ref Range Status    02/22/2022 41.0 40.0 - 54.0 % Final     Platelets   Date Value Ref Range Status   02/22/2022 253 150 - 450 K/uL Final     BUN   Date Value Ref Range Status   02/22/2022 19 6 - 20 mg/dL Final     Creatinine   Date Value Ref Range Status   02/22/2022 1.2 0.5 - 1.4 mg/dL Final     Glucose   Date Value Ref Range Status   02/22/2022 89 70 - 110 mg/dL Final     Calcium   Date Value Ref Range Status   02/22/2022 9.1 8.7 - 10.5 mg/dL Final     Sodium   Date Value Ref Range Status   02/22/2022 141 136 - 145 mmol/L Final     Potassium   Date Value Ref Range Status   02/22/2022 4.3 3.5 - 5.1 mmol/L Final     Chloride   Date Value Ref Range Status   02/22/2022 104 95 - 110 mmol/L Final     Magnesium   Date Value Ref Range Status   06/18/2012 2.1 1.6 - 2.6 mg/dl Final     AST   Date Value Ref Range Status   03/16/2022 43 (H) 10 - 40 U/L Final     ALT   Date Value Ref Range Status   03/16/2022 46 (H) 10 - 44 U/L Final     Alkaline Phosphatase   Date Value Ref Range Status   03/16/2022 85 55 - 135 U/L Final     Total Bilirubin   Date Value Ref Range Status   03/16/2022 0.4 0.1 - 1.0 mg/dL Final     Comment:     For infants and newborns, interpretation of results should be based  on gestational age, weight and in agreement with clinical  observations.    Premature Infant recommended reference ranges:  Up to 24 hours.............<8.0 mg/dL  Up to 48 hours............<12.0 mg/dL  3-5 days..................<15.0 mg/dL  6-29 days.................<15.0 mg/dL       Albumin   Date Value Ref Range Status   03/16/2022 3.8 3.5 - 5.2 g/dL Final     INR   Date Value Ref Range Status   04/21/2020 1.0 0.8 - 1.2 Final     Comment:     Coumadin Therapy:  2.0 - 3.0 for INR for all indicators except mechanical heart valves  and antiphospholipid syndromes which should use 2.5 - 3.5.           Assessment/Plan:       1.Abnormal LFTs:  -Will initiate work up to rule out infectious/autoimmune/genetic disorders of the liver  -Prior exposure to hep B,  has Core ab +ve, will check Hep B DNA level to ensure no reactivation  -Short term treatment with MTX can cause transient elevation of serum aminotransferase and long term therapy has been linked to development of fatty liver disease, fibrosis and even cirrhosis.   -Symptoms are usually absent until cirrhosis is present, and liver tests are typically normal or minimally and transiently elevated.  -Routine monitoring of patients with regular liver biopsies done at 1 to 2 year intervals is recommended  -I requested him to discuss with his rheumatologist to consider alternatives to treat RA  -If LFTs continue to stay abnormal or even if they normalize he may need liver biopsy to confirm absence of fibrosis  -Down trending transaminases with covid-19 is a possibility as there were no LFTs documented after 9/2021 until 2/2022    I have reviewed existing labs, imaging, procedures. Educated patient about disease process, prognosis. Ordered required labs, images and discussed treatment plan.     Janell Bradley MD  Transplant Hepatologist  Dept of Hepatology, [place] [facility]

## 2022-03-24 ENCOUNTER — LAB VISIT (OUTPATIENT)
Dept: LAB | Facility: HOSPITAL | Age: 57
End: 2022-03-24
Attending: INTERNAL MEDICINE
Payer: COMMERCIAL

## 2022-03-24 DIAGNOSIS — R79.89 ABNORMAL LFTS: Primary | ICD-10-CM

## 2022-03-24 DIAGNOSIS — R79.89 ABNORMAL LFTS: ICD-10-CM

## 2022-03-24 LAB
AFP SERPL-MCNC: 14 NG/ML (ref 0–8.4)
ALBUMIN SERPL BCP-MCNC: 3.7 G/DL (ref 3.5–5.2)
ALP SERPL-CCNC: 76 U/L (ref 55–135)
ALT SERPL W/O P-5'-P-CCNC: 131 U/L (ref 10–44)
ANION GAP SERPL CALC-SCNC: 7 MMOL/L (ref 8–16)
AST SERPL-CCNC: 88 U/L (ref 10–40)
BILIRUB SERPL-MCNC: 0.3 MG/DL (ref 0.1–1)
BUN SERPL-MCNC: 15 MG/DL (ref 6–20)
CALCIUM SERPL-MCNC: 9.4 MG/DL (ref 8.7–10.5)
CHLORIDE SERPL-SCNC: 106 MMOL/L (ref 95–110)
CO2 SERPL-SCNC: 29 MMOL/L (ref 23–29)
CREAT SERPL-MCNC: 1.3 MG/DL (ref 0.5–1.4)
EST. GFR  (AFRICAN AMERICAN): >60 ML/MIN/1.73 M^2
EST. GFR  (NON AFRICAN AMERICAN): >60 ML/MIN/1.73 M^2
GLUCOSE SERPL-MCNC: 89 MG/DL (ref 70–110)
IGA SERPL-MCNC: 497 MG/DL (ref 40–350)
IGG SERPL-MCNC: 1492 MG/DL (ref 650–1600)
IGM SERPL-MCNC: 40 MG/DL (ref 50–300)
INR PPP: 1 (ref 0.8–1.2)
POTASSIUM SERPL-SCNC: 4.4 MMOL/L (ref 3.5–5.1)
PROT SERPL-MCNC: 7.6 G/DL (ref 6–8.4)
PROTHROMBIN TIME: 11 SEC (ref 9–12.5)
SODIUM SERPL-SCNC: 142 MMOL/L (ref 136–145)

## 2022-03-24 PROCEDURE — 85610 PROTHROMBIN TIME: CPT | Performed by: INTERNAL MEDICINE

## 2022-03-24 PROCEDURE — 82105 ALPHA-FETOPROTEIN SERUM: CPT | Performed by: INTERNAL MEDICINE

## 2022-03-24 PROCEDURE — 36415 COLL VENOUS BLD VENIPUNCTURE: CPT | Performed by: INTERNAL MEDICINE

## 2022-03-24 PROCEDURE — 86235 NUCLEAR ANTIGEN ANTIBODY: CPT | Performed by: INTERNAL MEDICINE

## 2022-03-24 PROCEDURE — 80321 ALCOHOLS BIOMARKERS 1OR 2: CPT | Performed by: INTERNAL MEDICINE

## 2022-03-24 PROCEDURE — 86256 FLUORESCENT ANTIBODY TITER: CPT | Mod: 91 | Performed by: INTERNAL MEDICINE

## 2022-03-24 PROCEDURE — 86376 MICROSOMAL ANTIBODY EACH: CPT | Performed by: INTERNAL MEDICINE

## 2022-03-24 PROCEDURE — 82784 ASSAY IGA/IGD/IGG/IGM EACH: CPT | Performed by: INTERNAL MEDICINE

## 2022-03-24 PROCEDURE — 87517 HEPATITIS B DNA QUANT: CPT | Performed by: INTERNAL MEDICINE

## 2022-03-24 PROCEDURE — 80053 COMPREHEN METABOLIC PANEL: CPT | Performed by: INTERNAL MEDICINE

## 2022-03-25 ENCOUNTER — TELEPHONE (OUTPATIENT)
Dept: HEPATOLOGY | Facility: CLINIC | Age: 57
End: 2022-03-25
Payer: COMMERCIAL

## 2022-03-25 NOTE — TELEPHONE ENCOUNTER
Patient calling today looking for lab results of labs he did yesterday. Patient was advised once all lab results come back someone will be in touch and will call and go over labs with him. Patient verbalized understanding.

## 2022-03-28 LAB
LKM AB SER-ACNC: 0.9 UNITS
MITOCHONDRIA AB TITR SER IF: NORMAL {TITER}
PETH 16:0/18.1 (POPETH): <10 NG/ML
PETH 16:0/18.2 (PLPETH): <10 NG/ML
SMOOTH MUSCLE AB TITR SER IF: ABNORMAL {TITER}

## 2022-03-29 ENCOUNTER — TELEPHONE (OUTPATIENT)
Dept: HEPATOLOGY | Facility: CLINIC | Age: 57
End: 2022-03-29
Payer: COMMERCIAL

## 2022-03-29 ENCOUNTER — PATIENT MESSAGE (OUTPATIENT)
Dept: HEPATOLOGY | Facility: CLINIC | Age: 57
End: 2022-03-29
Payer: COMMERCIAL

## 2022-03-29 DIAGNOSIS — R79.89 ABNORMAL LFTS: Primary | ICD-10-CM

## 2022-03-29 LAB
HBV DNA SERPL NAA+PROBE-ACNC: <10 IU/ML
HBV DNA SERPL NAA+PROBE-LOG IU: <1 LOG (10) IU/ML
HBV DNA SERPL QL NAA+PROBE: NOT DETECTED

## 2022-03-29 NOTE — TELEPHONE ENCOUNTER
----- Message from [employer] sent at 3/29/2022  1:00 PM CDT -----  Pt called in regards to his lab results , please give a call back at .[phone]     Thanks

## 2022-03-30 ENCOUNTER — TELEPHONE (OUTPATIENT)
Dept: RHEUMATOLOGY | Facility: CLINIC | Age: 57
End: 2022-03-30
Payer: COMMERCIAL

## 2022-03-30 ENCOUNTER — OFFICE VISIT (OUTPATIENT)
Dept: PODIATRY | Facility: CLINIC | Age: 57
End: 2022-03-30
Payer: COMMERCIAL

## 2022-03-30 ENCOUNTER — PATIENT MESSAGE (OUTPATIENT)
Dept: HEPATOLOGY | Facility: CLINIC | Age: 57
End: 2022-03-30
Payer: COMMERCIAL

## 2022-03-30 VITALS
HEIGHT: 78 IN | WEIGHT: 231 LBS | HEART RATE: 92 BPM | SYSTOLIC BLOOD PRESSURE: 118 MMHG | BODY MASS INDEX: 26.73 KG/M2 | DIASTOLIC BLOOD PRESSURE: 85 MMHG

## 2022-03-30 DIAGNOSIS — B35.1 ONYCHOMYCOSIS OF TOENAIL: Primary | ICD-10-CM

## 2022-03-30 DIAGNOSIS — L84 CORN OR CALLUS: ICD-10-CM

## 2022-03-30 PROCEDURE — 3074F PR MOST RECENT SYSTOLIC BLOOD PRESSURE < 130 MM HG: ICD-10-PCS | Mod: CPTII,S$GLB,, | Performed by: PODIATRIST

## 2022-03-30 PROCEDURE — 1160F RVW MEDS BY RX/DR IN RCRD: CPT | Mod: CPTII,S$GLB,, | Performed by: PODIATRIST

## 2022-03-30 PROCEDURE — 3008F PR BODY MASS INDEX (BMI) DOCUMENTED: ICD-10-PCS | Mod: CPTII,S$GLB,, | Performed by: PODIATRIST

## 2022-03-30 PROCEDURE — 99999 PR PBB SHADOW E&M-EST. PATIENT-LVL III: CPT | Mod: PBBFAC,,, | Performed by: PODIATRIST

## 2022-03-30 PROCEDURE — 3008F BODY MASS INDEX DOCD: CPT | Mod: CPTII,S$GLB,, | Performed by: PODIATRIST

## 2022-03-30 PROCEDURE — 1159F PR MEDICATION LIST DOCUMENTED IN MEDICAL RECORD: ICD-10-PCS | Mod: CPTII,S$GLB,, | Performed by: PODIATRIST

## 2022-03-30 PROCEDURE — 3079F DIAST BP 80-89 MM HG: CPT | Mod: CPTII,S$GLB,, | Performed by: PODIATRIST

## 2022-03-30 PROCEDURE — 99999 PR PBB SHADOW E&M-EST. PATIENT-LVL III: ICD-10-PCS | Mod: PBBFAC,,, | Performed by: PODIATRIST

## 2022-03-30 PROCEDURE — 1160F PR REVIEW ALL MEDS BY PRESCRIBER/CLIN PHARMACIST DOCUMENTED: ICD-10-PCS | Mod: CPTII,S$GLB,, | Performed by: PODIATRIST

## 2022-03-30 PROCEDURE — 99499 UNLISTED E&M SERVICE: CPT | Mod: ,,, | Performed by: PODIATRIST

## 2022-03-30 PROCEDURE — 3074F SYST BP LT 130 MM HG: CPT | Mod: CPTII,S$GLB,, | Performed by: PODIATRIST

## 2022-03-30 PROCEDURE — 99499 NO LOS: ICD-10-PCS | Mod: ,,, | Performed by: PODIATRIST

## 2022-03-30 PROCEDURE — 1159F MED LIST DOCD IN RCRD: CPT | Mod: CPTII,S$GLB,, | Performed by: PODIATRIST

## 2022-03-30 PROCEDURE — 17999 UNLISTD PX SKN MUC MEMB SUBQ: CPT | Mod: CSM,S$GLB,, | Performed by: PODIATRIST

## 2022-03-30 PROCEDURE — 3079F PR MOST RECENT DIASTOLIC BLOOD PRESSURE 80-89 MM HG: ICD-10-PCS | Mod: CPTII,S$GLB,, | Performed by: PODIATRIST

## 2022-03-30 PROCEDURE — 17999 PR NON-COVERED FOOT CARE: ICD-10-PCS | Mod: CSM,S$GLB,, | Performed by: PODIATRIST

## 2022-03-30 NOTE — TELEPHONE ENCOUNTER
----- Message from Jenn Jackson MD sent at 3/30/2022  3:41 PM CDT -----  Regarding: RE: PT called  There was only one other time when LFTs were abnormal. This was in June of 2012 and they lasted for 4 days from 6/18 - 6/22/2012. There was no need for a consultation at that time as it was self limiting. The numbers normalized.  It is my policy to notify patients of their abnormal labs. This time labs have been abnormal for 1 month.  ----- Message -----  From: Franca Garcia RN  Sent: 3/30/2022   3:16 PM CDT  To: Jenn Jackson MD  Subject: FW: PT called                                    Questioning some of his past lab work, that was abnormal.  He states he looked back at past labs because of his abnormal liver lab-and him having to see a hepatologist.  Asking why he was not notified of any of the past abnormal labs.  ----- Message -----  From: Maryam Romero  Sent: 3/30/2022   8:22 AM CDT  To: [facility] Staff  Subject: PT called                                        Name of Who is Calling: DHARMESH DELEON [[id number]]      What is the request in detail: PT is asking to speak with nurse about previous blood work he's had. Please advise.       Can the clinic reply by [facility]: NO      What Number to [facility]: [phone]

## 2022-03-31 ENCOUNTER — HOSPITAL ENCOUNTER (OUTPATIENT)
Dept: RADIOLOGY | Facility: HOSPITAL | Age: 57
Discharge: HOME | End: 2022-03-31
Attending: INTERNAL MEDICINE
Payer: COMMERCIAL

## 2022-03-31 DIAGNOSIS — R79.89 ABNORMAL LFTS: ICD-10-CM

## 2022-03-31 LAB
FINAL PATHOLOGIC DIAGNOSIS: NORMAL
GROSS: NORMAL
Lab: NORMAL
MICROSCOPIC EXAM: NORMAL

## 2022-03-31 PROCEDURE — 76705 US ABDOMEN LIMITED: ICD-10-PCS | Mod: 26,,, | Performed by: RADIOLOGY

## 2022-03-31 PROCEDURE — 76705 ECHO EXAM OF ABDOMEN: CPT | Mod: 26,,, | Performed by: RADIOLOGY

## 2022-03-31 PROCEDURE — 76705 ECHO EXAM OF ABDOMEN: CPT | Mod: TC

## 2022-04-01 ENCOUNTER — OFFICE VISIT (OUTPATIENT)
Dept: OTOLARYNGOLOGY | Facility: CLINIC | Age: 57
End: 2022-04-01
Payer: COMMERCIAL

## 2022-04-01 VITALS — SYSTOLIC BLOOD PRESSURE: 132 MMHG | TEMPERATURE: 98 F | DIASTOLIC BLOOD PRESSURE: 82 MMHG

## 2022-04-01 DIAGNOSIS — K11.5 PAROTID SIALOLITHIASIS: Primary | ICD-10-CM

## 2022-04-01 PROCEDURE — 1159F PR MEDICATION LIST DOCUMENTED IN MEDICAL RECORD: ICD-10-PCS | Mod: CPTII,S$GLB,, | Performed by: OTOLARYNGOLOGY

## 2022-04-01 PROCEDURE — 99999 PR PBB SHADOW E&M-EST. PATIENT-LVL IV: CPT | Mod: PBBFAC,,, | Performed by: OTOLARYNGOLOGY

## 2022-04-01 PROCEDURE — 3075F SYST BP GE 130 - 139MM HG: CPT | Mod: CPTII,S$GLB,, | Performed by: OTOLARYNGOLOGY

## 2022-04-01 PROCEDURE — 1159F MED LIST DOCD IN RCRD: CPT | Mod: CPTII,S$GLB,, | Performed by: OTOLARYNGOLOGY

## 2022-04-01 PROCEDURE — 1160F RVW MEDS BY RX/DR IN RCRD: CPT | Mod: CPTII,S$GLB,, | Performed by: OTOLARYNGOLOGY

## 2022-04-01 PROCEDURE — 3079F DIAST BP 80-89 MM HG: CPT | Mod: CPTII,S$GLB,, | Performed by: OTOLARYNGOLOGY

## 2022-04-01 PROCEDURE — 99214 PR OFFICE/OUTPT VISIT, EST, LEVL IV, 30-39 MIN: ICD-10-PCS | Mod: S$GLB,,, | Performed by: OTOLARYNGOLOGY

## 2022-04-01 PROCEDURE — 99999 PR PBB SHADOW E&M-EST. PATIENT-LVL IV: ICD-10-PCS | Mod: PBBFAC,,, | Performed by: OTOLARYNGOLOGY

## 2022-04-01 PROCEDURE — 1160F PR REVIEW ALL MEDS BY PRESCRIBER/CLIN PHARMACIST DOCUMENTED: ICD-10-PCS | Mod: CPTII,S$GLB,, | Performed by: OTOLARYNGOLOGY

## 2022-04-01 PROCEDURE — 3079F PR MOST RECENT DIASTOLIC BLOOD PRESSURE 80-89 MM HG: ICD-10-PCS | Mod: CPTII,S$GLB,, | Performed by: OTOLARYNGOLOGY

## 2022-04-01 PROCEDURE — 99214 OFFICE O/P EST MOD 30 MIN: CPT | Mod: S$GLB,,, | Performed by: OTOLARYNGOLOGY

## 2022-04-01 PROCEDURE — 3075F PR MOST RECENT SYSTOLIC BLOOD PRESS GE 130-139MM HG: ICD-10-PCS | Mod: CPTII,S$GLB,, | Performed by: OTOLARYNGOLOGY

## 2022-04-01 NOTE — PROGRESS NOTES
History of Present Illness:   Sachin Gonzalez is a 56 y.o. year old male evaluated on 4/5/2022, in the Otolaryngology-Head and Neck Surgery Clinic at [facility]. The patient is being seen for evaluation of saliva gland problem.  Patient has been seen by multiple providers and has had this for several years.  He has what appears to be  by record review sialendoscopy  in outside facility with some stones removed from the right parotid gland however he had residual stone that could not be removed.  He had Sialoendoscopy with Dr. Ledezma 12/2020.  He had recurrent swelling of the right parotid gland and was seen by Dr. Wheeler and subsequently by Dr. Wilson for the same problem.  Dr. Wilson ordered a CT scan of the neck with plan on removal of the stone through cheek approach as this was superficial.  He reports over the last month he had skin change over the right cheek and saw Dermatology with lesion biopsied and he was referred to us for further treatment.  He reports continued swelling to the right side of the face.  He reports that prior to Dermatology visit he had some purulence but currently does not have any.  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     Past Family/Social History  His family history includes Alcohol abuse in his father; Anxiety disorder in his mother; Arthritis in his mother; Hypertension in his mother; Kidney disease in his brother; No Known Problems in his sister; Stroke in his father.  He   reports that he has never smoked. He has never used smokeless tobacco. He reports that he does not drink alcohol and does not use drugs.     Medications/Allergies/Immunizations  His current medication(s) include:   Current Outpatient Medications   Medication Sig Dispense Refill    ascorbic acid, vitamin C, (VITAMIN C) 250 MG tablet Take 250 mg by mouth once daily.      cholecalciferol, vitamin D3, 25 mcg (1,000 unit) Chew Take by mouth.      cyclobenzaprine (FLEXERIL) 10 MG tablet 1/2 to one tab po qhs prn muscle spasm 30 tablet 0    folic acid (FOLVITE) 1 MG tablet Take 1 tablet (1,000 mcg total) by mouth once daily. 100 tablet 2    hydroCHLOROthiazide (HYDRODIURIL) 12.5 MG Tab Take 1 tablet (12.5 mg total) by mouth once daily. 90 tablet 3    ibuprofen (ADVIL,MOTRIN) 800 MG tablet Take 1 tablet (800 mg total) by mouth every 8 (eight) hours as needed for Pain. 90 tablet 0    methotrexate 2.5 MG Tab TAKE 8 TABLETS (20 MG TOTAL) EVERY 7 DAYS, THIS MEDICATION REQUIRES PERIODIC LAB MONITORING (AT LEAST EVERY 3 MONTHS) 120 tablet 3    metoprolol succinate (TOPROL-XL) 50 MG 24 hr tablet TAKE 1 TABLET TWICE A DAY. HOLD IF SYSTOLIC BLOOD PRESSURE LESS THAN OR EQUAL  OR HEART RATE IS LESS THAN 60 180 tablet 3    naproxen (NAPROSYN) 500 MG tablet Take 1 tablet (500 mg total) by mouth 2 (two) times daily with meals. For pain and inflammation 15 tablet 0    sucralfate (CARAFATE) 1 gram tablet Take 1 tablet (1 g total) by mouth 3 (three) times daily as needed (reflux indigestion). 90 tablet 1    zinc sulfate (ZINC-220 ORAL) Take by mouth.       No current facility-administered medications for this visit.        Allergies: Patient has no known allergies.     Immunizations:   Immunization History   Administered Date(s) Administered    COVID-19, MRNA, LN-S, PF (Pfizer) (Purple Cap) 03/04/2021, 03/23/2021, 12/08/2021    Influenza - Quadrivalent 11/14/2016    Influenza - Quadrivalent - PF *Preferred* (6 months and  older) 09/22/2017, 11/14/2018, 12/04/2019, 09/14/2020, 09/27/2021    Pneumococcal Conjugate - 13 Valent 10/15/2020    Tdap 06/13/2016         Review of Systems   Constitutional: Negative for fever, weight loss and weight gain.  Skin: Negative for rash, itchiness, dryness  HENT:  As per HPI  Cardiovascular: Negative for chest pain and dyspnea on exertion .   Respiratory: Is not experiencing shortness of breath.   Gastrointestinal: Negative for nausea and vomiting.   Neurological: Negative for headaches.   Lymph/Heme: Negative for lymphadenopathy or easy bruising  Musculoskeletal: Negative for joint or muscle pain  Psychiatric: The patient is not nervous/anxious.        All other systems are negative except for that listed in the HPI.      PHYSICAL EXAM:   Vital Signs:  /82   Temp 97.7 °F (36.5 °C) (Temporal)      General:  Well-developed, well-nourished  Communication and Voice:  Clear pitch and clarity  Hearing: Hearing adequate for verbal communication bilaterally   Inspection:  Normocephalic and atraumatic without mass or lesion right cheek shows scab about 1 and half cm from the angle of the mandible  Palpation:  Facial skeleton intact without bony stepoffs  Parotid Glands:  swelling to the right parotid gland just above scab and skin changes noted above  Facial Strength:  Facial motility symmetric and full bilaterally  Pinna:  External ear intact and fully developed  External canal:  Canal is patent with intact skin  Tympanic Membrane:  Clear and mobile  External nose:  No scar or anatomic deformity  Internal Nose:  Septum intact and midline.  No edema, polyp, or rhinorrhea.  TMJ:  No pain to palpation with full mobility  Oral cavity, Lips, Teeth, and Gums:  Mucosa and teeth intact and viable, No lesions, masses or ulcers there is normal saliva flow from both Stensen's ducts  Oropharynx: No erythema or exudate, no masses or ulcerations, non-obstructive tonsils  Nasopharynx:  No mass or lesion with  intact mucosa  Hypopharynx:  Not well visualized secondary to gagging  Larynx:  Not well visualized secondary to gagging  Neck, Trachea, Lymphatics:  Midline trachea without mass or lesion, no lymphadenopathy  Thyroid:  No mass or nodularity  Eyes: No nystagmus with equal extraocular motion bilaterally  Neuro/Psych/Balance: Patient oriented and appropriate in interaction;  Appropriate mood and affect;  Gait is intact with no imbalance; Cranial nerves I-XII are intact  Respiratory effort:  Equal inspiration and expiration without stridor  Peripheral Vascular:  Warm extremities with equal pulses      PATHOLOGY REVIEW:     dermatology biopsy of right cheek 03/16/2022  1.  Skin, right jawline, punch biopsy:   - MIXED GRANULOMATOUS INFLAMMATION CONSISTENT WITH REACTION TO A RUPTURED   FOLLICLE OR A RUPTURED FOLLICULAR CYST.   This lesion is benign.     RADIOLOGIC REVIEW:   I have personally reviewed the CT scan and discussed findings and showed images to the patient.  I agree with radiology read below.  The parotid gland stone is very close to the skin surface with stranding of the skin.      CT neck with contrast 02/22/2022  Impression:     Right-sided sialoadenitis.  7.8 mm calcification in the anterior margin of the parotid gland possibly a ductal calculus.  Focal area of soft tissue swelling in the surface of the skin superficial to the calculus measuring 9.4 x 13.1 mm possibly a small superficial cutaneous abscess.  No significant cervical adenopathy.  1.8 cm in diameter right-sided thyroid nodule.    IMPRESSION:   1. Parotid sialolithiasis            PLAN:   Different options for management were discussed with the patient to include right parotidectomy which he is not keen on.  This was offered to him prior by Dr. Wilson as well.  I discussed with him excision of the stone through cheek approach with risks benefits and alternatives discussed to include scarring potential nerve injury.  He is very apprehensive about  possibility of nerve injury and I discussed with him that the risk is low however present.  He expressed desire to have no risk surgery and I explained to him that this is not possible.  I tentatively scheduled him for a week from today but he will check his schedule and possibly change surgery date.  Will obtain consent day of surgery if not done.       I believe that Mr. Gonzalez has a good understanding of the issues involved and I answered all of his questions.     DISCLAIMER: This note was prepared with [employer] voice recognition transcription software. Garbled syntax, mangled pronouns, and other bizarre constructions may be attributed to that software system. While efforts were made to correct any mistakes made by this voice recognition program, some errors and/or omissions may remain in the note that were missed when the note was originally created.

## 2022-04-11 NOTE — PROGRESS NOTES
Pt presents for routine non-covered foot care. Pt. does not have high risk feet. Pedal pulses are palpable. Nails are elongated, thickened Bilaterally. Diagnosis is onychauxis. Nails were reduced Bilaterally. Patient tolerated well and related relief. RTC p.r.n. as PROC B

## 2022-04-20 ENCOUNTER — OFFICE VISIT (OUTPATIENT)
Dept: INTERNAL MEDICINE | Facility: CLINIC | Age: 57
End: 2022-04-20
Payer: COMMERCIAL

## 2022-04-20 VITALS
HEIGHT: 78 IN | WEIGHT: 231.5 LBS | TEMPERATURE: 98 F | RESPIRATION RATE: 16 BRPM | DIASTOLIC BLOOD PRESSURE: 82 MMHG | BODY MASS INDEX: 26.79 KG/M2 | OXYGEN SATURATION: 97 % | SYSTOLIC BLOOD PRESSURE: 108 MMHG | HEART RATE: 84 BPM

## 2022-04-20 DIAGNOSIS — I10 ESSENTIAL HYPERTENSION: Chronic | ICD-10-CM

## 2022-04-20 DIAGNOSIS — M06.9 RHEUMATOID ARTHRITIS OF HAND, UNSPECIFIED LATERALITY, UNSPECIFIED WHETHER RHEUMATOID FACTOR PRESENT: ICD-10-CM

## 2022-04-20 DIAGNOSIS — Z85.46 HISTORY OF PROSTATE CANCER: ICD-10-CM

## 2022-04-20 DIAGNOSIS — E78.2 MIXED HYPERLIPIDEMIA: Chronic | ICD-10-CM

## 2022-04-20 DIAGNOSIS — Z00.00 WELL ADULT EXAM: Primary | ICD-10-CM

## 2022-04-20 DIAGNOSIS — Z79.60 LONG-TERM USE OF IMMUNOSUPPRESSANT MEDICATION: ICD-10-CM

## 2022-04-20 DIAGNOSIS — K21.9 GASTROESOPHAGEAL REFLUX DISEASE WITHOUT ESOPHAGITIS: ICD-10-CM

## 2022-04-20 PROBLEM — C61 PROSTATE CANCER: Status: RESOLVED | Noted: 2021-12-08 | Resolved: 2022-04-20

## 2022-04-20 PROCEDURE — 90471 PNEUMOCOCCAL CONJUGATE VACCINE 13-VALENT LESS THAN 5YO & GREATER THAN: ICD-10-PCS | Mod: S$GLB,,, | Performed by: PEDIATRICS

## 2022-04-20 PROCEDURE — 3074F SYST BP LT 130 MM HG: CPT | Mod: CPTII,S$GLB,, | Performed by: PEDIATRICS

## 2022-04-20 PROCEDURE — 3079F PR MOST RECENT DIASTOLIC BLOOD PRESSURE 80-89 MM HG: ICD-10-PCS | Mod: CPTII,S$GLB,, | Performed by: PEDIATRICS

## 2022-04-20 PROCEDURE — 3008F PR BODY MASS INDEX (BMI) DOCUMENTED: ICD-10-PCS | Mod: CPTII,S$GLB,, | Performed by: PEDIATRICS

## 2022-04-20 PROCEDURE — 3008F BODY MASS INDEX DOCD: CPT | Mod: CPTII,S$GLB,, | Performed by: PEDIATRICS

## 2022-04-20 PROCEDURE — 1160F RVW MEDS BY RX/DR IN RCRD: CPT | Mod: CPTII,S$GLB,, | Performed by: PEDIATRICS

## 2022-04-20 PROCEDURE — 90471 IMMUNIZATION ADMIN: CPT | Mod: S$GLB,,, | Performed by: PEDIATRICS

## 2022-04-20 PROCEDURE — 99396 PR PREVENTIVE VISIT,EST,40-64: ICD-10-PCS | Mod: 25,S$GLB,, | Performed by: PEDIATRICS

## 2022-04-20 PROCEDURE — 1159F MED LIST DOCD IN RCRD: CPT | Mod: CPTII,S$GLB,, | Performed by: PEDIATRICS

## 2022-04-20 PROCEDURE — 90670 PNEUMOCOCCAL CONJUGATE VACCINE 13-VALENT LESS THAN 5YO & GREATER THAN: ICD-10-PCS | Mod: S$GLB,,, | Performed by: PEDIATRICS

## 2022-04-20 PROCEDURE — 1160F PR REVIEW ALL MEDS BY PRESCRIBER/CLIN PHARMACIST DOCUMENTED: ICD-10-PCS | Mod: CPTII,S$GLB,, | Performed by: PEDIATRICS

## 2022-04-20 PROCEDURE — 99999 PR PBB SHADOW E&M-EST. PATIENT-LVL IV: ICD-10-PCS | Mod: PBBFAC,,, | Performed by: PEDIATRICS

## 2022-04-20 PROCEDURE — 1159F PR MEDICATION LIST DOCUMENTED IN MEDICAL RECORD: ICD-10-PCS | Mod: CPTII,S$GLB,, | Performed by: PEDIATRICS

## 2022-04-20 PROCEDURE — 3074F PR MOST RECENT SYSTOLIC BLOOD PRESSURE < 130 MM HG: ICD-10-PCS | Mod: CPTII,S$GLB,, | Performed by: PEDIATRICS

## 2022-04-20 PROCEDURE — 90670 PCV13 VACCINE IM: CPT | Mod: S$GLB,,, | Performed by: PEDIATRICS

## 2022-04-20 PROCEDURE — 3079F DIAST BP 80-89 MM HG: CPT | Mod: CPTII,S$GLB,, | Performed by: PEDIATRICS

## 2022-04-20 PROCEDURE — 99396 PREV VISIT EST AGE 40-64: CPT | Mod: 25,S$GLB,, | Performed by: PEDIATRICS

## 2022-04-20 PROCEDURE — 99999 PR PBB SHADOW E&M-EST. PATIENT-LVL IV: CPT | Mod: PBBFAC,,, | Performed by: PEDIATRICS

## 2022-04-20 NOTE — PROGRESS NOTES
Subjective:       Patient ID: Sachin Gonzalez is a 56 y.o. male.    Chief Complaint: Establish Care and Results    Sachin Gonzalez is a 56 y.o. male who presents to clinic to establish care. Still having problems with mass on R jaw. It is improved. Still draining and tender. Has seen Dermatologist 3/16/22.     PMHx, PSHx, SocHx, and FHx reviewed and discussed with patient.    Hepatology and Rheumatology labs discussed and reviewed with patient.     Patient Active Problem List:     Hx of prostate cancer/Erectile dysfunction/Peyronie's disease/Stricture of bulbous urethra in male: had prostatectomy 2011, PSA un measurable, followed by urology     RA/Long-term use of immunosuppressant medication: on methotrexate, followed by rheum     HTN: only taking Metoprolol     Lumbar foraminal stenosis: occasionally Sx, takes PRN ibuprofen     GERD: PRN Carafate      Mixed hyperlipidemia: diet controlled         Review of Systems   Constitutional: Negative for activity change, appetite change, chills, diaphoresis, fatigue, fever and unexpected weight change.   HENT: Negative for nasal congestion, ear pain, mouth sores, nosebleeds, postnasal drip, rhinorrhea, sneezing and sore throat.    Eyes: Negative for photophobia, pain, discharge, redness and visual disturbance.   Respiratory: Negative for cough, chest tightness, shortness of breath, wheezing and stridor.    Cardiovascular: Negative for chest pain, palpitations and leg swelling.   Gastrointestinal: Negative for abdominal distention, blood in stool, constipation, diarrhea, nausea and vomiting.   Genitourinary: Negative for decreased urine volume, difficulty urinating, dysuria, flank pain, frequency, genital sores, hematuria and urgency.   Musculoskeletal: Negative for arthralgias, back pain, joint swelling, neck pain and neck stiffness.   Integumentary:  Negative for color change, pallor, rash and wound.   Neurological: Negative for dizziness, syncope, speech difficulty,  weakness, light-headedness and headaches.   Hematological: Negative for adenopathy. Does not bruise/bleed easily.   Psychiatric/Behavioral: Negative for confusion, decreased concentration, dysphoric mood, hallucinations, sleep disturbance and suicidal ideas. The patient is not nervous/anxious.    All other systems reviewed and are negative.        Objective:      Physical Exam  Vitals and nursing note reviewed.   Constitutional:       General: He is not in acute distress.     Appearance: He is well-developed.   Neck:      Thyroid: No thyromegaly.      Vascular: No JVD.   Cardiovascular:      Rate and Rhythm: Normal rate and regular rhythm.      Heart sounds: Normal heart sounds. No murmur heard.  Pulmonary:      Effort: Pulmonary effort is normal. No respiratory distress.      Breath sounds: Normal breath sounds. No wheezing or rales.   Abdominal:      General: There is no distension.      Palpations: Abdomen is soft. There is no mass.      Tenderness: There is no abdominal tenderness. There is no guarding.   Musculoskeletal:      Right lower leg: No edema.      Left lower leg: No edema.   Lymphadenopathy:      Cervical: No cervical adenopathy.   Skin:     Capillary Refill: Capillary refill takes less than 2 seconds.      Findings: Lesion present. No rash.      Comments: 1-2cm area of ulcerative scabbing lesion that is slightly swollen and tender at R jaw line   Neurological:      General: No focal deficit present.      Mental Status: He is alert and oriented to person, place, and time.      Cranial Nerves: No cranial nerve deficit.      Coordination: Coordination normal.   Psychiatric:         Mood and Affect: Mood normal.         Behavior: Behavior normal.         Thought Content: Thought content normal.         Judgment: Judgment normal.         Assessment:       Problem List Items Addressed This Visit     Essential hypertension (Chronic)    Mixed hyperlipidemia (Chronic)    Relevant Orders    Lipid Panel     History of prostate cancer    RA (rheumatoid arthritis)    Long-term use of immunosuppressant medication    Gastroesophageal reflux disease without esophagitis      Other Visit Diagnoses     Well adult exam    -  Primary          Plan:     Well adult exam    Essential hypertension    Gastroesophageal reflux disease without esophagitis    History of prostate cancer    Long-term use of immunosuppressant medication    Mixed hyperlipidemia  -     Lipid Panel; Future; Expected date: 04/20/2022    Rheumatoid arthritis of hand, unspecified laterality, unspecified whether rheumatoid factor present    Other orders  -     Pneumococcal Conjugate Vaccine (13 Valent) (IM)     issues reviewed with patient. Start PCV series. Shingles and covid#4 discussed. Await lipids. Although the furuncle is better it is  and swollen. I think there may be some fluid still present from retained follicular cells. I will send message to Dr Luo to see if she would reexam for send to surgery. Keep RA and GI care. Avoid tylenol and Etoh and to work on low fat diet and weight loss for elevated LFTs in case fatty liver. F/U 6 months.  Scribe Attestation:   I, Roberto Cast, am scribing for, and in the presence of, Dr. Santy Greenberg Jr. I performed the above scribed service and the documentation accurately describes the services I performed. I attest to the accuracy of the note.    I, Dr. Santy Greenberg Jr, reviewed documentation as scribed above. I personally performed the services described in this documentation.  I agree that the record reflects my personal performance and is accurate and complete. Santy Greenberg Jr., MD.  04/20/2022

## 2022-04-22 ENCOUNTER — LAB VISIT (OUTPATIENT)
Dept: LAB | Facility: HOSPITAL | Age: 57
End: 2022-04-22
Attending: INTERNAL MEDICINE
Payer: COMMERCIAL

## 2022-04-22 DIAGNOSIS — R79.89 ABNORMAL LFTS: ICD-10-CM

## 2022-04-22 DIAGNOSIS — E78.2 MIXED HYPERLIPIDEMIA: Chronic | ICD-10-CM

## 2022-04-22 LAB
ALBUMIN SERPL BCP-MCNC: 3.5 G/DL (ref 3.5–5.2)
ALP SERPL-CCNC: 72 U/L (ref 55–135)
ALT SERPL W/O P-5'-P-CCNC: 95 U/L (ref 10–44)
ANION GAP SERPL CALC-SCNC: 8 MMOL/L (ref 8–16)
AST SERPL-CCNC: 53 U/L (ref 10–40)
BILIRUB SERPL-MCNC: 0.6 MG/DL (ref 0.1–1)
BUN SERPL-MCNC: 15 MG/DL (ref 6–20)
CALCIUM SERPL-MCNC: 9.3 MG/DL (ref 8.7–10.5)
CHLORIDE SERPL-SCNC: 101 MMOL/L (ref 95–110)
CHOLEST SERPL-MCNC: 189 MG/DL (ref 120–199)
CHOLEST/HDLC SERPL: 5 {RATIO} (ref 2–5)
CO2 SERPL-SCNC: 29 MMOL/L (ref 23–29)
CREAT SERPL-MCNC: 1.1 MG/DL (ref 0.5–1.4)
EST. GFR  (AFRICAN AMERICAN): >60 ML/MIN/1.73 M^2
EST. GFR  (NON AFRICAN AMERICAN): >60 ML/MIN/1.73 M^2
GLUCOSE SERPL-MCNC: 87 MG/DL (ref 70–110)
HDLC SERPL-MCNC: 38 MG/DL (ref 40–75)
HDLC SERPL: 20.1 % (ref 20–50)
INR PPP: 1 (ref 0.8–1.2)
LDLC SERPL CALC-MCNC: 131.6 MG/DL (ref 63–159)
NONHDLC SERPL-MCNC: 151 MG/DL
POTASSIUM SERPL-SCNC: 4.2 MMOL/L (ref 3.5–5.1)
PROT SERPL-MCNC: 7.2 G/DL (ref 6–8.4)
PROTHROMBIN TIME: 11.2 SEC (ref 9–12.5)
SODIUM SERPL-SCNC: 138 MMOL/L (ref 136–145)
TRIGL SERPL-MCNC: 97 MG/DL (ref 30–150)

## 2022-04-22 PROCEDURE — 80053 COMPREHEN METABOLIC PANEL: CPT | Performed by: INTERNAL MEDICINE

## 2022-04-22 PROCEDURE — 80061 LIPID PANEL: CPT | Performed by: PEDIATRICS

## 2022-04-22 PROCEDURE — 36415 COLL VENOUS BLD VENIPUNCTURE: CPT | Performed by: INTERNAL MEDICINE

## 2022-04-22 PROCEDURE — 85610 PROTHROMBIN TIME: CPT | Performed by: INTERNAL MEDICINE

## 2022-04-26 ENCOUNTER — PATIENT MESSAGE (OUTPATIENT)
Dept: HEPATOLOGY | Facility: CLINIC | Age: 57
End: 2022-04-26
Payer: COMMERCIAL

## 2022-04-26 ENCOUNTER — TELEPHONE (OUTPATIENT)
Dept: PREADMISSION TESTING | Facility: HOSPITAL | Age: 57
End: 2022-04-26
Payer: COMMERCIAL

## 2022-04-26 DIAGNOSIS — Z01.818 PREOP TESTING: Primary | ICD-10-CM

## 2022-04-26 NOTE — TELEPHONE ENCOUNTER
Mr. Gonzalez informed me he has cancelled his surgery and has notified Dr. Negron's office this morning. I sent an InBasket to Dr. Negron's staff to confirm information.

## 2022-04-27 ENCOUNTER — TELEPHONE (OUTPATIENT)
Dept: PREADMISSION TESTING | Facility: HOSPITAL | Age: 57
End: 2022-04-27
Payer: COMMERCIAL

## 2022-04-27 NOTE — TELEPHONE ENCOUNTER
4/26/22: Yu De Jesus RN  P Migdalia Issam Staff  Caller: Unspecified (Yesterday, 12:03 PM)  Gideon Mr. Gonzalez informed me he has cancelled his surgery and has notified your office this morning.   Thank you

## 2022-04-29 DIAGNOSIS — K11.5 PAROTID SIALOLITHIASIS: Primary | ICD-10-CM

## 2022-04-29 RX ORDER — LIDOCAINE HYDROCHLORIDE 10 MG/ML
1 INJECTION, SOLUTION EPIDURAL; INFILTRATION; INTRACAUDAL; PERINEURAL ONCE
Status: CANCELLED | OUTPATIENT
Start: 2022-04-29 | End: 2022-04-29

## 2022-04-29 RX ORDER — SODIUM CHLORIDE 0.9 % (FLUSH) 0.9 %
10 SYRINGE (ML) INJECTION
Status: CANCELLED | OUTPATIENT
Start: 2022-04-29

## 2022-05-02 ENCOUNTER — OFFICE VISIT (OUTPATIENT)
Dept: HEPATOLOGY | Facility: CLINIC | Age: 57
End: 2022-05-02
Payer: COMMERCIAL

## 2022-05-02 VITALS
HEIGHT: 77 IN | DIASTOLIC BLOOD PRESSURE: 80 MMHG | WEIGHT: 231.5 LBS | BODY MASS INDEX: 27.33 KG/M2 | HEART RATE: 76 BPM | SYSTOLIC BLOOD PRESSURE: 122 MMHG

## 2022-05-02 DIAGNOSIS — R79.89 ABNORMAL LFTS: Primary | ICD-10-CM

## 2022-05-02 PROCEDURE — 99999 PR PBB SHADOW E&M-EST. PATIENT-LVL IV: ICD-10-PCS | Mod: PBBFAC,,, | Performed by: INTERNAL MEDICINE

## 2022-05-02 PROCEDURE — 3079F PR MOST RECENT DIASTOLIC BLOOD PRESSURE 80-89 MM HG: ICD-10-PCS | Mod: CPTII,S$GLB,, | Performed by: INTERNAL MEDICINE

## 2022-05-02 PROCEDURE — 1159F MED LIST DOCD IN RCRD: CPT | Mod: CPTII,S$GLB,, | Performed by: INTERNAL MEDICINE

## 2022-05-02 PROCEDURE — 1159F PR MEDICATION LIST DOCUMENTED IN MEDICAL RECORD: ICD-10-PCS | Mod: CPTII,S$GLB,, | Performed by: INTERNAL MEDICINE

## 2022-05-02 PROCEDURE — 99214 OFFICE O/P EST MOD 30 MIN: CPT | Mod: S$GLB,,, | Performed by: INTERNAL MEDICINE

## 2022-05-02 PROCEDURE — 3008F BODY MASS INDEX DOCD: CPT | Mod: CPTII,S$GLB,, | Performed by: INTERNAL MEDICINE

## 2022-05-02 PROCEDURE — 99214 PR OFFICE/OUTPT VISIT, EST, LEVL IV, 30-39 MIN: ICD-10-PCS | Mod: S$GLB,,, | Performed by: INTERNAL MEDICINE

## 2022-05-02 PROCEDURE — 3074F PR MOST RECENT SYSTOLIC BLOOD PRESSURE < 130 MM HG: ICD-10-PCS | Mod: CPTII,S$GLB,, | Performed by: INTERNAL MEDICINE

## 2022-05-02 PROCEDURE — 3079F DIAST BP 80-89 MM HG: CPT | Mod: CPTII,S$GLB,, | Performed by: INTERNAL MEDICINE

## 2022-05-02 PROCEDURE — 3074F SYST BP LT 130 MM HG: CPT | Mod: CPTII,S$GLB,, | Performed by: INTERNAL MEDICINE

## 2022-05-02 PROCEDURE — 99999 PR PBB SHADOW E&M-EST. PATIENT-LVL IV: CPT | Mod: PBBFAC,,, | Performed by: INTERNAL MEDICINE

## 2022-05-02 PROCEDURE — 3008F PR BODY MASS INDEX (BMI) DOCUMENTED: ICD-10-PCS | Mod: CPTII,S$GLB,, | Performed by: INTERNAL MEDICINE

## 2022-05-02 PROCEDURE — 1160F RVW MEDS BY RX/DR IN RCRD: CPT | Mod: CPTII,S$GLB,, | Performed by: INTERNAL MEDICINE

## 2022-05-02 PROCEDURE — 1160F PR REVIEW ALL MEDS BY PRESCRIBER/CLIN PHARMACIST DOCUMENTED: ICD-10-PCS | Mod: CPTII,S$GLB,, | Performed by: INTERNAL MEDICINE

## 2022-05-02 NOTE — PROGRESS NOTES
Subjective:     Sachin Gonzalez is here for evaluation of abnormal LFTs    History of Present Illness:  Sachin Gonzalez is a 56 YM with known history of sero+ve RA, has been on methotrexate for approximately 10 years. He noted to have abnormal AST/ALT in feb of 2022. He had Covid-19 in 9/2021. He was not aware of exposure to Hep B. He denies use of ETOH.    Duration of abnormality  Medications/OTC/Herbal- none, uses ibuprofen for joint pains  ETOH-none  Metabolic issues- HTN  BMI-26  Family Hx-none    Interval history: 5/2/22    Here for f/u, recent repeat labs still shows abnormal transaminases even though slightly better. Has been taking MTX only 4 tabs instead of 8 tabs each 25 mg, says joint pains have been manageable. He is seeing his rheumatologist in 2 weeks       Review of Systems   Constitutional: Negative for fatigue and fever.   Gastrointestinal: Negative for abdominal distention, abdominal pain, blood in stool, nausea and vomiting.   Musculoskeletal: Positive for arthralgias and joint swelling. Negative for myalgias.   Hematological: Does not bruise/bleed easily.   Psychiatric/Behavioral: Positive for sleep disturbance.       Objective:     Physical Exam  Constitutional:       Appearance: Normal appearance. He is normal weight.   Eyes:      General: No scleral icterus.  Abdominal:      General: Abdomen is flat. There is no distension.      Palpations: Abdomen is soft. There is no mass.      Tenderness: There is no abdominal tenderness.   Musculoskeletal:      Right lower leg: No edema.      Left lower leg: No edema.   Skin:     Coloration: Skin is not jaundiced.      Findings: No erythema.   Neurological:      Mental Status: He is alert and oriented to person, place, and time.   Psychiatric:         Thought Content: Thought content normal.         Judgment: Judgment normal.         MELD-Na score: 7 at 4/22/2022  7:27 AM  MELD score: 7 at 4/22/2022  7:27 AM  Calculated from:  Serum Creatinine: 1.1  mg/dL at 4/22/2022  7:22 AM  Serum Sodium: 138 mmol/L (Using max of 137 mmol/L) at 4/22/2022  7:22 AM  Total Bilirubin: 0.6 mg/dL (Using min of 1 mg/dL) at 4/22/2022  7:22 AM  INR(ratio): 1.0 at 4/22/2022  7:27 AM  Age: 56 years    WBC   Date Value Ref Range Status   02/22/2022 5.11 3.90 - 12.70 K/uL Final     Hemoglobin   Date Value Ref Range Status   02/22/2022 13.8 (L) 14.0 - 18.0 g/dL Final     Hematocrit   Date Value Ref Range Status   02/22/2022 41.0 40.0 - 54.0 % Final     Platelets   Date Value Ref Range Status   02/22/2022 253 150 - 450 K/uL Final     BUN   Date Value Ref Range Status   04/22/2022 15 6 - 20 mg/dL Final     Creatinine   Date Value Ref Range Status   04/22/2022 1.1 0.5 - 1.4 mg/dL Final     Glucose   Date Value Ref Range Status   04/22/2022 87 70 - 110 mg/dL Final     Calcium   Date Value Ref Range Status   04/22/2022 9.3 8.7 - 10.5 mg/dL Final     Sodium   Date Value Ref Range Status   04/22/2022 138 136 - 145 mmol/L Final     Potassium   Date Value Ref Range Status   04/22/2022 4.2 3.5 - 5.1 mmol/L Final     Chloride   Date Value Ref Range Status   04/22/2022 101 95 - 110 mmol/L Final     Magnesium   Date Value Ref Range Status   06/18/2012 2.1 1.6 - 2.6 mg/dl Final     AST   Date Value Ref Range Status   04/22/2022 53 (H) 10 - 40 U/L Final     ALT   Date Value Ref Range Status   04/22/2022 95 (H) 10 - 44 U/L Final     Alkaline Phosphatase   Date Value Ref Range Status   04/22/2022 72 55 - 135 U/L Final     Total Bilirubin   Date Value Ref Range Status   04/22/2022 0.6 0.1 - 1.0 mg/dL Final     Comment:     For infants and newborns, interpretation of results should be based  on gestational age, weight and in agreement with clinical  observations.    Premature Infant recommended reference ranges:  Up to 24 hours.............<8.0 mg/dL  Up to 48 hours............<12.0 mg/dL  3-5 days..................<15.0 mg/dL  6-29 days.................<15.0 mg/dL       Albumin   Date Value Ref Range  Status   04/22/2022 3.5 3.5 - 5.2 g/dL Final     INR   Date Value Ref Range Status   04/22/2022 1.0 0.8 - 1.2 Final     Comment:     Coumadin Therapy:  2.0 - 3.0 for INR for all indicators except mechanical heart valves  and antiphospholipid syndromes which should use 2.5 - 3.5.           Assessment/Plan:       1.Abnormal LFTs:  -Work up for infectious/autoimmune/genetic disorders of the liver has been negative, will check Anti trypsin, cerulopasmin levels to complete w/u, ferritin high but likely an acute phase reactant, TSAT is 20, will check phenotype.  -Prior exposure to hep B, has Core ab +ve,  Hep B DNA level less than 10  -Short term treatment with MTX can cause transient elevation of serum aminotransferase and long term therapy has been linked to development of fatty liver disease, fibrosis and even cirrhosis.   -Symptoms are usually absent until cirrhosis is present, and liver tests are typically normal or minimally and transiently elevated.  -Routine monitoring of patients with regular liver biopsies done at 1 to 2 year intervals is recommended  -I requested him to discuss with his rheumatologist to consider alternatives to treat RA  -If LFTs continue to stay abnormal or even if they normalize he may need liver biopsy to confirm absence of fibrosis  -A very slow Down trending transaminases with covid-19 is unlikely but possible,  there were no LFTs documented after 9/2021 until 2/2022    I discussed with him the need foot liver biopsy to exclude underlying fibrosis even if his LFTs normalize, he wanted to proceed with FibroScan for now,  continue monitoring liver enzymes and reconsider in the next 3-6 months.     I have reviewed existing labs, imaging, procedures. Educated patient about disease process, prognosis. Ordered required labs, images and discussed treatment plan.     Janell Bradley MD  Transplant Hepatologist  Dept of Hepatology, [place] [facility]

## 2022-05-03 ENCOUNTER — TELEPHONE (OUTPATIENT)
Dept: RHEUMATOLOGY | Facility: CLINIC | Age: 57
End: 2022-05-03
Payer: COMMERCIAL

## 2022-05-03 ENCOUNTER — TELEPHONE (OUTPATIENT)
Dept: OTOLARYNGOLOGY | Facility: CLINIC | Age: 57
End: 2022-05-03
Payer: COMMERCIAL

## 2022-05-03 ENCOUNTER — PATIENT OUTREACH (OUTPATIENT)
Dept: ADMINISTRATIVE | Facility: OTHER | Age: 57
End: 2022-05-03
Payer: COMMERCIAL

## 2022-05-03 DIAGNOSIS — K11.20 SIALOADENITIS: Primary | ICD-10-CM

## 2022-05-03 NOTE — TELEPHONE ENCOUNTER
Unable to reach patient to set up VV with Dr. Jackson.  Unable to leave message-Voice mail not set up.

## 2022-05-03 NOTE — TELEPHONE ENCOUNTER
----- Message from Jessica Faith RN sent at 5/2/2022  2:40 PM CDT -----  Contact: Sachin  Requests 5/18. I will have the schedulers move it.     He feels like the spot has moved. I will schedule new imaging if you would like.    ----- Message -----  From: Yola Munoz  Sent: 5/2/2022   9:56 AM CDT  To: Katie Love Staff    Sachin would like a call back at [phone], Regards to getting a sooner date than 6/1/22 for his procedure.    Thanks  Td

## 2022-05-04 ENCOUNTER — OFFICE VISIT (OUTPATIENT)
Dept: PODIATRY | Facility: CLINIC | Age: 57
End: 2022-05-04
Payer: COMMERCIAL

## 2022-05-04 VITALS — WEIGHT: 231.5 LBS | BODY MASS INDEX: 27.33 KG/M2 | HEIGHT: 77 IN

## 2022-05-04 DIAGNOSIS — G57.61 NEUROMA, MORTON'S, RIGHT: ICD-10-CM

## 2022-05-04 DIAGNOSIS — M20.41 HAMMER TOE OF RIGHT FOOT: ICD-10-CM

## 2022-05-04 DIAGNOSIS — M24.571 CONTRACTURE, RIGHT ANKLE: ICD-10-CM

## 2022-05-04 DIAGNOSIS — M21.42 PES PLANUS OF BOTH FEET: ICD-10-CM

## 2022-05-04 DIAGNOSIS — M77.51 BURSITIS OF INTERMETATARSAL BURSA OF RIGHT FOOT: ICD-10-CM

## 2022-05-04 DIAGNOSIS — M77.41 METATARSALGIA, RIGHT FOOT: Primary | ICD-10-CM

## 2022-05-04 DIAGNOSIS — M21.41 PES PLANUS OF BOTH FEET: ICD-10-CM

## 2022-05-04 PROCEDURE — 1159F PR MEDICATION LIST DOCUMENTED IN MEDICAL RECORD: ICD-10-PCS | Mod: CPTII,S$GLB,, | Performed by: PODIATRIST

## 2022-05-04 PROCEDURE — 99214 PR OFFICE/OUTPT VISIT, EST, LEVL IV, 30-39 MIN: ICD-10-PCS | Mod: 25,S$GLB,, | Performed by: PODIATRIST

## 2022-05-04 PROCEDURE — 20600 DRAIN/INJ JOINT/BURSA W/O US: CPT | Mod: S$GLB,,, | Performed by: PODIATRIST

## 2022-05-04 PROCEDURE — 20600 PR DRAIN/INJECT SMALL JOINT/BURSA: ICD-10-PCS | Mod: S$GLB,,, | Performed by: PODIATRIST

## 2022-05-04 PROCEDURE — 1159F MED LIST DOCD IN RCRD: CPT | Mod: CPTII,S$GLB,, | Performed by: PODIATRIST

## 2022-05-04 PROCEDURE — 1160F RVW MEDS BY RX/DR IN RCRD: CPT | Mod: CPTII,S$GLB,, | Performed by: PODIATRIST

## 2022-05-04 PROCEDURE — 3008F BODY MASS INDEX DOCD: CPT | Mod: CPTII,S$GLB,, | Performed by: PODIATRIST

## 2022-05-04 PROCEDURE — 99999 PR PBB SHADOW E&M-EST. PATIENT-LVL III: ICD-10-PCS | Mod: PBBFAC,,, | Performed by: PODIATRIST

## 2022-05-04 PROCEDURE — 1160F PR REVIEW ALL MEDS BY PRESCRIBER/CLIN PHARMACIST DOCUMENTED: ICD-10-PCS | Mod: CPTII,S$GLB,, | Performed by: PODIATRIST

## 2022-05-04 PROCEDURE — 3008F PR BODY MASS INDEX (BMI) DOCUMENTED: ICD-10-PCS | Mod: CPTII,S$GLB,, | Performed by: PODIATRIST

## 2022-05-04 PROCEDURE — 99999 PR PBB SHADOW E&M-EST. PATIENT-LVL III: CPT | Mod: PBBFAC,,, | Performed by: PODIATRIST

## 2022-05-04 PROCEDURE — 99214 OFFICE O/P EST MOD 30 MIN: CPT | Mod: 25,S$GLB,, | Performed by: PODIATRIST

## 2022-05-04 RX ORDER — TRIAMCINOLONE ACETONIDE 40 MG/ML
40 INJECTION, SUSPENSION INTRA-ARTICULAR; INTRAMUSCULAR ONCE
Status: COMPLETED | OUTPATIENT
Start: 2022-05-04 | End: 2022-05-04

## 2022-05-04 RX ORDER — DEXAMETHASONE SODIUM PHOSPHATE 4 MG/ML
4 INJECTION, SOLUTION INTRA-ARTICULAR; INTRALESIONAL; INTRAMUSCULAR; INTRAVENOUS; SOFT TISSUE ONCE
Status: COMPLETED | OUTPATIENT
Start: 2022-05-04 | End: 2022-05-04

## 2022-05-04 RX ORDER — NAPROXEN 500 MG/1
500 TABLET ORAL 2 TIMES DAILY WITH MEALS
Qty: 60 TABLET | Refills: 0 | Status: SHIPPED | OUTPATIENT
Start: 2022-05-04 | End: 2022-05-10

## 2022-05-04 RX ADMIN — TRIAMCINOLONE ACETONIDE 40 MG: 40 INJECTION, SUSPENSION INTRA-ARTICULAR; INTRAMUSCULAR at 02:05

## 2022-05-04 RX ADMIN — DEXAMETHASONE SODIUM PHOSPHATE 4 MG: 4 INJECTION, SOLUTION INTRA-ARTICULAR; INTRALESIONAL; INTRAMUSCULAR; INTRAVENOUS; SOFT TISSUE at 02:05

## 2022-05-04 NOTE — PROGRESS NOTES
Health Maintenance Due   Topic Date Due    Shingles Vaccine (1 of 2) Never done    COVID-19 Vaccine (4 - Booster for Pfizer series) 04/08/2022     Updates were requested from care everywhere.  Chart was reviewed for overdue Proactive [facility] Encounters ([facility]) topics (CRS, Breast Cancer Screening, Eye exam)  Health Maintenance has been updated.  LINKS immunization registry triggered.  Immunizations were reconciled.

## 2022-05-04 NOTE — PROGRESS NOTES
Subjective:       Patient ID: Sachin Gonzalez is a 56 y.o. male.    Chief Complaint: Foot Pain (10/10 pain at present to middle 6 digits. Non diabetic, PCP: Dr. Greenberg last seen 04/20/22)      HPI: Sachin Gonzalez presents to the office today, with complaints of pains to the right foot. The pains are stated as moderate to severe to the ball(s) of the foot. Patient states limping with gait at times due to the pains. Pains are exacerbated by walking and standing. Sitting and limited WB does alleviate and/or decrease the pains. The patient does have hammer toe contractures. States alternation of shoe gear has not been helpful. Patient's Primary Care Provider is MAHAD Greenberg Jr, MD. States failing PO NSAIDs.     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       Past Surgical History:   Procedure Laterality Date    COLONOSCOPY N/A 4/21/2021    Procedure: COLONOSCOPY covid test scheduled on 4/19/21;  Surgeon: Darrell Worthington MD;  Location: [facility];  Service: Endoscopy;  Laterality: N/A;    ESOPHAGOGASTRODUODENOSCOPY      ESOPHAGOGASTRODUODENOSCOPY N/A 4/5/2021     "Procedure: EGD (ESOPHAGOGASTRODUODENOSCOPY) Rapid Covid test needed;  Surgeon: Darrell Worthington MD;  Location: [facility];  Service: Endoscopy;  Laterality: N/A;    PROSTATECTOMY  2011       Review of Systems   Constitutional: Negative for chills, fatigue and fever.   HENT: Negative for hearing loss.    Eyes: Negative for photophobia and visual disturbance.   Respiratory: Negative for cough, chest tightness, shortness of breath and wheezing.    Cardiovascular: Negative for chest pain and palpitations.   Gastrointestinal: Negative for constipation, diarrhea, nausea and vomiting.   Endocrine: Negative for cold intolerance and heat intolerance.   Genitourinary: Negative for flank pain.   Musculoskeletal: Positive for gait problem. Negative for neck pain and neck stiffness.   Neurological: Negative for light-headedness and headaches.   Psychiatric/Behavioral: Negative for sleep disturbance.         Objective:   Ht 6' 5" (1.956 m)   Wt 105 kg (231 lb 7.7 oz)   BMI 27.45 kg/m²     LOWER EXTREMITY PHYSICAL EXAMINATION    DERMATOLOGY: Skin is supple, dry and intact.     VASCULAR: The right dorsalis pedis pulse is 2/4 and the posterior tibial pulse is 2/4. The left dorsalis pedis pulse is 2/4 and the posterior tibial pulse is 2/4. Hair growth is noted on the dorsal foot and digits. Proximal to distal, warm to warm. Capillary refill time is WNL at less than 3s.    NEUROLOGY: Upon palpation of the interspaces, there are no neurological sensations stated that radiate proximal or distal. Upon compression of the metatarsal heads from medial to lateral, no neurological sensations or symptoms are stated.    ORTHOPEDIC: Manual Muscle Testing is 5/5 in all planes on the left and right foot, without pains, with and without resistance. Gait pattern is antalgic. There is moderate to severe discomfort to palpation at the right plantar 2nd, 3rd and 4th MTPJ. Plantar fat pad atrophy with displacement is noted.  Gastrocsoleal equinus " contracture is noted.  Rectus foot type is noted. Negative Lachman's testing is noted. Concern is not for Predislocation Syndrome or plantar plate rupture at the not MTPJ(s). No concern for inter-metatarsal bursitis.    Assessment:     1. Metatarsalgia, right foot    2. Hammer toe of right foot    3. Bursitis of intermetatarsal bursa of right foot    4. Neuroma, Roque's, right    5. Contracture, right ankle    6. Pes planus of both feet        Plan:     Metatarsalgia, right foot  -     triamcinolone acetonide injection 40 mg  -     dexamethasone injection 4 mg  -     naproxen (NAPROSYN) 500 MG tablet; Take 1 tablet (500 mg total) by mouth 2 (two) times daily with meals. For pain and inflammation  Dispense: 60 tablet; Refill: 0    Hammer toe of right foot    Bursitis of intermetatarsal bursa of right foot  -     triamcinolone acetonide injection 40 mg  -     dexamethasone injection 4 mg  -     naproxen (NAPROSYN) 500 MG tablet; Take 1 tablet (500 mg total) by mouth 2 (two) times daily with meals. For pain and inflammation  Dispense: 60 tablet; Refill: 0    Neuroma, Roque's, right  -     triamcinolone acetonide injection 40 mg  -     dexamethasone injection 4 mg    Contracture, right ankle    Pes planus of both feet    Thorough discussion is had with the patient today, concerning the diagnosis, its etiology, and the treatment algorithm at present.     Following sterile preparation with alcohol swab and/or betadine paint, injection was given into and around the area of the right 2nd, 3rd and 4th MTPJ, using 25-gauge needle. Injection consisted of approximately 0.5cc of Dexamethasone Sodium Phosphate, 0.5cc of Kenalog-40 and 0.5cc of 1% Lidocaine w/ or w/o Epinephrine or 0.25% or 0.50% Marcaine plain. Bandage application thereafter. Patient tolerated procedure well, and without complications or complaints. Patient educated that the area of pathology, might actually be slightly more painful, due to the injection, over  the course of the next one to two days.     Did discuss proper and supportive shoe gear in detail and at length with the patient.  These are shoes with firm and robust arch support; medial counter.  Shoes which only bend at the metatarsophalangeal joint and which are rigid in the midfoot and hindfoot. Patient urged to purchase running type or cross training type shoes gear which are designed for pronation control.           Future Appointments   Date Time Provider Department Center   5/10/2022  3:00 PM Jenn Jackson MD [facility]   5/12/2022  4:00 PM Yaniv Murray MD [facility] UROLOGY [facility]   5/13/2022  8:30 AM FIBROSCAN, [facility] HEPATOLOGY [facility] HEPAT [facility]   5/13/2022 10:15 AM LABORATORY, [facility] LAB [facility]   5/16/2022 11:05 AM LABORATORY, [facility] LAB [facility]   5/17/2022  2:30 PM [facility] CT1 LIMIT 500 LBS HGV CT SCAN [facility]   5/18/2022  3:30 PM Jenn Jackson MD [facility] RHEUM [facility] y   5/24/2022  1:00 PM Daniel Ramirez DPM [facility] POD [facility]   5/30/2022  2:00 PM Reina Guzman DPM [facility] POD [facility]   6/23/2022  3:00 PM Janell Bradley MD [facility] HEPAT [facility]   8/2/2022  9:55 AM LABORATORY, [facility] LAB [facility]   10/18/2022  2:40 PM KEYONA Greenberg Jr., MD [facility] IM [facility]

## 2022-05-06 ENCOUNTER — LAB VISIT (OUTPATIENT)
Dept: LAB | Facility: HOSPITAL | Age: 57
End: 2022-05-06
Attending: INTERNAL MEDICINE
Payer: COMMERCIAL

## 2022-05-06 ENCOUNTER — PROCEDURE VISIT (OUTPATIENT)
Dept: HEPATOLOGY | Facility: CLINIC | Age: 57
End: 2022-05-06
Attending: INTERNAL MEDICINE
Payer: COMMERCIAL

## 2022-05-06 VITALS — WEIGHT: 231 LBS | BODY MASS INDEX: 27.28 KG/M2 | HEIGHT: 77 IN

## 2022-05-06 DIAGNOSIS — R79.89 ABNORMAL LFTS: ICD-10-CM

## 2022-05-06 LAB
A1AT SERPL-MCNC: 120 MG/DL (ref 100–190)
CERULOPLASMIN SERPL-MCNC: 22 MG/DL (ref 15–45)
FERRITIN SERPL-MCNC: 466 NG/ML (ref 20–300)

## 2022-05-06 PROCEDURE — 81256 HFE GENE: CPT | Performed by: INTERNAL MEDICINE

## 2022-05-06 PROCEDURE — 91200 PR LIVER ELASTOGRAPHY W/OUT IMAG W/INTERP & REPORT: ICD-10-PCS | Mod: S$GLB,,, | Performed by: INTERNAL MEDICINE

## 2022-05-06 PROCEDURE — 82103 ALPHA-1-ANTITRYPSIN TOTAL: CPT | Performed by: INTERNAL MEDICINE

## 2022-05-06 PROCEDURE — 91200 LIVER ELASTOGRAPHY: CPT | Mod: S$GLB,,, | Performed by: INTERNAL MEDICINE

## 2022-05-06 PROCEDURE — 82728 ASSAY OF FERRITIN: CPT | Performed by: INTERNAL MEDICINE

## 2022-05-06 PROCEDURE — 82390 ASSAY OF CERULOPLASMIN: CPT | Performed by: INTERNAL MEDICINE

## 2022-05-06 PROCEDURE — 36415 COLL VENOUS BLD VENIPUNCTURE: CPT | Performed by: INTERNAL MEDICINE

## 2022-05-06 NOTE — Clinical Note
Fibroscan shows S0 Steatosis and F0 fibrosis 
Patient here for follow up on cellulitis.  Taking Bactrim.  Has 3 days left.  Right leg with less swelling and redness noted.  Raised lesion with clear fluid on right shin.  No fever reported.   
same name as above
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
## 2022-05-10 ENCOUNTER — OFFICE VISIT (OUTPATIENT)
Dept: RHEUMATOLOGY | Facility: CLINIC | Age: 57
End: 2022-05-10
Payer: COMMERCIAL

## 2022-05-10 VITALS
BODY MASS INDEX: 28 KG/M2 | SYSTOLIC BLOOD PRESSURE: 152 MMHG | DIASTOLIC BLOOD PRESSURE: 89 MMHG | WEIGHT: 236.13 LBS | HEART RATE: 79 BPM

## 2022-05-10 DIAGNOSIS — M05.79 RHEUMATOID ARTHRITIS INVOLVING MULTIPLE SITES WITH POSITIVE RHEUMATOID FACTOR: Primary | ICD-10-CM

## 2022-05-10 DIAGNOSIS — R76.8 HEPATITIS B CORE ANTIBODY POSITIVE: ICD-10-CM

## 2022-05-10 DIAGNOSIS — K11.20 PAROTITIS: ICD-10-CM

## 2022-05-10 DIAGNOSIS — Z79.631 LONG TERM METHOTREXATE USER: ICD-10-CM

## 2022-05-10 DIAGNOSIS — R79.89 ABNORMAL LIVER FUNCTION TESTS: ICD-10-CM

## 2022-05-10 LAB
GENETICIST REVIEW: NORMAL
HFE GENE MUT ANL BLD/T: NORMAL
HFE RELEASED BY: NORMAL
HFE RESULT SUMMARY: NEGATIVE
REF LAB TEST METHOD: NORMAL
SPECIMEN SOURCE: NORMAL
SPECIMEN,  HEMOCHROMATOSIS: NORMAL

## 2022-05-10 PROCEDURE — 3008F PR BODY MASS INDEX (BMI) DOCUMENTED: ICD-10-PCS | Mod: CPTII,S$GLB,, | Performed by: INTERNAL MEDICINE

## 2022-05-10 PROCEDURE — 99999 PR PBB SHADOW E&M-EST. PATIENT-LVL III: CPT | Mod: PBBFAC,,, | Performed by: INTERNAL MEDICINE

## 2022-05-10 PROCEDURE — 3079F PR MOST RECENT DIASTOLIC BLOOD PRESSURE 80-89 MM HG: ICD-10-PCS | Mod: CPTII,S$GLB,, | Performed by: INTERNAL MEDICINE

## 2022-05-10 PROCEDURE — 99214 PR OFFICE/OUTPT VISIT, EST, LEVL IV, 30-39 MIN: ICD-10-PCS | Mod: S$GLB,,, | Performed by: INTERNAL MEDICINE

## 2022-05-10 PROCEDURE — 3008F BODY MASS INDEX DOCD: CPT | Mod: CPTII,S$GLB,, | Performed by: INTERNAL MEDICINE

## 2022-05-10 PROCEDURE — 1159F PR MEDICATION LIST DOCUMENTED IN MEDICAL RECORD: ICD-10-PCS | Mod: CPTII,S$GLB,, | Performed by: INTERNAL MEDICINE

## 2022-05-10 PROCEDURE — 3077F PR MOST RECENT SYSTOLIC BLOOD PRESSURE >= 140 MM HG: ICD-10-PCS | Mod: CPTII,S$GLB,, | Performed by: INTERNAL MEDICINE

## 2022-05-10 PROCEDURE — 99999 PR PBB SHADOW E&M-EST. PATIENT-LVL III: ICD-10-PCS | Mod: PBBFAC,,, | Performed by: INTERNAL MEDICINE

## 2022-05-10 PROCEDURE — 1160F RVW MEDS BY RX/DR IN RCRD: CPT | Mod: CPTII,S$GLB,, | Performed by: INTERNAL MEDICINE

## 2022-05-10 PROCEDURE — 3079F DIAST BP 80-89 MM HG: CPT | Mod: CPTII,S$GLB,, | Performed by: INTERNAL MEDICINE

## 2022-05-10 PROCEDURE — 99214 OFFICE O/P EST MOD 30 MIN: CPT | Mod: S$GLB,,, | Performed by: INTERNAL MEDICINE

## 2022-05-10 PROCEDURE — 3077F SYST BP >= 140 MM HG: CPT | Mod: CPTII,S$GLB,, | Performed by: INTERNAL MEDICINE

## 2022-05-10 PROCEDURE — 1160F PR REVIEW ALL MEDS BY PRESCRIBER/CLIN PHARMACIST DOCUMENTED: ICD-10-PCS | Mod: CPTII,S$GLB,, | Performed by: INTERNAL MEDICINE

## 2022-05-10 PROCEDURE — 1159F MED LIST DOCD IN RCRD: CPT | Mod: CPTII,S$GLB,, | Performed by: INTERNAL MEDICINE

## 2022-05-10 ASSESSMENT — ROUTINE ASSESSMENT OF PATIENT INDEX DATA (RAPID3)
FATIGUE SCORE: 4
MDHAQ FUNCTION SCORE: 0.5
PSYCHOLOGICAL DISTRESS SCORE: 3.3
TOTAL RAPID3 SCORE: 4.39
PATIENT GLOBAL ASSESSMENT SCORE: 7.5
PAIN SCORE: 4
AM STIFFNESS SCORE: 1, YES

## 2022-05-10 NOTE — PROCEDURES
Fibroscan Procedure     Name: Sachin Gonzalez  Date of Procedure : 05/10/2022   :: Janell Bradley MD  Diagnosis: CRYPTOGENIC    Probe: M    Fibroscan readin.2 KPa    Fibrosis:F 0-1     CAP readin dB/m    Steatosis: :<S1       Interpretation: S0 Steatosis and F0 fibrosis

## 2022-05-10 NOTE — PATIENT INSTRUCTIONS
Don't get dehydrated.  Drink fluids.  Massage your parotid when it gets swollen.    Labs on 5/12/22.  Contact us if you don't hear from us regarding Hepatitis B core Ab.

## 2022-05-10 NOTE — Clinical Note
Ishan Mendez: Could you please give me some advice on this patient. He has been on Methotrexate for many years and did well, but recently had abnormal LFTs and HBcAb & HBS Ab were both positive; they were negative in the past. HBV DNA not detectable. I had wanted him to see a hepatologist and he saw one in [place] who suggested I use a less hepatotoxic agent to treat him (but at this level all have the potential for hepatotoxicity & he has done very well on MTX). She did not comment on the use of entecavir or other HBV rx. I suspect he does not need rx of HBV & would like to continue MTX. Would you like to see him or should I just follow his LFTs and make sure they don't go up 3-5 x or so and forego any HBV rx.  I will send him to you, if you would like to see him. I appreciate your input. You have always been so helpful in these cases. Thanks, Anita  I apologize for the length.

## 2022-05-12 ENCOUNTER — OFFICE VISIT (OUTPATIENT)
Dept: UROLOGY | Facility: CLINIC | Age: 57
End: 2022-05-12
Payer: COMMERCIAL

## 2022-05-12 ENCOUNTER — HOSPITAL ENCOUNTER (OUTPATIENT)
Dept: RADIOLOGY | Facility: HOSPITAL | Age: 57
Discharge: HOME | End: 2022-05-12
Attending: UROLOGY
Payer: COMMERCIAL

## 2022-05-12 ENCOUNTER — LAB VISIT (OUTPATIENT)
Dept: LAB | Facility: HOSPITAL | Age: 57
End: 2022-05-12
Attending: UROLOGY
Payer: COMMERCIAL

## 2022-05-12 VITALS
WEIGHT: 236 LBS | SYSTOLIC BLOOD PRESSURE: 127 MMHG | DIASTOLIC BLOOD PRESSURE: 81 MMHG | HEIGHT: 77 IN | HEART RATE: 79 BPM | BODY MASS INDEX: 27.87 KG/M2

## 2022-05-12 DIAGNOSIS — Z01.818 PRE-OP TESTING: ICD-10-CM

## 2022-05-12 DIAGNOSIS — N35.912 STRICTURE OF BULBOUS URETHRA IN MALE, UNSPECIFIED STRICTURE TYPE: ICD-10-CM

## 2022-05-12 DIAGNOSIS — R31.0 GROSS HEMATURIA: ICD-10-CM

## 2022-05-12 DIAGNOSIS — N52.9 ERECTILE DYSFUNCTION, UNSPECIFIED ERECTILE DYSFUNCTION TYPE: ICD-10-CM

## 2022-05-12 DIAGNOSIS — C61 PROSTATE CANCER: Primary | ICD-10-CM

## 2022-05-12 PROCEDURE — 71046 X-RAY EXAM CHEST 2 VIEWS: CPT | Mod: TC

## 2022-05-12 PROCEDURE — 1159F PR MEDICATION LIST DOCUMENTED IN MEDICAL RECORD: ICD-10-PCS | Mod: CPTII,S$GLB,, | Performed by: UROLOGY

## 2022-05-12 PROCEDURE — 99999 PR PBB SHADOW E&M-EST. PATIENT-LVL V: ICD-10-PCS | Mod: PBBFAC,,, | Performed by: UROLOGY

## 2022-05-12 PROCEDURE — 3008F PR BODY MASS INDEX (BMI) DOCUMENTED: ICD-10-PCS | Mod: CPTII,S$GLB,, | Performed by: UROLOGY

## 2022-05-12 PROCEDURE — 1159F MED LIST DOCD IN RCRD: CPT | Mod: CPTII,S$GLB,, | Performed by: UROLOGY

## 2022-05-12 PROCEDURE — 3008F BODY MASS INDEX DOCD: CPT | Mod: CPTII,S$GLB,, | Performed by: UROLOGY

## 2022-05-12 PROCEDURE — 99214 OFFICE O/P EST MOD 30 MIN: CPT | Mod: S$GLB,,, | Performed by: UROLOGY

## 2022-05-12 PROCEDURE — 3079F DIAST BP 80-89 MM HG: CPT | Mod: CPTII,S$GLB,, | Performed by: UROLOGY

## 2022-05-12 PROCEDURE — 3074F SYST BP LT 130 MM HG: CPT | Mod: CPTII,S$GLB,, | Performed by: UROLOGY

## 2022-05-12 PROCEDURE — 71046 X-RAY EXAM CHEST 2 VIEWS: CPT | Mod: 26,,, | Performed by: RADIOLOGY

## 2022-05-12 PROCEDURE — 3079F PR MOST RECENT DIASTOLIC BLOOD PRESSURE 80-89 MM HG: ICD-10-PCS | Mod: CPTII,S$GLB,, | Performed by: UROLOGY

## 2022-05-12 PROCEDURE — 3074F PR MOST RECENT SYSTOLIC BLOOD PRESSURE < 130 MM HG: ICD-10-PCS | Mod: CPTII,S$GLB,, | Performed by: UROLOGY

## 2022-05-12 PROCEDURE — 85025 COMPLETE CBC W/AUTO DIFF WBC: CPT | Performed by: UROLOGY

## 2022-05-12 PROCEDURE — 99214 PR OFFICE/OUTPT VISIT, EST, LEVL IV, 30-39 MIN: ICD-10-PCS | Mod: S$GLB,,, | Performed by: UROLOGY

## 2022-05-12 PROCEDURE — 99999 PR PBB SHADOW E&M-EST. PATIENT-LVL V: CPT | Mod: PBBFAC,,, | Performed by: UROLOGY

## 2022-05-12 PROCEDURE — 71046 XR CHEST PA AND LATERAL: ICD-10-PCS | Mod: 26,,, | Performed by: RADIOLOGY

## 2022-05-12 PROCEDURE — 80048 BASIC METABOLIC PNL TOTAL CA: CPT | Mod: XB | Performed by: UROLOGY

## 2022-05-12 NOTE — PROGRESS NOTES
Chief Complaint:   Encounter Diagnoses   Name Primary?    Prostate cancer Yes    Stricture of bulbous urethra in male, unspecified stricture type     Gross hematuria     Erectile dysfunction, unspecified erectile dysfunction type          HPI:   5/12/22- patient states that his voiding is getting worse, he is ready to pursue surgical management.  No real change in erections.  Previous PSA was stable.  No evidence of gross hematuria.      Allergies:  No known allergies    Medications:  has a current medication list which includes the following prescription(s): pantoprazole, sucralfate, zinc sulfate, ascorbic acid (vitamin c), cholecalciferol (vitamin d3), famotidine, folic acid, hydrochlorothiazide, ibuprofen, magnesium, methotrexate, and metoprolol succinate.    Review of Systems:  General: No fever, chills, fatigability, or weight loss.  Skin: No rashes, itching, or changes in color or texture of skin.  Chest: Denies TYLER, cyanosis, wheezing, cough, and sputum production.  Abdomen: Appetite fine. No weight loss. Denies diarrhea, abdominal pain, hematemesis, or blood in stool.  Musculoskeletal: No joint stiffness or swelling. Denies back pain.  : As above.  All other review of systems negative.    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    PSH:   has a past surgical history that includes Prostatectomy (2011) and Esophagogastroduodenoscopy.    FamHx: family history includes Alcohol abuse in his father; Anxiety disorder in his mother; Arthritis in his mother; Hypertension in his mother; Kidney disease in his brother; No Known Problems in his sister; Stroke in his father.    SocHx:  reports that he has never smoked. He has never used smokeless tobacco. He reports that he does not drink alcohol and does  not use drugs.      Physical Exam:  There were no vitals filed for this visit.  General: A&Ox3, no apparent distress, no deformities  Neck: No masses, normal thyroid  Lungs: normal inspiration, no use of accessory muscles  Heart: normal pulse, no arrhythmias  Abdomen: Soft, NT, ND  Skin: The skin is warm and dry. No jaundice.  Ext: No c/c/e.    Labs/Studies:   pvr 26 ml 12/21  Cystoscopy USD 3/21  PSA <0.01 12/21  CT urogram bladder outflow obstruction 3/21    Impression/Plan:      1. Prostate cancer-  RP pT3b Gl7 with adjuvant XRT  6/7/12    PSA has been stable, repeat at his yearly screening appointment.    2. Urethral stricture disease- DVIU was recommended, but not performed previously.  He states that his symptoms have definitely gotten worse and he is not ready to pursue cystoscopy with DVIU.    Patient understands the risks, benefits and alternatives of the above-stated procedure.  These include but not limited to damage to the surrounding structures including the urethra, prostate, ureters and bladder.  Risk of the need for stent placement, perforation requiring open procedures, Salazar catheterization at the conclusion of the procedure will be mandatory.  Risk of recurrent strictures or need for further surgeries.  Risk of pain, hematuria, infections.  Risk of heart attack, stroke, death, DVT and PE.  Patient understanding of all the above he has elected to pursue.    3. Gross hematuria- structural evaluation attempted by my past partner last spring, CT scan unremarkable.  Cystoscopy could not be completed due to the stricture.  Of note patient has never been a smoker, no recurrent hematuria.  Will be able to assess with cystoscopy at the time of DVIU.    4. Erectile dysfunction- MARÍA works but not sufficient.  Cialis, trimix and muse were all a failure.  At this point this is not an issue.

## 2022-05-13 ENCOUNTER — PATIENT MESSAGE (OUTPATIENT)
Dept: RHEUMATOLOGY | Facility: CLINIC | Age: 57
End: 2022-05-13
Payer: COMMERCIAL

## 2022-05-13 DIAGNOSIS — K11.5 PAROTID SIALOLITHIASIS: Primary | ICD-10-CM

## 2022-05-13 LAB
ANION GAP SERPL CALC-SCNC: 6 MMOL/L (ref 8–16)
BASOPHILS # BLD AUTO: 0.04 K/UL (ref 0–0.2)
BASOPHILS NFR BLD: 0.6 % (ref 0–1.9)
BUN SERPL-MCNC: 22 MG/DL (ref 6–20)
CALCIUM SERPL-MCNC: 9.3 MG/DL (ref 8.7–10.5)
CHLORIDE SERPL-SCNC: 105 MMOL/L (ref 95–110)
CO2 SERPL-SCNC: 28 MMOL/L (ref 23–29)
CREAT SERPL-MCNC: 1.2 MG/DL (ref 0.5–1.4)
DIFFERENTIAL METHOD: ABNORMAL
EOSINOPHIL # BLD AUTO: 0.2 K/UL (ref 0–0.5)
EOSINOPHIL NFR BLD: 2.4 % (ref 0–8)
ERYTHROCYTE [DISTWIDTH] IN BLOOD BY AUTOMATED COUNT: 13.6 % (ref 11.5–14.5)
EST. GFR  (AFRICAN AMERICAN): >60 ML/MIN/1.73 M^2
EST. GFR  (NON AFRICAN AMERICAN): >60 ML/MIN/1.73 M^2
GLUCOSE SERPL-MCNC: 83 MG/DL (ref 70–110)
HCT VFR BLD AUTO: 45 % (ref 40–54)
HGB BLD-MCNC: 14.5 G/DL (ref 14–18)
IMM GRANULOCYTES # BLD AUTO: 0.02 K/UL (ref 0–0.04)
IMM GRANULOCYTES NFR BLD AUTO: 0.3 % (ref 0–0.5)
LYMPHOCYTES # BLD AUTO: 1.2 K/UL (ref 1–4.8)
LYMPHOCYTES NFR BLD: 18.1 % (ref 18–48)
MCH RBC QN AUTO: 33.1 PG (ref 27–31)
MCHC RBC AUTO-ENTMCNC: 32.2 G/DL (ref 32–36)
MCV RBC AUTO: 103 FL (ref 82–98)
MONOCYTES # BLD AUTO: 0.7 K/UL (ref 0.3–1)
MONOCYTES NFR BLD: 10 % (ref 4–15)
NEUTROPHILS # BLD AUTO: 4.7 K/UL (ref 1.8–7.7)
NEUTROPHILS NFR BLD: 68.6 % (ref 38–73)
NRBC BLD-RTO: 0 /100 WBC
PLATELET # BLD AUTO: 259 K/UL (ref 150–450)
PMV BLD AUTO: 10.7 FL (ref 9.2–12.9)
POTASSIUM SERPL-SCNC: 4.7 MMOL/L (ref 3.5–5.1)
RBC # BLD AUTO: 4.38 M/UL (ref 4.6–6.2)
SODIUM SERPL-SCNC: 139 MMOL/L (ref 136–145)
WBC # BLD AUTO: 6.79 K/UL (ref 3.9–12.7)

## 2022-05-17 ENCOUNTER — LAB VISIT (OUTPATIENT)
Dept: LAB | Facility: HOSPITAL | Age: 57
End: 2022-05-17
Attending: INTERNAL MEDICINE
Payer: COMMERCIAL

## 2022-05-17 ENCOUNTER — HOSPITAL ENCOUNTER (OUTPATIENT)
Dept: RADIOLOGY | Facility: HOSPITAL | Age: 57
Discharge: HOME | End: 2022-05-17
Attending: OTOLARYNGOLOGY
Payer: COMMERCIAL

## 2022-05-17 ENCOUNTER — HOSPITAL ENCOUNTER (OUTPATIENT)
Dept: CARDIOLOGY | Facility: HOSPITAL | Age: 57
Discharge: HOME | End: 2022-05-17
Attending: UROLOGY
Payer: COMMERCIAL

## 2022-05-17 DIAGNOSIS — Z79.631 LONG TERM METHOTREXATE USER: ICD-10-CM

## 2022-05-17 DIAGNOSIS — Z01.818 PRE-OP TESTING: ICD-10-CM

## 2022-05-17 DIAGNOSIS — K11.5 PAROTID SIALOLITHIASIS: ICD-10-CM

## 2022-05-17 LAB
ALBUMIN SERPL BCP-MCNC: 3.7 G/DL (ref 3.5–5.2)
ALP SERPL-CCNC: 88 U/L (ref 55–135)
ALT SERPL W/O P-5'-P-CCNC: 24 U/L (ref 10–44)
ANION GAP SERPL CALC-SCNC: 8 MMOL/L (ref 8–16)
AST SERPL-CCNC: 24 U/L (ref 10–40)
BASOPHILS # BLD AUTO: 0.02 K/UL (ref 0–0.2)
BASOPHILS NFR BLD: 0.3 % (ref 0–1.9)
BILIRUB SERPL-MCNC: 0.3 MG/DL (ref 0.1–1)
BUN SERPL-MCNC: 21 MG/DL (ref 6–20)
CALCIUM SERPL-MCNC: 8.9 MG/DL (ref 8.7–10.5)
CHLORIDE SERPL-SCNC: 107 MMOL/L (ref 95–110)
CO2 SERPL-SCNC: 25 MMOL/L (ref 23–29)
CREAT SERPL-MCNC: 1.3 MG/DL (ref 0.5–1.4)
CRP SERPL-MCNC: 5.5 MG/L (ref 0–8.2)
DIFFERENTIAL METHOD: ABNORMAL
EOSINOPHIL # BLD AUTO: 0.1 K/UL (ref 0–0.5)
EOSINOPHIL NFR BLD: 2.1 % (ref 0–8)
ERYTHROCYTE [DISTWIDTH] IN BLOOD BY AUTOMATED COUNT: 13.7 % (ref 11.5–14.5)
ERYTHROCYTE [SEDIMENTATION RATE] IN BLOOD BY WESTERGREN METHOD: 12 MM/HR (ref 0–23)
EST. GFR  (AFRICAN AMERICAN): >60 ML/MIN/1.73 M^2
EST. GFR  (NON AFRICAN AMERICAN): >60 ML/MIN/1.73 M^2
GLUCOSE SERPL-MCNC: 78 MG/DL (ref 70–110)
HCT VFR BLD AUTO: 42.4 % (ref 40–54)
HGB BLD-MCNC: 13.6 G/DL (ref 14–18)
IMM GRANULOCYTES # BLD AUTO: 0.01 K/UL (ref 0–0.04)
IMM GRANULOCYTES NFR BLD AUTO: 0.2 % (ref 0–0.5)
LYMPHOCYTES # BLD AUTO: 1 K/UL (ref 1–4.8)
LYMPHOCYTES NFR BLD: 15.4 % (ref 18–48)
MCH RBC QN AUTO: 32.4 PG (ref 27–31)
MCHC RBC AUTO-ENTMCNC: 32.1 G/DL (ref 32–36)
MCV RBC AUTO: 101 FL (ref 82–98)
MONOCYTES # BLD AUTO: 0.5 K/UL (ref 0.3–1)
MONOCYTES NFR BLD: 8.7 % (ref 4–15)
NEUTROPHILS # BLD AUTO: 4.6 K/UL (ref 1.8–7.7)
NEUTROPHILS NFR BLD: 73.3 % (ref 38–73)
NRBC BLD-RTO: 0 /100 WBC
PLATELET # BLD AUTO: 188 K/UL (ref 150–450)
PMV BLD AUTO: 10.9 FL (ref 9.2–12.9)
POTASSIUM SERPL-SCNC: 4.4 MMOL/L (ref 3.5–5.1)
PROT SERPL-MCNC: 7 G/DL (ref 6–8.4)
RBC # BLD AUTO: 4.2 M/UL (ref 4.6–6.2)
SODIUM SERPL-SCNC: 140 MMOL/L (ref 136–145)
WBC # BLD AUTO: 6.23 K/UL (ref 3.9–12.7)

## 2022-05-17 PROCEDURE — 93005 ELECTROCARDIOGRAM TRACING: CPT

## 2022-05-17 PROCEDURE — 36415 COLL VENOUS BLD VENIPUNCTURE: CPT | Performed by: INTERNAL MEDICINE

## 2022-05-17 PROCEDURE — 93010 ELECTROCARDIOGRAM REPORT: CPT | Mod: ,,, | Performed by: INTERNAL MEDICINE

## 2022-05-17 PROCEDURE — 85025 COMPLETE CBC W/AUTO DIFF WBC: CPT | Performed by: INTERNAL MEDICINE

## 2022-05-17 PROCEDURE — 80053 COMPREHEN METABOLIC PANEL: CPT | Performed by: INTERNAL MEDICINE

## 2022-05-17 PROCEDURE — 70490 CT SOFT TISSUE NECK W/O DYE: CPT | Mod: TC

## 2022-05-17 PROCEDURE — 85652 RBC SED RATE AUTOMATED: CPT | Performed by: INTERNAL MEDICINE

## 2022-05-17 PROCEDURE — 70490 CT SOFT TISSUE NECK W/O DYE: CPT | Mod: 26,,, | Performed by: RADIOLOGY

## 2022-05-17 PROCEDURE — 93010 EKG 12-LEAD: ICD-10-PCS | Mod: ,,, | Performed by: INTERNAL MEDICINE

## 2022-05-17 PROCEDURE — 86140 C-REACTIVE PROTEIN: CPT | Performed by: INTERNAL MEDICINE

## 2022-05-17 PROCEDURE — 70490 CT SOFT TISSUE NECK WITHOUT CONTRAST: ICD-10-PCS | Mod: 26,,, | Performed by: RADIOLOGY

## 2022-05-24 NOTE — PRE ADMISSION SCREENING
Pre op instructions reviewed with pt per phone: Spoke about pre op process and surgery instructions, verbalized understanding.    Surgery is scheduled on 5/31/22. Please arrive at 9am. We will call you the afternoon prior to surgery to confirm arrival time, as it is subject to change due to cancellations & emergencies.    Please report to the Main Hospital (1st Floor) at [facility] located off of [facility] (2nd building on the left, in front of the [facility] pole).  Address: [address] [place] [address] [place] [address]        INSTRUCTIONS IMPORTANT!!!  Do Not Eat, Drink, or Smoke after 12 midnight! NO WATER after midnight! OK to brush teeth, no gum, candy or mints!      *Take only these medicines with a small swallow of water-morning of surgery.  Metoprolol    ____  NO Acrylic/fake nails or nail polish worn day of surgery (specifically hand/arm & foot surgeries).  ____  NO powder, lotions, deodorants, oils or creams on body.  ____  Please Remove All jewelry & piercings prior to surgery.  ____  Please Remove Dentures, Hearing Aids & Contact Lens prior to the start of surgery.  ____  Please bring photo ID and insurance information to hospital (Leave Valuables at Home).  ____  If going home the same day, arrange for a ride home. You will not be able to drive 24 hrs if Anesthesia was used.   ____  Females (ages 11-60) may need to give a urine sample the morning of surgery; please see Pre op Nurse prior to voiding.  ____  Wear clean, loose fitting clothing. Allow for dressings, bandages.  ____  Stop all Aspirin products, Ibuprofen, Advil, Motrin & Aleve at least 5-7 days before surgery, unless otherwise instructed by your doctor, or the nurse.   ____  Blood Thinners are stopped based on your Provider's recommendation; Call Surgeon's Office to inquire when to stop/hold.  ____  Stop taking any Fish Oil supplements or Vitamins at least 5 days prior to surgery, unless instructed otherwise by your Doctor.            Diabetic  Patients: If you take diabetic medication, do NOT take morning of surgery unless instructed by             Doctor. Metformin to be stopped 24 hrs prior to surgery time. DO NOT take long-acting insulin the evening before surgery. Blood sugars will be checked in pre-op morning of.    Bathing Instructions:    -Do not shave your face or body the day before or the day of surgery.  -Do not shave pubic hair 7 days prior to surgery (gyn pt's).   -Shower & Rinse your body as usual with anti-bacterial Soap (Dial, Lever 2000, or Hibiclens)   -Do not use Hibiclens on your head, face, or genitals.   -Do not wash with anti-bacterial soap after you use the Hibiclens.   -Rinse your body thoroughly.      [facility] Visitor/Ride Policy:  Only 2 adults allowed (over the age of 18) to accompany you to the Hospital. You Must have a ride home from a responsible adult that you know and trust. Medical Transport, Uber or Lyft can only be used if patient has a responsible adult to accompany them during ride home.    Post-Op Instructions: You will receive Post-op/Discharge instructions by your Discharge Nurse prior to going home. Please call your Surgeon's office with any post-surgery questions/concerns.    *Call [facility] Pre-Admissions Department with surgery instruction questions @ [phone] or [phone] (Mon-Fri 7:30a to 3:45p)  *If you are running late or have questions the morning of surgery, please call the Surgery Dept @ [phone]  *Insurance/ Financial Questions, please call [phone].

## 2022-05-27 ENCOUNTER — TELEPHONE (OUTPATIENT)
Dept: UROLOGY | Facility: CLINIC | Age: 57
End: 2022-05-27
Payer: COMMERCIAL

## 2022-05-27 NOTE — TELEPHONE ENCOUNTER
Attempted to contact pt regarding below. No answer. Voicemail not setup, AL, LPN.     ----- Message from Dina Morin sent at 5/27/2022 11:31 AM CDT -----  Contact: patient/[phone]  Patient needs a callback got a few questions regarding his procedure scheduled for 5/31/2022. Please call him at [phone]..    Thanks KL

## 2022-05-30 ENCOUNTER — TELEPHONE (OUTPATIENT)
Dept: PREADMISSION TESTING | Facility: HOSPITAL | Age: 57
End: 2022-05-30
Payer: COMMERCIAL

## 2022-05-30 ENCOUNTER — TELEPHONE (OUTPATIENT)
Dept: OTOLARYNGOLOGY | Facility: CLINIC | Age: 57
End: 2022-05-30
Payer: COMMERCIAL

## 2022-05-30 ENCOUNTER — PATIENT MESSAGE (OUTPATIENT)
Dept: HEPATOLOGY | Facility: CLINIC | Age: 57
End: 2022-05-30
Payer: COMMERCIAL

## 2022-05-30 ENCOUNTER — TELEPHONE (OUTPATIENT)
Dept: UROLOGY | Facility: CLINIC | Age: 57
End: 2022-05-30
Payer: COMMERCIAL

## 2022-05-30 NOTE — TELEPHONE ENCOUNTER
----- Message from Franca Trinidad sent at 5/30/2022  9:39 AM CDT -----  States he would like to cancel his procedure on June 1. Please call pt [phone]. Thank you

## 2022-05-30 NOTE — TELEPHONE ENCOUNTER
Pt has questions regarding risks of procedure. Read off risks from progress note, while also explaining that all surgeries have risks, and the importance is to weigh out the benefits versus the risks. Pt requests to speak to Dr. Murray further regarding Sx scheduled for tomorrow. Informed pt that he was in clinic, but I would inform Dr. Schroeder and we would get back with him. Pt also requesting excuse for his daughter since she will be his transportation to and from Sx. Informed pt that I could generate this in his chart and he could access this from my [facility]. Pt expressed verbal understanding of this, AL, LPN.   ----- Message from Franca Trinidad sent at 5/30/2022  9:35 AM CDT -----  States he has some questions regarding his procedure on tomorrow. Please call pt [phone]. Thank you

## 2022-05-31 ENCOUNTER — ANESTHESIA (OUTPATIENT)
Dept: SURGERY | Facility: HOSPITAL | Age: 57
End: 2022-05-31
Payer: COMMERCIAL

## 2022-05-31 ENCOUNTER — ANESTHESIA EVENT (OUTPATIENT)
Dept: SURGERY | Facility: HOSPITAL | Age: 57
End: 2022-05-31
Payer: COMMERCIAL

## 2022-05-31 ENCOUNTER — HOSPITAL ENCOUNTER (OUTPATIENT)
Facility: HOSPITAL | Age: 57
Discharge: HOME | End: 2022-05-31
Attending: UROLOGY | Admitting: UROLOGY
Payer: COMMERCIAL

## 2022-05-31 DIAGNOSIS — C61 PROSTATE CANCER: ICD-10-CM

## 2022-05-31 DIAGNOSIS — N35.912 STRICTURE OF BULBOUS URETHRA IN MALE, UNSPECIFIED STRICTURE TYPE: ICD-10-CM

## 2022-05-31 PROCEDURE — 36000707: Performed by: UROLOGY

## 2022-05-31 PROCEDURE — 63600175 PHARM REV CODE 636 W HCPCS: Performed by: ANESTHESIOLOGY

## 2022-05-31 PROCEDURE — C1729 CATH, DRAINAGE: HCPCS | Performed by: UROLOGY

## 2022-05-31 PROCEDURE — 71000015 HC POSTOP RECOV 1ST HR: Performed by: UROLOGY

## 2022-05-31 PROCEDURE — 52276 CYSTOSCOPY AND TREATMENT: CPT | Mod: ,,, | Performed by: UROLOGY

## 2022-05-31 PROCEDURE — 52276 PR CYSTOSCOPY,DIR VIS INT URETHROTOMY: ICD-10-PCS | Mod: ,,, | Performed by: UROLOGY

## 2022-05-31 PROCEDURE — 25000003 PHARM REV CODE 250: Performed by: NURSE ANESTHETIST, CERTIFIED REGISTERED

## 2022-05-31 PROCEDURE — 36000706: Performed by: UROLOGY

## 2022-05-31 PROCEDURE — 25000003 PHARM REV CODE 250: Performed by: UROLOGY

## 2022-05-31 PROCEDURE — 71000033 HC RECOVERY, INTIAL HOUR: Performed by: UROLOGY

## 2022-05-31 PROCEDURE — 37000008 HC ANESTHESIA 1ST 15 MINUTES: Performed by: UROLOGY

## 2022-05-31 PROCEDURE — 37000009 HC ANESTHESIA EA ADD 15 MINS: Performed by: UROLOGY

## 2022-05-31 PROCEDURE — C1769 GUIDE WIRE: HCPCS | Performed by: UROLOGY

## 2022-05-31 PROCEDURE — 63600175 PHARM REV CODE 636 W HCPCS: Performed by: NURSE ANESTHETIST, CERTIFIED REGISTERED

## 2022-05-31 RX ORDER — PHENAZOPYRIDINE HYDROCHLORIDE 200 MG/1
200 TABLET, FILM COATED ORAL 3 TIMES DAILY PRN
Qty: 15 TABLET | Refills: 0 | Status: ON HOLD | OUTPATIENT
Start: 2022-05-31 | End: 2022-11-21 | Stop reason: HOSPADM

## 2022-05-31 RX ORDER — CEFDINIR 300 MG/1
300 CAPSULE ORAL 2 TIMES DAILY
Qty: 10 CAPSULE | Refills: 0 | Status: SHIPPED | OUTPATIENT
Start: 2022-05-31 | End: 2022-06-05

## 2022-05-31 RX ORDER — OXYCODONE AND ACETAMINOPHEN 5; 325 MG/1; MG/1
1 TABLET ORAL EVERY 4 HOURS PRN
Qty: 10 TABLET | Refills: 0 | Status: ON HOLD | OUTPATIENT
Start: 2022-05-31 | End: 2022-11-21 | Stop reason: HOSPADM

## 2022-05-31 RX ORDER — LIDOCAINE HYDROCHLORIDE 20 MG/ML
INJECTION, SOLUTION EPIDURAL; INFILTRATION; INTRACAUDAL; PERINEURAL
Status: DISCONTINUED | OUTPATIENT
Start: 2022-05-31 | End: 2022-05-31

## 2022-05-31 RX ORDER — OXYCODONE AND ACETAMINOPHEN 5; 325 MG/1; MG/1
1 TABLET ORAL
Status: DISCONTINUED | OUTPATIENT
Start: 2022-05-31 | End: 2022-05-31 | Stop reason: HOSPADM

## 2022-05-31 RX ORDER — FENTANYL CITRATE 50 UG/ML
INJECTION, SOLUTION INTRAMUSCULAR; INTRAVENOUS
Status: DISCONTINUED | OUTPATIENT
Start: 2022-05-31 | End: 2022-05-31

## 2022-05-31 RX ORDER — ONDANSETRON 2 MG/ML
4 INJECTION INTRAMUSCULAR; INTRAVENOUS DAILY PRN
Status: DISCONTINUED | OUTPATIENT
Start: 2022-05-31 | End: 2022-05-31 | Stop reason: HOSPADM

## 2022-05-31 RX ORDER — CEFAZOLIN SODIUM 1 G/3ML
INJECTION, POWDER, FOR SOLUTION INTRAMUSCULAR; INTRAVENOUS
Status: DISCONTINUED | OUTPATIENT
Start: 2022-05-31 | End: 2022-05-31

## 2022-05-31 RX ORDER — HYDROMORPHONE HYDROCHLORIDE 2 MG/ML
0.2 INJECTION, SOLUTION INTRAMUSCULAR; INTRAVENOUS; SUBCUTANEOUS EVERY 5 MIN PRN
Status: DISCONTINUED | OUTPATIENT
Start: 2022-05-31 | End: 2022-05-31 | Stop reason: HOSPADM

## 2022-05-31 RX ORDER — KETOROLAC TROMETHAMINE 30 MG/ML
15 INJECTION, SOLUTION INTRAMUSCULAR; INTRAVENOUS EVERY 8 HOURS PRN
Status: DISCONTINUED | OUTPATIENT
Start: 2022-05-31 | End: 2022-05-31 | Stop reason: HOSPADM

## 2022-05-31 RX ORDER — PROPOFOL 10 MG/ML
VIAL (ML) INTRAVENOUS
Status: DISCONTINUED | OUTPATIENT
Start: 2022-05-31 | End: 2022-05-31

## 2022-05-31 RX ORDER — PHENAZOPYRIDINE HYDROCHLORIDE 100 MG/1
200 TABLET, FILM COATED ORAL ONCE
Status: COMPLETED | OUTPATIENT
Start: 2022-05-31 | End: 2022-05-31

## 2022-05-31 RX ORDER — CEFAZOLIN SODIUM 2 G/50ML
2 SOLUTION INTRAVENOUS
Status: DISCONTINUED | OUTPATIENT
Start: 2022-05-31 | End: 2022-05-31 | Stop reason: HOSPADM

## 2022-05-31 RX ADMIN — KETOROLAC TROMETHAMINE 15 MG: 30 INJECTION, SOLUTION INTRAMUSCULAR; INTRAVENOUS at 10:05

## 2022-05-31 RX ADMIN — PHENAZOPYRIDINE HYDROCHLORIDE 200 MG: 100 TABLET ORAL at 10:05

## 2022-05-31 RX ADMIN — PROPOFOL 280 MG: 10 INJECTION, EMULSION INTRAVENOUS at 10:05

## 2022-05-31 RX ADMIN — SODIUM CHLORIDE, POTASSIUM CHLORIDE, SODIUM LACTATE AND CALCIUM CHLORIDE: 600; 310; 30; 20 INJECTION, SOLUTION INTRAVENOUS at 09:05

## 2022-05-31 RX ADMIN — FENTANYL CITRATE 100 MCG: 50 INJECTION, SOLUTION INTRAMUSCULAR; INTRAVENOUS at 09:05

## 2022-05-31 RX ADMIN — LIDOCAINE HYDROCHLORIDE 100 MG: 20 INJECTION, SOLUTION EPIDURAL; INFILTRATION; INTRACAUDAL; PERINEURAL at 09:05

## 2022-05-31 RX ADMIN — CEFAZOLIN 2 G: 1 INJECTION, POWDER, FOR SOLUTION INTRAMUSCULAR; INTRAVENOUS at 09:05

## 2022-05-31 NOTE — DISCHARGE INSTRUCTIONS
Clinic will call regarding scheduling appointments listed below:   Salazar removal in 48 hours  6 weeks with Dr. Murray

## 2022-05-31 NOTE — OP NOTE
Date of Procedure: 05/31/2022    PREOP DIAGNOSIS:  Urethral stricture disease.    POSTOP DIAGNOSIS:  Urethral stricture disease.    PROCEDURES:   Direct visual incision of urethral stricture disease    SURGEON:  Yaniv Murray M.D.    Assistants: None    Specimen:  None    ANESTHESIA:  General endotracheal    FINDINGS:  Clear channel at the conclusion      PROCEDURE IN DETAIL:  Patient was brought to the operative suite and placed under general anesthesia and positioned into the dorsal lithotomy position.  After being sterilely prepped and draped a 20 Gabonese urethrotome was inserted into a normal urethra.  Stricture was immediately identified, using a blade we were able to incise at approximately the 3 and 9 o'clock positions.  At the conclusion the scope passed easily without restrictions.  The bladder was entered, bladder neck was unremarkable.  Cystoscopy demonstrated no evidence of intravesical lesions or other abnormalities.  Bilateral ureteral orifices were normal in size, shape, caliber and location.  Of note a wire was inserted through the urethrotome and into the bladder.  Using Seldinger technique we placed an 18 Gabonese Adger tip catheter, with 10 mL of sterile water within the balloon and it sat nicely upon the bladder neck.  Patient will have his catheter removed in 2 days.  Patient will then return to see me in 6 weeks with a uroflow and postvoid residual.    COMPLICATIONS:  None

## 2022-05-31 NOTE — ANESTHESIA PREPROCEDURE EVALUATION
05/31/2022  Sachin Gonzalez is a 56 y.o., male.    Patient Active Problem List   Diagnosis    History of prostate cancer    RA (rheumatoid arthritis)    Status post prostatectomy (06/07/12)    Erectile dysfunction    Peyronie's disease    Long-term use of immunosuppressant medication    Cephalic vein thrombosis-post op    Essential hypertension    Tension-type headache, not intractable    Lumbar foraminal stenosis    Parotid sialolithiasis    Gastroesophageal reflux disease without esophagitis    Mixed hyperlipidemia    Melena    Screen for colon cancer    Prostate cancer    Gross hematuria    Stricture of bulbous urethra in male     Past Surgical History:   Procedure Laterality Date    COLONOSCOPY N/A 4/21/2021    Procedure: COLONOSCOPY covid test scheduled on 4/19/21;  Surgeon: Darrell Worthington MD;  Location: [facility];  Service: Endoscopy;  Laterality: N/A;    ESOPHAGOGASTRODUODENOSCOPY      ESOPHAGOGASTRODUODENOSCOPY N/A 4/5/2021    Procedure: EGD (ESOPHAGOGASTRODUODENOSCOPY) Rapid Covid test needed;  Surgeon: Darrell Worthington MD;  Location: [facility];  Service: Endoscopy;  Laterality: N/A;    PROSTATECTOMY  2011       Pre-op Assessment    I have reviewed the Patient Summary Reports.    I have reviewed the NPO Status.   I have reviewed the Medications.     Review of Systems  Anesthesia Hx:  No problems with previous Anesthesia    Social:  Non-Smoker    Hematology/Oncology:  Hematology Normal       -- Cancer in past history:    Cardiovascular:   Hypertension hyperlipidemia    Pulmonary:  Pulmonary Normal    Renal/:  Renal/ Normal  Hx of prostate cancer    Urethral stricture    Musculoskeletal:   Arthritis  RA (rheumatoid arthritis)   Neurological:  Neurology Normal    Endocrine:  Endocrine Normal        Physical Exam  General: Well nourished    Airway:  Mallampati: II   Mouth  Opening: Normal  TM Distance: Normal  Neck ROM: Normal ROM    Dental:  Intact        Anesthesia Plan  Type of Anesthesia, risks & benefits discussed:    Anesthesia Type: Gen ETT, Gen Supraglottic Airway, MAC, Gen Natural Airway  Intra-op Monitoring Plan: Standard ASA Monitors  Post Op Pain Control Plan: multimodal analgesia  Induction:  IV  Airway Plan: , Post-Induction  Informed Consent: Informed consent signed with the Patient and all parties understand the risks and agree with anesthesia plan.  All questions answered.   ASA Score: 2    Ready For Surgery From Anesthesia Perspective.     .    Chemistry        Component Value Date/Time     05/17/2022 1405    K 4.4 05/17/2022 1405     05/17/2022 1405    CO2 25 05/17/2022 1405    BUN 21 (H) 05/17/2022 1405    CREATININE 1.3 05/17/2022 1405    GLU 78 05/17/2022 1405        Component Value Date/Time    CALCIUM 8.9 05/17/2022 1405    ALKPHOS 88 05/17/2022 1405    AST 24 05/17/2022 1405    ALT 24 05/17/2022 1405    BILITOT 0.3 05/17/2022 1405    ESTGFRAFRICA >60.0 05/17/2022 1405    EGFRNONAA >60.0 05/17/2022 1405        Lab Results   Component Value Date    WBC 6.23 05/17/2022    HGB 13.6 (L) 05/17/2022    HCT 42.4 05/17/2022     (H) 05/17/2022     05/17/2022

## 2022-05-31 NOTE — ANESTHESIA POSTPROCEDURE EVALUATION
Anesthesia Post Evaluation    Patient: Sachin Gonzalez    Procedure(s) Performed: Procedure(s) (LRB):  CYSTOSCOPY, WITH DIRECT VISION INTERNAL URETHROTOMY (N/A)    Final Anesthesia Type: MAC      Patient location during evaluation: PACU  Patient participation: Yes- Able to Participate  Level of consciousness: awake and alert  Post-procedure vital signs: reviewed and stable  Airway patency: patent      Anesthetic complications: no      Cardiovascular status: blood pressure returned to baseline  Respiratory status: unassisted and spontaneous ventilation  Hydration status: euvolemic  Follow-up not needed.          Vitals Value Taken Time   /84 05/31/22 1045   Temp 36 °C (96.8 °F) 05/31/22 1015   Pulse 67 05/31/22 1050   Resp 27 05/31/22 1048   SpO2 99 % 05/31/22 1050   Vitals shown include unvalidated device data.      Event Time   Out of Recovery 10:51:00         Pain/Deonte Score: Pain Rating Prior to Med Admin: 5 (5/31/2022 10:37 AM)  Deonte Score: 9 (5/31/2022 10:45 AM)

## 2022-05-31 NOTE — TRANSFER OF CARE
"Anesthesia Transfer of Care Note    Patient: Sachin Gonzalez    Procedure(s) Performed: Procedure(s) (LRB):  CYSTOSCOPY, WITH DIRECT VISION INTERNAL URETHROTOMY (N/A)    Patient location: PACU    Anesthesia Type: MAC    Transport from OR: Transported from OR on room air with adequate spontaneous ventilation    Post pain: adequate analgesia    Post assessment: no apparent anesthetic complications and tolerated procedure well    Post vital signs: stable    Level of consciousness: awake, alert and oriented    Nausea/Vomiting: no nausea/vomiting    Complications: none    Transfer of care protocol was followed      Last vitals:   Visit Vitals  /80 (BP Location: Left arm, Patient Position: Lying)   Pulse 83   Temp 36 °C (96.8 °F) (Temporal)   Resp 16   Ht 6' 7" (2.007 m)   Wt 103.5 kg (228 lb 2.8 oz)   SpO2 95%   BMI 25.71 kg/m²     "

## 2022-05-31 NOTE — DISCHARGE SUMMARY
[facility] (Hospital)  Discharge Note  Short Stay    Procedure(s) (LRB):  CYSTOSCOPY, WITH DIRECT VISION INTERNAL URETHROTOMY (N/A)    OUTCOME: Patient tolerated treatment/procedure well without complication and is now ready for discharge.    DISPOSITION: Home or Self Care    FINAL DIAGNOSIS:  <principal problem not specified>    FOLLOWUP: In clinic    DISCHARGE INSTRUCTIONS:    Discharge Procedure Orders   Diet Adult Regular     Notify your health care provider if you experience any of the following:  severe uncontrolled pain     Notify your health care provider if you experience any of the following:  persistent nausea and vomiting or diarrhea     Notify your health care provider if you experience any of the following:  temperature >100.4     Activity as tolerated        TIME SPENT ON DISCHARGE: 5 minutes

## 2022-06-01 ENCOUNTER — TELEPHONE (OUTPATIENT)
Dept: UROLOGY | Facility: CLINIC | Age: 57
End: 2022-06-01
Payer: COMMERCIAL

## 2022-06-02 ENCOUNTER — CLINICAL SUPPORT (OUTPATIENT)
Dept: UROLOGY | Facility: CLINIC | Age: 57
End: 2022-06-02
Payer: COMMERCIAL

## 2022-06-02 ENCOUNTER — TELEPHONE (OUTPATIENT)
Dept: UROLOGY | Facility: CLINIC | Age: 57
End: 2022-06-02
Payer: COMMERCIAL

## 2022-06-02 PROCEDURE — 99999 PR PBB SHADOW E&M-EST. PATIENT-LVL I: ICD-10-PCS | Mod: PBBFAC,,,

## 2022-06-02 PROCEDURE — 99999 PR PBB SHADOW E&M-EST. PATIENT-LVL I: CPT | Mod: PBBFAC,,,

## 2022-06-02 NOTE — PROGRESS NOTES
...Pt came to clinic for a voiding trail. I put in 240 mL of sterile water and pt said that he had to urinate. Using a 10 cc syringe, the balloon was deflated. After a few minutes the pt urinated 250 ml of clear urine. The patient left with a doctor's appointment for July 18 with Dr Murray . Left clinic ambulatory per self.    Loraine Milanes RN

## 2022-06-02 NOTE — TELEPHONE ENCOUNTER
Called pt and he said he could be here by 3 for Cathter removal. Told pt to come and we would remove cathter and make sure he can void.    ----- Message from Bob Patrick sent at 6/2/2022  8:56 AM CDT -----  Contact: self [phone]  Patient called at 8:56, is requesting if he can come at 11:00 today. Trying to find a ride (also is asking about covid test). Please call back at [phone] . Thanks/dj

## 2022-06-03 ENCOUNTER — TELEPHONE (OUTPATIENT)
Dept: UROLOGY | Facility: CLINIC | Age: 57
End: 2022-06-03
Payer: COMMERCIAL

## 2022-06-03 VITALS
DIASTOLIC BLOOD PRESSURE: 84 MMHG | HEIGHT: 78 IN | HEART RATE: 68 BPM | BODY MASS INDEX: 26.4 KG/M2 | OXYGEN SATURATION: 99 % | SYSTOLIC BLOOD PRESSURE: 120 MMHG | TEMPERATURE: 97 F | WEIGHT: 228.19 LBS | RESPIRATION RATE: 12 BRPM

## 2022-06-03 NOTE — TELEPHONE ENCOUNTER
Called pt and advised him to try to put heat on lower back and take motrin per Dr. Murray. If not better, advised pt to go to ED     ----- Message from Carlos Hays sent at 6/3/2022  2:06 PM CDT -----  Contact: self  Renéalberto Gonzalez would like a call back at [phone], in regards to consulting with nurse about complications that he's having following his surgery.

## 2022-06-10 ENCOUNTER — IMMUNIZATION (OUTPATIENT)
Dept: PHARMACY | Facility: CLINIC | Age: 57
End: 2022-06-10
Payer: COMMERCIAL

## 2022-06-10 ENCOUNTER — TELEPHONE (OUTPATIENT)
Dept: UROLOGY | Facility: CLINIC | Age: 57
End: 2022-06-10
Payer: COMMERCIAL

## 2022-06-10 DIAGNOSIS — Z23 NEED FOR VACCINATION: Primary | ICD-10-CM

## 2022-06-10 NOTE — TELEPHONE ENCOUNTER
Tried calling pt there was no answer. appt was moved to earlier the same day per pt's request.       ----- Message from Kalin Vicente sent at 6/10/2022  9:11 AM CDT -----  Contact: self  Pt would like to consult with nurse regarding upcoming appt.  Pt would like to know if he can come in earlier than 3:15 on 7/18/2022.  Please contact Sachin Gonzalez @ [phone].  Thanks/As

## 2022-06-13 ENCOUNTER — OFFICE VISIT (OUTPATIENT)
Dept: PODIATRY | Facility: CLINIC | Age: 57
End: 2022-06-13
Payer: COMMERCIAL

## 2022-06-13 VITALS — BODY MASS INDEX: 26.4 KG/M2 | HEIGHT: 78 IN | WEIGHT: 228.19 LBS

## 2022-06-13 DIAGNOSIS — M77.51 BURSITIS OF INTERMETATARSAL BURSA OF RIGHT FOOT: Primary | ICD-10-CM

## 2022-06-13 DIAGNOSIS — G57.61 NEUROMA, MORTON'S, RIGHT: ICD-10-CM

## 2022-06-13 PROCEDURE — 3008F PR BODY MASS INDEX (BMI) DOCUMENTED: ICD-10-PCS | Mod: CPTII,S$GLB,, | Performed by: PODIATRIST

## 2022-06-13 PROCEDURE — 99213 OFFICE O/P EST LOW 20 MIN: CPT | Mod: 25,S$GLB,, | Performed by: PODIATRIST

## 2022-06-13 PROCEDURE — 1160F PR REVIEW ALL MEDS BY PRESCRIBER/CLIN PHARMACIST DOCUMENTED: ICD-10-PCS | Mod: CPTII,S$GLB,, | Performed by: PODIATRIST

## 2022-06-13 PROCEDURE — 20600 PR DRAIN/INJECT SMALL JOINT/BURSA: ICD-10-PCS | Mod: S$GLB,,, | Performed by: PODIATRIST

## 2022-06-13 PROCEDURE — 20600 DRAIN/INJ JOINT/BURSA W/O US: CPT | Mod: S$GLB,,, | Performed by: PODIATRIST

## 2022-06-13 PROCEDURE — 3008F BODY MASS INDEX DOCD: CPT | Mod: CPTII,S$GLB,, | Performed by: PODIATRIST

## 2022-06-13 PROCEDURE — 99213 PR OFFICE/OUTPT VISIT, EST, LEVL III, 20-29 MIN: ICD-10-PCS | Mod: 25,S$GLB,, | Performed by: PODIATRIST

## 2022-06-13 PROCEDURE — 1159F PR MEDICATION LIST DOCUMENTED IN MEDICAL RECORD: ICD-10-PCS | Mod: CPTII,S$GLB,, | Performed by: PODIATRIST

## 2022-06-13 PROCEDURE — 99999 PR PBB SHADOW E&M-EST. PATIENT-LVL III: CPT | Mod: PBBFAC,,, | Performed by: PODIATRIST

## 2022-06-13 PROCEDURE — 99999 PR PBB SHADOW E&M-EST. PATIENT-LVL III: ICD-10-PCS | Mod: PBBFAC,,, | Performed by: PODIATRIST

## 2022-06-13 PROCEDURE — 1160F RVW MEDS BY RX/DR IN RCRD: CPT | Mod: CPTII,S$GLB,, | Performed by: PODIATRIST

## 2022-06-13 PROCEDURE — 1159F MED LIST DOCD IN RCRD: CPT | Mod: CPTII,S$GLB,, | Performed by: PODIATRIST

## 2022-06-13 RX ORDER — DEXAMETHASONE SODIUM PHOSPHATE 4 MG/ML
4 INJECTION, SOLUTION INTRA-ARTICULAR; INTRALESIONAL; INTRAMUSCULAR; INTRAVENOUS; SOFT TISSUE ONCE
Status: COMPLETED | OUTPATIENT
Start: 2022-06-13 | End: 2022-06-13

## 2022-06-13 RX ORDER — TRIAMCINOLONE ACETONIDE 40 MG/ML
40 INJECTION, SUSPENSION INTRA-ARTICULAR; INTRAMUSCULAR ONCE
Status: COMPLETED | OUTPATIENT
Start: 2022-06-13 | End: 2022-06-13

## 2022-06-13 RX ADMIN — TRIAMCINOLONE ACETONIDE 40 MG: 40 INJECTION, SUSPENSION INTRA-ARTICULAR; INTRAMUSCULAR at 02:06

## 2022-06-13 RX ADMIN — DEXAMETHASONE SODIUM PHOSPHATE 4 MG: 4 INJECTION, SOLUTION INTRA-ARTICULAR; INTRALESIONAL; INTRAMUSCULAR; INTRAVENOUS; SOFT TISSUE at 02:06

## 2022-07-12 ENCOUNTER — OFFICE VISIT (OUTPATIENT)
Dept: ORTHOPEDICS | Facility: CLINIC | Age: 57
End: 2022-07-12
Payer: COMMERCIAL

## 2022-07-12 ENCOUNTER — HOSPITAL ENCOUNTER (OUTPATIENT)
Dept: RADIOLOGY | Facility: HOSPITAL | Age: 57
Discharge: HOME | End: 2022-07-12
Attending: ORTHOPAEDIC SURGERY
Payer: COMMERCIAL

## 2022-07-12 VITALS
WEIGHT: 228.19 LBS | HEIGHT: 78 IN | SYSTOLIC BLOOD PRESSURE: 109 MMHG | HEART RATE: 81 BPM | DIASTOLIC BLOOD PRESSURE: 69 MMHG | BODY MASS INDEX: 26.4 KG/M2

## 2022-07-12 DIAGNOSIS — M79.641 BILATERAL HAND PAIN: ICD-10-CM

## 2022-07-12 DIAGNOSIS — M79.642 BILATERAL HAND PAIN: ICD-10-CM

## 2022-07-12 DIAGNOSIS — M18.0 ARTHRITIS OF CARPOMETACARPAL (CMC) JOINTS OF BOTH THUMBS: ICD-10-CM

## 2022-07-12 DIAGNOSIS — M79.642 BILATERAL HAND PAIN: Primary | ICD-10-CM

## 2022-07-12 DIAGNOSIS — M79.641 BILATERAL HAND PAIN: Primary | ICD-10-CM

## 2022-07-12 DIAGNOSIS — M65.30 TRIGGER FINGER, ACQUIRED: Primary | ICD-10-CM

## 2022-07-12 PROCEDURE — 1160F PR REVIEW ALL MEDS BY PRESCRIBER/CLIN PHARMACIST DOCUMENTED: ICD-10-PCS | Mod: CPTII,S$GLB,, | Performed by: ORTHOPAEDIC SURGERY

## 2022-07-12 PROCEDURE — 99999 PR PBB SHADOW E&M-EST. PATIENT-LVL III: CPT | Mod: PBBFAC,,, | Performed by: ORTHOPAEDIC SURGERY

## 2022-07-12 PROCEDURE — 99213 OFFICE O/P EST LOW 20 MIN: CPT | Mod: 25,S$GLB,, | Performed by: ORTHOPAEDIC SURGERY

## 2022-07-12 PROCEDURE — 1160F RVW MEDS BY RX/DR IN RCRD: CPT | Mod: CPTII,S$GLB,, | Performed by: ORTHOPAEDIC SURGERY

## 2022-07-12 PROCEDURE — 1159F PR MEDICATION LIST DOCUMENTED IN MEDICAL RECORD: ICD-10-PCS | Mod: CPTII,S$GLB,, | Performed by: ORTHOPAEDIC SURGERY

## 2022-07-12 PROCEDURE — 99213 PR OFFICE/OUTPT VISIT, EST, LEVL III, 20-29 MIN: ICD-10-PCS | Mod: 25,S$GLB,, | Performed by: ORTHOPAEDIC SURGERY

## 2022-07-12 PROCEDURE — 3078F DIAST BP <80 MM HG: CPT | Mod: CPTII,S$GLB,, | Performed by: ORTHOPAEDIC SURGERY

## 2022-07-12 PROCEDURE — 1159F MED LIST DOCD IN RCRD: CPT | Mod: CPTII,S$GLB,, | Performed by: ORTHOPAEDIC SURGERY

## 2022-07-12 PROCEDURE — 3074F PR MOST RECENT SYSTOLIC BLOOD PRESSURE < 130 MM HG: ICD-10-PCS | Mod: CPTII,S$GLB,, | Performed by: ORTHOPAEDIC SURGERY

## 2022-07-12 PROCEDURE — 20550 TENDON SHEATH: ICD-10-PCS | Mod: 50,S$GLB,, | Performed by: ORTHOPAEDIC SURGERY

## 2022-07-12 PROCEDURE — 73130 XR HAND COMPLETE 3 VIEWS BILATERAL: ICD-10-PCS | Mod: 26,,, | Performed by: RADIOLOGY

## 2022-07-12 PROCEDURE — 20550 NJX 1 TENDON SHEATH/LIGAMENT: CPT | Mod: 50,S$GLB,, | Performed by: ORTHOPAEDIC SURGERY

## 2022-07-12 PROCEDURE — 3008F BODY MASS INDEX DOCD: CPT | Mod: CPTII,S$GLB,, | Performed by: ORTHOPAEDIC SURGERY

## 2022-07-12 PROCEDURE — 3078F PR MOST RECENT DIASTOLIC BLOOD PRESSURE < 80 MM HG: ICD-10-PCS | Mod: CPTII,S$GLB,, | Performed by: ORTHOPAEDIC SURGERY

## 2022-07-12 PROCEDURE — 3074F SYST BP LT 130 MM HG: CPT | Mod: CPTII,S$GLB,, | Performed by: ORTHOPAEDIC SURGERY

## 2022-07-12 PROCEDURE — 73130 X-RAY EXAM OF HAND: CPT | Mod: 26,,, | Performed by: RADIOLOGY

## 2022-07-12 PROCEDURE — 73130 X-RAY EXAM OF HAND: CPT | Mod: TC,50

## 2022-07-12 PROCEDURE — 3008F PR BODY MASS INDEX (BMI) DOCUMENTED: ICD-10-PCS | Mod: CPTII,S$GLB,, | Performed by: ORTHOPAEDIC SURGERY

## 2022-07-12 PROCEDURE — 99999 PR PBB SHADOW E&M-EST. PATIENT-LVL III: ICD-10-PCS | Mod: PBBFAC,,, | Performed by: ORTHOPAEDIC SURGERY

## 2022-07-12 RX ORDER — TRIAMCINOLONE ACETONIDE 40 MG/ML
40 INJECTION, SUSPENSION INTRA-ARTICULAR; INTRAMUSCULAR
Status: DISCONTINUED | OUTPATIENT
Start: 2022-07-12 | End: 2022-07-12 | Stop reason: HOSPADM

## 2022-07-12 RX ADMIN — TRIAMCINOLONE ACETONIDE 40 MG: 40 INJECTION, SUSPENSION INTRA-ARTICULAR; INTRAMUSCULAR at 11:07

## 2022-07-12 NOTE — PROCEDURES
Tendon Sheath    Date/Time: 7/12/2022 11:15 AM  Performed by: Obi Connell MD  Authorized by: Obi Connell MD     Consent Done?:  Yes (Verbal)  Indications:  Pain  Timeout: prior to procedure the correct patient, procedure, and site was verified    Prep: patient was prepped and draped in usual sterile fashion      Local anesthesia used?: Yes    Local anesthetic:  Lidocaine 2% without epinephrine  Anesthetic total (ml):  0.5    Location:  Thumb  Site:  R thumb flexor tendon sheath and L thumb flexor tendon sheath  Ultrasonic guidance for needle placement?: No    Needle size:  25 G  Approach:  Volar  Medications:  40 mg triamcinolone acetonide 40 mg/mL; 40 mg triamcinolone acetonide 40 mg/mL

## 2022-07-12 NOTE — PROGRESS NOTES
Subjective:     Patient ID: Sachin Gonzalez is a 57 y.o. male.    Chief Complaint: Pain of the Left Wrist and Pain of the Right Hand      HPI:  The patient is seen in follow-up after a left de Quervain injection and right trigger thumb injection 09/07/2021.  At this point he has pain about bilateral trigger thumbs.  His de Quervain is not recurred.  He also has some element of basal joint arthritis in both hands.  He has rheumatoid arthritis.  He does not have thumb spica splints.  He requests injection bilateral trigger thumbs and we will wait on injecting his basal joints.    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     Past Surgical History:   Procedure Laterality Date    COLONOSCOPY N/A 4/21/2021    Procedure: COLONOSCOPY covid test scheduled on 4/19/21;  Surgeon: Darrell Worthington MD;  Location: [facility];  Service: Endoscopy;  Laterality: N/A;    CYSTOURETHROSCOPY WITH DIRECT VISION INTERNAL URETHROTOMY N/A 5/31/2022    Procedure: CYSTOSCOPY, WITH DIRECT VISION INTERNAL URETHROTOMY;  Surgeon: Yaniv Murray MD;  Location: [facility];  Service: Urology;  Laterality: N/A;    ESOPHAGOGASTRODUODENOSCOPY      ESOPHAGOGASTRODUODENOSCOPY N/A 4/5/2021    Procedure: EGD (ESOPHAGOGASTRODUODENOSCOPY) Rapid Covid test needed;  Surgeon: Darrell Worthington MD;  Location: [facility];  Service: Endoscopy;  Laterality: N/A;    PROSTATECTOMY  2011     Family History   Problem Relation Age of Onset    Stroke Father     Alcohol abuse Father     Arthritis Mother     Hypertension Mother     Anxiety disorder Mother     No Known Problems Sister     Kidney disease Brother      Social History     Socioeconomic History    Marital status: Single   Tobacco Use    Smoking status: Never Smoker     Smokeless tobacco: Never Used   Substance and Sexual Activity    Alcohol use: No    Drug use: No    Sexual activity: Yes     Partners: Female   Social History Narrative    No pets or smokers in household.     Medication List with Changes/Refills   Current Medications    ASCORBIC ACID, VITAMIN C, (VITAMIN C) 250 MG TABLET    Take 250 mg by mouth once daily.    CHOLECALCIFEROL, VITAMIN D3, 25 MCG (1,000 UNIT) CHEW    Take by mouth.    FOLIC ACID (FOLVITE) 1 MG TABLET    Take 1 tablet (1,000 mcg total) by mouth once daily.    IBUPROFEN (ADVIL,MOTRIN) 800 MG TABLET    Take 1 tablet (800 mg total) by mouth every 8 (eight) hours as needed for Pain.    METHOTREXATE 2.5 MG TAB    TAKE 8 TABLETS (20 MG TOTAL) EVERY 7 DAYS, THIS MEDICATION REQUIRES PERIODIC LAB MONITORING (AT LEAST EVERY 3 MONTHS)    METOPROLOL SUCCINATE (TOPROL-XL) 50 MG 24 HR TABLET    TAKE 1 TABLET TWICE A DAY. HOLD IF SYSTOLIC BLOOD PRESSURE LESS THAN OR EQUAL  OR HEART RATE IS LESS THAN 60    OXYCODONE-ACETAMINOPHEN (PERCOCET) 5-325 MG PER TABLET    Take 1 tablet by mouth every 4 (four) hours as needed for Pain.    PHENAZOPYRIDINE (PYRIDIUM) 200 MG TABLET    Take 1 tablet (200 mg total) by mouth 3 (three) times daily as needed for Pain.    SUCRALFATE (CARAFATE) 1 GRAM TABLET    Take 1 tablet (1 g total) by mouth 3 (three) times daily as needed (reflux indigestion).    ZINC SULFATE (ZINC-220 ORAL)    Take 1 tablet by mouth as needed.     Review of patient's allergies indicates:  No Known Allergies  Review of Systems   Constitutional: Negative for malaise/fatigue.   HENT: Negative for hearing loss.    Eyes: Negative for double vision and visual disturbance.   Cardiovascular: Negative for chest pain.   Respiratory: Negative for shortness of breath.    Endocrine: Negative for cold intolerance.   Hematologic/Lymphatic: Does not bruise/bleed easily.   Skin: Negative for poor wound healing and suspicious lesions.   Musculoskeletal: Positive for  arthritis, back pain, joint pain and joint swelling. Negative for gout.   Gastrointestinal: Positive for heartburn. Negative for nausea and vomiting.   Genitourinary: Positive for frequency, hematuria, hesitancy, incomplete emptying and nocturia. Negative for dysuria.   Neurological: Positive for headaches. Negative for numbness, paresthesias and sensory change.   Psychiatric/Behavioral: Negative for depression, memory loss and substance abuse. The patient is not nervous/anxious.    Allergic/Immunologic: Negative for persistent infections.       Objective:   Body mass index is 25.71 kg/m².  Vitals:    07/12/22 1127   BP: 109/69   Pulse: 81                General    Constitutional: He is oriented to person, place, and time. He appears well-developed and well-nourished. No distress.   HENT:   Head: Normocephalic.   Eyes: EOM are normal.   Pulmonary/Chest: Effort normal.   Neurological: He is oriented to person, place, and time.   Psychiatric: He has a normal mood and affect.             Right Hand/Wrist Exam     Inspection   Scars: Wrist - absent Hand -  absent  Effusion: Wrist - absent Hand -  absent    Pain   Hand - The patient exhibits pain of the thumb MCP.  Wrist - The patient exhibits pain of the CMC.    Other     Neuorologic Exam    Median Distribution: normal  Ulnar Distribution: normal  Radial Distribution: normal    Comments:  The patient has triggering right thumb with a palpable nodule that moves with tendon excursion.  He is also tender about the right thumb basal joint with a positive axial circumduction grind test      Left Hand/Wrist Exam     Inspection   Scars: Wrist - absent Hand -  absent  Effusion: Wrist - absent Hand -  absent    Pain   Hand - The patient exhibits pain of the thumb MCP.  Wrist - The patient exhibits pain of the CMC.    Other     Sensory Exam  Median Distribution: normal  Ulnar Distribution: normal  Radial Distribution: normal    Comments:  .  The patient has triggering left thumb  with a palpable nodule that moves with tendon excursion.  He is also tender about the left thumb basal joint with a positive axial circumduction grind test          Vascular Exam       Capillary Refill  Right Hand: normal capillary refill  Left Hand: normal capillary refill            Relevant imaging results reviewed and interpreted by me, discussed with the patient and / or family today radiographs both hands showed arthritis of bilateral basal joints both thumbs with about 50% subluxation  Assessment:     Encounter Diagnoses   Name Primary?    Trigger finger, acquired Yes    Arthritis of carpometacarpal (CMC) joints of both thumbs         Plan:       The patient was counseled regarding the diagnosis.  He was injected in bilateral trigger thumbs each with 0.5 cc of Kenalog and 0.5 cc of 2% plain lidocaine.  He was given thumb spica splints.  He was told if he continues to be symptomatic he may benefit from injection bilateral basal joints              Disclaimer: This note was prepared using a voice recognition system and is likely to have sound alike errors within the text.

## 2022-07-14 ENCOUNTER — OFFICE VISIT (OUTPATIENT)
Dept: HEPATOLOGY | Facility: CLINIC | Age: 57
End: 2022-07-14
Payer: COMMERCIAL

## 2022-07-14 VITALS
HEIGHT: 78 IN | DIASTOLIC BLOOD PRESSURE: 72 MMHG | SYSTOLIC BLOOD PRESSURE: 118 MMHG | WEIGHT: 229.75 LBS | HEART RATE: 73 BPM | BODY MASS INDEX: 26.58 KG/M2

## 2022-07-14 DIAGNOSIS — R79.89 ABNORMAL LFTS: Primary | ICD-10-CM

## 2022-07-14 DIAGNOSIS — R76.8 HEPATITIS B CORE ANTIBODY POSITIVE: ICD-10-CM

## 2022-07-14 PROCEDURE — 3074F SYST BP LT 130 MM HG: CPT | Mod: CPTII,S$GLB,, | Performed by: INTERNAL MEDICINE

## 2022-07-14 PROCEDURE — 1159F MED LIST DOCD IN RCRD: CPT | Mod: CPTII,S$GLB,, | Performed by: INTERNAL MEDICINE

## 2022-07-14 PROCEDURE — 3078F DIAST BP <80 MM HG: CPT | Mod: CPTII,S$GLB,, | Performed by: INTERNAL MEDICINE

## 2022-07-14 PROCEDURE — 99999 PR PBB SHADOW E&M-EST. PATIENT-LVL IV: ICD-10-PCS | Mod: PBBFAC,,, | Performed by: INTERNAL MEDICINE

## 2022-07-14 PROCEDURE — 99999 PR PBB SHADOW E&M-EST. PATIENT-LVL IV: CPT | Mod: PBBFAC,,, | Performed by: INTERNAL MEDICINE

## 2022-07-14 PROCEDURE — 3008F BODY MASS INDEX DOCD: CPT | Mod: CPTII,S$GLB,, | Performed by: INTERNAL MEDICINE

## 2022-07-14 PROCEDURE — 3008F PR BODY MASS INDEX (BMI) DOCUMENTED: ICD-10-PCS | Mod: CPTII,S$GLB,, | Performed by: INTERNAL MEDICINE

## 2022-07-14 PROCEDURE — 3078F PR MOST RECENT DIASTOLIC BLOOD PRESSURE < 80 MM HG: ICD-10-PCS | Mod: CPTII,S$GLB,, | Performed by: INTERNAL MEDICINE

## 2022-07-14 PROCEDURE — 3074F PR MOST RECENT SYSTOLIC BLOOD PRESSURE < 130 MM HG: ICD-10-PCS | Mod: CPTII,S$GLB,, | Performed by: INTERNAL MEDICINE

## 2022-07-14 PROCEDURE — 99214 OFFICE O/P EST MOD 30 MIN: CPT | Mod: S$GLB,,, | Performed by: INTERNAL MEDICINE

## 2022-07-14 PROCEDURE — 1159F PR MEDICATION LIST DOCUMENTED IN MEDICAL RECORD: ICD-10-PCS | Mod: CPTII,S$GLB,, | Performed by: INTERNAL MEDICINE

## 2022-07-14 PROCEDURE — 99214 PR OFFICE/OUTPT VISIT, EST, LEVL IV, 30-39 MIN: ICD-10-PCS | Mod: S$GLB,,, | Performed by: INTERNAL MEDICINE

## 2022-07-14 NOTE — PROGRESS NOTES
Subjective:     Sachin Gonzalez is here for evaluation of abnormal LFTs    History of Present Illness:  Sachin Gonzalez is a 56 YM with known history of sero+ve RA, has been on methotrexate for approximately 10 years. He noted to have abnormal AST/ALT in feb of 2022. He had Covid-19 in 9/2021. He was not aware of exposure to Hep B. He denies use of ETOH.    Duration of abnormality  Medications/OTC/Herbal- none, uses ibuprofen for joint pains  ETOH-none  Metabolic issues- HTN  BMI-26  Family Hx-none    Interval history: 5/2/22  Here for f/u, recent repeat labs still shows abnormal transaminases even though slightly better. Has been taking MTX only 4 tabs instead of 8 tabs each 25 mg, says joint pains have been manageable. He is seeing his rheumatologist in 2 weeks.    Interval history:07/14/2022.  He continues to take methotrexate 4 tablets instead of 8 tablets as recommended by his rheumatologist.  Says his joint pains have been tolerable, denies joint swelling.  He appears less anxious and more cheerful this visit.  Denies fatigue, abdominal pain, itching      Review of Systems   Constitutional: Negative for fatigue and fever.   Gastrointestinal: Negative for abdominal distention, abdominal pain, blood in stool, nausea and vomiting.   Musculoskeletal: Negative for arthralgias, joint swelling and myalgias.   Hematological: Does not bruise/bleed easily.   Psychiatric/Behavioral: Negative for sleep disturbance.       Objective:     Physical Exam  Constitutional:       Appearance: Normal appearance. He is normal weight.   Eyes:      General: No scleral icterus.  Abdominal:      General: Abdomen is flat. There is no distension.      Palpations: Abdomen is soft. There is no mass.      Tenderness: There is no abdominal tenderness.   Musculoskeletal:      Right lower leg: No edema.      Left lower leg: No edema.   Skin:     Coloration: Skin is not jaundiced.      Findings: No erythema.   Neurological:      Mental  Status: He is alert and oriented to person, place, and time.   Psychiatric:         Thought Content: Thought content normal.         Judgment: Judgment normal.         MELD-Na score: 7 at 4/22/2022  7:27 AM  MELD score: 7 at 4/22/2022  7:27 AM  Calculated from:  Serum Creatinine: 1.1 mg/dL at 4/22/2022  7:22 AM  Serum Sodium: 138 mmol/L (Using max of 137 mmol/L) at 4/22/2022  7:22 AM  Total Bilirubin: 0.6 mg/dL (Using min of 1 mg/dL) at 4/22/2022  7:22 AM  INR(ratio): 1.0 at 4/22/2022  7:27 AM  Age: 56 years    WBC   Date Value Ref Range Status   05/17/2022 6.23 3.90 - 12.70 K/uL Final     Hemoglobin   Date Value Ref Range Status   05/17/2022 13.6 (L) 14.0 - 18.0 g/dL Final     Hematocrit   Date Value Ref Range Status   05/17/2022 42.4 40.0 - 54.0 % Final     Platelets   Date Value Ref Range Status   05/17/2022 188 150 - 450 K/uL Final     BUN   Date Value Ref Range Status   05/17/2022 21 (H) 6 - 20 mg/dL Final     Creatinine   Date Value Ref Range Status   05/17/2022 1.3 0.5 - 1.4 mg/dL Final     Glucose   Date Value Ref Range Status   05/17/2022 78 70 - 110 mg/dL Final     Calcium   Date Value Ref Range Status   05/17/2022 8.9 8.7 - 10.5 mg/dL Final     Sodium   Date Value Ref Range Status   05/17/2022 140 136 - 145 mmol/L Final     Potassium   Date Value Ref Range Status   05/17/2022 4.4 3.5 - 5.1 mmol/L Final     Chloride   Date Value Ref Range Status   05/17/2022 107 95 - 110 mmol/L Final     Magnesium   Date Value Ref Range Status   06/18/2012 2.1 1.6 - 2.6 mg/dl Final     AST   Date Value Ref Range Status   05/17/2022 24 10 - 40 U/L Final     ALT   Date Value Ref Range Status   05/17/2022 24 10 - 44 U/L Final     Alkaline Phosphatase   Date Value Ref Range Status   05/17/2022 88 55 - 135 U/L Final     Total Bilirubin   Date Value Ref Range Status   05/17/2022 0.3 0.1 - 1.0 mg/dL Final     Comment:     For infants and newborns, interpretation of results should be based  on gestational age, weight and in  agreement with clinical  observations.    Premature Infant recommended reference ranges:  Up to 24 hours.............<8.0 mg/dL  Up to 48 hours............<12.0 mg/dL  3-5 days..................<15.0 mg/dL  6-29 days.................<15.0 mg/dL       Albumin   Date Value Ref Range Status   05/17/2022 3.7 3.5 - 5.2 g/dL Final     INR   Date Value Ref Range Status   04/22/2022 1.0 0.8 - 1.2 Final     Comment:     Coumadin Therapy:  2.0 - 3.0 for INR for all indicators except mechanical heart valves  and antiphospholipid syndromes which should use 2.5 - 3.5.           Assessment/Plan:       1.Abnormal LFTs:  -Work up for infectious/autoimmune/genetic disorders of the liver has been negative, will check Anti trypsin, cerulopasmin levels to complete w/u, ferritin high but likely an acute phase reactant, TSAT is 20, phenotype for HHC is negative  -Short term treatment with MTX can cause transient elevation of serum aminotransferase and long term therapy has been linked to development of fatty liver disease, fibrosis and even cirrhosis.   -Symptoms are usually absent until cirrhosis is present, and liver tests are typically normal or minimally and transiently elevated.  -Routine monitoring of patients with regular liver biopsies done at 1 to 2 year intervals is recommended  -I requested him to discuss with his rheumatologist to consider alternatives to treat RA  -If LFTs continue to stay abnormal or even if they normalize he may need liver biopsy to confirm absence of fibrosis, fibroscan shows F1 fibrosis, recommend yearly VCTE. He defers liver biopsy for now  -A very slow Down trending transaminases with covid-19 is unlikely but possible,  there were no LFTs documented after 9/2021 until 2/2022    2.Hep B core ab +ve:  -Hep B DNA level <10 in 3/2022  -Prior exposure to hep B, has Core ab +ve,  Hep B DNA level less than 10,Hep B Surface ag negative, low risk for reactivation, avoid prednisone combination. Will monitor Hep  B DNA and surface antigen q3 months. Ordered today    I have reviewed existing labs, imaging, procedures. Educated patient about disease process, prognosis. Ordered required labs, images and discussed treatment plan.     Janell Bradley MD  Transplant Hepatologist  Dept of Hepatology, [place] [facility]

## 2022-07-20 ENCOUNTER — OFFICE VISIT (OUTPATIENT)
Dept: PODIATRY | Facility: CLINIC | Age: 57
End: 2022-07-20
Payer: COMMERCIAL

## 2022-07-20 VITALS — HEIGHT: 78 IN | WEIGHT: 229.75 LBS | BODY MASS INDEX: 26.58 KG/M2

## 2022-07-20 DIAGNOSIS — M48.061 LUMBAR FORAMINAL STENOSIS: ICD-10-CM

## 2022-07-20 DIAGNOSIS — M77.41 METATARSALGIA, RIGHT FOOT: ICD-10-CM

## 2022-07-20 DIAGNOSIS — M77.51 BURSITIS OF INTERMETATARSAL BURSA OF RIGHT FOOT: Primary | ICD-10-CM

## 2022-07-20 DIAGNOSIS — G57.61 NEUROMA, MORTON'S, RIGHT: ICD-10-CM

## 2022-07-20 DIAGNOSIS — M20.41 HAMMER TOE OF RIGHT FOOT: ICD-10-CM

## 2022-07-20 PROCEDURE — 1160F RVW MEDS BY RX/DR IN RCRD: CPT | Mod: CPTII,S$GLB,, | Performed by: PODIATRIST

## 2022-07-20 PROCEDURE — 99214 OFFICE O/P EST MOD 30 MIN: CPT | Mod: S$GLB,,, | Performed by: PODIATRIST

## 2022-07-20 PROCEDURE — 99214 PR OFFICE/OUTPT VISIT, EST, LEVL IV, 30-39 MIN: ICD-10-PCS | Mod: S$GLB,,, | Performed by: PODIATRIST

## 2022-07-20 PROCEDURE — 1159F MED LIST DOCD IN RCRD: CPT | Mod: CPTII,S$GLB,, | Performed by: PODIATRIST

## 2022-07-20 PROCEDURE — 3008F BODY MASS INDEX DOCD: CPT | Mod: CPTII,S$GLB,, | Performed by: PODIATRIST

## 2022-07-20 PROCEDURE — 99999 PR PBB SHADOW E&M-EST. PATIENT-LVL III: CPT | Mod: PBBFAC,,, | Performed by: PODIATRIST

## 2022-07-20 PROCEDURE — 1160F PR REVIEW ALL MEDS BY PRESCRIBER/CLIN PHARMACIST DOCUMENTED: ICD-10-PCS | Mod: CPTII,S$GLB,, | Performed by: PODIATRIST

## 2022-07-20 PROCEDURE — 99999 PR PBB SHADOW E&M-EST. PATIENT-LVL III: ICD-10-PCS | Mod: PBBFAC,,, | Performed by: PODIATRIST

## 2022-07-20 PROCEDURE — 3008F PR BODY MASS INDEX (BMI) DOCUMENTED: ICD-10-PCS | Mod: CPTII,S$GLB,, | Performed by: PODIATRIST

## 2022-07-20 PROCEDURE — 1159F PR MEDICATION LIST DOCUMENTED IN MEDICAL RECORD: ICD-10-PCS | Mod: CPTII,S$GLB,, | Performed by: PODIATRIST

## 2022-07-20 NOTE — PROGRESS NOTES
Subjective:       Patient ID: Sachin Goznalez is a 57 y.o. male.    Chief Complaint: Foot Pain (C/o 8/10 B/L pain to middle 3 digits. Right is worse. Non diabetic PCP: Dr. Greenberg last seen 04/20/22)    HPI: Sachin Gonzalez presents to the office today, with complaints of persistent pains to the right forefoot at the webspaces and lesser toes. He has had 2 cortisone injections to the right foot w/o alleviation of symptoms. WB with normal shoe gear at present. Does have a history of lumbar spinal stenosis. States no chronic back pains. States NSAIDs.     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       Past Surgical History:   Procedure Laterality Date    COLONOSCOPY N/A 4/21/2021    Procedure: COLONOSCOPY covid test scheduled on 4/19/21;  Surgeon: Darrell Worthington MD;  Location: [facility] ENDO;  Service: Endoscopy;  Laterality: N/A;    CYSTOURETHROSCOPY WITH DIRECT VISION INTERNAL URETHROTOMY N/A 5/31/2022    Procedure: CYSTOSCOPY, WITH DIRECT VISION INTERNAL URETHROTOMY;  Surgeon: Yaniv Murray MD;  Location: [facility] OR;  Service: Urology;  Laterality: N/A;     "ESOPHAGOGASTRODUODENOSCOPY      ESOPHAGOGASTRODUODENOSCOPY N/A 4/5/2021    Procedure: EGD (ESOPHAGOGASTRODUODENOSCOPY) Rapid Covid test needed;  Surgeon: Darrell Worthington MD;  Location: [facility];  Service: Endoscopy;  Laterality: N/A;    PROSTATECTOMY  2011       Review of Systems   Constitutional: Negative for chills, fatigue and fever.   HENT: Negative for hearing loss.    Eyes: Negative for photophobia and visual disturbance.   Respiratory: Negative for cough, chest tightness, shortness of breath and wheezing.    Cardiovascular: Negative for chest pain and palpitations.   Gastrointestinal: Negative for constipation, diarrhea, nausea and vomiting.   Endocrine: Negative for cold intolerance and heat intolerance.   Genitourinary: Negative for flank pain.   Musculoskeletal: Positive for gait problem. Negative for neck pain and neck stiffness.   Neurological: Negative for light-headedness and headaches.   Psychiatric/Behavioral: Negative for sleep disturbance.         Objective:   Ht 6' 7" (2.007 m)   Wt 104.2 kg (229 lb 11.5 oz)   BMI 25.88 kg/m²     LOWER EXTREMITY PHYSICAL EXAMINATION    DERMATOLOGY: Skin is supple, dry and intact.     ORTHOPEDIC: Manual Muscle Testing is 5/5 in all planes on the left and right foot, without pains, with and without resistance. There is moderate discomfort to palpation at the right plantar 2nd, 3rd and 4th digit sulcu. Plantar fat pad atrophy with displacement is noted.  Gastrocsoleal equinus contracture is noted.  Rectus foot type is noted. Negative Lachman's testing is noted. Concern is not for Predislocation Syndrome or plantar plate rupture at the not MTPJ(s). No concern for inter-metatarsal bursitis.    NEUROLOGY: Upon palpation of the interspaces, there are no neurological sensations stated that radiate proximal or distal. Upon compression of the metatarsal heads from medial to lateral, no neurological sensations or symptoms are stated.    Assessment:     1. Bursitis of " intermetatarsal bursa of right foot    2. Neuroma, Roque's, right    3. Metatarsalgia, right foot    4. Hammer toe of right foot    5. Lumbar foraminal stenosis        Plan:     Bursitis of intermetatarsal bursa of right foot  -     MRI Foot (Forefoot) Right W W/O Contrast; Future; Expected date: 07/20/2022    Neuroma, Roque's, right  -     MRI Foot (Forefoot) Right W W/O Contrast; Future; Expected date: 07/20/2022    Metatarsalgia, right foot  -     MRI Foot (Forefoot) Right W W/O Contrast; Future; Expected date: 07/20/2022    Hammer toe of right foot  -     MRI Foot (Forefoot) Right W W/O Contrast; Future; Expected date: 07/20/2022    Lumbar foraminal stenosis    Thorough discussion is had with the patient today, concerning the diagnosis, its etiology, and the treatment algorithm at present.     Has failed multiple cortisone injections to the lesser webspaces, as such, will obtain MRI to R/O soft tissue or osseous pathology in the locale.    Does have a minor/remote history of lumbar spinal pathology, but does not have chronic lower back pains.    If MRI is negative, pathology is either hammer toes or lumbar radiculopathy.     Has also failed PO NSAIDs.         Future Appointments   Date Time Provider Department Center   7/21/2022  7:40 AM KEYONA Greenberg Jr., MD [facility]   7/21/2022  9:30 AM Reina Guzman DPM [facility] POD [facility]   7/21/2022 11:20 AM LABORATORY, [facility] LAB [facility]   7/28/2022  1:00 PM IB MRI1 IB MRI [place]   8/2/2022  9:55 AM LABORATORY, [facility] LAB [facility]   8/4/2022  4:00 PM Yaniv Murray MD [facility] UROLOGY [facility]   8/11/2022  3:30 PM Jenn [facility] MD [facility] RHEUM [facility]   10/18/2022  2:40 PM KEYONA Greenberg Jr., MD [facility]   1/13/2023  9:30 AM ONL US2 [facility]   1/13/2023 10:30 AM LABORATORY, [facility] LAB [facility]   1/23/2023  3:00 PM Janell Bradley MD [facility]

## 2022-07-21 ENCOUNTER — TELEPHONE (OUTPATIENT)
Dept: OPHTHALMOLOGY | Facility: CLINIC | Age: 57
End: 2022-07-21
Payer: COMMERCIAL

## 2022-07-21 ENCOUNTER — LAB VISIT (OUTPATIENT)
Dept: LAB | Facility: HOSPITAL | Age: 57
End: 2022-07-21
Attending: INTERNAL MEDICINE
Payer: COMMERCIAL

## 2022-07-21 ENCOUNTER — OFFICE VISIT (OUTPATIENT)
Dept: PODIATRY | Facility: CLINIC | Age: 57
End: 2022-07-21
Payer: COMMERCIAL

## 2022-07-21 ENCOUNTER — OFFICE VISIT (OUTPATIENT)
Dept: INTERNAL MEDICINE | Facility: CLINIC | Age: 57
End: 2022-07-21
Payer: COMMERCIAL

## 2022-07-21 VITALS
SYSTOLIC BLOOD PRESSURE: 110 MMHG | WEIGHT: 230.19 LBS | HEIGHT: 78 IN | BODY MASS INDEX: 26.63 KG/M2 | HEART RATE: 75 BPM | DIASTOLIC BLOOD PRESSURE: 82 MMHG

## 2022-07-21 VITALS
SYSTOLIC BLOOD PRESSURE: 110 MMHG | HEART RATE: 75 BPM | BODY MASS INDEX: 26.63 KG/M2 | WEIGHT: 230.19 LBS | OXYGEN SATURATION: 98 % | TEMPERATURE: 98 F | DIASTOLIC BLOOD PRESSURE: 82 MMHG | HEIGHT: 78 IN

## 2022-07-21 DIAGNOSIS — G25.0 ESSENTIAL TREMOR: ICD-10-CM

## 2022-07-21 DIAGNOSIS — B35.1 ONYCHOMYCOSIS OF TOENAIL: ICD-10-CM

## 2022-07-21 DIAGNOSIS — G60.9 IDIOPATHIC PERIPHERAL NEUROPATHY: Primary | ICD-10-CM

## 2022-07-21 DIAGNOSIS — G25.0 ESSENTIAL TREMOR: Primary | ICD-10-CM

## 2022-07-21 DIAGNOSIS — I10 HYPERTENSION, UNSPECIFIED TYPE: ICD-10-CM

## 2022-07-21 DIAGNOSIS — R76.8 HEPATITIS B CORE ANTIBODY POSITIVE: ICD-10-CM

## 2022-07-21 DIAGNOSIS — R79.89 ABNORMAL LFTS: ICD-10-CM

## 2022-07-21 LAB
AFP SERPL-MCNC: 13 NG/ML (ref 0–8.4)
ALBUMIN SERPL BCP-MCNC: 3.8 G/DL (ref 3.5–5.2)
ALP SERPL-CCNC: 72 U/L (ref 55–135)
ALT SERPL W/O P-5'-P-CCNC: 18 U/L (ref 10–44)
ANION GAP SERPL CALC-SCNC: 10 MMOL/L (ref 8–16)
AST SERPL-CCNC: 21 U/L (ref 10–40)
BILIRUB SERPL-MCNC: 0.8 MG/DL (ref 0.1–1)
BUN SERPL-MCNC: 22 MG/DL (ref 6–20)
CALCIUM SERPL-MCNC: 9.6 MG/DL (ref 8.7–10.5)
CHLORIDE SERPL-SCNC: 101 MMOL/L (ref 95–110)
CO2 SERPL-SCNC: 26 MMOL/L (ref 23–29)
CREAT SERPL-MCNC: 1.2 MG/DL (ref 0.5–1.4)
EST. GFR  (AFRICAN AMERICAN): >60 ML/MIN/1.73 M^2
EST. GFR  (NON AFRICAN AMERICAN): >60 ML/MIN/1.73 M^2
GLUCOSE SERPL-MCNC: 85 MG/DL (ref 70–110)
MAGNESIUM SERPL-MCNC: 2 MG/DL (ref 1.6–2.6)
PHOSPHATE SERPL-MCNC: 4.1 MG/DL (ref 2.7–4.5)
POTASSIUM SERPL-SCNC: 4 MMOL/L (ref 3.5–5.1)
PROT SERPL-MCNC: 7.5 G/DL (ref 6–8.4)
SODIUM SERPL-SCNC: 137 MMOL/L (ref 136–145)

## 2022-07-21 PROCEDURE — 1160F PR REVIEW ALL MEDS BY PRESCRIBER/CLIN PHARMACIST DOCUMENTED: ICD-10-PCS | Mod: CPTII,S$GLB,, | Performed by: PEDIATRICS

## 2022-07-21 PROCEDURE — 84100 ASSAY OF PHOSPHORUS: CPT | Performed by: PEDIATRICS

## 2022-07-21 PROCEDURE — 1159F PR MEDICATION LIST DOCUMENTED IN MEDICAL RECORD: ICD-10-PCS | Mod: CPTII,S$GLB,, | Performed by: PEDIATRICS

## 2022-07-21 PROCEDURE — 1160F PR REVIEW ALL MEDS BY PRESCRIBER/CLIN PHARMACIST DOCUMENTED: ICD-10-PCS | Mod: CPTII,S$GLB,, | Performed by: PODIATRIST

## 2022-07-21 PROCEDURE — 1159F PR MEDICATION LIST DOCUMENTED IN MEDICAL RECORD: ICD-10-PCS | Mod: CPTII,S$GLB,, | Performed by: PODIATRIST

## 2022-07-21 PROCEDURE — 99999 PR PBB SHADOW E&M-EST. PATIENT-LVL III: ICD-10-PCS | Mod: PBBFAC,,, | Performed by: PODIATRIST

## 2022-07-21 PROCEDURE — 3079F PR MOST RECENT DIASTOLIC BLOOD PRESSURE 80-89 MM HG: ICD-10-PCS | Mod: CPTII,S$GLB,, | Performed by: PEDIATRICS

## 2022-07-21 PROCEDURE — 11721 PR DEBRIDEMENT OF NAILS, 6 OR MORE: ICD-10-PCS | Mod: S$GLB,,, | Performed by: PODIATRIST

## 2022-07-21 PROCEDURE — 99213 OFFICE O/P EST LOW 20 MIN: CPT | Mod: S$GLB,,, | Performed by: PEDIATRICS

## 2022-07-21 PROCEDURE — 3074F SYST BP LT 130 MM HG: CPT | Mod: CPTII,S$GLB,, | Performed by: PODIATRIST

## 2022-07-21 PROCEDURE — 99499 NO LOS: ICD-10-PCS | Mod: S$GLB,,, | Performed by: PODIATRIST

## 2022-07-21 PROCEDURE — 3074F PR MOST RECENT SYSTOLIC BLOOD PRESSURE < 130 MM HG: ICD-10-PCS | Mod: CPTII,S$GLB,, | Performed by: PEDIATRICS

## 2022-07-21 PROCEDURE — 86706 HEP B SURFACE ANTIBODY: CPT | Performed by: INTERNAL MEDICINE

## 2022-07-21 PROCEDURE — 1160F RVW MEDS BY RX/DR IN RCRD: CPT | Mod: CPTII,S$GLB,, | Performed by: PEDIATRICS

## 2022-07-21 PROCEDURE — 3008F BODY MASS INDEX DOCD: CPT | Mod: CPTII,S$GLB,, | Performed by: PODIATRIST

## 2022-07-21 PROCEDURE — 3079F PR MOST RECENT DIASTOLIC BLOOD PRESSURE 80-89 MM HG: ICD-10-PCS | Mod: CPTII,S$GLB,, | Performed by: PODIATRIST

## 2022-07-21 PROCEDURE — 36415 COLL VENOUS BLD VENIPUNCTURE: CPT | Performed by: INTERNAL MEDICINE

## 2022-07-21 PROCEDURE — 87340 HEPATITIS B SURFACE AG IA: CPT | Performed by: INTERNAL MEDICINE

## 2022-07-21 PROCEDURE — 3008F PR BODY MASS INDEX (BMI) DOCUMENTED: ICD-10-PCS | Mod: CPTII,S$GLB,, | Performed by: PODIATRIST

## 2022-07-21 PROCEDURE — 3079F DIAST BP 80-89 MM HG: CPT | Mod: CPTII,S$GLB,, | Performed by: PODIATRIST

## 2022-07-21 PROCEDURE — 3074F PR MOST RECENT SYSTOLIC BLOOD PRESSURE < 130 MM HG: ICD-10-PCS | Mod: CPTII,S$GLB,, | Performed by: PODIATRIST

## 2022-07-21 PROCEDURE — 11721 DEBRIDE NAIL 6 OR MORE: CPT | Mod: S$GLB,,, | Performed by: PODIATRIST

## 2022-07-21 PROCEDURE — 99999 PR PBB SHADOW E&M-EST. PATIENT-LVL IV: CPT | Mod: PBBFAC,,, | Performed by: PEDIATRICS

## 2022-07-21 PROCEDURE — 83735 ASSAY OF MAGNESIUM: CPT | Performed by: PEDIATRICS

## 2022-07-21 PROCEDURE — 87517 HEPATITIS B DNA QUANT: CPT | Performed by: INTERNAL MEDICINE

## 2022-07-21 PROCEDURE — 3074F SYST BP LT 130 MM HG: CPT | Mod: CPTII,S$GLB,, | Performed by: PEDIATRICS

## 2022-07-21 PROCEDURE — 82105 ALPHA-FETOPROTEIN SERUM: CPT | Performed by: INTERNAL MEDICINE

## 2022-07-21 PROCEDURE — 1159F MED LIST DOCD IN RCRD: CPT | Mod: CPTII,S$GLB,, | Performed by: PODIATRIST

## 2022-07-21 PROCEDURE — 1159F MED LIST DOCD IN RCRD: CPT | Mod: CPTII,S$GLB,, | Performed by: PEDIATRICS

## 2022-07-21 PROCEDURE — 99999 PR PBB SHADOW E&M-EST. PATIENT-LVL III: CPT | Mod: PBBFAC,,, | Performed by: PODIATRIST

## 2022-07-21 PROCEDURE — 99999 PR PBB SHADOW E&M-EST. PATIENT-LVL IV: ICD-10-PCS | Mod: PBBFAC,,, | Performed by: PEDIATRICS

## 2022-07-21 PROCEDURE — 3008F BODY MASS INDEX DOCD: CPT | Mod: CPTII,S$GLB,, | Performed by: PEDIATRICS

## 2022-07-21 PROCEDURE — 3079F DIAST BP 80-89 MM HG: CPT | Mod: CPTII,S$GLB,, | Performed by: PEDIATRICS

## 2022-07-21 PROCEDURE — 1160F RVW MEDS BY RX/DR IN RCRD: CPT | Mod: CPTII,S$GLB,, | Performed by: PODIATRIST

## 2022-07-21 PROCEDURE — 99499 UNLISTED E&M SERVICE: CPT | Mod: S$GLB,,, | Performed by: PODIATRIST

## 2022-07-21 PROCEDURE — 99213 PR OFFICE/OUTPT VISIT, EST, LEVL III, 20-29 MIN: ICD-10-PCS | Mod: S$GLB,,, | Performed by: PEDIATRICS

## 2022-07-21 PROCEDURE — 3008F PR BODY MASS INDEX (BMI) DOCUMENTED: ICD-10-PCS | Mod: CPTII,S$GLB,, | Performed by: PEDIATRICS

## 2022-07-21 PROCEDURE — 80053 COMPREHEN METABOLIC PANEL: CPT | Performed by: INTERNAL MEDICINE

## 2022-07-21 RX ORDER — METOPROLOL SUCCINATE 50 MG/1
50 TABLET, EXTENDED RELEASE ORAL DAILY
Qty: 90 TABLET | Refills: 3 | Status: SHIPPED | OUTPATIENT
Start: 2022-07-21 | End: 2022-11-23 | Stop reason: SDUPTHER

## 2022-07-21 NOTE — TELEPHONE ENCOUNTER
----- Message from Jo De Luna sent at 7/21/2022  1:52 PM CDT -----  Contact: DHARMESH DELEON [[id number]]  .Type:  Needs Medical Advice    Who Called: DHARMESH DELEON [[id number]]  Would the patient rather a call back or a response via [facility]? Call   Best Call Back Number: .[phone] (home)    Additional Information: Pt is req a call back in regards to having his appointment rescheduled.. Thanks AW

## 2022-07-21 NOTE — PROGRESS NOTES
Subjective:       Patient ID: Sachin Gonzalez is a 57 y.o. male.    Chief Complaint: hands shaking    Sachin Gonzalez is a 57 y.o. male who presents to clinic for shaky hands for a few months. Happens off and on a couple times a week. Not specific to activity. He can qualify much more .Sometimes can feel a pulse in his head. Just had injection of L thumb by Dr. Connell (Ortho). This has not helped. The tremor does not interfere with hand witting or drinking. Mother developed tremors late n life. Does not drink.     Review of Systems   Constitutional: Negative for activity change, appetite change, chills, diaphoresis, fatigue, fever and unexpected weight change.   HENT: Negative for nasal congestion, ear pain, mouth sores, nosebleeds, postnasal drip, rhinorrhea, sneezing and sore throat.    Eyes: Negative for photophobia, pain, discharge, redness and visual disturbance.   Respiratory: Negative for cough, chest tightness, shortness of breath, wheezing and stridor.    Cardiovascular: Negative for chest pain, palpitations and leg swelling.   Gastrointestinal: Negative for abdominal distention, blood in stool, constipation, diarrhea, nausea and vomiting.   Genitourinary: Negative for decreased urine volume, difficulty urinating, dysuria, flank pain, frequency, genital sores, hematuria and urgency.   Musculoskeletal: Negative for arthralgias, back pain, joint swelling, neck pain and neck stiffness.   Integumentary:  Negative for color change, pallor, rash and wound.   Neurological: Positive for tremors. Negative for dizziness, syncope, speech difficulty, weakness, light-headedness and headaches.   Hematological: Negative for adenopathy. Does not bruise/bleed easily.   Psychiatric/Behavioral: Negative for confusion, decreased concentration, dysphoric mood, hallucinations, sleep disturbance and suicidal ideas. The patient is not nervous/anxious.    All other systems reviewed and are negative.        Objective:       Physical Exam  Vitals and nursing note reviewed.   Constitutional:       General: He is not in acute distress.     Appearance: He is well-developed.   Neck:      Thyroid: No thyromegaly.      Vascular: No JVD.   Cardiovascular:      Rate and Rhythm: Normal rate and regular rhythm.      Heart sounds: Normal heart sounds. No murmur heard.  Pulmonary:      Effort: Pulmonary effort is normal. No respiratory distress.      Breath sounds: Normal breath sounds. No wheezing or rales.   Abdominal:      General: There is no distension.      Palpations: Abdomen is soft. There is no mass.      Tenderness: There is no abdominal tenderness. There is no guarding.   Musculoskeletal:      Right lower leg: No edema.      Left lower leg: No edema.   Lymphadenopathy:      Cervical: No cervical adenopathy.   Skin:     Capillary Refill: Capillary refill takes less than 2 seconds.      Findings: No rash.   Neurological:      General: No focal deficit present.      Mental Status: He is alert and oriented to person, place, and time.      Cranial Nerves: No cranial nerve deficit.      Coordination: Coordination normal.      Comments: Mild resting tremor of his hands that went away with intention, no cog wheel, or coordination issues, gait, speech, facial movement all normal    Psychiatric:         Mood and Affect: Mood normal.         Behavior: Behavior normal.         Thought Content: Thought content normal.         Judgment: Judgment normal.         Assessment:       Problem List Items Addressed This Visit    None     Visit Diagnoses     Essential tremor    -  Primary    Relevant Medications    metoprolol succinate (TOPROL-XL) 50 MG 24 hr tablet    Other Relevant Orders    Magnesium    PHOSPHORUS    Hypertension, unspecified type        Relevant Medications    metoprolol succinate (TOPROL-XL) 50 MG 24 hr tablet          Plan:     Essential tremor  -     Cancel: Ammonia; Future; Expected date: 07/21/2022  -     Magnesium; Future; Expected  date: 07/21/2022  -     PHOSPHORUS; Future; Expected date: 07/21/2022  -     metoprolol succinate (TOPROL-XL) 50 MG 24 hr tablet; Take 1 tablet (50 mg total) by mouth once daily.  Dispense: 90 tablet; Refill: 3    Hypertension, unspecified type  -     metoprolol succinate (TOPROL-XL) 50 MG 24 hr tablet; Take 1 tablet (50 mg total) by mouth once daily.  Dispense: 90 tablet; Refill: 3    Will try metoprolol 50 mg for b/p control and tremors. Add above labs to GI labs to look for secondary causes. F/U based on response.  Scribe Attestation:   I, Roberto Cast, am scribing for, and in the presence of, Dr. Santy Greenberg Jr. I performed the above scribed service and the documentation accurately describes the services I performed. I attest to the accuracy of the note.    I, Dr. Santy Greenberg Jr, reviewed documentation as scribed above. I personally performed the services described in this documentation.  I agree that the record reflects my personal performance and is accurate and complete. Santy Greenberg Jr., MD.  07/21/2022

## 2022-07-22 LAB — HBV SURFACE AG SERPL QL IA: NEGATIVE

## 2022-07-25 LAB
HBV DNA SERPL NAA+PROBE-ACNC: <10 IU/ML
HBV DNA SERPL NAA+PROBE-LOG IU: <1 LOG (10) IU/ML
HBV DNA SERPL QL NAA+PROBE: NOT DETECTED
HBV SURFACE AB SER-ACNC: POSITIVE M[IU]/ML

## 2022-07-27 DIAGNOSIS — G25.0 ESSENTIAL TREMOR: ICD-10-CM

## 2022-07-27 DIAGNOSIS — I10 HYPERTENSION, UNSPECIFIED TYPE: ICD-10-CM

## 2022-07-27 RX ORDER — METOPROLOL SUCCINATE 50 MG/1
TABLET, EXTENDED RELEASE ORAL
Refills: 0 | OUTPATIENT
Start: 2022-07-27

## 2022-07-27 NOTE — TELEPHONE ENCOUNTER
No new care gaps identified.  [facility] Embedded Care Gaps. Reference number: [id number]. 7/27/2022   2:46:25 PM CDT

## 2022-07-27 NOTE — TELEPHONE ENCOUNTER
Refill Decision Note   Sachin Gonzalez  is requesting a refill authorization.  Brief Assessment and Rationale for Refill:  Quick Discontinue     Medication Therapy Plan:    No data recorded        Medication Reconciliation Completed: No   Comments:  Pharmacy is requesting new scripts for the following medications without required information, (sig/ frequency/qty/etc)       No Care Gaps recommended.     Note composed:5:22 PM 07/27/2022

## 2022-07-28 ENCOUNTER — HOSPITAL ENCOUNTER (OUTPATIENT)
Dept: RADIOLOGY | Facility: HOSPITAL | Age: 57
Discharge: HOME | End: 2022-07-28
Attending: PODIATRIST
Payer: COMMERCIAL

## 2022-07-28 DIAGNOSIS — M77.51 BURSITIS OF INTERMETATARSAL BURSA OF RIGHT FOOT: ICD-10-CM

## 2022-07-28 DIAGNOSIS — M77.41 METATARSALGIA, RIGHT FOOT: ICD-10-CM

## 2022-07-28 DIAGNOSIS — M20.41 HAMMER TOE OF RIGHT FOOT: ICD-10-CM

## 2022-07-28 DIAGNOSIS — G57.61 NEUROMA, MORTON'S, RIGHT: ICD-10-CM

## 2022-07-31 NOTE — PROGRESS NOTES
Subjective:     Patient ID: Sachin Gonzalez is a 57 y.o. male.    Chief Complaint: Nail Care (PROC B nail care,pt c/o right toe pain 8/10 , non-diabetic pt wears tennis shoes w/ socks, PCP Dr. Greenberg last seen 7/21/22) and Toe Pain    Sachin is a 57 y.o. male who presents to the clinic for evaluation and treatment of high risk feet. 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 The patient's chief complaint is long, thick toenails. This patient has documented high risk feet requiring routine maintenance secondary to peripheral neuropathy. Patient states he is scheduled for right foot MRI due to continue right foot pain.     PCP: MAHAD Greenberg Jr, MD    Date Last Seen by PCP: 07/21/2022    Current shoe gear:  Affected Foot: Tennis shoes     Unaffected Foot: Tennis shoes    Last encounter in this department: 3/30/2022    Hemoglobin A1C   Date Value Ref Range Status   04/12/2017 5.7 4.5 - 6.2 % Final     Comment:     According to ADA guidelines, hemoglobin A1C <7.0% represents  optimal control in non-pregnant diabetic patients.  Different  metrics may apply to specific populations.   Standards of Medical Care in Diabetes - 2016.  For the purpose of screening for the presence of diabetes:  <5.7%     Consistent with the absence of diabetes  5.7-6.4%  Consistent with increasing risk for diabetes   (prediabetes)  >or=6.5%  Consistent with diabetes  Currently no consensus exists for use of hemoglobin A1C  for diagnosis of diabetes for children.         Patient Active Problem List   Diagnosis    History of prostate cancer    RA (rheumatoid arthritis)    Status post prostatectomy (06/07/12)    Erectile dysfunction    Peyronie's disease    Long-term use of immunosuppressant medication    Cephalic vein  thrombosis-post op    Essential hypertension    Tension-type headache, not intractable    Lumbar foraminal stenosis    Parotid sialolithiasis    Gastroesophageal reflux disease without esophagitis    Mixed hyperlipidemia    Melena    Screen for colon cancer    Prostate cancer    Gross hematuria    Stricture of bulbous urethra in male       Medication List with Changes/Refills   Current Medications    ASCORBIC ACID, VITAMIN C, (VITAMIN C) 250 MG TABLET    Take 250 mg by mouth once daily.    CHOLECALCIFEROL, VITAMIN D3, 25 MCG (1,000 UNIT) CHEW    Take by mouth.    FOLIC ACID (FOLVITE) 1 MG TABLET    Take 1 tablet (1,000 mcg total) by mouth once daily.    IBUPROFEN (ADVIL,MOTRIN) 800 MG TABLET    Take 1 tablet (800 mg total) by mouth every 8 (eight) hours as needed for Pain.    METHOTREXATE 2.5 MG TAB    TAKE 8 TABLETS (20 MG TOTAL) EVERY 7 DAYS, THIS MEDICATION REQUIRES PERIODIC LAB MONITORING (AT LEAST EVERY 3 MONTHS)    METOPROLOL SUCCINATE (TOPROL-XL) 50 MG 24 HR TABLET    Take 1 tablet (50 mg total) by mouth once daily.    OXYCODONE-ACETAMINOPHEN (PERCOCET) 5-325 MG PER TABLET    Take 1 tablet by mouth every 4 (four) hours as needed for Pain.    PHENAZOPYRIDINE (PYRIDIUM) 200 MG TABLET    Take 1 tablet (200 mg total) by mouth 3 (three) times daily as needed for Pain.    SUCRALFATE (CARAFATE) 1 GRAM TABLET    Take 1 tablet (1 g total) by mouth 3 (three) times daily as needed (reflux indigestion).    ZINC SULFATE (ZINC-220 ORAL)    Take 1 tablet by mouth as needed.       Review of patient's allergies indicates:  No Known Allergies    Past Surgical History:   Procedure Laterality Date    COLONOSCOPY N/A 4/21/2021    Procedure: COLONOSCOPY covid test scheduled on 4/19/21;  Surgeon: Darrell Worthington MD;  Location: [facility];  Service: Endoscopy;  Laterality: N/A;    CYSTOURETHROSCOPY WITH DIRECT VISION INTERNAL URETHROTOMY N/A 5/31/2022    Procedure: CYSTOSCOPY, WITH DIRECT VISION INTERNAL URETHROTOMY;   "Surgeon: Yaniv Murray MD;  Location: [facility] OR;  Service: Urology;  Laterality: N/A;    ESOPHAGOGASTRODUODENOSCOPY      ESOPHAGOGASTRODUODENOSCOPY N/A 4/5/2021    Procedure: EGD (ESOPHAGOGASTRODUODENOSCOPY) Rapid Covid test needed;  Surgeon: Darrell Worthington MD;  Location: [facility] ENDO;  Service: Endoscopy;  Laterality: N/A;    PROSTATECTOMY  2011       Family History   Problem Relation Age of Onset    Stroke Father     Alcohol abuse Father     Arthritis Mother     Hypertension Mother     Anxiety disorder Mother     No Known Problems Sister     Kidney disease Brother        Social History     Socioeconomic History    Marital status: Single   Tobacco Use    Smoking status: Never Smoker    Smokeless tobacco: Never Used   Substance and Sexual Activity    Alcohol use: No    Drug use: No    Sexual activity: Yes     Partners: Female   Social History Narrative    No pets or smokers in household.       Vitals:    07/21/22 0918   BP: 110/82   Pulse: 75   Weight: 104.4 kg (230 lb 2.6 oz)   Height: 6' 7" (2.007 m)   PainSc:   8       Hemoglobin A1C   Date Value Ref Range Status   04/12/2017 5.7 4.5 - 6.2 % Final     Comment:     According to ADA guidelines, hemoglobin A1C <7.0% represents  optimal control in non-pregnant diabetic patients.  Different  metrics may apply to specific populations.   Standards of Medical Care in Diabetes - 2016.  For the purpose of screening for the presence of diabetes:  <5.7%     Consistent with the absence of diabetes  5.7-6.4%  Consistent with increasing risk for diabetes   (prediabetes)  >or=6.5%  Consistent with diabetes  Currently no consensus exists for use of hemoglobin A1C  for diagnosis of diabetes for children.         Review of Systems   Constitutional: Negative for chills and fever.   Respiratory: Negative for shortness of breath.    Cardiovascular: Negative for chest pain, palpitations, orthopnea, claudication and leg swelling.   Gastrointestinal: Negative for " diarrhea, nausea and vomiting.   Musculoskeletal: Negative for joint pain.   Skin: Negative for rash.   Neurological: Positive for tingling and sensory change.   Psychiatric/Behavioral: Negative.          Objective:      PHYSICAL EXAM: Apperance: Alert and orient in no distress,well developed, and with good attention to grooming and body habits  Patient presents ambulating in tennis shoes.   LOWER EXTREMITY EXAM:  VASCULAR: Dorsalis pedis pulses 2/4 bilateral and Posterior Tibial pulses 2/4 bilateral. Capillary fill time <4 seconds bilateral. No edema observed bilateral. Varicosities absent bilateral. Skin temperature of the lower extremities is warm to warm, proximal to distal. Hair growth WNL bilateral.  DERMATOLOGICAL: No skin rashes, subcutaneous nodules, lesions, or ulcers observed bilateral. Nails 1,3,4,5 bilateral elongated, thickened, and discolored with subungual debris. Webspaces 1,2,3,4 bilateral clean, dry and without evidence of break in skin integrity.   NEUROLOGICAL: Light touch, sharp-dull, proprioception all present and equal bilaterally.  Vibratory sensation diminished at bilateral hallux and intact at bilateral navicular. Protective sensation absent at 6/10 sites as tested with a [place] 5.07 monofilament.   MUSCULOSKELETAL: Muscle strength is 5/5 for foot inverters, everters, plantarflexors, and dorsiflexors. Muscle tone is normal.           Assessment:       ICD-10-CM ICD-9-CM   1. Idiopathic peripheral neuropathy  G60.9 356.9   2. Onychomycosis of toenail  B35.1 110.1       Plan:   Idiopathic peripheral neuropathy    Onychomycosis of toenail      I counseled the patient on his conditions, regarding findings of my examination, my impressions, and usual treatment plan.   Greater than 15 minutes of a 20 minute appointment spent on education about the neuropathy, and prevention of limb loss.  Shoe inspection. Patient instructed on proper foot hygeine. We discussed wearing proper shoe gear,  daily foot inspections, never walking without protective shoe gear, never putting sharp instruments to feet.    With patient's permission, nails 1-5 bilateral were debrided/trimmed in length and thickness aggressively to their soft tissue attachment mechanically and with electric , removing all offending nail and debris. Patient relates relief following the procedure.  Patient  will continue to monitor the areas daily, inspect feet, wear protective shoe gear when ambulatory, moisturizer to maintain skin integrity.  Patient to return 2 months or sooner if needed.            Reina Guzman DPM  [facility]

## 2022-08-04 ENCOUNTER — OFFICE VISIT (OUTPATIENT)
Dept: UROLOGY | Facility: CLINIC | Age: 57
End: 2022-08-04
Payer: COMMERCIAL

## 2022-08-04 VITALS
BODY MASS INDEX: 25.91 KG/M2 | SYSTOLIC BLOOD PRESSURE: 118 MMHG | TEMPERATURE: 98 F | HEART RATE: 86 BPM | DIASTOLIC BLOOD PRESSURE: 75 MMHG | WEIGHT: 230 LBS

## 2022-08-04 DIAGNOSIS — N52.9 ERECTILE DYSFUNCTION, UNSPECIFIED ERECTILE DYSFUNCTION TYPE: ICD-10-CM

## 2022-08-04 DIAGNOSIS — R31.0 GROSS HEMATURIA: ICD-10-CM

## 2022-08-04 DIAGNOSIS — N35.912 STRICTURE OF BULBOUS URETHRA IN MALE, UNSPECIFIED STRICTURE TYPE: ICD-10-CM

## 2022-08-04 DIAGNOSIS — C61 PROSTATE CANCER: Primary | ICD-10-CM

## 2022-08-04 PROCEDURE — 99999 PR PBB SHADOW E&M-EST. PATIENT-LVL IV: ICD-10-PCS | Mod: PBBFAC,,, | Performed by: UROLOGY

## 2022-08-04 PROCEDURE — 99999 PR PBB SHADOW E&M-EST. PATIENT-LVL IV: CPT | Mod: PBBFAC,,, | Performed by: UROLOGY

## 2022-08-04 PROCEDURE — 99024 POSTOP FOLLOW-UP VISIT: CPT | Mod: S$GLB,,, | Performed by: UROLOGY

## 2022-08-04 PROCEDURE — 3078F PR MOST RECENT DIASTOLIC BLOOD PRESSURE < 80 MM HG: ICD-10-PCS | Mod: CPTII,S$GLB,, | Performed by: UROLOGY

## 2022-08-04 PROCEDURE — 3074F PR MOST RECENT SYSTOLIC BLOOD PRESSURE < 130 MM HG: ICD-10-PCS | Mod: CPTII,S$GLB,, | Performed by: UROLOGY

## 2022-08-04 PROCEDURE — 1159F PR MEDICATION LIST DOCUMENTED IN MEDICAL RECORD: ICD-10-PCS | Mod: CPTII,S$GLB,, | Performed by: UROLOGY

## 2022-08-04 PROCEDURE — 3008F BODY MASS INDEX DOCD: CPT | Mod: CPTII,S$GLB,, | Performed by: UROLOGY

## 2022-08-04 PROCEDURE — 1159F MED LIST DOCD IN RCRD: CPT | Mod: CPTII,S$GLB,, | Performed by: UROLOGY

## 2022-08-04 PROCEDURE — 3078F DIAST BP <80 MM HG: CPT | Mod: CPTII,S$GLB,, | Performed by: UROLOGY

## 2022-08-04 PROCEDURE — 3008F PR BODY MASS INDEX (BMI) DOCUMENTED: ICD-10-PCS | Mod: CPTII,S$GLB,, | Performed by: UROLOGY

## 2022-08-04 PROCEDURE — 99024 PR POST-OP FOLLOW-UP VISIT: ICD-10-PCS | Mod: S$GLB,,, | Performed by: UROLOGY

## 2022-08-04 PROCEDURE — 3074F SYST BP LT 130 MM HG: CPT | Mod: CPTII,S$GLB,, | Performed by: UROLOGY

## 2022-08-04 NOTE — PROGRESS NOTES
Chief Complaint:   Encounter Diagnoses   Name Primary?    Prostate cancer Yes    Stricture of bulbous urethra in male, unspecified stricture type     Gross hematuria     Erectile dysfunction, unspecified erectile dysfunction type          HPI:   8/4/22- patient states his flow is much better, still having a couple episodes of gross hematuria usually after a bowel movement.  Not due as of yet for a PSA.      Allergies:  No known allergies    Medications:  has a current medication list which includes the following prescription(s): pantoprazole, sucralfate, zinc sulfate, ascorbic acid (vitamin c), cholecalciferol (vitamin d3), famotidine, folic acid, hydrochlorothiazide, ibuprofen, magnesium, methotrexate, and metoprolol succinate.    Review of Systems:  General: No fever, chills, fatigability, or weight loss.  Skin: No rashes, itching, or changes in color or texture of skin.  Chest: Denies TYLER, cyanosis, wheezing, cough, and sputum production.  Abdomen: Appetite fine. No weight loss. Denies diarrhea, abdominal pain, hematemesis, or blood in stool.  Musculoskeletal: No joint stiffness or swelling. Denies back pain.  : As above.  All other review of systems negative.    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    PSH:   has a past surgical history that includes Prostatectomy (2011) and Esophagogastroduodenoscopy.    FamHx: family history includes Alcohol abuse in his father; Anxiety disorder in his mother; Arthritis in his mother; Hypertension in his mother; Kidney disease in his brother; No Known Problems in his sister; Stroke in his father.    SocHx:  reports that he has never smoked. He has never used smokeless tobacco. He reports that he does not drink alcohol and does not use drugs.      Physical  Exam:  There were no vitals filed for this visit.  General: A&Ox3, no apparent distress, no deformities  Neck: No masses, normal thyroid  Lungs: normal inspiration, no use of accessory muscles  Heart: normal pulse, no arrhythmias  Abdomen: Soft, NT, ND  Skin: The skin is warm and dry. No jaundice.  Ext: No c/c/e.    Labs/Studies:   pvr 26 ml 12/21  Cystoscopy normal 5/21  PSA <0.01 12/21  CT urogram bladder outflow obstruction 3/21    Impression/Plan:      1. Prostate cancer-  RP pT3b Gl7 with adjuvant XRT  6/7/12    PSA has been stable, repeat in 4 months.    2. Urethral stricture disease-  DVIU  5/31/22    Patient believes that is flow is better after our procedure, call with a recurrence.    3. Gross hematuria- structural evaluation relatively clear, continue to monitor expectantly.  Concerned that this could be due to radiation changes, episodes now occur immediately following a bowel movement.  Instructed him to keep his stools clear, avoid constipation and increase hydration.  If this were to occur or become worse, he will need to contact us immediately.    4. Erectile dysfunction- MARÍA works but not sufficient.  Cialis, trimix and muse were all a failure.  At this point this is not an issue.

## 2022-08-05 ENCOUNTER — HOSPITAL ENCOUNTER (OUTPATIENT)
Dept: RADIOLOGY | Facility: HOSPITAL | Age: 57
Discharge: HOME | End: 2022-08-05
Attending: PODIATRIST
Payer: COMMERCIAL

## 2022-08-05 PROCEDURE — 73720 MRI LWR EXTREMITY W/O&W/DYE: CPT | Mod: TC,PN,RT

## 2022-08-05 PROCEDURE — A9585 GADOBUTROL INJECTION: HCPCS | Mod: PN | Performed by: PODIATRIST

## 2022-08-05 PROCEDURE — 25500020 PHARM REV CODE 255: Mod: PN | Performed by: PODIATRIST

## 2022-08-05 RX ORDER — GADOBUTROL 604.72 MG/ML
10 INJECTION INTRAVENOUS
Status: COMPLETED | OUTPATIENT
Start: 2022-08-05 | End: 2022-08-05

## 2022-08-05 RX ADMIN — GADOBUTROL 10 ML: 604.72 INJECTION INTRAVENOUS at 02:08

## 2022-08-08 ENCOUNTER — PATIENT MESSAGE (OUTPATIENT)
Dept: PODIATRY | Facility: CLINIC | Age: 57
End: 2022-08-08
Payer: COMMERCIAL

## 2022-08-08 ENCOUNTER — TELEPHONE (OUTPATIENT)
Dept: PODIATRY | Facility: CLINIC | Age: 57
End: 2022-08-08
Payer: COMMERCIAL

## 2022-08-10 ENCOUNTER — TELEPHONE (OUTPATIENT)
Dept: PODIATRY | Facility: CLINIC | Age: 57
End: 2022-08-10
Payer: COMMERCIAL

## 2022-08-31 ENCOUNTER — OFFICE VISIT (OUTPATIENT)
Dept: PODIATRY | Facility: CLINIC | Age: 57
End: 2022-08-31
Payer: COMMERCIAL

## 2022-08-31 VITALS — WEIGHT: 229.94 LBS | BODY MASS INDEX: 26.6 KG/M2 | HEIGHT: 78 IN

## 2022-08-31 DIAGNOSIS — M77.41 METATARSALGIA, RIGHT FOOT: Primary | ICD-10-CM

## 2022-08-31 DIAGNOSIS — M77.51 BURSITIS OF RIGHT FOOT: ICD-10-CM

## 2022-08-31 DIAGNOSIS — Z01.818 PREOP TESTING: ICD-10-CM

## 2022-08-31 DIAGNOSIS — M06.9 RHEUMATOID ARTHRITIS OF HAND, UNSPECIFIED LATERALITY, UNSPECIFIED WHETHER RHEUMATOID FACTOR PRESENT: ICD-10-CM

## 2022-08-31 DIAGNOSIS — M20.41 HAMMER TOE OF RIGHT FOOT: ICD-10-CM

## 2022-08-31 DIAGNOSIS — M24.571 CONTRACTURE, RIGHT ANKLE: ICD-10-CM

## 2022-08-31 PROCEDURE — 99999 PR PBB SHADOW E&M-EST. PATIENT-LVL IV: CPT | Mod: PBBFAC,,, | Performed by: PODIATRIST

## 2022-08-31 PROCEDURE — 99999 PR PBB SHADOW E&M-EST. PATIENT-LVL IV: ICD-10-PCS | Mod: PBBFAC,,, | Performed by: PODIATRIST

## 2022-08-31 PROCEDURE — 1159F PR MEDICATION LIST DOCUMENTED IN MEDICAL RECORD: ICD-10-PCS | Mod: CPTII,S$GLB,, | Performed by: PODIATRIST

## 2022-08-31 PROCEDURE — 1159F MED LIST DOCD IN RCRD: CPT | Mod: CPTII,S$GLB,, | Performed by: PODIATRIST

## 2022-08-31 PROCEDURE — 1160F PR REVIEW ALL MEDS BY PRESCRIBER/CLIN PHARMACIST DOCUMENTED: ICD-10-PCS | Mod: CPTII,S$GLB,, | Performed by: PODIATRIST

## 2022-08-31 PROCEDURE — 3008F BODY MASS INDEX DOCD: CPT | Mod: CPTII,S$GLB,, | Performed by: PODIATRIST

## 2022-08-31 PROCEDURE — 1160F RVW MEDS BY RX/DR IN RCRD: CPT | Mod: CPTII,S$GLB,, | Performed by: PODIATRIST

## 2022-08-31 PROCEDURE — 3008F PR BODY MASS INDEX (BMI) DOCUMENTED: ICD-10-PCS | Mod: CPTII,S$GLB,, | Performed by: PODIATRIST

## 2022-08-31 PROCEDURE — 99214 PR OFFICE/OUTPT VISIT, EST, LEVL IV, 30-39 MIN: ICD-10-PCS | Mod: S$GLB,,, | Performed by: PODIATRIST

## 2022-08-31 PROCEDURE — 99214 OFFICE O/P EST MOD 30 MIN: CPT | Mod: S$GLB,,, | Performed by: PODIATRIST

## 2022-08-31 NOTE — PROGRESS NOTES
Subjective:       Patient ID: Sachin Gonzlaez is a 57 y.o. male.    Chief Complaint: Follow-up (Right foot bursitis, 8/10 pain at present, non-diabetic, pcp Dr.Sledge Cano)    HPI: Sachin Gonzalez presents to the office today, with complaints of persistent pains to the right forefoot at the webspaces and lesser toes. He has had 2 cortisone injections to the right foot w/o alleviation of symptoms. WB with normal shoe gear at present. Does have a history of lumbar spinal stenosis. States no chronic back pains. States NSAIDs. Has had MRI as well.     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       Past Surgical History:   Procedure Laterality Date    COLONOSCOPY N/A 4/21/2021    Procedure: COLONOSCOPY covid test scheduled on 4/19/21;  Surgeon: Darrell Worthington MD;  Location: [facility];  Service: Endoscopy;  Laterality: N/A;    CYSTOURETHROSCOPY WITH DIRECT VISION INTERNAL URETHROTOMY N/A 5/31/2022    Procedure: CYSTOSCOPY, WITH DIRECT VISION INTERNAL URETHROTOMY;  Surgeon: Yaniv Murray MD;  [facility]: [facility] OR;  Service: Urology;  Laterality: N/A;    ESOPHAGOGASTRODUODENOSCOPY       "ESOPHAGOGASTRODUODENOSCOPY N/A 4/5/2021    Procedure: EGD (ESOPHAGOGASTRODUODENOSCOPY) Rapid Covid test needed;  Surgeon: Darrell Worthington MD;  Location: [facility];  Service: Endoscopy;  Laterality: N/A;    PROSTATECTOMY  2011       Review of Systems   Constitutional:  Negative for chills, fatigue and fever.   HENT:  Negative for hearing loss.    Eyes:  Negative for photophobia and visual disturbance.   Respiratory:  Negative for cough, chest tightness, shortness of breath and wheezing.    Cardiovascular:  Negative for chest pain and palpitations.   Gastrointestinal:  Negative for constipation, diarrhea, nausea and vomiting.   Endocrine: Negative for cold intolerance and heat intolerance.   Genitourinary:  Negative for flank pain.   Musculoskeletal:  Positive for gait problem. Negative for neck pain and neck stiffness.   Neurological:  Negative for light-headedness and headaches.   Psychiatric/Behavioral:  Negative for sleep disturbance.        Objective:   Ht 6' 7" (2.007 m)   Wt 104.3 kg (229 lb 15 oz)   BMI 25.90 kg/m²     LOWER EXTREMITY PHYSICAL EXAMINATION    DERMATOLOGY: Skin is supple, dry and intact.     ORTHOPEDIC: Manual Muscle Testing is 5/5 in all planes on the left and right foot, without pains, with and without resistance. There is moderate discomfort to palpation at the right plantar 2nd, 3rd and 4th digit sulcus. Plantar fat pad atrophy with displacement is noted.  Gastroc equinus contracture is noted. Rectus foot type is noted. Negative Lachman's testing is noted. Concern is not for Predislocation Syndrome or plantar plate rupture at the not MTPJ(s). No concern for inter-metatarsal bursitis.    NEUROLOGY: Upon palpation of the interspaces, there are no neurological sensations stated that radiate proximal or distal. Upon compression of the metatarsal heads from medial to lateral, no neurological sensations or symptoms are stated.    VASCULAR: DP and PT are 2/4. CFT is WNL.     Assessment:     1. " Metatarsalgia, right foot    2. Hammer toe of right foot    3. Bursitis of right foot    4. Contracture, right ankle    5. Preop testing    6. Rheumatoid arthritis of hand, unspecified laterality, unspecified whether rheumatoid factor present          Plan:     Metatarsalgia, right foot  -     Case Request Operating Room: RELEASE, CONTRACTURE, JOINT, FUSION, JOINT, TOE, OSTEOTOMY, METATARSAL BONE, RESECTION, MUSCLE, GASTROCNEMIUS    Hammer toe of right foot  -     Case Request Operating Room: RELEASE, CONTRACTURE, JOINT, FUSION, JOINT, TOE, OSTEOTOMY, METATARSAL BONE, RESECTION, MUSCLE, GASTROCNEMIUS    Bursitis of right foot  -     Case Request Operating Room: RELEASE, CONTRACTURE, JOINT, FUSION, JOINT, TOE, OSTEOTOMY, METATARSAL BONE, RESECTION, MUSCLE, GASTROCNEMIUS    Contracture, right ankle  -     Case Request Operating Room: RELEASE, CONTRACTURE, JOINT, FUSION, JOINT, TOE, OSTEOTOMY, METATARSAL BONE, RESECTION, MUSCLE, GASTROCNEMIUS    Preop testing  -     Basic Metabolic Panel; Future; Expected date: 02/28/2023  -     CBC Without Differential; Future; Expected date: 08/31/2022  -     EKG 12-lead; Future; Expected date: 08/31/2022  -     X-Ray Chest PA And Lateral; Future; Expected date: 08/31/2022    Rheumatoid arthritis of hand, unspecified laterality, unspecified whether rheumatoid factor present    Thorough discussion is had with the patient today, concerning the diagnosis, its etiology, and the treatment algorithm at present.     XRAYS are reviewed in detail with the patient. All questions and concerns regarding findings and its/their implications are outlined and discussed.    MRI is reviewed in detail with the patient. All questions and concerns regarding findings and its/their implications are outlined and discussed.    The procedure of (RLE gastroc recession with repair of hammer toes 2-4 with possible metatarsal osteotomy) was thoroughly explained to the patient. Its necessity and/or purpose and the  implications therein were outlined, including any pertinent advantages and/or disadvantages, and possible complications, if any. Possible complications include recurrence of pathology and/or deformity, infection (cellulitis, drainage, purulence, malodor, etc...), pain, numbness, neuritis, edema, burning, loss of function, need for further surgery, possible need for removal of any implanted hardware, soft tissue contracture and/or scarring, etc... No guarantees were given and/or implied. Post-operative expectations and weightbearing protocol is thoroughly explained to the patient, who acknowledges understanding.     Is on MTX and will not need to HOLD.         Future Appointments   Date Time Provider Department Center   9/21/2022  2:00 PM Reina Guzman DPM [facility] POD [facility]   10/18/2022  2:40 PM KEYONA Greenberg Jr., MD [facility] IM [facility]   12/5/2022  9:50 AM LABORATORY, [facility] LAB [facility]   12/8/2022  2:45 PM Yaniv Murray MD [facility] UROLOGY [facility]   [facility]   1/13/2023 10:30 AM LABORATORY, [facility]   1/23/2023  3:00 PM Janell Bradley MD [facility]

## 2022-09-27 ENCOUNTER — OFFICE VISIT (OUTPATIENT)
Dept: PODIATRY | Facility: CLINIC | Age: 57
End: 2022-09-27
Payer: COMMERCIAL

## 2022-09-27 VITALS — BODY MASS INDEX: 26.6 KG/M2 | HEIGHT: 78 IN | WEIGHT: 229.94 LBS

## 2022-09-27 DIAGNOSIS — B35.1 ONYCHOMYCOSIS OF TOENAIL: ICD-10-CM

## 2022-09-27 DIAGNOSIS — G60.9 IDIOPATHIC PERIPHERAL NEUROPATHY: Primary | ICD-10-CM

## 2022-09-27 PROCEDURE — 3008F PR BODY MASS INDEX (BMI) DOCUMENTED: ICD-10-PCS | Mod: CPTII,S$GLB,, | Performed by: PODIATRIST

## 2022-09-27 PROCEDURE — 99499 NO LOS: ICD-10-PCS | Mod: S$GLB,,, | Performed by: PODIATRIST

## 2022-09-27 PROCEDURE — 1159F PR MEDICATION LIST DOCUMENTED IN MEDICAL RECORD: ICD-10-PCS | Mod: CPTII,S$GLB,, | Performed by: PODIATRIST

## 2022-09-27 PROCEDURE — 1160F RVW MEDS BY RX/DR IN RCRD: CPT | Mod: CPTII,S$GLB,, | Performed by: PODIATRIST

## 2022-09-27 PROCEDURE — 1160F PR REVIEW ALL MEDS BY PRESCRIBER/CLIN PHARMACIST DOCUMENTED: ICD-10-PCS | Mod: CPTII,S$GLB,, | Performed by: PODIATRIST

## 2022-09-27 PROCEDURE — 11721 PR DEBRIDEMENT OF NAILS, 6 OR MORE: ICD-10-PCS | Mod: S$GLB,,, | Performed by: PODIATRIST

## 2022-09-27 PROCEDURE — 11721 DEBRIDE NAIL 6 OR MORE: CPT | Mod: S$GLB,,, | Performed by: PODIATRIST

## 2022-09-27 PROCEDURE — 3008F BODY MASS INDEX DOCD: CPT | Mod: CPTII,S$GLB,, | Performed by: PODIATRIST

## 2022-09-27 PROCEDURE — 99499 UNLISTED E&M SERVICE: CPT | Mod: S$GLB,,, | Performed by: PODIATRIST

## 2022-09-27 PROCEDURE — 99999 PR PBB SHADOW E&M-EST. PATIENT-LVL III: CPT | Mod: PBBFAC,,, | Performed by: PODIATRIST

## 2022-09-27 PROCEDURE — 99999 PR PBB SHADOW E&M-EST. PATIENT-LVL III: ICD-10-PCS | Mod: PBBFAC,,, | Performed by: PODIATRIST

## 2022-09-27 PROCEDURE — 1159F MED LIST DOCD IN RCRD: CPT | Mod: CPTII,S$GLB,, | Performed by: PODIATRIST

## 2022-10-09 NOTE — PROGRESS NOTES
Subjective:     Patient ID: Sachin Gonzalez is a 57 y.o. male.    Chief Complaint: Nail Care (Non-diabetic pt wears tennis shoes, PCP Dr. Greenberg last seen 7-21-22)    Sachin is a 57 y.o. male who presents to the clinic for evaluation and treatment of high risk feet. 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 The patient's chief complaint is long, thick toenails. This patient has documented high risk feet requiring routine maintenance secondary to peripheral neuropathy. Patient states he is scheduled for right foot MRI due to continue right foot pain.     PCP: MAHAD Greenberg Jr, MD    Date Last Seen by PCP: 07/21/2022    Current shoe gear:  Affected Foot: Tennis shoes     Unaffected Foot: Tennis shoes    Last encounter in this department: 3/30/2022    Hemoglobin A1C   Date Value Ref Range Status   04/12/2017 5.7 4.5 - 6.2 % Final     Comment:     According to ADA guidelines, hemoglobin A1C <7.0% represents  optimal control in non-pregnant diabetic patients.  Different  metrics may apply to specific populations.   Standards of Medical Care in Diabetes - 2016.  For the purpose of screening for the presence of diabetes:  <5.7%     Consistent with the absence of diabetes  5.7-6.4%  Consistent with increasing risk for diabetes   (prediabetes)  >or=6.5%  Consistent with diabetes  Currently no consensus exists for use of hemoglobin A1C  for diagnosis of diabetes for children.         Patient Active Problem List   Diagnosis    History of prostate cancer    RA (rheumatoid arthritis)    Status post prostatectomy (06/07/12)    Erectile dysfunction    Peyronie's disease    Long-term use of immunosuppressant medication    Cephalic vein thrombosis-post op    Essential hypertension    Tension-type headache, not  intractable    Lumbar foraminal stenosis    Parotid sialolithiasis    Gastroesophageal reflux disease without esophagitis    Mixed hyperlipidemia    Melena    Screen for colon cancer    Prostate cancer    Gross hematuria    Stricture of bulbous urethra in male       Medication List with Changes/Refills   Current Medications    ASCORBIC ACID, VITAMIN C, (VITAMIN C) 250 MG TABLET    Take 250 mg by mouth once daily.    CHOLECALCIFEROL, VITAMIN D3, 25 MCG (1,000 UNIT) CHEW    Take by mouth.    FOLIC ACID (FOLVITE) 1 MG TABLET    Take 1 tablet (1,000 mcg total) by mouth once daily.    IBUPROFEN (ADVIL,MOTRIN) 800 MG TABLET    Take 1 tablet (800 mg total) by mouth every 8 (eight) hours as needed for Pain.    METHOTREXATE 2.5 MG TAB    TAKE 8 TABLETS (20 MG TOTAL) EVERY 7 DAYS, THIS MEDICATION REQUIRES PERIODIC LAB MONITORING (AT LEAST EVERY 3 MONTHS)    METOPROLOL SUCCINATE (TOPROL-XL) 50 MG 24 HR TABLET    Take 1 tablet (50 mg total) by mouth once daily.    OXYCODONE-ACETAMINOPHEN (PERCOCET) 5-325 MG PER TABLET    Take 1 tablet by mouth every 4 (four) hours as needed for Pain.    PHENAZOPYRIDINE (PYRIDIUM) 200 MG TABLET    Take 1 tablet (200 mg total) by mouth 3 (three) times daily as needed for Pain.    SUCRALFATE (CARAFATE) 1 GRAM TABLET    Take 1 tablet (1 g total) by mouth 3 (three) times daily as needed (reflux indigestion).    ZINC SULFATE (ZINC-220 ORAL)    Take 1 tablet by mouth as needed.       Review of patient's allergies indicates:  No Known Allergies    Past Surgical History:   Procedure Laterality Date    COLONOSCOPY N/A 4/21/2021    Procedure: COLONOSCOPY covid test scheduled on 4/19/21;  Surgeon: Darrell Worthington MD;  Location: [facility] ENDO;  Service: Endoscopy;  Laterality: N/A;    CYSTOURETHROSCOPY WITH DIRECT VISION INTERNAL URETHROTOMY N/A 5/31/2022    Procedure: CYSTOSCOPY, WITH DIRECT VISION INTERNAL URETHROTOMY;  Surgeon: Yaniv Murray MD;  Location: [facility] OR;  Service: Urology;  Laterality:  "N/A;    ESOPHAGOGASTRODUODENOSCOPY      ESOPHAGOGASTRODUODENOSCOPY N/A 4/5/2021    Procedure: EGD (ESOPHAGOGASTRODUODENOSCOPY) Rapid Covid test needed;  Surgeon: Darrell Worthington MD;  Location: [facility];  Service: Endoscopy;  Laterality: N/A;    PROSTATECTOMY  2011       Family History   Problem Relation Age of Onset    Stroke Father     Alcohol abuse Father     Arthritis Mother     Hypertension Mother     Anxiety disorder Mother     No Known Problems Sister     Kidney disease Brother        Social History     Socioeconomic History    Marital status: Single   Tobacco Use    Smoking status: Never    Smokeless tobacco: Never   Substance and Sexual Activity    Alcohol use: No    Drug use: No    Sexual activity: Yes     Partners: Female   Social History Narrative    No pets or smokers in household.       Vitals:    09/27/22 1334   Weight: 104.3 kg (229 lb 15 oz)   Height: 6' 7" (2.007 m)       Hemoglobin A1C   Date Value Ref Range Status   04/12/2017 5.7 4.5 - 6.2 % Final     Comment:     According to ADA guidelines, hemoglobin A1C <7.0% represents  optimal control in non-pregnant diabetic patients.  Different  metrics may apply to specific populations.   Standards of Medical Care in Diabetes - 2016.  For the purpose of screening for the presence of diabetes:  <5.7%     Consistent with the absence of diabetes  5.7-6.4%  Consistent with increasing risk for diabetes   (prediabetes)  >or=6.5%  Consistent with diabetes  Currently no consensus exists for use of hemoglobin A1C  for diagnosis of diabetes for children.         Review of Systems   Constitutional:  Negative for chills and fever.   Respiratory:  Negative for shortness of breath.    Cardiovascular:  Negative for chest pain, palpitations, orthopnea, claudication and leg swelling.   Gastrointestinal:  Negative for diarrhea, nausea and vomiting.   Musculoskeletal:  Negative for joint pain.   Skin:  Negative for rash.   Neurological:  Positive for tingling and sensory " change.   Psychiatric/Behavioral: Negative.         Objective:      PHYSICAL EXAM: Apperance: Alert and orient in no distress,well developed, and with good attention to grooming and body habits  Patient presents ambulating in tennis shoes.   LOWER EXTREMITY EXAM:  VASCULAR: Dorsalis pedis pulses 2/4 bilateral and Posterior Tibial pulses 2/4 bilateral. Capillary fill time <4 seconds bilateral. No edema observed bilateral. Varicosities absent bilateral. Skin temperature of the lower extremities is warm to warm, proximal to distal. Hair growth WNL bilateral.  DERMATOLOGICAL: No skin rashes, subcutaneous nodules, lesions, or ulcers observed bilateral. Nails 1,3,4,5 bilateral elongated, thickened, and discolored with subungual debris. Webspaces 1,2,3,4 bilateral clean, dry and without evidence of break in skin integrity.   NEUROLOGICAL: Light touch, sharp-dull, proprioception all present and equal bilaterally.  Vibratory sensation diminished at bilateral hallux and intact at bilateral navicular. Protective sensation absent at 6/10 sites as tested with a Thida-Ansley 5.07 monofilament.   MUSCULOSKELETAL: Muscle strength is 5/5 for foot inverters, everters, plantarflexors, and dorsiflexors. Muscle tone is normal.           Assessment:       ICD-10-CM ICD-9-CM   1. Idiopathic peripheral neuropathy  G60.9 356.9   2. Onychomycosis of toenail  B35.1 110.1         Plan:   Idiopathic peripheral neuropathy    Onychomycosis of toenail      I counseled the patient on his conditions, regarding findings of my examination, my impressions, and usual treatment plan.   Greater than 15 minutes of a 20 minute appointment spent on education about the neuropathy, and prevention of limb loss.  Shoe inspection. Patient instructed on proper foot hygeine. We discussed wearing proper shoe gear, daily foot inspections, never walking without protective shoe gear, never putting sharp instruments to feet.    With patient's permission, nails 1-5  bilateral were debrided/trimmed in length and thickness aggressively to their soft tissue attachment mechanically and with electric , removing all offending nail and debris. Patient relates relief following the procedure.  Patient  will continue to monitor the areas daily, inspect feet, wear protective shoe gear when ambulatory, moisturizer to maintain skin integrity.  Patient to return 2 months or sooner if needed.            Reina Guzman DPM  [facility]

## 2022-10-17 ENCOUNTER — TELEPHONE (OUTPATIENT)
Dept: HEPATOLOGY | Facility: CLINIC | Age: 57
End: 2022-10-17
Payer: COMMERCIAL

## 2022-10-17 NOTE — TELEPHONE ENCOUNTER
----- Message from Masoud Topete sent at 10/17/2022  1:15 PM CDT -----  Contact: [phone]  Pt would like to consult with a nurse in regards to seeing if he needs orders for blood work. Please call pt back at [phone]. Thanks KB

## 2022-10-19 ENCOUNTER — IMMUNIZATION (OUTPATIENT)
Dept: PRIMARY CARE CLINIC | Facility: CLINIC | Age: 57
End: 2022-10-19
Payer: COMMERCIAL

## 2022-10-19 DIAGNOSIS — Z23 NEED FOR VACCINATION: Primary | ICD-10-CM

## 2022-10-19 PROCEDURE — 91312 COVID-19, MRNA, LNP-S, BIVALENT BOOSTER, PF, 30 MCG/0.3 ML DOSE: CPT | Mod: PBBFAC | Performed by: FAMILY MEDICINE

## 2022-10-19 PROCEDURE — 0124A COVID-19, MRNA, LNP-S, BIVALENT BOOSTER, PF, 30 MCG/0.3 ML DOSE: CPT | Mod: CV19,PBBFAC | Performed by: FAMILY MEDICINE

## 2022-10-25 ENCOUNTER — TELEPHONE (OUTPATIENT)
Dept: PODIATRY | Facility: CLINIC | Age: 57
End: 2022-10-25
Payer: COMMERCIAL

## 2022-10-25 NOTE — TELEPHONE ENCOUNTER
"Called to tell patient that 11/11/22 Sx is not available.    We will have to leave on 11/21/22.    No answer.    No VM.    ----- Message from Paige Garcia MA sent at 10/25/2022 10:08 AM CDT -----  He wants to reschedule for 11/11 if possible. Doesn't want to be out "that long"  ----- Message -----  From: Chantal Meredith  Sent: 10/25/2022  10:04 AM CDT  To: [facility] Staff    Please call pt @ [phone] regarding surgery on 11/21, pt need to reschedule.      "

## 2022-11-03 ENCOUNTER — TELEPHONE (OUTPATIENT)
Dept: INTERNAL MEDICINE | Facility: CLINIC | Age: 57
End: 2022-11-03
Payer: COMMERCIAL

## 2022-11-03 ENCOUNTER — PATIENT MESSAGE (OUTPATIENT)
Dept: INTERNAL MEDICINE | Facility: CLINIC | Age: 57
End: 2022-11-03
Payer: COMMERCIAL

## 2022-11-03 NOTE — TELEPHONE ENCOUNTER
Called patient to inform of appt cancellation. Patient did not answer, VM not set up, unable to leave message.

## 2022-11-07 DIAGNOSIS — I10 HYPERTENSION, UNSPECIFIED TYPE: ICD-10-CM

## 2022-11-07 DIAGNOSIS — G25.0 ESSENTIAL TREMOR: ICD-10-CM

## 2022-11-07 NOTE — PRE ADMISSION SCREENING
Pre op instructions reviewed with PATIENT per phone on 11/07/22: Spoke about pre op process and surgery instructions, verbalized understanding.    Surgery is scheduled on 11/21/22.  We will call you the business day prior to surgery to confirm arrival time (after 2:30 pm), as it is subject to change due to cancellations & emergencies.    Please report to the [facility] (1st Floor) at [facility] located off of [facility] (2nd building on the left, in front of the [facility]),address: [address] [place] [address] [place]. [address]      INSTRUCTIONS IMPORTANT!!!  Do Not Eat, Drink, or Smoke after 12 midnight! NO WATER after midnight! OK to brush teeth, no gum, candy or mints!    Take only these medicines with a small swallow of water-morning of surgery:  Metoprolol  Carafate    ____  NO Acrylic/fake nails or nail polish worn day of surgery (specifically hand/arm & foot surgeries).  ____  NO powder, lotions, deodorants, oils or creams on body.  ____  Please Remove All jewelry & piercings prior to surgery.  ____  Please Remove Dentures, Hearing Aids & Contact Lens prior to the start of surgery.  ____  Please bring photo ID and insurance information to hospital (Leave Valuables at Home).  ____  If going home the same day, arrange for a ride home. You will not be able to drive 24 hrs if Anesthesia was used.   ____  Wear clean, loose fitting clothing. Allow for dressings, bandages.  ____  Stop all Aspirin products, Ibuprofen, Advil, Motrin & Aleve at least 5-7 days before surgery, unless otherwise instructed by your doctor, or the nurse.   ____  Blood Thinners are stopped based on your Provider's recommendation; Call Surgeon's Office to inquire when to stop/hold.  ____  Stop taking any Fish Oil supplements or Vitamins at least 5 days prior to surgery, unless instructed otherwise by your Doctor.          Diabetic Patients: If you take diabetic medication, do NOT take morning of surgery unless instructed by              Doctor. Metformin to be stopped 24 hrs prior to surgery time. DO NOT take long-acting insulin the evening before surgery. Blood sugars will be checked in pre-op morning of.    Bathing Instructions:    -Do not shave your face or body the day before or the day of surgery.              -Do not shave abdominal area prior to surgery   -Shower & Rinse your body as usual with anti-bacterial Soap (Dial)              [facility] Visitor/Ride Policy:  Only 2 adults allowed (over the age of 18) to accompany you to the Hospital. You Must have a ride home from a responsible adult that you know and trust. Medical Transport, Uber or Lyft can only be used if patient has a responsible adult to accompany them during ride home.    Post-Op Instructions: You will receive Post-op/Discharge instructions by your Discharge Nurse prior to going home. Please call your Surgeon's office with any post-surgery questions/concerns.    *Call [facility] Department with surgery instruction questions @ [phone], [phone] or [phone] (Mon-Fri 8 am to 4 pm)    *If you are running late or have questions the morning of surgery, please call the Surgery Dept @ [phone]  *Insurance/ Financial Questions, please call [phone].

## 2022-11-10 ENCOUNTER — OFFICE VISIT (OUTPATIENT)
Dept: INTERNAL MEDICINE | Facility: CLINIC | Age: 57
End: 2022-11-10
Payer: COMMERCIAL

## 2022-11-10 ENCOUNTER — PATIENT MESSAGE (OUTPATIENT)
Dept: RHEUMATOLOGY | Facility: CLINIC | Age: 57
End: 2022-11-10
Payer: COMMERCIAL

## 2022-11-10 VITALS
SYSTOLIC BLOOD PRESSURE: 130 MMHG | HEIGHT: 78 IN | BODY MASS INDEX: 26.58 KG/M2 | HEART RATE: 74 BPM | OXYGEN SATURATION: 98 % | TEMPERATURE: 97 F | DIASTOLIC BLOOD PRESSURE: 82 MMHG | WEIGHT: 229.75 LBS

## 2022-11-10 DIAGNOSIS — E78.2 MIXED HYPERLIPIDEMIA: Chronic | ICD-10-CM

## 2022-11-10 DIAGNOSIS — I10 ESSENTIAL HYPERTENSION: Chronic | ICD-10-CM

## 2022-11-10 DIAGNOSIS — Z01.818 PRE-OP EXAM: Primary | ICD-10-CM

## 2022-11-10 PROCEDURE — 99214 PR OFFICE/OUTPT VISIT, EST, LEVL IV, 30-39 MIN: ICD-10-PCS | Mod: S$GLB,,, | Performed by: NURSE PRACTITIONER

## 2022-11-10 PROCEDURE — 3079F DIAST BP 80-89 MM HG: CPT | Mod: CPTII,S$GLB,, | Performed by: NURSE PRACTITIONER

## 2022-11-10 PROCEDURE — 3079F PR MOST RECENT DIASTOLIC BLOOD PRESSURE 80-89 MM HG: ICD-10-PCS | Mod: CPTII,S$GLB,, | Performed by: NURSE PRACTITIONER

## 2022-11-10 PROCEDURE — 99999 PR PBB SHADOW E&M-EST. PATIENT-LVL III: CPT | Mod: PBBFAC,,, | Performed by: NURSE PRACTITIONER

## 2022-11-10 PROCEDURE — 3008F BODY MASS INDEX DOCD: CPT | Mod: CPTII,S$GLB,, | Performed by: NURSE PRACTITIONER

## 2022-11-10 PROCEDURE — 99999 PR PBB SHADOW E&M-EST. PATIENT-LVL III: ICD-10-PCS | Mod: PBBFAC,,, | Performed by: NURSE PRACTITIONER

## 2022-11-10 PROCEDURE — 3008F PR BODY MASS INDEX (BMI) DOCUMENTED: ICD-10-PCS | Mod: CPTII,S$GLB,, | Performed by: NURSE PRACTITIONER

## 2022-11-10 PROCEDURE — 3075F SYST BP GE 130 - 139MM HG: CPT | Mod: CPTII,S$GLB,, | Performed by: NURSE PRACTITIONER

## 2022-11-10 PROCEDURE — 99214 OFFICE O/P EST MOD 30 MIN: CPT | Mod: S$GLB,,, | Performed by: NURSE PRACTITIONER

## 2022-11-10 PROCEDURE — 3075F PR MOST RECENT SYSTOLIC BLOOD PRESS GE 130-139MM HG: ICD-10-PCS | Mod: CPTII,S$GLB,, | Performed by: NURSE PRACTITIONER

## 2022-11-10 RX ORDER — METOPROLOL SUCCINATE 50 MG/1
TABLET, EXTENDED RELEASE ORAL
Refills: 0 | OUTPATIENT
Start: 2022-11-10

## 2022-11-10 RX ORDER — METHOTREXATE 2.5 MG/1
TABLET ORAL
Refills: 0 | OUTPATIENT
Start: 2022-11-10

## 2022-11-10 NOTE — H&P (VIEW-ONLY)
Subjective:       Patient ID: Sachin Gonzalez is a 57 y.o. male.    Chief Complaint: pre op     HPI      He will be having R foot surgery per Dr. Ramirez on 11/21    GERD-stable  HTN-stable. 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     Past Surgical History:   Procedure Laterality Date    COLONOSCOPY N/A 4/21/2021    Procedure: COLONOSCOPY covid test scheduled on 4/19/21;  Surgeon: Darrell Worthington MD;  Location: [facility];  Service: Endoscopy;  Laterality: N/A;    CYSTOURETHROSCOPY WITH DIRECT VISION INTERNAL URETHROTOMY N/A 5/31/2022    Procedure: CYSTOSCOPY, WITH DIRECT VISION INTERNAL URETHROTOMY;  Surgeon: Yaniv Murray MD;  Location: [facility];  Service: Urology;  Laterality: N/A;    ESOPHAGOGASTRODUODENOSCOPY      ESOPHAGOGASTRODUODENOSCOPY N/A 4/5/2021    Procedure: EGD (ESOPHAGOGASTRODUODENOSCOPY) Rapid Covid test needed;  Surgeon: Darrell Worthington MD;  Location: [facility];  Service: Endoscopy;  Laterality: N/A;    PROSTATECTOMY  2011     Social History     Socioeconomic History    Marital status: Single   Tobacco Use    Smoking status: Never    Smokeless tobacco: Never   Substance and Sexual Activity    Alcohol use: Never    Drug use: No    Sexual activity: Yes     Partners: Female   Social History Narrative    No pets or smokers in household.     Review of patient's allergies indicates:  No Known Allergies  Current Outpatient Medications   Medication Sig    ascorbic acid, vitamin C, (VITAMIN C) 250 MG tablet Take 250 mg by mouth once daily.    cholecalciferol, vitamin D3, 25 mcg (1,000 unit) Chew Take by mouth.    folic acid (FOLVITE) 1 MG tablet Take 1 tablet (1,000 mcg total) by mouth once daily.    methotrexate 2.5 MG Tab TAKE 8 TABLETS  (20 MG TOTAL) EVERY 7 DAYS, THIS MEDICATION REQUIRES PERIODIC LAB MONITORING (AT LEAST EVERY 3 MONTHS) (Patient taking differently: Take 2.5 mg by mouth Every Friday.)    metoprolol succinate (TOPROL-XL) 50 MG 24 hr tablet Take 1 tablet (50 mg total) by mouth once daily.    multivitamin capsule Take 1 capsule by mouth once daily.    oxyCODONE-acetaminophen (PERCOCET) 5-325 mg per tablet Take 1 tablet by mouth every 4 (four) hours as needed for Pain.    phenazopyridine (PYRIDIUM) 200 MG tablet Take 1 tablet (200 mg total) by mouth 3 (three) times daily as needed for Pain.    sucralfate (CARAFATE) 1 gram tablet Take 1 tablet (1 g total) by mouth 3 (three) times daily as needed (reflux indigestion).    zinc sulfate (ZINC-220 ORAL) Take 1 tablet by mouth as needed.     No current facility-administered medications for this visit.           Review of Systems   Constitutional:  Negative for activity change, appetite change, chills, diaphoresis, fatigue, fever and unexpected weight change.   HENT:  Negative for congestion, ear pain, postnasal drip, rhinorrhea, sinus pressure, sinus pain, sneezing, sore throat, tinnitus, trouble swallowing and voice change.    Eyes:  Negative for photophobia, pain and visual disturbance.   Respiratory:  Negative for cough, chest tightness, shortness of breath and wheezing.    Cardiovascular:  Negative for chest pain, palpitations and leg swelling.   Gastrointestinal:  Negative for abdominal distention, abdominal pain, constipation, diarrhea, nausea and vomiting.   Genitourinary:  Negative for decreased urine volume, difficulty urinating, dysuria, flank pain, frequency, hematuria and urgency.   Musculoskeletal:  Positive for arthralgias and myalgias. Negative for back pain, joint swelling, neck pain and neck stiffness.   Allergic/Immunologic: Negative for immunocompromised state.   Neurological:  Negative for dizziness, tremors, seizures, syncope, facial asymmetry, speech difficulty,  weakness, light-headedness, numbness and headaches.   Hematological:  Negative for adenopathy. Does not bruise/bleed easily.   Psychiatric/Behavioral:  Negative for confusion and sleep disturbance.      Objective:      Physical Exam  Vitals reviewed.   HENT:      Right Ear: Tympanic membrane normal.      Left Ear: Tympanic membrane normal.      Nose: Nose normal.      Mouth/Throat:      Mouth: Mucous membranes are moist.   Cardiovascular:      Rate and Rhythm: Normal rate and regular rhythm.      Pulses: Normal pulses.      Heart sounds: Normal heart sounds.   Pulmonary:      Effort: Pulmonary effort is normal.      Breath sounds: Normal breath sounds.   Musculoskeletal:         General: Normal range of motion.      Cervical back: Normal range of motion and neck supple.   Skin:     General: Skin is warm and dry.   Neurological:      Mental Status: He is alert and oriented to person, place, and time.   Psychiatric:         Mood and Affect: Mood normal.       Assessment:     Vitals:    11/10/22 1352   BP: 130/82   Pulse: 74   Temp: 97.4 °F (36.3 °C)         1. Pre-op exam    2. Essential hypertension    3. Mixed hyperlipidemia        Plan:   Pre-op exam    Essential hypertension    Mixed hyperlipidemia    Pending pre op testing    Addendum  Preop testing reviewed  Pt cleared for surgery with low risk for otoniel-op complications

## 2022-11-10 NOTE — PROGRESS NOTES
Subjective:       Patient ID: Sachin Gonzalez is a 57 y.o. male.    Chief Complaint: pre op     HPI      He will be having R foot surgery per Dr. Ramirez on 11/21    GERD-stable  HTN-stable. 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     Past Surgical History:   Procedure Laterality Date    COLONOSCOPY N/A 4/21/2021    Procedure: COLONOSCOPY covid test scheduled on 4/19/21;  Surgeon: Darrell Worthington MD;  Location: [facility];  Service: Endoscopy;  Laterality: N/A;    CYSTOURETHROSCOPY WITH DIRECT VISION INTERNAL URETHROTOMY N/A 5/31/2022    Procedure: CYSTOSCOPY, WITH DIRECT VISION INTERNAL URETHROTOMY;  Surgeon: Yaniv Murray MD;  Location: [facility];  Service: Urology;  Laterality: N/A;    ESOPHAGOGASTRODUODENOSCOPY      ESOPHAGOGASTRODUODENOSCOPY N/A 4/5/2021    Procedure: EGD (ESOPHAGOGASTRODUODENOSCOPY) Rapid Covid test needed;  Surgeon: Darrell Worthington MD;  Location: [facility];  Service: Endoscopy;  Laterality: N/A;    PROSTATECTOMY  2011     Social History     Socioeconomic History    Marital status: Single   Tobacco Use    Smoking status: Never    Smokeless tobacco: Never   Substance and Sexual Activity    Alcohol use: Never    Drug use: No    Sexual activity: Yes     Partners: Female   Social History Narrative    No pets or smokers in household.     Review of patient's allergies indicates:  No Known Allergies  Current Outpatient Medications   Medication Sig    ascorbic acid, vitamin C, (VITAMIN C) 250 MG tablet Take 250 mg by mouth once daily.    cholecalciferol, vitamin D3, 25 mcg (1,000 unit) Chew Take by mouth.    folic acid (FOLVITE) 1 MG tablet Take 1 tablet (1,000 mcg total) by mouth once daily.    methotrexate 2.5 MG Tab TAKE 8 TABLETS  (20 MG TOTAL) EVERY 7 DAYS, THIS MEDICATION REQUIRES PERIODIC LAB MONITORING (AT LEAST EVERY 3 MONTHS) (Patient taking differently: Take 2.5 mg by mouth Every Friday.)    metoprolol succinate (TOPROL-XL) 50 MG 24 hr tablet Take 1 tablet (50 mg total) by mouth once daily.    multivitamin capsule Take 1 capsule by mouth once daily.    oxyCODONE-acetaminophen (PERCOCET) 5-325 mg per tablet Take 1 tablet by mouth every 4 (four) hours as needed for Pain.    phenazopyridine (PYRIDIUM) 200 MG tablet Take 1 tablet (200 mg total) by mouth 3 (three) times daily as needed for Pain.    sucralfate (CARAFATE) 1 gram tablet Take 1 tablet (1 g total) by mouth 3 (three) times daily as needed (reflux indigestion).    zinc sulfate (ZINC-220 ORAL) Take 1 tablet by mouth as needed.     No current facility-administered medications for this visit.           Review of Systems   Constitutional:  Negative for activity change, appetite change, chills, diaphoresis, fatigue, fever and unexpected weight change.   HENT:  Negative for congestion, ear pain, postnasal drip, rhinorrhea, sinus pressure, sinus pain, sneezing, sore throat, tinnitus, trouble swallowing and voice change.    Eyes:  Negative for photophobia, pain and visual disturbance.   Respiratory:  Negative for cough, chest tightness, shortness of breath and wheezing.    Cardiovascular:  Negative for chest pain, palpitations and leg swelling.   Gastrointestinal:  Negative for abdominal distention, abdominal pain, constipation, diarrhea, nausea and vomiting.   Genitourinary:  Negative for decreased urine volume, difficulty urinating, dysuria, flank pain, frequency, hematuria and urgency.   Musculoskeletal:  Positive for arthralgias and myalgias. Negative for back pain, joint swelling, neck pain and neck stiffness.   Allergic/Immunologic: Negative for immunocompromised state.   Neurological:  Negative for dizziness, tremors, seizures, syncope, facial asymmetry, speech difficulty,  weakness, light-headedness, numbness and headaches.   Hematological:  Negative for adenopathy. Does not bruise/bleed easily.   Psychiatric/Behavioral:  Negative for confusion and sleep disturbance.      Objective:      Physical Exam  Vitals reviewed.   HENT:      Right Ear: Tympanic membrane normal.      Left Ear: Tympanic membrane normal.      Nose: Nose normal.      Mouth/Throat:      Mouth: Mucous membranes are moist.   Cardiovascular:      Rate and Rhythm: Normal rate and regular rhythm.      Pulses: Normal pulses.      Heart sounds: Normal heart sounds.   Pulmonary:      Effort: Pulmonary effort is normal.      Breath sounds: Normal breath sounds.   Musculoskeletal:         General: Normal range of motion.      Cervical back: Normal range of motion and neck supple.   Skin:     General: Skin is warm and dry.   Neurological:      Mental Status: He is alert and oriented to person, place, and time.   Psychiatric:         Mood and Affect: Mood normal.       Assessment:     Vitals:    11/10/22 1352   BP: 130/82   Pulse: 74   Temp: 97.4 °F (36.3 °C)         1. Pre-op exam    2. Essential hypertension    3. Mixed hyperlipidemia        Plan:   Pre-op exam    Essential hypertension    Mixed hyperlipidemia    Pending pre op testing    Addendum  Preop testing reviewed  Pt cleared for surgery with low risk for otoniel-op complications

## 2022-11-14 ENCOUNTER — HOSPITAL ENCOUNTER (OUTPATIENT)
Dept: RADIOLOGY | Facility: HOSPITAL | Age: 57
Discharge: HOME | End: 2022-11-14
Attending: PODIATRIST
Payer: COMMERCIAL

## 2022-11-14 ENCOUNTER — HOSPITAL ENCOUNTER (OUTPATIENT)
Dept: CARDIOLOGY | Facility: HOSPITAL | Age: 57
Discharge: HOME | End: 2022-11-14
Attending: PODIATRIST
Payer: COMMERCIAL

## 2022-11-14 DIAGNOSIS — Z01.818 PREOP TESTING: ICD-10-CM

## 2022-11-14 PROCEDURE — 93010 EKG 12-LEAD: ICD-10-PCS | Mod: ,,, | Performed by: INTERNAL MEDICINE

## 2022-11-14 PROCEDURE — 71046 XR CHEST PA AND LATERAL: ICD-10-PCS | Mod: 26,,, | Performed by: RADIOLOGY

## 2022-11-14 PROCEDURE — 71046 X-RAY EXAM CHEST 2 VIEWS: CPT | Mod: 26,,, | Performed by: RADIOLOGY

## 2022-11-14 PROCEDURE — 71046 X-RAY EXAM CHEST 2 VIEWS: CPT | Mod: TC

## 2022-11-14 PROCEDURE — 93005 ELECTROCARDIOGRAM TRACING: CPT

## 2022-11-14 PROCEDURE — 93010 ELECTROCARDIOGRAM REPORT: CPT | Mod: ,,, | Performed by: INTERNAL MEDICINE

## 2022-11-18 ENCOUNTER — ANESTHESIA EVENT (OUTPATIENT)
Dept: SURGERY | Facility: HOSPITAL | Age: 57
End: 2022-11-18
Payer: COMMERCIAL

## 2022-11-18 ENCOUNTER — TELEPHONE (OUTPATIENT)
Dept: PREADMISSION TESTING | Facility: HOSPITAL | Age: 57
End: 2022-11-18
Payer: COMMERCIAL

## 2022-11-18 NOTE — TELEPHONE ENCOUNTER
Called and unable to LVM, sent message to my chart about the following:     Please arrive to [facility] main lobby at 5:30am on 11/21/22 for your scheduled procedure. NOTHING to eat or drink after midnight unless instructed otherwise by your Surgeon. Take only the medications instructed by your Pre Admit Nurse with small sip of water.

## 2022-11-18 NOTE — ANESTHESIA PREPROCEDURE EVALUATION
11/18/2022  Sachin Gonzalez is a 57 y.o., male    Patient Active Problem List   Diagnosis    History of prostate cancer    RA (rheumatoid arthritis)    Status post prostatectomy (06/07/12)    Erectile dysfunction    Peyronie's disease    Long-term use of immunosuppressant medication    Cephalic vein thrombosis-post op    Essential hypertension    Tension-type headache, not intractable    Lumbar foraminal stenosis    Parotid sialolithiasis    Gastroesophageal reflux disease without esophagitis    Mixed hyperlipidemia    Melena    Screen for colon cancer    Prostate cancer    Gross hematuria    Stricture of bulbous urethra in male     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     Past Surgical History:   Procedure Laterality Date    COLONOSCOPY N/A 4/21/2021    Procedure: COLONOSCOPY covid test scheduled on 4/19/21;  Surgeon: Darrell Worthington MD;  Location: [facility];  Service: Endoscopy;  Laterality: N/A;    CYSTOURETHROSCOPY WITH DIRECT VISION INTERNAL URETHROTOMY N/A 5/31/2022    Procedure: CYSTOSCOPY, WITH DIRECT VISION INTERNAL URETHROTOMY;  Surgeon: Yaniv Murray MD;  Location: [facility];  Service: Urology;  Laterality: N/A;    ESOPHAGOGASTRODUODENOSCOPY      ESOPHAGOGASTRODUODENOSCOPY N/A 4/5/2021    Procedure: EGD (ESOPHAGOGASTRODUODENOSCOPY) Rapid Covid test needed;  Surgeon: Darrell Worthington MD;  Location: [facility];  Service: Endoscopy;  Laterality: N/A;    PROSTATECTOMY  2011       Pre-op Assessment    I have reviewed the Patient Summary Reports.    I have reviewed the NPO Status.   I have reviewed the Medications.     Review of  Systems  Anesthesia Hx:  No problems with previous Anesthesia  History of prior surgery of interest to airway management or planning:  Denies Personal Hx of Anesthesia complications.   Social:  Non-Smoker    Hematology/Oncology:  Hematology Normal   Oncology Normal     EENT/Dental:EENT/Dental Normal   Cardiovascular:   Hypertension ECG has been reviewed. Normal ECHO   Pulmonary:  Pulmonary Normal    Renal/:   Prostatectomy for cancer   Hepatic/GI:   GERD Hepatitis, B    Musculoskeletal:   Arthritis     Neurological:   Headaches    Endocrine:  Endocrine Normal    Dermatological:  Skin Normal    Psych:  Psychiatric Normal           Chemistry        Component Value Date/Time     11/14/2022 1500    K 3.8 11/14/2022 1500     11/14/2022 1500    CO2 26 11/14/2022 1500    BUN 21 (H) 11/14/2022 1500    CREATININE 1.1 11/14/2022 1500    GLU 69 (L) 11/14/2022 1500        Component Value Date/Time    CALCIUM 9.3 11/14/2022 1500    ALKPHOS 72 07/21/2022 1018    AST 21 07/21/2022 1018    ALT 18 07/21/2022 1018    BILITOT 0.8 07/21/2022 1018    ESTGFRAFRICA >60.0 07/21/2022 1018    EGFRNONAA >60.0 07/21/2022 1018        Lab Results   Component Value Date    WBC 4.52 11/14/2022    HGB 13.8 (L) 11/14/2022    HCT 42.6 11/14/2022     (H) 11/14/2022     11/14/2022           Physical Exam  General: Well nourished, Cooperative, Alert and Oriented    Airway:  Mallampati: II   Mouth Opening: Normal  TM Distance: Normal  Tongue: Normal  Neck ROM: Normal ROM    Dental:  Intact  Patient denies any currently loose or chipped teeth; Patient denies any removable dental appliances  Chest/Lungs:  Clear to auscultation, Normal Respiratory Rate        Anesthesia Plan  Type of Anesthesia, risks & benefits discussed:    Anesthesia Type: Gen Supraglottic Airway  Intra-op Monitoring Plan: Standard ASA Monitors  Post Op Pain Control Plan: multimodal analgesia and IV/PO Opioids PRN  Induction:  IV  Informed Consent: Informed  consent signed with the Patient and all parties understand the risks and agree with anesthesia plan.  All questions answered.   ASA Score: 3    Ready For Surgery From Anesthesia Perspective.     .

## 2022-11-18 NOTE — TELEPHONE ENCOUNTER
----- Message from Daniel Ramirez DPM sent at 11/18/2022  3:29 PM CST -----  Regarding: RE: surgery 11/21  He should be aware. We spoke when I moved him to 7AM a few days ago.    ----- Message -----  From: Nicole Marquis RN  Sent: 11/18/2022   3:22 PM CST  To: [facility] Staff  Subject: surgery 11/21                                    Unable to reach Pt to review arrival time for surgery on 11/21.  Arrival time-5:30 am, Surgery time-7:00am    Thank you,    NILTON ivoryt

## 2022-11-21 ENCOUNTER — PATIENT MESSAGE (OUTPATIENT)
Dept: SURGERY | Facility: HOSPITAL | Age: 57
End: 2022-11-21
Payer: COMMERCIAL

## 2022-11-21 ENCOUNTER — HOSPITAL ENCOUNTER (OUTPATIENT)
Facility: HOSPITAL | Age: 57
Discharge: HOME | End: 2022-11-21
Attending: PODIATRIST | Admitting: PODIATRIST
Payer: COMMERCIAL

## 2022-11-21 ENCOUNTER — ANESTHESIA (OUTPATIENT)
Dept: SURGERY | Facility: HOSPITAL | Age: 57
End: 2022-11-21
Payer: COMMERCIAL

## 2022-11-21 ENCOUNTER — NURSE TRIAGE (OUTPATIENT)
Dept: ADMINISTRATIVE | Facility: CLINIC | Age: 57
End: 2022-11-21
Payer: COMMERCIAL

## 2022-11-21 DIAGNOSIS — M20.41 HAMMER TOE OF RIGHT FOOT: ICD-10-CM

## 2022-11-21 PROCEDURE — 71000033 HC RECOVERY, INTIAL HOUR: Performed by: PODIATRIST

## 2022-11-21 PROCEDURE — 25000003 PHARM REV CODE 250: Performed by: STUDENT IN AN ORGANIZED HEALTH CARE EDUCATION/TRAINING PROGRAM

## 2022-11-21 PROCEDURE — 28270 PR CAPSULOTOMY MT-P JT,FOOT,EACH: ICD-10-PCS | Mod: 59,T6,, | Performed by: PODIATRIST

## 2022-11-21 PROCEDURE — 28270 RELEASE OF FOOT CONTRACTURE: CPT | Mod: 59,T6,, | Performed by: PODIATRIST

## 2022-11-21 PROCEDURE — 71000039 HC RECOVERY, EACH ADD'L HOUR: Performed by: PODIATRIST

## 2022-11-21 PROCEDURE — 25000003 PHARM REV CODE 250: Performed by: PODIATRIST

## 2022-11-21 PROCEDURE — 63600175 PHARM REV CODE 636 W HCPCS: Performed by: STUDENT IN AN ORGANIZED HEALTH CARE EDUCATION/TRAINING PROGRAM

## 2022-11-21 PROCEDURE — 36000708 HC OR TIME LEV III 1ST 15 MIN: Performed by: PODIATRIST

## 2022-11-21 PROCEDURE — 27687 PR GASTROCNEMIUS RECESSION: ICD-10-PCS | Mod: 51,RT,, | Performed by: PODIATRIST

## 2022-11-21 PROCEDURE — 36000709 HC OR TIME LEV III EA ADD 15 MIN: Performed by: PODIATRIST

## 2022-11-21 PROCEDURE — 27687 REVISION OF CALF TENDON: CPT | Mod: 51,RT,, | Performed by: PODIATRIST

## 2022-11-21 PROCEDURE — 28285 PR REPAIR OF HAMMERTOE,ONE: ICD-10-PCS | Mod: 51,T7,, | Performed by: PODIATRIST

## 2022-11-21 PROCEDURE — 37000008 HC ANESTHESIA 1ST 15 MINUTES: Performed by: PODIATRIST

## 2022-11-21 PROCEDURE — 28285 REPAIR OF HAMMERTOE: CPT | Mod: T6,,, | Performed by: PODIATRIST

## 2022-11-21 PROCEDURE — C1769 GUIDE WIRE: HCPCS | Performed by: PODIATRIST

## 2022-11-21 PROCEDURE — 37000009 HC ANESTHESIA EA ADD 15 MINS: Performed by: PODIATRIST

## 2022-11-21 PROCEDURE — 71000015 HC POSTOP RECOV 1ST HR: Performed by: PODIATRIST

## 2022-11-21 DEVICE — WIRE C TROCAR TIP .062: Type: IMPLANTABLE DEVICE | Site: FOOT | Status: FUNCTIONAL

## 2022-11-21 RX ORDER — FENTANYL CITRATE 50 UG/ML
INJECTION, SOLUTION INTRAMUSCULAR; INTRAVENOUS
Status: DISCONTINUED | OUTPATIENT
Start: 2022-11-21 | End: 2022-11-21

## 2022-11-21 RX ORDER — GABAPENTIN 100 MG/1
100 CAPSULE ORAL 2 TIMES DAILY
Qty: 60 CAPSULE | Refills: 0 | Status: SHIPPED | OUTPATIENT
Start: 2022-11-21 | End: 2022-12-22 | Stop reason: SDUPTHER

## 2022-11-21 RX ORDER — METHOCARBAMOL 750 MG/1
1500 TABLET, FILM COATED ORAL 3 TIMES DAILY
Qty: 60 TABLET | Refills: 0 | Status: SHIPPED | OUTPATIENT
Start: 2022-11-21 | End: 2022-12-01

## 2022-11-21 RX ORDER — HYDROMORPHONE HYDROCHLORIDE 2 MG/ML
0.2 INJECTION, SOLUTION INTRAMUSCULAR; INTRAVENOUS; SUBCUTANEOUS EVERY 5 MIN PRN
Status: DISCONTINUED | OUTPATIENT
Start: 2022-11-21 | End: 2022-11-21 | Stop reason: HOSPADM

## 2022-11-21 RX ORDER — CEFADROXIL 500 MG/1
500 CAPSULE ORAL EVERY 12 HOURS
Qty: 10 CAPSULE | Refills: 0 | Status: SHIPPED | OUTPATIENT
Start: 2022-11-21 | End: 2022-11-26

## 2022-11-21 RX ORDER — OXYCODONE AND ACETAMINOPHEN 10; 325 MG/1; MG/1
1 TABLET ORAL EVERY 6 HOURS PRN
Qty: 30 TABLET | Refills: 0 | Status: SHIPPED | OUTPATIENT
Start: 2022-11-21 | End: 2022-11-29

## 2022-11-21 RX ORDER — LIDOCAINE HYDROCHLORIDE 10 MG/ML
INJECTION, SOLUTION EPIDURAL; INFILTRATION; INTRACAUDAL; PERINEURAL
Status: DISCONTINUED | OUTPATIENT
Start: 2022-11-21 | End: 2022-11-21

## 2022-11-21 RX ORDER — CLINDAMYCIN PHOSPHATE 900 MG/50ML
900 INJECTION, SOLUTION INTRAVENOUS
Status: COMPLETED | OUTPATIENT
Start: 2022-11-21 | End: 2022-11-21

## 2022-11-21 RX ORDER — ONDANSETRON 2 MG/ML
4 INJECTION INTRAMUSCULAR; INTRAVENOUS DAILY PRN
Status: DISCONTINUED | OUTPATIENT
Start: 2022-11-21 | End: 2022-11-21 | Stop reason: HOSPADM

## 2022-11-21 RX ORDER — MIDAZOLAM HYDROCHLORIDE 1 MG/ML
INJECTION INTRAMUSCULAR; INTRAVENOUS
Status: DISCONTINUED | OUTPATIENT
Start: 2022-11-21 | End: 2022-11-21

## 2022-11-21 RX ORDER — BUPIVACAINE HYDROCHLORIDE 5 MG/ML
INJECTION, SOLUTION EPIDURAL; INTRACAUDAL
Status: DISCONTINUED | OUTPATIENT
Start: 2022-11-21 | End: 2022-11-21 | Stop reason: HOSPADM

## 2022-11-21 RX ORDER — PROPOFOL 10 MG/ML
VIAL (ML) INTRAVENOUS
Status: DISCONTINUED | OUTPATIENT
Start: 2022-11-21 | End: 2022-11-21

## 2022-11-21 RX ORDER — SODIUM CHLORIDE, SODIUM LACTATE, POTASSIUM CHLORIDE, CALCIUM CHLORIDE 600; 310; 30; 20 MG/100ML; MG/100ML; MG/100ML; MG/100ML
INJECTION, SOLUTION INTRAVENOUS CONTINUOUS PRN
Status: DISCONTINUED | OUTPATIENT
Start: 2022-11-21 | End: 2022-11-21

## 2022-11-21 RX ORDER — OXYCODONE AND ACETAMINOPHEN 5; 325 MG/1; MG/1
1 TABLET ORAL
Status: DISCONTINUED | OUTPATIENT
Start: 2022-11-21 | End: 2022-11-21 | Stop reason: HOSPADM

## 2022-11-21 RX ORDER — PHENYLEPHRINE HYDROCHLORIDE 10 MG/ML
INJECTION INTRAVENOUS
Status: DISCONTINUED | OUTPATIENT
Start: 2022-11-21 | End: 2022-11-21

## 2022-11-21 RX ADMIN — HYDROMORPHONE HYDROCHLORIDE 0.2 MG: 2 INJECTION INTRAMUSCULAR; INTRAVENOUS; SUBCUTANEOUS at 10:11

## 2022-11-21 RX ADMIN — MIDAZOLAM HYDROCHLORIDE 2 MG: 1 INJECTION, SOLUTION INTRAMUSCULAR; INTRAVENOUS at 06:11

## 2022-11-21 RX ADMIN — OXYCODONE HYDROCHLORIDE AND ACETAMINOPHEN 1 TABLET: 5; 325 TABLET ORAL at 11:11

## 2022-11-21 RX ADMIN — CLINDAMYCIN PHOSPHATE 900 MG: 18 INJECTION, SOLUTION INTRAVENOUS at 07:11

## 2022-11-21 RX ADMIN — LIDOCAINE HYDROCHLORIDE 100 MG: 10 INJECTION, SOLUTION EPIDURAL; INFILTRATION; INTRACAUDAL; PERINEURAL at 07:11

## 2022-11-21 RX ADMIN — PHENYLEPHRINE HYDROCHLORIDE 100 MCG: 10 INJECTION INTRAVENOUS at 07:11

## 2022-11-21 RX ADMIN — HYDROMORPHONE HYDROCHLORIDE 0.2 MG: 2 INJECTION INTRAMUSCULAR; INTRAVENOUS; SUBCUTANEOUS at 11:11

## 2022-11-21 RX ADMIN — PROPOFOL 150 MG: 10 INJECTION, EMULSION INTRAVENOUS at 07:11

## 2022-11-21 RX ADMIN — FENTANYL CITRATE 50 MCG: 50 INJECTION, SOLUTION INTRAMUSCULAR; INTRAVENOUS at 08:11

## 2022-11-21 RX ADMIN — FENTANYL CITRATE 25 MCG: 50 INJECTION, SOLUTION INTRAMUSCULAR; INTRAVENOUS at 07:11

## 2022-11-21 RX ADMIN — SODIUM CHLORIDE, SODIUM LACTATE, POTASSIUM CHLORIDE, AND CALCIUM CHLORIDE: 600; 310; 30; 20 INJECTION, SOLUTION INTRAVENOUS at 06:11

## 2022-11-21 RX ADMIN — PROPOFOL 50 MG: 10 INJECTION, EMULSION INTRAVENOUS at 07:11

## 2022-11-21 NOTE — TRANSFER OF CARE
"Anesthesia Transfer of Care Note    Patient: Sachin Gonzalez    Procedure(s) Performed: Procedure(s) (LRB):  RELEASE, CONTRACTURE, JOINT (Right)  FUSION, JOINT, TOE (Right)  OSTEOTOMY, METATARSAL BONE (Right)  RESECTION, MUSCLE, GASTROCNEMIUS (Right)    Patient location: PACU    Anesthesia Type: general    Transport from OR: Transported from OR on room air with adequate spontaneous ventilation    Post pain: adequate analgesia    Post assessment: no apparent anesthetic complications and tolerated procedure well    Post vital signs: stable    Level of consciousness: awake, responds to stimulation and alert    Nausea/Vomiting: no nausea/vomiting    Complications: none    Transfer of care protocol was followed      Last vitals:   Visit Vitals  /84 (BP Location: Right arm, Patient Position: Sitting)   Pulse 75   Temp 36.6 °C (97.9 °F) (Temporal)   Resp 20   Ht 6' 7" (2.007 m)   Wt 105.2 kg (231 lb 14.8 oz)   SpO2 98%   BMI 26.13 kg/m²     "

## 2022-11-21 NOTE — ANESTHESIA PROCEDURE NOTES
Intubation    Date/Time: 11/21/2022 7:03 AM  Performed by: Arnol Tapia CRNA  Authorized by: Samuel Rene MD     Intubation:     Induction:  Intravenous    Intubated:  Postinduction    Mask Ventilation:  Easy mask    Attempts:  1    Attempted By:  CRNA    Method of Intubation:  Blind intubation    Difficult Airway Encountered?: No      Complications:  None    Airway Device Size:  4.0    Style/Cuff Inflation:  Cuffed (inflated to minimal occlusive pressure)    Secured at:  The lips    Placement Verified By:  Capnometry    Complicating Factors:  None    Findings Post-Intubation:  BS equal bilateral and atraumatic/condition of teeth unchanged

## 2022-11-21 NOTE — BRIEF OP NOTE
[facility] - Surgery (Hospital)  Brief Operative Note    Surgery Date: 11/21/2022     Surgeon(s) and Role:     * Daniel Ramirez DPM - Primary    Assisting Surgeon: None    Pre-op Diagnosis:  Metatarsalgia, right foot [M77.41]  Hammer toe of right foot [M20.41]  Bursitis of right foot [M77.51]  Contracture, right ankle [M24.571]    Post-op Diagnosis:  Post-Op Diagnosis Codes:     * Metatarsalgia, right foot [M77.41]     * Hammer toe of right foot [M20.41]     * Bursitis of right foot [M77.51]     * Contracture, right ankle [M24.571]    Procedure(s) (LRB):  RELEASE, CONTRACTURE, JOINT (Right)  FUSION, JOINT, TOE (Right)  OSTEOTOMY, METATARSAL BONE (Right)  RESECTION, MUSCLE, GASTROCNEMIUS (Right)    Anesthesia: General    Operative Findings: As per Dx.     Estimated Blood Loss: * No values recorded between 11/21/2022  7:28 AM and 11/21/2022  9:24 AM *         Specimens:   Specimen (24h ago, onward)      None              Discharge Note    OUTCOME: Patient tolerated treatment/procedure well without complication and is now ready for discharge.    DISPOSITION: Home or Self Care    FINAL DIAGNOSIS:      * Metatarsalgia, right foot [M77.41]     * Hammer toe of right foot [M20.41]     * Bursitis of right foot [M77.51]     * Contracture, right ankle [M24.571]    FOLLOWUP: In clinic    DISCHARGE INSTRUCTIONS:  Weightbearing Status and Wound care:  1. When walking, wear the surgical shoe or walking boot at all times.    2. During daytime/awake hours, if a CAST or POSTERIOR SPLINT is in place, ice the posterior aspect of the leg, behind the knee, 20 minutes on (w/ ice) and followed by 20 minutes off (w/o ice) until follow up; repeat accordingly until follow up. IF no CAST or POSTERIOR SPLINT is in place, ice the anterior aspect of the ankle, in a similar manner. During night time/sleep hours, simply elevating the limb above the level of the heart is sufficient.     3. Elevate the extremity above the level of the heart at all  times when sitting.    4. Take the pain mediations as prescribed for the initial 3 or so days, then only as needed.    5. If no overt medical contra-indication, take Motrin or Advil or Ibuprofen or Aleve in between the pain medication doses.    7. Do not change the dressings.    8. Do not get the dressings wet.     9. Please be reminded of the harmful effects of nicotine/tobacco/cigarette/cigar/marijuana smoking, especially in relation to the lower extremity, particularly in reference to post operative healing. I do rec. complete or near complete cessation of any or all of the aforementioned until you are well out of the post-operative period.    10. If you were prescribed more than one short acting opioid, e.g., Dilaudid with Percocet or Norco or Tramadol, the Dilaudid (Hydromorphone) is to be taken during the immediate initial days s/p, at an interval of every 6 hours or 5 hours or 4 hours, as needed for pain control. Once you are complete with the Dilaudid (Hydromorphone), you may/should convert to the secondary medication. DO NOT take both medications together at any time. It is not advisable to start with the less potent medication, Percocet or Norco or Tramadol, and then convert to the stronger medication. If you do start with the less potent medication, this should be the opioid therapy/drug of choice without ever converting back to the Dilaudid. You or your family member or friend should monitor for over sedation and/or respiratory depression while medicating.

## 2022-11-21 NOTE — OP NOTE
[facility]  Podiatric Medicine & Surgery  Operative Report    SUMMARY     Date of Procedure: 11/21/2022    Procedure: Procedure(s):  RELEASE, CONTRACTURE, JOINT  FUSION, JOINT, TOE  OSTEOTOMY, METATARSAL BONE  RESECTION, MUSCLE, GASTROCNEMIUS    Surgeon(s) and Role: Surgeon(s) and Role:     * Daniel Ramirez DPM - Primary    Pre-Operative Diagnosis: Pre-Op Diagnosis Codes:     * Metatarsalgia, right foot [M77.41]     * Hammer toe of right foot [M20.41]     * Bursitis of right foot [M77.51]     * Contracture, right ankle [M24.571]    Post-Operative Diagnosis: Post-Op Diagnosis Codes:     * Metatarsalgia, right foot [M77.41]     * Hammer toe of right foot [M20.41]     * Bursitis of right foot [M77.51]     * Contracture, right ankle [M24.571]    Anesthesia: General    Technical Procedures Used:   1. Fusion, PIPJ and DIPJ, RLE 2nd toe.   2. Fusion, PIPJ and DIPJ, RLE 3rd toe.   3. Fusion, PIPJ and DIPJ, RLE 4th toe.   4. Capsulotomy; MTPJ, w/ or w/o Tenorrhaphy, RLE 2nd.   5. Capsulotomy; MTPJ, w/ or w/o Tenorrhaphy, RLE 3rd.   6. Capsulotomy; MTPJ, w/ or w/o Tenorrhaphy, RLE 4th.   7. Gastroc Recession, RLE.     Description of the Findings of the Procedure: The patient was seen in the Holding Room. The risks, benefits, complications, treatment options, and expected outcomes were discussed with the patient. The risks and potential complications of their problem and purposed treatment include but are not limited to infection, nerve injury, vascular injury, nonunion/malunion/delayed union of the surgical site, persistent pain, potential skin necrosis, deep vein thrombosis, possible pulmonary embolus, complications of the anesthetics and failure of the implant.  The patient concurred with the proposed plan, giving informed consent. The patient is aware that the procedure may be a part of a staged collection of procedures for definitive cure and/or alleviation of symptoms. The site of surgery  properly noted/marked. Preoperative intravenous antibiotics are hanging at bedside, and are currently being administered via the heparin lock. The patient was taken to Operating Suite.    Once in the operative suite, the patient is transferred onto the operative table in the supine position. A TIME-OUT is taken as per protocol to identify the proper patient, procedure to be performed, and laterality.  The patient is properly positioned on the operating room table for ease of dissection and for any ancillary imaging.  A well-padded tourniquet was applied to the right thigh.  Nursing and ancillary OR staff prepared the patient for the procedure. The patient is adequately sedated by the Attending Anesthesiologist and/or covering CRNA. Next, the operative limb was rendered sterile using chlorhexidine paint and scrub.  Sterile sheets and drapes were applied thereafter. Another TIME-OUT is taken as per protocol to identify the proper patient, procedure to be performed, and laterality.      The tourniquet is elevated to 350mmHg after the limb is exsanguinated for approximately 2 min with an Esmarch bandage.    Following this, sharp skin incision at the dorsal aspect of the RLE 2nd, 3rd and 4th digit/ray/MTPJ with a scalpel blade. Deep dissection with a large tenotomy scissor. The PIPJ and the DIPJ are entered with a scalpel blade.  The medial and lateral collateral ligaments were severed. The extensor tendon is removed from the dorsal surface of the head of the proximal interphalangeal joint and is retracted proximally.  The extensor ornelas/sling apparatus is also severed from its medial and lateral attachments to the base of proximal phalanx, and is retracted proximally.  Copious irrigation with sterile saline solution.  The head of the proximal phalanx is then transected with a sagittal saw as is the head of the middle phalanx. The base of the middle phalanx and the distal phalanx was then transected in similar fashion.      A Kelikian push up test is then performed, and is determined that further corrective measures would be necessitated due to persistence of contracture(s).     Following this, capsulotomy about the RLE 2nd, 3rd and 4th MTPJ with a scalpel blade. Capsulotomy of the medial, dorsal, lateral capsule as well as collateral ligaments for correction of digital contracture at the metatarsophalangeal joint. Copious lavage with sterile saline solution. EDL z-lengthening is performed. An EDB tenotomy is performed.  Following this a another Kelikian push up test is performed and no further correction is needed.    At this point an arthrodesis of the PIPJ and DIPJ was performed with a 0.045/0.062 k-wire. The tendon is repaired across the proximal interphalangeal joint using Monocryl suture. The subcutaneous/subcuticular closure along the incision length with Monocryl Vicryl suture. The skin is closed using Nylon.     Following this, a 2cm incision is made approximately one thumbs breath posterior to the medial crest of the tibia, at approximately 2in proximal to the terminal portion of the medial head of the gastrocnemius muscle.  Subcuticular dissection using a sharp curved Metzenbaum scissor.  Finger dissection at this level removing further soft tissues and copious adipose tissue.  Following this, retraction with Army-Nesquehoning retractors for deep exposure.  The deep fascia of the leg is encountered and is incised in line with the skin incision.  Finger dissection to find the gastrocnemius fascia.  The fascia is identify and a large, wide, vaginal speculum is inserted into this interval, from medial to lateral.  A Sawyer elevator is used to inspect for and identify the sural nerve, if possible.  The neurovascular structures were retracted out of the way with the assistance of the vaginal speculum.  A long handle sharp #15 blade was used to incise the gastroc aponeurosis/fascia from lateral to medial, with simultaneous dorsiflexion  "of the foot the metatarsophalangeal joint. After adequate resection of this interval, the dorsiflexion of the foot is improved as compared to preoperatively.  Copious irrigation of this wound using sterile saline solution.  Closure of the deep fascia of the leg using Vicryl suture. Fat and subcutaneous closure using Vicryl suture. Skin closure using staples.    The tourniquet is deflated and CFT is WNL to the 2nd-4th toes. No manipulation of k-wires is needed.     Post-op block is given. All incisions are dressed with Xeroform/Adaptic nonadherent dressings followed by sterile 4 x 4 gauze, abdominal pad, Ila/Kerlix and light ACE. Leal Compression is applied w/ a posterior splint.     The anesthesia is weaned. There were no complications to this procedure. Any final necessary imaging to be performed in the PACU if it was not performed here in the OR suite.  The patient is transferred to the [facility] bed/stretcher, [facility]. The patient is transferred to the PACU.    Significant Surgical Tasks Conducted by the Assistant(s), if Applicable: N/A    Complications: * No complications entered in OR log *    Estimated Blood Loss (EBL): Minimal    Drains: N/A    Implants:   Implant Name Type Inv. Item Serial No.  Lot No. LRB No. Used Action   C-Wire .062" Kimberly     [id number] Right 2 Implanted   C-Wire .062" Kimberly     [id number] Right 1 Implanted       Specimens: * No specimens in log *    Condition: stable    Disposition: PACU - hemodynamically stable.    Attestation: I performed the procedure.            "

## 2022-11-21 NOTE — INTERVAL H&P NOTE
"The patient has been examined and the H&P has been reviewed:    Patient seen recently for pre-op clearance by NP; Note labeled as "progress" but appears to be near complete H&P less full ROS - patient denies any new SOB, SOB, fever, cough, infection, LE swelling, etc.; lungs CTAB, cardiac exam benign with RRR.       Anesthesia risks, benefits and alternative options discussed and understood by patient/family.          There are no hospital problems to display for this patient.    "

## 2022-11-22 NOTE — TELEPHONE ENCOUNTER
Hammer toe procedure today with Dr. Ramirez. Pain severe, pain meds aren't helping. Taking percocet 10mg every 6 hours. Pain remains 10/10. Last dose approx 90 min ago.     S/w Dr. Guzman: Elevate and take ibuprofen in between doses. Go to ED fore uncontrolled pain. Updated pt     Also wants to know if okay to take normal meds. Informed caller okay to resume other meds. Encouraged to take other new meds (robaxin, gabapentin) he was prescribed.    Reviewed discharge instructions from [facility].     2. During daytime/awake hours, if a CAST or POSTERIOR SPLINT is in place, ice the posterior aspect of the leg, behind  the knee, 20 minutes on (w/ ice) and followed by 20 minutes off (w/o ice) until follow up; repeat accordingly until follow  up. IF no CAST or POSTERIOR SPLINT is in place, ice the anterior aspect of the ankle, in a similar manner. During night  time/sleep hours, simply elevating the limb above the level of the heart is sufficient.  3. Elevate the extremity above the level of the heart at all times when sitting.  4. Take the pain mediations as prescribed for the initial 3 or so days, then only as needed.  5. If no overt medical contra-indication, take Motrin or Advil or Ibuprofen or Aleve in between the pain medication  doses.  7. Do not change the dressings.  8. Do not get the dressings wet.  Reason for Disposition   [1] SEVERE post-op pain (e.g., excruciating, pain scale 8-10) AND [2] not controlled with pain medications    Additional Information   Negative: Sounds like a life-threatening emergency to the triager   Negative: [1] Widespread rash AND [2] bright red, sunburn-like   Negative: [1] SEVERE headache AND [2] after spinal (epidural) anesthesia   Negative: [1] Vomiting AND [2] persists > 4 hours   Negative: [1] Vomiting AND [2] abdomen looks much more swollen than usual   Negative: [1] Drinking very little AND [2] dehydration suspected (e.g., no urine > 12 hours, very dry mouth, very lightheaded)    Negative: Patient sounds very sick or weak to the triager   Negative: Sounds like a serious complication to the triager   Negative: Fever > 100.4 F (38.0 C)    Protocols used: Post-Op Symptoms and [id number]-XG

## 2022-11-23 ENCOUNTER — TELEPHONE (OUTPATIENT)
Dept: PODIATRY | Facility: CLINIC | Age: 57
End: 2022-11-23
Payer: COMMERCIAL

## 2022-11-23 VITALS
DIASTOLIC BLOOD PRESSURE: 84 MMHG | TEMPERATURE: 98 F | HEART RATE: 64 BPM | OXYGEN SATURATION: 99 % | SYSTOLIC BLOOD PRESSURE: 135 MMHG | WEIGHT: 231.94 LBS | HEIGHT: 78 IN | RESPIRATION RATE: 12 BRPM | BODY MASS INDEX: 26.84 KG/M2

## 2022-11-23 DIAGNOSIS — I10 HYPERTENSION, UNSPECIFIED TYPE: ICD-10-CM

## 2022-11-23 DIAGNOSIS — G25.0 ESSENTIAL TREMOR: ICD-10-CM

## 2022-11-23 RX ORDER — METOPROLOL SUCCINATE 50 MG/1
50 TABLET, EXTENDED RELEASE ORAL DAILY
Qty: 90 TABLET | Refills: 3 | Status: SHIPPED | OUTPATIENT
Start: 2022-11-23

## 2022-11-23 NOTE — ANESTHESIA POSTPROCEDURE EVALUATION
Anesthesia Post Evaluation    Patient: Sachin Gonzalez    Procedure(s) Performed: Procedure(s) (LRB):  RELEASE, CONTRACTURE, JOINT (Right)  FUSION, JOINT, TOE (Right)  OSTEOTOMY, METATARSAL BONE (Right)  RESECTION, MUSCLE, GASTROCNEMIUS (Right)    Final Anesthesia Type: general      Patient location during evaluation: PACU  Patient participation: Yes- Able to Participate  Level of consciousness: awake and alert and oriented  Post-procedure vital signs: reviewed and stable  Pain management: adequate  Airway patency: patent  DANELLE mitigation strategies: Verification of full reversal of neuromuscular block  PONV status at discharge: No PONV  Anesthetic complications: no      Cardiovascular status: blood pressure returned to baseline and hemodynamically stable  Respiratory status: unassisted  Hydration status: euvolemic  Follow-up not needed.          Vitals Value Taken Time   /84 11/21/22 1145   Temp 36.6 °C (97.9 °F) 11/21/22 0925   Pulse 63 11/21/22 1147   Resp 12 11/21/22 1147   SpO2 98 % 11/21/22 1146   Vitals shown include unvalidated device data.      Event Time   Out of Recovery 11:59:09         Pain/Deonte Score: No data recorded

## 2022-11-23 NOTE — TELEPHONE ENCOUNTER
----- Message from Mimi Powers sent at 11/23/2022  3:02 PM CST -----  .Type:  RX Refill Request    Who Called: .Sachin Gonzalez   Refill or New Rx:Refill  RX Name and Strength:metoprolol succinate (TOPROL-XL) 50 MG 24 hr tablet and methotrexate 2.5 MG Tab  How is the patient currently taking it? (ex. 1XDay):daily  Is this a 30 day or 90 day RX:90Preferred Pharmacy with phone number:.    [facility] - [address] [address]  Phone: [phone] Fax: [phone]     Local or Mail Order: mail order  Ordering Provider:Dr. Greenberg  Would the patient rather a call back or a response via [facility]? Call back  Best Call Back Number:.[phone]   Additional Information: Pt is out of the medication

## 2022-11-23 NOTE — TELEPHONE ENCOUNTER
No new care gaps identified.  [facility] Embedded Care Gaps. Reference number: [id number]. 11/23/2022   3:33:07 PM CST

## 2022-11-24 DIAGNOSIS — Z98.890 POST-OPERATIVE STATE: Primary | ICD-10-CM

## 2022-11-24 DIAGNOSIS — M79.671 PAIN IN RIGHT FOOT: ICD-10-CM

## 2022-11-24 RX ORDER — HYDROMORPHONE HYDROCHLORIDE 4 MG/1
4 TABLET ORAL EVERY 4 HOURS PRN
Qty: 18 TABLET | Refills: 0 | Status: SHIPPED | OUTPATIENT
Start: 2022-11-24 | End: 2022-11-28

## 2022-12-02 ENCOUNTER — LAB VISIT (OUTPATIENT)
Dept: LAB | Facility: HOSPITAL | Age: 57
End: 2022-12-02
Attending: INTERNAL MEDICINE
Payer: COMMERCIAL

## 2022-12-02 ENCOUNTER — OFFICE VISIT (OUTPATIENT)
Dept: PODIATRY | Facility: CLINIC | Age: 57
End: 2022-12-02
Payer: COMMERCIAL

## 2022-12-02 ENCOUNTER — PATIENT MESSAGE (OUTPATIENT)
Dept: PODIATRY | Facility: CLINIC | Age: 57
End: 2022-12-02

## 2022-12-02 VITALS
HEART RATE: 99 BPM | WEIGHT: 231.94 LBS | SYSTOLIC BLOOD PRESSURE: 122 MMHG | BODY MASS INDEX: 26.84 KG/M2 | DIASTOLIC BLOOD PRESSURE: 83 MMHG | HEIGHT: 78 IN

## 2022-12-02 DIAGNOSIS — M05.79 RHEUMATOID ARTHRITIS INVOLVING MULTIPLE SITES WITH POSITIVE RHEUMATOID FACTOR: ICD-10-CM

## 2022-12-02 DIAGNOSIS — M20.41 HAMMER TOE OF RIGHT FOOT: ICD-10-CM

## 2022-12-02 DIAGNOSIS — Z09 POSTOP CHECK: Primary | ICD-10-CM

## 2022-12-02 DIAGNOSIS — M24.571 CONTRACTURE, RIGHT ANKLE: ICD-10-CM

## 2022-12-02 LAB
ALBUMIN SERPL BCP-MCNC: 3.4 G/DL (ref 3.5–5.2)
ALP SERPL-CCNC: 78 U/L (ref 55–135)
ALT SERPL W/O P-5'-P-CCNC: 21 U/L (ref 10–44)
ANION GAP SERPL CALC-SCNC: 11 MMOL/L (ref 8–16)
AST SERPL-CCNC: 27 U/L (ref 10–40)
BILIRUB SERPL-MCNC: 0.3 MG/DL (ref 0.1–1)
BUN SERPL-MCNC: 18 MG/DL (ref 6–20)
CALCIUM SERPL-MCNC: 9.6 MG/DL (ref 8.7–10.5)
CHLORIDE SERPL-SCNC: 101 MMOL/L (ref 95–110)
CO2 SERPL-SCNC: 24 MMOL/L (ref 23–29)
CREAT SERPL-MCNC: 1 MG/DL (ref 0.5–1.4)
CRP SERPL-MCNC: 9 MG/L (ref 0–8.2)
ERYTHROCYTE [SEDIMENTATION RATE] IN BLOOD BY WESTERGREN METHOD: 22 MM/HR (ref 0–10)
EST. GFR  (NO RACE VARIABLE): >60 ML/MIN/1.73 M^2
GLUCOSE SERPL-MCNC: 77 MG/DL (ref 70–110)
POTASSIUM SERPL-SCNC: 4.4 MMOL/L (ref 3.5–5.1)
PROT SERPL-MCNC: 7.5 G/DL (ref 6–8.4)
SODIUM SERPL-SCNC: 136 MMOL/L (ref 136–145)

## 2022-12-02 PROCEDURE — 99999 PR PBB SHADOW E&M-EST. PATIENT-LVL IV: CPT | Mod: PBBFAC,,, | Performed by: PODIATRIST

## 2022-12-02 PROCEDURE — 3079F PR MOST RECENT DIASTOLIC BLOOD PRESSURE 80-89 MM HG: ICD-10-PCS | Mod: CPTII,S$GLB,, | Performed by: PODIATRIST

## 2022-12-02 PROCEDURE — 85025 COMPLETE CBC W/AUTO DIFF WBC: CPT | Performed by: INTERNAL MEDICINE

## 2022-12-02 PROCEDURE — 1159F MED LIST DOCD IN RCRD: CPT | Mod: CPTII,S$GLB,, | Performed by: PODIATRIST

## 2022-12-02 PROCEDURE — 99999 PR PBB SHADOW E&M-EST. PATIENT-LVL IV: ICD-10-PCS | Mod: PBBFAC,,, | Performed by: PODIATRIST

## 2022-12-02 PROCEDURE — 99024 PR POST-OP FOLLOW-UP VISIT: ICD-10-PCS | Mod: S$GLB,,, | Performed by: PODIATRIST

## 2022-12-02 PROCEDURE — 99024 POSTOP FOLLOW-UP VISIT: CPT | Mod: S$GLB,,, | Performed by: PODIATRIST

## 2022-12-02 PROCEDURE — 80053 COMPREHEN METABOLIC PANEL: CPT | Performed by: INTERNAL MEDICINE

## 2022-12-02 PROCEDURE — 1159F PR MEDICATION LIST DOCUMENTED IN MEDICAL RECORD: ICD-10-PCS | Mod: CPTII,S$GLB,, | Performed by: PODIATRIST

## 2022-12-02 PROCEDURE — 36415 COLL VENOUS BLD VENIPUNCTURE: CPT | Performed by: INTERNAL MEDICINE

## 2022-12-02 PROCEDURE — 3079F DIAST BP 80-89 MM HG: CPT | Mod: CPTII,S$GLB,, | Performed by: PODIATRIST

## 2022-12-02 PROCEDURE — 1160F RVW MEDS BY RX/DR IN RCRD: CPT | Mod: CPTII,S$GLB,, | Performed by: PODIATRIST

## 2022-12-02 PROCEDURE — 3074F SYST BP LT 130 MM HG: CPT | Mod: CPTII,S$GLB,, | Performed by: PODIATRIST

## 2022-12-02 PROCEDURE — 86140 C-REACTIVE PROTEIN: CPT | Performed by: INTERNAL MEDICINE

## 2022-12-02 PROCEDURE — 3074F PR MOST RECENT SYSTOLIC BLOOD PRESSURE < 130 MM HG: ICD-10-PCS | Mod: CPTII,S$GLB,, | Performed by: PODIATRIST

## 2022-12-02 PROCEDURE — 3008F BODY MASS INDEX DOCD: CPT | Mod: CPTII,S$GLB,, | Performed by: PODIATRIST

## 2022-12-02 PROCEDURE — 85651 RBC SED RATE NONAUTOMATED: CPT | Performed by: INTERNAL MEDICINE

## 2022-12-02 PROCEDURE — 3008F PR BODY MASS INDEX (BMI) DOCUMENTED: ICD-10-PCS | Mod: CPTII,S$GLB,, | Performed by: PODIATRIST

## 2022-12-02 PROCEDURE — 1160F PR REVIEW ALL MEDS BY PRESCRIBER/CLIN PHARMACIST DOCUMENTED: ICD-10-PCS | Mod: CPTII,S$GLB,, | Performed by: PODIATRIST

## 2022-12-02 RX ORDER — OXYCODONE AND ACETAMINOPHEN 10; 325 MG/1; MG/1
1 TABLET ORAL EVERY 6 HOURS PRN
Qty: 28 TABLET | Refills: 0 | Status: SHIPPED | OUTPATIENT
Start: 2022-12-02 | End: 2022-12-09

## 2022-12-02 RX ORDER — METHOTREXATE 2.5 MG/1
TABLET ORAL
Qty: 120 TABLET | Refills: 3 | OUTPATIENT
Start: 2022-12-02

## 2022-12-02 NOTE — TELEPHONE ENCOUNTER
----- Message from Bob Patrick sent at 12/2/2022  9:01 AM CST -----  Contact: [phone]  Type:  RX Refill Request    Who Called: Thelander   Refill or New Rx:refill   RX Name and Strength:methotrexate 2.5 MG Tab  How is the patient currently taking it? (ex. 1XDay):1 a week   Is this a 30 day or 90 day RX:  Preferred Pharmacy with phone number:  [facility] [address] [address]  Phone: [phone] Fax: [phone]  Local or Mail Order:Mail   Ordering Provider:Dr Jackson  Would the patient rather a call back or a response via [facility]? Call back/Fidelis   Best Call Back Number:[phone]   Additional Information:

## 2022-12-03 ENCOUNTER — PATIENT MESSAGE (OUTPATIENT)
Dept: RHEUMATOLOGY | Facility: CLINIC | Age: 57
End: 2022-12-03
Payer: COMMERCIAL

## 2022-12-03 DIAGNOSIS — M05.79 RHEUMATOID ARTHRITIS INVOLVING MULTIPLE SITES WITH POSITIVE RHEUMATOID FACTOR: Primary | ICD-10-CM

## 2022-12-03 LAB
BASOPHILS # BLD AUTO: 0.05 K/UL (ref 0–0.2)
BASOPHILS NFR BLD: 0.8 % (ref 0–1.9)
DIFFERENTIAL METHOD: ABNORMAL
EOSINOPHIL # BLD AUTO: 0.1 K/UL (ref 0–0.5)
EOSINOPHIL NFR BLD: 2.2 % (ref 0–8)
ERYTHROCYTE [DISTWIDTH] IN BLOOD BY AUTOMATED COUNT: 13.2 % (ref 11.5–14.5)
HCT VFR BLD AUTO: 42.9 % (ref 40–54)
HGB BLD-MCNC: 13.8 G/DL (ref 14–18)
IMM GRANULOCYTES # BLD AUTO: 0.02 K/UL (ref 0–0.04)
IMM GRANULOCYTES NFR BLD AUTO: 0.3 % (ref 0–0.5)
LYMPHOCYTES # BLD AUTO: 1 K/UL (ref 1–4.8)
LYMPHOCYTES NFR BLD: 15.5 % (ref 18–48)
MCH RBC QN AUTO: 32.3 PG (ref 27–31)
MCHC RBC AUTO-ENTMCNC: 32.2 G/DL (ref 32–36)
MCV RBC AUTO: 101 FL (ref 82–98)
MONOCYTES # BLD AUTO: 0.7 K/UL (ref 0.3–1)
MONOCYTES NFR BLD: 10.4 % (ref 4–15)
NEUTROPHILS # BLD AUTO: 4.5 K/UL (ref 1.8–7.7)
NEUTROPHILS NFR BLD: 70.8 % (ref 38–73)
NRBC BLD-RTO: 0 /100 WBC
PLATELET # BLD AUTO: 313 K/UL (ref 150–450)
PMV BLD AUTO: 9.9 FL (ref 9.2–12.9)
RBC # BLD AUTO: 4.27 M/UL (ref 4.6–6.2)
WBC # BLD AUTO: 6.34 K/UL (ref 3.9–12.7)

## 2022-12-03 RX ORDER — METHOTREXATE 2.5 MG/1
10 TABLET ORAL
Qty: 60 TABLET | Refills: 1 | Status: SHIPPED | OUTPATIENT
Start: 2022-12-03 | End: 2023-06-06 | Stop reason: SDUPTHER

## 2022-12-03 NOTE — PROGRESS NOTES
Subjective:       Patient ID: Sachin Gonzalez is a 57 y.o. male.    Chief Complaint: Post-op Evaluation (11/21/222 RELEASE, CONTRACTURE, JOINT. 9/10 PAIN. Non diabetic PCP: Dr. Greenberg last seen 11/10/22 with Dr. Beltrán )      HPI:  Sachin Gonzalez presents to the office today, s/p 11/21/22 RLE hammertoe repair, 2nd through 4th.  Patient states moderate to severe pains.  Weight-bearing with tall walking boot.  Requests opioid refill.  Does not have home health.  States icing to the posterior knee.    Hemoglobin A1C   Date Value Ref Range Status   04/12/2017 5.7 4.5 - 6.2 % Final     Comment:     According to ADA guidelines, hemoglobin A1C <7.0% represents  optimal control in non-pregnant diabetic patients.  Different  metrics may apply to specific populations.   Standards of Medical Care in Diabetes - 2016.  For the purpose of screening for the presence of diabetes:  <5.7%     Consistent with the absence of diabetes  5.7-6.4%  Consistent with increasing risk for diabetes   (prediabetes)  >or=6.5%  Consistent with diabetes  Currently no consensus exists for use of hemoglobin A1C  for diagnosis of diabetes for children.         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       Family History   Problem Relation Age of Onset    Stroke Father     Alcohol abuse Father     Arthritis Mother     Hypertension Mother     Anxiety disorder Mother     No Known Problems Sister     Kidney disease Brother        Social History     Socioeconomic History    Marital status: Single   Tobacco Use    Smoking status: Never    Smokeless tobacco: Never   Substance and Sexual Activity    Alcohol use: Never    Drug use: No    Sexual activity: Yes     Partners:  Female   Social History Narrative    No pets or smokers in household.       Past Surgical History:   Procedure Laterality Date    COLONOSCOPY N/A 4/21/2021    Procedure: COLONOSCOPY covid test scheduled on 4/19/21;  Surgeon: Darrell Worthington MD;  Location: [facility];  Service: Endoscopy;  Laterality: N/A;    CYSTOURETHROSCOPY WITH DIRECT VISION INTERNAL URETHROTOMY N/A 5/31/2022    Procedure: CYSTOSCOPY, WITH DIRECT VISION INTERNAL URETHROTOMY;  Surgeon: Yaniv Murray MD;  Location: [facility];  Service: Urology;  Laterality: N/A;    ESOPHAGOGASTRODUODENOSCOPY      ESOPHAGOGASTRODUODENOSCOPY N/A 4/5/2021    Procedure: EGD (ESOPHAGOGASTRODUODENOSCOPY) Rapid Covid test needed;  Surgeon: Darrell Worthington MD;  Location: [facility];  Service: Endoscopy;  Laterality: N/A;    OSTEOTOMY OF METATARSAL BONE Right 11/21/2022    Procedure: OSTEOTOMY, METATARSAL BONE;  Surgeon: Daniel Ramirez DPM;  Location: [facility];  Service: Podiatry;  Laterality: Right;    PROSTATECTOMY  2011    RELEASE OF CONTRACTURE OF JOINT Right 11/21/2022    Procedure: RELEASE, CONTRACTURE, JOINT;  Surgeon: Daniel Ramirez DPM;  [facility] OR;  Service: Podiatry;  Laterality: Right;    RESECTION OF GASTROCNEMIUS MUSCLE Right 11/21/2022    Procedure: RESECTION, MUSCLE, GASTROCNEMIUS;  Surgeon: Daniel Ramirez DPM;  Location: [facility];  Service: Podiatry;  Laterality: Right;       Review of Systems   Constitutional:  Negative for chills, fatigue and fever.   HENT:  Negative for hearing loss.    Eyes:  Negative for photophobia and visual disturbance.   Respiratory:  Negative for cough, chest tightness, shortness of breath and wheezing.    Cardiovascular:  Negative for chest pain and palpitations.   Gastrointestinal:  Negative for constipation, diarrhea, nausea and vomiting.   Endocrine: Negative for cold intolerance and heat intolerance.   Genitourinary:  Negative for flank pain.   Musculoskeletal:  Negative for neck pain and neck stiffness.  "  Neurological:  Negative for light-headedness and headaches.   Psychiatric/Behavioral:  Negative for sleep disturbance.         Objective:   /83   Pulse 99   Ht 6' 7" (2.007 m)   Wt 105.2 kg (231 lb 14.8 oz)   BMI 26.13 kg/m²          Physical Exam  LOWER EXTREMITY PHYSICAL EXAMINATION    NEUROLOGY: Proprioception is intact. Sensation to light touch is intact. Shonna-incisional sensation is intact.    DERMATOLOGY:  No evidence of wound complications to the gastrocnemius recession site, 3rd, or 4th toes.  Slight maceration with drainage noted, left 2nd digit, dorsal medial aspect.  No overt wound dehiscence noted.    ORTHOPEDIC:  Minimal edema, right 2nd through 4th toes.  No pain to palpation, gastrocnemius recession site.  Range of motion about the ankle joint is noted.        Assessment:     1. Postop check    2. Hammer toe of right foot    3. Contracture, right ankle          Plan:     Postop check  -     oxyCODONE-acetaminophen (PERCOCET)  mg per tablet; Take 1 tablet by mouth every 6 (six) hours as needed for Pain.  Dispense: 28 tablet; Refill: 0  -     Ambulatory referral/consult to Home Health; Future; Expected date: 12/03/2022    Hammer toe of right foot  -     oxyCODONE-acetaminophen (PERCOCET)  mg per tablet; Take 1 tablet by mouth every 6 (six) hours as needed for Pain.  Dispense: 28 tablet; Refill: 0  -     Ambulatory referral/consult to Home Health; Future; Expected date: 12/03/2022    Contracture, right ankle  -     oxyCODONE-acetaminophen (PERCOCET)  mg per tablet; Take 1 tablet by mouth every 6 (six) hours as needed for Pain.  Dispense: 28 tablet; Refill: 0  -     Ambulatory referral/consult to Home Health; Future; Expected date: 12/03/2022      Thorough discussion is had with the patient today, concerning the diagnosis, its etiology, and the treatment algorithm at present.    XRAYS are reviewed in detail with the patient. All questions and concerns regarding findings and " its/their implications are outlined and discussed.    Local wound care to the right 2nd digit with calcium alginate.    We will start [facility].     Continue weight-bearing as tolerated with tall CAM Walker.    RICE therapy ATC.    Start in home PT/OT.    FMLA to be complete.          Future Appointments   Date Time Provider Department Center   12/5/2022  9:50 AM LABORATORY, [facility] HGV LAB [facility]   12/8/2022  2:45 PM Yaniv Murray MD [facility] UROLOGY [facility]   12/15/2022  1:45 PM Daniel Ramirez DPM [facility]   1/13/2023  9:30 AM [facility] US2 [facility] ULSOUND [facility]   1/13/2023 10:30 AM LABORATORY, [facility] HG LAB [facility]   1/23/2023  3:00 PM Janell Bradley MD [facility] HEPAT [facility]

## 2022-12-05 ENCOUNTER — PATIENT MESSAGE (OUTPATIENT)
Dept: PODIATRY | Facility: CLINIC | Age: 57
End: 2022-12-05
Payer: COMMERCIAL

## 2022-12-06 ENCOUNTER — PATIENT MESSAGE (OUTPATIENT)
Dept: PODIATRY | Facility: CLINIC | Age: 57
End: 2022-12-06
Payer: COMMERCIAL

## 2022-12-06 PROCEDURE — G0180 PR HOME HEALTH MD CERTIFICATION: ICD-10-PCS | Mod: ,,, | Performed by: PODIATRIST

## 2022-12-06 PROCEDURE — G0180 MD CERTIFICATION HHA PATIENT: HCPCS | Mod: ,,, | Performed by: PODIATRIST

## 2022-12-09 ENCOUNTER — PATIENT MESSAGE (OUTPATIENT)
Dept: PODIATRY | Facility: CLINIC | Age: 57
End: 2022-12-09
Payer: COMMERCIAL

## 2022-12-09 ENCOUNTER — TELEPHONE (OUTPATIENT)
Dept: PODIATRY | Facility: CLINIC | Age: 57
End: 2022-12-09
Payer: COMMERCIAL

## 2022-12-13 ENCOUNTER — PATIENT MESSAGE (OUTPATIENT)
Dept: RHEUMATOLOGY | Facility: CLINIC | Age: 57
End: 2022-12-13
Payer: COMMERCIAL

## 2022-12-15 ENCOUNTER — PATIENT MESSAGE (OUTPATIENT)
Dept: PODIATRY | Facility: CLINIC | Age: 57
End: 2022-12-15

## 2022-12-15 ENCOUNTER — OFFICE VISIT (OUTPATIENT)
Dept: PODIATRY | Facility: CLINIC | Age: 57
End: 2022-12-15
Payer: COMMERCIAL

## 2022-12-15 VITALS — HEIGHT: 78 IN | WEIGHT: 231.94 LBS | BODY MASS INDEX: 26.84 KG/M2

## 2022-12-15 DIAGNOSIS — M20.41 HAMMER TOE OF RIGHT FOOT: ICD-10-CM

## 2022-12-15 DIAGNOSIS — T81.89XA DELAYED SURGICAL WOUND HEALING, INITIAL ENCOUNTER: ICD-10-CM

## 2022-12-15 DIAGNOSIS — M79.671 PAIN IN RIGHT FOOT: Primary | ICD-10-CM

## 2022-12-15 DIAGNOSIS — M20.11 HAV (HALLUX ABDUCTO VALGUS), RIGHT: ICD-10-CM

## 2022-12-15 DIAGNOSIS — M79.671 PAIN IN RIGHT FOOT: ICD-10-CM

## 2022-12-15 DIAGNOSIS — T81.31XD SURGICAL WOUND DEHISCENCE, SUBSEQUENT ENCOUNTER: Primary | ICD-10-CM

## 2022-12-15 DIAGNOSIS — Z09 POSTOP CHECK: Primary | ICD-10-CM

## 2022-12-15 DIAGNOSIS — M24.571 CONTRACTURE, RIGHT ANKLE: ICD-10-CM

## 2022-12-15 PROCEDURE — 1159F PR MEDICATION LIST DOCUMENTED IN MEDICAL RECORD: ICD-10-PCS | Mod: CPTII,S$GLB,, | Performed by: PODIATRIST

## 2022-12-15 PROCEDURE — 3008F PR BODY MASS INDEX (BMI) DOCUMENTED: ICD-10-PCS | Mod: CPTII,S$GLB,, | Performed by: PODIATRIST

## 2022-12-15 PROCEDURE — 1160F PR REVIEW ALL MEDS BY PRESCRIBER/CLIN PHARMACIST DOCUMENTED: ICD-10-PCS | Mod: CPTII,S$GLB,, | Performed by: PODIATRIST

## 2022-12-15 PROCEDURE — 1160F RVW MEDS BY RX/DR IN RCRD: CPT | Mod: CPTII,S$GLB,, | Performed by: PODIATRIST

## 2022-12-15 PROCEDURE — 87186 SC STD MICRODIL/AGAR DIL: CPT | Performed by: PODIATRIST

## 2022-12-15 PROCEDURE — 3008F BODY MASS INDEX DOCD: CPT | Mod: CPTII,S$GLB,, | Performed by: PODIATRIST

## 2022-12-15 PROCEDURE — 87077 CULTURE AEROBIC IDENTIFY: CPT | Performed by: PODIATRIST

## 2022-12-15 PROCEDURE — 1159F MED LIST DOCD IN RCRD: CPT | Mod: CPTII,S$GLB,, | Performed by: PODIATRIST

## 2022-12-15 PROCEDURE — 99999 PR PBB SHADOW E&M-EST. PATIENT-LVL III: CPT | Mod: PBBFAC,,, | Performed by: PODIATRIST

## 2022-12-15 PROCEDURE — 99024 POSTOP FOLLOW-UP VISIT: CPT | Mod: S$GLB,,, | Performed by: PODIATRIST

## 2022-12-15 PROCEDURE — 87070 CULTURE OTHR SPECIMN AEROBIC: CPT | Performed by: PODIATRIST

## 2022-12-15 PROCEDURE — 99024 PR POST-OP FOLLOW-UP VISIT: ICD-10-PCS | Mod: S$GLB,,, | Performed by: PODIATRIST

## 2022-12-15 PROCEDURE — 99999 PR PBB SHADOW E&M-EST. PATIENT-LVL III: ICD-10-PCS | Mod: PBBFAC,,, | Performed by: PODIATRIST

## 2022-12-15 RX ORDER — SULFAMETHOXAZOLE AND TRIMETHOPRIM 800; 160 MG/1; MG/1
1 TABLET ORAL 2 TIMES DAILY
Qty: 14 TABLET | Refills: 0 | Status: SHIPPED | OUTPATIENT
Start: 2022-12-15 | End: 2022-12-22

## 2022-12-15 NOTE — PROGRESS NOTES
Subjective:       Patient ID: Sachin Gonzalez is a 57 y.o. male.    Chief Complaint: Post-op Evaluation (Right ft hammer toe Non Diabetic Pain: 7/10 PCP: KEYONA Greenberg Jr., MD last seen 11/12/2022 )      HPI:  Sachin Gonzalez presents to the office today, s/p 11/21/22 RLE hammertoe repair, 2nd through 4th.  Patient states mild pains. Weight-bearing with tall walking boot. Does not have home health.     Hemoglobin A1C   Date Value Ref Range Status   04/12/2017 5.7 4.5 - 6.2 % Final     Comment:     According to ADA guidelines, hemoglobin A1C <7.0% represents  optimal control in non-pregnant diabetic patients.  Different  metrics may apply to specific populations.   Standards of Medical Care in Diabetes - 2016.  For the purpose of screening for the presence of diabetes:  <5.7%     Consistent with the absence of diabetes  5.7-6.4%  Consistent with increasing risk for diabetes   (prediabetes)  >or=6.5%  Consistent with diabetes  Currently no consensus exists for use of hemoglobin A1C  for diagnosis of diabetes for children.         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       Family History   Problem Relation Age of Onset    Stroke Father     Alcohol abuse Father     Arthritis Mother     Hypertension Mother     Anxiety disorder Mother     No Known Problems Sister     Kidney disease Brother        Social History     Socioeconomic History    Marital status: Single   Tobacco Use    Smoking status: Never    Smokeless tobacco: Never   Substance and Sexual Activity    Alcohol use: Never    Drug use: No    Sexual activity: Yes     Partners: Female   Social History Narrative    No pets or smokers in household.       Past Surgical History:    Procedure Laterality Date    COLONOSCOPY N/A 4/21/2021    Procedure: COLONOSCOPY covid test scheduled on 4/19/21;  Surgeon: Darrell Worthington MD;  Location: [facility];  Service: Endoscopy;  Laterality: N/A;    CYSTOURETHROSCOPY WITH DIRECT VISION INTERNAL URETHROTOMY N/A 5/31/2022    Procedure: CYSTOSCOPY, WITH DIRECT VISION INTERNAL URETHROTOMY;  Surgeon: Yaniv Murray MD;  Location: [facility] OR;  Service: Urology;  Laterality: N/A;    ESOPHAGOGASTRODUODENOSCOPY      ESOPHAGOGASTRODUODENOSCOPY N/A 4/5/2021    Procedure: EGD (ESOPHAGOGASTRODUODENOSCOPY) Rapid Covid test needed;  Surgeon: Darrell Worthington MD;  Location: [facility];  Service: Endoscopy;  Laterality: N/A;    OSTEOTOMY OF METATARSAL BONE Right 11/21/2022    Procedure: OSTEOTOMY, METATARSAL BONE;  Surgeon: Daniel Ramirez DPM;  Location: [facility] OR;  Service: Podiatry;  Laterality: Right;    PROSTATECTOMY  2011    RELEASE OF CONTRACTURE OF JOINT Right 11/21/2022    Procedure: RELEASE, CONTRACTURE, JOINT;  Surgeon: Daniel Ramirez DPM;  Location: [facility] OR;  Service: Podiatry;  Laterality: Right;    RESECTION OF GASTROCNEMIUS MUSCLE Right 11/21/2022    Procedure: RESECTION, MUSCLE, GASTROCNEMIUS;  Surgeon: Daniel Ramirez DPM;  Location: [facility] OR;  Service: Podiatry;  Laterality: Right;       Review of Systems   Constitutional:  Negative for chills, fatigue and fever.   HENT:  Negative for hearing loss.    Eyes:  Negative for photophobia and visual disturbance.   Respiratory:  Negative for cough, chest tightness, shortness of breath and wheezing.    Cardiovascular:  Negative for chest pain and palpitations.   Gastrointestinal:  Negative for constipation, diarrhea, nausea and vomiting.   Endocrine: Negative for cold intolerance and heat intolerance.   Genitourinary:  Negative for flank pain.   Musculoskeletal:  Negative for neck pain and neck stiffness.   Neurological:  Negative for light-headedness and headaches.   Psychiatric/Behavioral:  Negative for  "sleep disturbance.         Objective:   Ht 6' 7" (2.007 m)   Wt 105.2 kg (231 lb 14.8 oz)   BMI 26.13 kg/m²          Physical Exam  LOWER EXTREMITY PHYSICAL EXAMINATION  NEUROLOGY: Proprioception is intact. Sensation to light touch is intact. Shonna-incisional sensation is intact.    DERMATOLOGY:  No evidence of wound complications to the gastrocnemius recession site, 3rd, or 4th toes. The wound at the 2nd toe is improved. There is no overt dehiscence noted. No bone is noted.    ORTHOPEDIC: No edema, right 2nd through 4th toes. No pain to palpation, gastrocnemius recession site. Range of motion about the ankle joint is noted. HAV is noted on the RLE, moderate at present, unmasked by correction of HT. There is slight abutment at the medial 2nd digit.    Assessment:     1. Postop check    2. Hammer toe of right foot    3. Pain in right foot    4. Contracture, right ankle    5. Delayed surgical wound healing, initial encounter    6. Hav (hallux abducto valgus), right            Plan:     Postop check  -     X-Ray Foot Complete Right; Future; Expected date: 12/15/2022    Hammer toe of right foot    Pain in right foot  -     sulfamethoxazole-trimethoprim 800-160mg (BACTRIM DS) 800-160 mg Tab; Take 1 tablet by mouth 2 (two) times daily. for 7 days  Dispense: 14 tablet; Refill: 0    Contracture, right ankle    Delayed surgical wound healing, initial encounter  -     SUBSEQUENT HOME HEALTH ORDERS  -     Aerobic culture (Specify Source)  -     sulfamethoxazole-trimethoprim 800-160mg (BACTRIM DS) 800-160 mg Tab; Take 1 tablet by mouth 2 (two) times daily. for 7 days  Dispense: 14 tablet; Refill: 0    Hav (hallux abducto valgus), right      Thorough discussion is had with the patient today, concerning the diagnosis, its etiology, and the treatment algorithm at present.    Local wound care to the right 2nd digit with collagen.    Continue [facility] with collagen and Hydrogel to the digit.     Home Health should splint " the 1st digit in neutral to slightly varus as to off-load the 2nd toe.    RICE therapy.    FMLA to be revised for starting on 1/9/23.    May need 1st ray procedure if persistent wound to the medial 2nd toe.             Future Appointments   Date Time Provider Department Center   12/20/2022  2:00 PM Jenn Jackson MD [facility] [address]   1/3/2023 12:30 PM [facility] XR1-DR [facility] XRAY [facility]   1/3/2023  1:00 PM Daniel Ramirez DPM [facility]   1/13/2023  9:30 AM [facility] US2 [facility] ULSOUND [facility]   1/13/2023 10:30 AM LABORATORY, [facility] HG LAB [facility]   1/23/2023  3:00 PM Janell Bradley MD [facility] HEPAT [facility]

## 2022-12-16 ENCOUNTER — PATIENT MESSAGE (OUTPATIENT)
Dept: PODIATRY | Facility: CLINIC | Age: 57
End: 2022-12-16
Payer: COMMERCIAL

## 2022-12-16 DIAGNOSIS — M79.671 PAIN IN RIGHT FOOT: Primary | ICD-10-CM

## 2022-12-16 DIAGNOSIS — Z09 POSTOP CHECK: ICD-10-CM

## 2022-12-16 RX ORDER — OXYCODONE AND ACETAMINOPHEN 10; 325 MG/1; MG/1
1 TABLET ORAL EVERY 6 HOURS PRN
Qty: 28 TABLET | Refills: 0 | Status: SHIPPED | OUTPATIENT
Start: 2022-12-16 | End: 2022-12-22

## 2022-12-20 LAB — BACTERIA SPEC AEROBE CULT: ABNORMAL

## 2022-12-22 ENCOUNTER — OFFICE VISIT (OUTPATIENT)
Dept: PODIATRY | Facility: CLINIC | Age: 57
End: 2022-12-22
Payer: COMMERCIAL

## 2022-12-22 ENCOUNTER — TELEPHONE (OUTPATIENT)
Dept: ADMINISTRATIVE | Facility: CLINIC | Age: 57
End: 2022-12-22
Payer: COMMERCIAL

## 2022-12-22 VITALS — WEIGHT: 231.94 LBS | BODY MASS INDEX: 26.84 KG/M2 | HEIGHT: 78 IN

## 2022-12-22 DIAGNOSIS — M62.81 MUSCLE WEAKNESS (GENERALIZED): Primary | ICD-10-CM

## 2022-12-22 DIAGNOSIS — Z09 POSTOP CHECK: Primary | ICD-10-CM

## 2022-12-22 DIAGNOSIS — M20.41 HAMMER TOE OF RIGHT FOOT: ICD-10-CM

## 2022-12-22 DIAGNOSIS — T81.31XD SURGICAL WOUND DEHISCENCE, SUBSEQUENT ENCOUNTER: ICD-10-CM

## 2022-12-22 DIAGNOSIS — M79.671 PAIN IN RIGHT FOOT: ICD-10-CM

## 2022-12-22 DIAGNOSIS — M24.571 CONTRACTURE, RIGHT ANKLE: ICD-10-CM

## 2022-12-22 PROCEDURE — 1160F RVW MEDS BY RX/DR IN RCRD: CPT | Mod: CPTII,S$GLB,, | Performed by: PODIATRIST

## 2022-12-22 PROCEDURE — 99999 PR PBB SHADOW E&M-EST. PATIENT-LVL III: ICD-10-PCS | Mod: PBBFAC,,, | Performed by: PODIATRIST

## 2022-12-22 PROCEDURE — 99999 PR PBB SHADOW E&M-EST. PATIENT-LVL III: CPT | Mod: PBBFAC,,, | Performed by: PODIATRIST

## 2022-12-22 PROCEDURE — 3008F BODY MASS INDEX DOCD: CPT | Mod: CPTII,S$GLB,, | Performed by: PODIATRIST

## 2022-12-22 PROCEDURE — 1159F MED LIST DOCD IN RCRD: CPT | Mod: CPTII,S$GLB,, | Performed by: PODIATRIST

## 2022-12-22 PROCEDURE — 3008F PR BODY MASS INDEX (BMI) DOCUMENTED: ICD-10-PCS | Mod: CPTII,S$GLB,, | Performed by: PODIATRIST

## 2022-12-22 PROCEDURE — 1160F PR REVIEW ALL MEDS BY PRESCRIBER/CLIN PHARMACIST DOCUMENTED: ICD-10-PCS | Mod: CPTII,S$GLB,, | Performed by: PODIATRIST

## 2022-12-22 PROCEDURE — 99024 POSTOP FOLLOW-UP VISIT: CPT | Mod: S$GLB,,, | Performed by: PODIATRIST

## 2022-12-22 PROCEDURE — 99024 PR POST-OP FOLLOW-UP VISIT: ICD-10-PCS | Mod: S$GLB,,, | Performed by: PODIATRIST

## 2022-12-22 PROCEDURE — 1159F PR MEDICATION LIST DOCUMENTED IN MEDICAL RECORD: ICD-10-PCS | Mod: CPTII,S$GLB,, | Performed by: PODIATRIST

## 2022-12-22 RX ORDER — GABAPENTIN 100 MG/1
100 CAPSULE ORAL 3 TIMES DAILY
Qty: 90 CAPSULE | Refills: 0 | Status: SHIPPED | OUTPATIENT
Start: 2022-12-22 | End: 2022-12-29

## 2022-12-22 RX ORDER — HYDROCODONE BITARTRATE AND ACETAMINOPHEN 10; 325 MG/1; MG/1
1 TABLET ORAL EVERY 6 HOURS PRN
Qty: 28 TABLET | Refills: 0 | Status: SHIPPED | OUTPATIENT
Start: 2022-12-22 | End: 2022-12-29

## 2022-12-22 NOTE — PROGRESS NOTES
Subjective:       Patient ID: Sachin Gonzalez is a 57 y.o. male.    Chief Complaint: Post-op Evaluation (11/21/222 RELEASE, CONTRACTURE, JOINT. 4/10 PAIN. Non diabetic PCP: Dr. Greenberg last seen 11/10/22 with Dr. Beltrán)      HPI:  Sachin Gonzalez presents to the office today, s/p 11/21/22 RLE hammertoe repair, 2nd through 4th.  Patient states moderate pains. Weight-bearing with tall walking boot. Does not have home health. Is concerned for possible infection as Wound C&S showed bacteria.     Hemoglobin A1C   Date Value Ref Range Status   04/12/2017 5.7 4.5 - 6.2 % Final     Comment:     According to ADA guidelines, hemoglobin A1C <7.0% represents  optimal control in non-pregnant diabetic patients.  Different  metrics may apply to specific populations.   Standards of Medical Care in Diabetes - 2016.  For the purpose of screening for the presence of diabetes:  <5.7%     Consistent with the absence of diabetes  5.7-6.4%  Consistent with increasing risk for diabetes   (prediabetes)  >or=6.5%  Consistent with diabetes  Currently no consensus exists for use of hemoglobin A1C  for diagnosis of diabetes for children.         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       Family History   Problem Relation Age of Onset    Stroke Father     Alcohol abuse Father     Arthritis Mother     Hypertension Mother     Anxiety disorder Mother     No Known Problems Sister     Kidney disease Brother        Social History     Socioeconomic History    Marital status: Single   Tobacco Use    Smoking status: Never    Smokeless tobacco: Never   Substance and Sexual Activity    Alcohol use: Never    Drug use: No    Sexual activity: Yes     Partners: Female    Social History Narrative    No pets or smokers in household.       Past Surgical History:   Procedure Laterality Date    COLONOSCOPY N/A 4/21/2021    Procedure: COLONOSCOPY covid test scheduled on 4/19/21;  Surgeon: Darrell Worthington MD;  Location: [facility];  Service: Endoscopy;  Laterality: N/A;    CYSTOURETHROSCOPY WITH DIRECT VISION INTERNAL URETHROTOMY N/A 5/31/2022    Procedure: CYSTOSCOPY, WITH DIRECT VISION INTERNAL URETHROTOMY;  Surgeon: Yaniv Murray MD;  Location: [facility];  Service: Urology;  Laterality: N/A;    ESOPHAGOGASTRODUODENOSCOPY      ESOPHAGOGASTRODUODENOSCOPY N/A 4/5/2021    Procedure: EGD (ESOPHAGOGASTRODUODENOSCOPY) Rapid Covid test needed;  Surgeon: Darrell Worthington MD;  Location: [facility];  Service: Endoscopy;  Laterality: N/A;    OSTEOTOMY OF METATARSAL BONE Right 11/21/2022    Procedure: OSTEOTOMY, METATARSAL BONE;  Surgeon: Daniel Ramirez DPM;  Location: [facility];  Service: Podiatry;  Laterality: Right;    PROSTATECTOMY  2011    RELEASE OF CONTRACTURE OF JOINT Right 11/21/2022    Procedure: RELEASE, CONTRACTURE, JOINT;  Surgeon: Daniel Ramirez DPM;  Location: [facility] OR;  Service: Podiatry;  Laterality: Right;    RESECTION OF GASTROCNEMIUS MUSCLE Right 11/21/2022    Procedure: RESECTION, MUSCLE, GASTROCNEMIUS;  Surgeon: Daniel Ramirez DPM;  Location: [facility];  Service: Podiatry;  Laterality: Right;       Review of Systems   Constitutional:  Negative for chills, fatigue and fever.   HENT:  Negative for hearing loss.    Eyes:  Negative for photophobia and visual disturbance.   Respiratory:  Negative for cough, chest tightness, shortness of breath and wheezing.    Cardiovascular:  Negative for chest pain and palpitations.   Gastrointestinal:  Negative for constipation, diarrhea, nausea and vomiting.   Endocrine: Negative for cold intolerance and heat intolerance.   Genitourinary:  Negative for flank pain.   Musculoskeletal:  Negative for neck pain and neck stiffness.  "  Neurological:  Negative for light-headedness and headaches.   Psychiatric/Behavioral:  Negative for sleep disturbance.         Objective:   Ht 6' 7" (2.007 m)   Wt 105.2 kg (231 lb 14.8 oz)   BMI 26.13 kg/m²          Physical Exam  LOWER EXTREMITY PHYSICAL EXAMINATION  NEUROLOGY: Proprioception is intact. Sensation to light touch is intact. Shonna-incisional sensation is intact.    DERMATOLOGY: No evidence of wound complications to the gastrocnemius recession site, 3rd, or 4th toes. The wound at the 2nd toe is improved and is nearly healed and resolved. No infection is noted.    ORTHOPEDIC: No edema, right 2nd through 4th toes. No pain to palpation, gastrocnemius recession site. Range of motion about the ankle joint is noted. HAV is noted on the RLE, moderate at present, unmasked by correction of HT. There is slight abutment at the medial 2nd digit.    Assessment:     1. Postop check    2. Pain in right foot    3. Hammer toe of right foot    4. Contracture, right ankle    5. Surgical wound dehiscence, subsequent encounter            Plan:     Postop check  -     gabapentin (NEURONTIN) 100 MG capsule; Take 1 capsule (100 mg total) by mouth 3 (three) times daily.  Dispense: 90 capsule; Refill: 0  -     HYDROcodone-acetaminophen (NORCO)  mg per tablet; Take 1 tablet by mouth every 6 (six) hours as needed for Pain.  Dispense: 28 tablet; Refill: 0    Pain in right foot  -     gabapentin (NEURONTIN) 100 MG capsule; Take 1 capsule (100 mg total) by mouth 3 (three) times daily.  Dispense: 90 capsule; Refill: 0  -     HYDROcodone-acetaminophen (NORCO)  mg per tablet; Take 1 tablet by mouth every 6 (six) hours as needed for Pain.  Dispense: 28 tablet; Refill: 0    Hammer toe of right foot    Contracture, right ankle    Surgical wound dehiscence, subsequent encounter  -     SUBSEQUENT HOME HEALTH ORDERS      Thorough discussion is had with the patient today, concerning the diagnosis, its etiology, and the " treatment algorithm at present.    The wound was surgically debrided after adequate prep with alcohol and/or betadine paint. Excisional wound debridement was performed using sharp #10/#15 blade/rounded scalpel and tissue nipper, with removal of all non-viable skin and soft tissues; necrotic skin/tissue formation. The woundbase/wound bed was also debrided to encourage bleeding as to promote/stimulate healing. Debridement was excisional and included epidermal, dermal and subcutaneous tissues. Post debridement measurements are as above. Hemostasis was achieved. Patient tolerated procedure well and without complications. Local woundcare with collagen dressings and bandage thereafter. Patient will change the collagen dressings Q3 - Q4 days in standard fashion.   Local wound care to the right 2nd digit with collagen.    Continue [facility] with collagen and Hydrogel to the digit.     RICE therapy.    FMLA to be revised for starting on 1/9/23.    Continue PO Abx.         Future Appointments   Date Time Provider Department Center   1/3/2023 12:30 PM [facility] XR1- [facility] XRAY [facility]   1/3/2023  1:00 PM Daniel Ramirez DPM [facility] POD [facility]   1/13/2023  9:30 AM [facility] US2 [facility] ULSOUND [facility]   1/13/2023 10:30 AM LABORATORY, [facility] HG LAB [facility]   1/23/2023  3:00 PM [facility]

## 2022-12-29 ENCOUNTER — PATIENT MESSAGE (OUTPATIENT)
Dept: PODIATRY | Facility: CLINIC | Age: 57
End: 2022-12-29
Payer: COMMERCIAL

## 2022-12-29 DIAGNOSIS — M79.671 PAIN IN RIGHT FOOT: ICD-10-CM

## 2022-12-29 DIAGNOSIS — T81.31XD SURGICAL WOUND DEHISCENCE, SUBSEQUENT ENCOUNTER: Primary | ICD-10-CM

## 2022-12-29 RX ORDER — CIPROFLOXACIN 750 MG/1
750 TABLET, FILM COATED ORAL EVERY 12 HOURS
Qty: 14 TABLET | Refills: 0 | Status: SHIPPED | OUTPATIENT
Start: 2022-12-29 | End: 2023-01-05

## 2022-12-29 RX ORDER — GABAPENTIN 300 MG/1
300 CAPSULE ORAL 2 TIMES DAILY
Qty: 60 CAPSULE | Refills: 1 | Status: SHIPPED | OUTPATIENT
Start: 2022-12-29 | End: 2023-06-06

## 2023-01-03 ENCOUNTER — OFFICE VISIT (OUTPATIENT)
Dept: PODIATRY | Facility: CLINIC | Age: 58
End: 2023-01-03
Payer: COMMERCIAL

## 2023-01-03 ENCOUNTER — HOSPITAL ENCOUNTER (OUTPATIENT)
Dept: RADIOLOGY | Facility: HOSPITAL | Age: 58
Discharge: HOME | End: 2023-01-03
Attending: PODIATRIST
Payer: COMMERCIAL

## 2023-01-03 ENCOUNTER — PATIENT MESSAGE (OUTPATIENT)
Dept: PODIATRY | Facility: CLINIC | Age: 58
End: 2023-01-03

## 2023-01-03 DIAGNOSIS — Z09 POSTOP CHECK: ICD-10-CM

## 2023-01-03 DIAGNOSIS — M79.671 PAIN IN RIGHT FOOT: ICD-10-CM

## 2023-01-03 DIAGNOSIS — T81.31XD SURGICAL WOUND DEHISCENCE, SUBSEQUENT ENCOUNTER: ICD-10-CM

## 2023-01-03 DIAGNOSIS — M20.41 HAMMER TOE OF RIGHT FOOT: Primary | ICD-10-CM

## 2023-01-03 PROCEDURE — 99999 PR PBB SHADOW E&M-EST. PATIENT-LVL II: CPT | Mod: PBBFAC,,, | Performed by: PODIATRIST

## 2023-01-03 PROCEDURE — 73630 XR FOOT COMPLETE 3 VIEW RIGHT: ICD-10-PCS | Mod: 26,RT,, | Performed by: STUDENT IN AN ORGANIZED HEALTH CARE EDUCATION/TRAINING PROGRAM

## 2023-01-03 PROCEDURE — 87070 CULTURE OTHR SPECIMN AEROBIC: CPT | Performed by: PODIATRIST

## 2023-01-03 PROCEDURE — 99024 POSTOP FOLLOW-UP VISIT: CPT | Mod: S$GLB,,, | Performed by: PODIATRIST

## 2023-01-03 PROCEDURE — 99999 PR PBB SHADOW E&M-EST. PATIENT-LVL II: ICD-10-PCS | Mod: PBBFAC,,, | Performed by: PODIATRIST

## 2023-01-03 PROCEDURE — 99024 PR POST-OP FOLLOW-UP VISIT: ICD-10-PCS | Mod: S$GLB,,, | Performed by: PODIATRIST

## 2023-01-03 PROCEDURE — 1159F PR MEDICATION LIST DOCUMENTED IN MEDICAL RECORD: ICD-10-PCS | Mod: CPTII,S$GLB,, | Performed by: PODIATRIST

## 2023-01-03 PROCEDURE — 1159F MED LIST DOCD IN RCRD: CPT | Mod: CPTII,S$GLB,, | Performed by: PODIATRIST

## 2023-01-03 PROCEDURE — 73630 X-RAY EXAM OF FOOT: CPT | Mod: 26,RT,, | Performed by: STUDENT IN AN ORGANIZED HEALTH CARE EDUCATION/TRAINING PROGRAM

## 2023-01-03 PROCEDURE — 73630 X-RAY EXAM OF FOOT: CPT | Mod: TC,RT

## 2023-01-03 PROCEDURE — 1160F PR REVIEW ALL MEDS BY PRESCRIBER/CLIN PHARMACIST DOCUMENTED: ICD-10-PCS | Mod: CPTII,S$GLB,, | Performed by: PODIATRIST

## 2023-01-03 PROCEDURE — 1160F RVW MEDS BY RX/DR IN RCRD: CPT | Mod: CPTII,S$GLB,, | Performed by: PODIATRIST

## 2023-01-03 NOTE — PROGRESS NOTES
Subjective:       Patient ID: Sachin Gonzalez is a 57 y.o. male.    Chief Complaint: No chief complaint on file.    HPI:  Sachin Gonzalez presents to the office today, s/p 11/21/22 RLE hammertoe repair, 2nd through 4th.  Patient states moderate pains. Weight-bearing with Sx. Shoe. [facility]. Is on PO Ciprofloxacin an did complete PO Bactrim.    Hemoglobin A1C   Date Value Ref Range Status   04/12/2017 5.7 4.5 - 6.2 % Final     Comment:     According to ADA guidelines, hemoglobin A1C <7.0% represents  optimal control in non-pregnant diabetic patients.  Different  metrics may apply to specific populations.   Standards of Medical Care in Diabetes - 2016.  For the purpose of screening for the presence of diabetes:  <5.7%     Consistent with the absence of diabetes  5.7-6.4%  Consistent with increasing risk for diabetes   (prediabetes)  >or=6.5%  Consistent with diabetes  Currently no consensus exists for use of hemoglobin A1C  for diagnosis of diabetes for children.         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       Family History   Problem Relation Age of Onset    Stroke Father     Alcohol abuse Father     Arthritis Mother     Hypertension Mother     Anxiety disorder Mother     No Known Problems Sister     Kidney disease Brother        Social History     Socioeconomic History    Marital status: Single   Tobacco Use    Smoking status: Never    Smokeless tobacco: Never   Substance and Sexual Activity    Alcohol use: Never    Drug use: No    Sexual activity: Yes     Partners: Female   Social History Narrative    No pets or smokers in household.       Past Surgical History:   Procedure Laterality Date     COLONOSCOPY N/A 4/21/2021    Procedure: COLONOSCOPY covid test scheduled on 4/19/21;  Surgeon: Darrell Worthington MD;  Location: [facility];  Service: Endoscopy;  Laterality: N/A;    CYSTOURETHROSCOPY WITH DIRECT VISION INTERNAL URETHROTOMY N/A 5/31/2022    Procedure: CYSTOSCOPY, WITH DIRECT VISION INTERNAL URETHROTOMY;  Surgeon: Yaniv Murray MD;  Location: [facility] OR;  Service: Urology;  Laterality: N/A;    ESOPHAGOGASTRODUODENOSCOPY      ESOPHAGOGASTRODUODENOSCOPY N/A 4/5/2021    Procedure: EGD (ESOPHAGOGASTRODUODENOSCOPY) Rapid Covid test needed;  Surgeon: Darrell Worthington MD;  Location: [facility];  Service: Endoscopy;  Laterality: N/A;    OSTEOTOMY OF METATARSAL BONE Right 11/21/2022    Procedure: OSTEOTOMY, METATARSAL BONE;  Surgeon: Daniel Ramirez DPM;  Location: [facility] OR;  Service: Podiatry;  Laterality: Right;    PROSTATECTOMY  2011    RELEASE OF CONTRACTURE OF JOINT Right 11/21/2022    Procedure: RELEASE, CONTRACTURE, JOINT;  Surgeon: Daniel Ramirez DPM;  Location: [facility] OR;  Service: Podiatry;  Laterality: Right;    RESECTION OF GASTROCNEMIUS MUSCLE Right 11/21/2022    Procedure: RESECTION, MUSCLE, GASTROCNEMIUS;  Surgeon: Daniel Ramirez DPM;  Location: [facility] OR;  Service: Podiatry;  Laterality: Right;       Review of Systems   Constitutional:  Negative for chills, fatigue and fever.   HENT:  Negative for hearing loss.    Eyes:  Negative for photophobia and visual disturbance.   Respiratory:  Negative for cough, chest tightness, shortness of breath and wheezing.    Cardiovascular:  Negative for chest pain and palpitations.   Gastrointestinal:  Negative for constipation, diarrhea, nausea and vomiting.   Endocrine: Negative for cold intolerance and heat intolerance.   Genitourinary:  Negative for flank pain.   Musculoskeletal:  Negative for neck pain and neck stiffness.   Skin:  Positive for wound.   Neurological:  Negative for light-headedness and headaches.   Psychiatric/Behavioral:  Negative for  sleep disturbance.         Objective:   There were no vitals taken for this visit.     Physical Exam  LOWER EXTREMITY PHYSICAL EXAMINATION  NEUROLOGY: Proprioception is intact. Sensation to light touch is intact. Shonna-incisional sensation is intact.    DERMATOLOGY: No evidence of wound complications to the gastrocnemius recession site, 3rd, or 4th toes. The wound at the 2nd toe is improved as compared to prior, but measures 0.80cm x 0.30cm x 0.20cm. No bone is noted. No tendon is noted.    ORTHOPEDIC: No edema, right 2nd through 4th toes. No pain to palpation, gastrocnemius recession site. Range of motion about the ankle joint is noted. HAV is noted on the RLE, mild. Less abutment of the 2nd toe at present.    Assessment:     1. Hammer toe of right foot    2. Postop check    3. Pain in right foot    4. Surgical wound dehiscence, subsequent encounter        Plan:     Hammer toe of right foot    Postop check    Pain in right foot  -     Ambulatory referral/consult to Wound Clinic; Future; Expected date: 01/09/2023    Surgical wound dehiscence, subsequent encounter  -     Aerobic culture (Specify Source)  -     SUBSEQUENT HOME HEALTH ORDERS  -     Ambulatory referral/consult to Wound Clinic; Future; Expected date: 01/09/2023      Thorough discussion is had with the patient today, concerning the diagnosis, its etiology, and the treatment algorithm at present.    The wound was surgically debrided after adequate prep with alcohol and/or betadine paint. Excisional wound debridement was performed using sharp #10/#15 blade/rounded scalpel and tissue nipper, with removal of all non-viable skin and soft tissues; necrotic skin/tissue formation. The woundbase/wound bed was also debrided to encourage bleeding as to promote/stimulate healing. Debridement was excisional and included epidermal, dermal and subcutaneous tissues. Post debridement measurements are as above. Hemostasis was achieved. Patient tolerated procedure well and  without complications. Local woundcare with Hydrogel dressings and bandage thereafter.     Start QD to BID warm water salts soaks.    Start QD to BID Hydrogel to the area.    XRAYS are reviewed in detail with the patient. All questions and concerns regarding findings and its/their implications are outlined and discussed.    Continue [facility].    FMLA was revised to 1/9/23 at the last appt.    Continue PO Abx.     Repeat C&S.          Future Appointments   Date Time Provider Department Center   1/13/2023  9:00 AM Daniel Ramirez DPM [facility]OD [facility]   1/13/2023  9:30 AM [facility] US2 [facility] ULSOUND [facility]   1/13/2023 11:20 AM LABORATORY, [facility] ON LAB [facility]   1/23/2023  3:00 PM Janell Bradley MD [facility]

## 2023-01-04 ENCOUNTER — EXTERNAL HOME HEALTH (OUTPATIENT)
Dept: HOME HEALTH SERVICES | Facility: HOSPITAL | Age: 58
End: 2023-01-04
Payer: COMMERCIAL

## 2023-01-06 LAB — BACTERIA SPEC AEROBE CULT: NORMAL

## 2023-01-12 ENCOUNTER — PATIENT MESSAGE (OUTPATIENT)
Dept: PODIATRY | Facility: CLINIC | Age: 58
End: 2023-01-12
Payer: COMMERCIAL

## 2023-01-13 ENCOUNTER — HOSPITAL ENCOUNTER (OUTPATIENT)
Dept: RADIOLOGY | Facility: HOSPITAL | Age: 58
Discharge: HOME | End: 2023-01-13
Attending: INTERNAL MEDICINE
Payer: COMMERCIAL

## 2023-01-13 DIAGNOSIS — R79.89 ABNORMAL LFTS: ICD-10-CM

## 2023-01-13 PROCEDURE — 76705 US ABDOMEN LIMITED: ICD-10-PCS | Mod: 26,,, | Performed by: RADIOLOGY

## 2023-01-13 PROCEDURE — 76705 ECHO EXAM OF ABDOMEN: CPT | Mod: TC

## 2023-01-13 PROCEDURE — 76705 ECHO EXAM OF ABDOMEN: CPT | Mod: 26,,, | Performed by: RADIOLOGY

## 2023-01-18 DIAGNOSIS — M79.671 PAIN IN RIGHT FOOT: Primary | ICD-10-CM

## 2023-01-18 DIAGNOSIS — T81.31XD SURGICAL WOUND DEHISCENCE, SUBSEQUENT ENCOUNTER: ICD-10-CM

## 2023-01-23 ENCOUNTER — OFFICE VISIT (OUTPATIENT)
Dept: HEPATOLOGY | Facility: CLINIC | Age: 58
End: 2023-01-23
Payer: COMMERCIAL

## 2023-01-23 ENCOUNTER — PATIENT MESSAGE (OUTPATIENT)
Dept: PODIATRY | Facility: CLINIC | Age: 58
End: 2023-01-23
Payer: COMMERCIAL

## 2023-01-23 VITALS — WEIGHT: 240.75 LBS | HEIGHT: 78 IN | HEART RATE: 89 BPM | BODY MASS INDEX: 27.85 KG/M2

## 2023-01-23 DIAGNOSIS — R79.89 ABNORMAL LFTS: Primary | ICD-10-CM

## 2023-01-23 PROCEDURE — 3008F BODY MASS INDEX DOCD: CPT | Mod: CPTII,S$GLB,, | Performed by: INTERNAL MEDICINE

## 2023-01-23 PROCEDURE — 1160F PR REVIEW ALL MEDS BY PRESCRIBER/CLIN PHARMACIST DOCUMENTED: ICD-10-PCS | Mod: CPTII,S$GLB,, | Performed by: INTERNAL MEDICINE

## 2023-01-23 PROCEDURE — 3044F HG A1C LEVEL LT 7.0%: CPT | Mod: CPTII,S$GLB,, | Performed by: INTERNAL MEDICINE

## 2023-01-23 PROCEDURE — 99999 PR PBB SHADOW E&M-EST. PATIENT-LVL III: ICD-10-PCS | Mod: PBBFAC,,, | Performed by: INTERNAL MEDICINE

## 2023-01-23 PROCEDURE — 1160F RVW MEDS BY RX/DR IN RCRD: CPT | Mod: CPTII,S$GLB,, | Performed by: INTERNAL MEDICINE

## 2023-01-23 PROCEDURE — 1159F MED LIST DOCD IN RCRD: CPT | Mod: CPTII,S$GLB,, | Performed by: INTERNAL MEDICINE

## 2023-01-23 PROCEDURE — 3008F PR BODY MASS INDEX (BMI) DOCUMENTED: ICD-10-PCS | Mod: CPTII,S$GLB,, | Performed by: INTERNAL MEDICINE

## 2023-01-23 PROCEDURE — 99214 PR OFFICE/OUTPT VISIT, EST, LEVL IV, 30-39 MIN: ICD-10-PCS | Mod: S$GLB,,, | Performed by: INTERNAL MEDICINE

## 2023-01-23 PROCEDURE — 3044F PR MOST RECENT HEMOGLOBIN A1C LEVEL <7.0%: ICD-10-PCS | Mod: CPTII,S$GLB,, | Performed by: INTERNAL MEDICINE

## 2023-01-23 PROCEDURE — 99999 PR PBB SHADOW E&M-EST. PATIENT-LVL III: CPT | Mod: PBBFAC,,, | Performed by: INTERNAL MEDICINE

## 2023-01-23 PROCEDURE — 99214 OFFICE O/P EST MOD 30 MIN: CPT | Mod: S$GLB,,, | Performed by: INTERNAL MEDICINE

## 2023-01-23 PROCEDURE — 1159F PR MEDICATION LIST DOCUMENTED IN MEDICAL RECORD: ICD-10-PCS | Mod: CPTII,S$GLB,, | Performed by: INTERNAL MEDICINE

## 2023-01-23 NOTE — PROGRESS NOTES
Subjective:     Sachin Gonzalez is here for evaluation of abnormal LFTs    History of Present Illness:  Sachin Gonzalez is a 56 YM with known history of sero+ve RA, has been on methotrexate for approximately 10 years. He noted to have abnormal AST/ALT in feb of 2022. He had Covid-19 in 9/2021. He was not aware of exposure to Hep B. He denies use of ETOH.    Duration of abnormality  Medications/OTC/Herbal- none, uses ibuprofen for joint pains  ETOH-none  Metabolic issues- HTN  BMI-26  Family Hx-none    Interval history: 5/2/22  Here for f/u, recent repeat labs still shows abnormal transaminases even though slightly better. Has been taking MTX only 4 tabs instead of 8 tabs each 25 mg, says joint pains have been manageable. He is seeing his rheumatologist in 2 weeks.    Interval history:07/14/2022.  He continues to take methotrexate 4 tablets instead of 8 tablets as recommended by his rheumatologist.  Says his joint pains have been tolerable, denies joint swelling.  He appears less anxious and more cheerful this visit.  Denies fatigue, abdominal pain, itching    01/25/2023    He is taking methotrexate 2.5 mg 4 tablets every 7 days.  Denies liver related complaints.  Able to work, joint pains tolerable    Review of Systems   Constitutional:  Negative for fatigue and fever.   Gastrointestinal:  Negative for abdominal distention, abdominal pain, blood in stool, nausea and vomiting.   Musculoskeletal:  Negative for arthralgias, joint swelling and myalgias.   Hematological:  Does not bruise/bleed easily.   Psychiatric/Behavioral:  Negative for sleep disturbance.      Objective:     Physical Exam  Constitutional:       Appearance: Normal appearance. He is normal weight.   Eyes:      General: No scleral icterus.  Abdominal:      General: Abdomen is flat. There is no distension.      Palpations: Abdomen is soft. There is no mass.      Tenderness: There is no abdominal tenderness.   Musculoskeletal:      Right lower leg: No  edema.      Left lower leg: No edema.   Skin:     Coloration: Skin is not jaundiced.      Findings: No erythema.   Neurological:      Mental Status: He is alert and oriented to person, place, and time.   Psychiatric:         Thought Content: Thought content normal.         Judgment: Judgment normal.       MELD-Na score: 7 at 4/22/2022  7:27 AM  MELD score: 7 at 4/22/2022  7:27 AM  Calculated from:  Serum Creatinine: 1.1 mg/dL at 4/22/2022  7:22 AM  Serum Sodium: 138 mmol/L (Using max of 137 mmol/L) at 4/22/2022  7:22 AM  Total Bilirubin: 0.6 mg/dL (Using min of 1 mg/dL) at 4/22/2022  7:22 AM  INR(ratio): 1.0 at 4/22/2022  7:27 AM  Age: 56 years    WBC   Date Value Ref Range Status   01/13/2023 5.22 3.90 - 12.70 K/uL Final     Hemoglobin   Date Value Ref Range Status   01/13/2023 14.0 14.0 - 18.0 g/dL Final     Hematocrit   Date Value Ref Range Status   01/13/2023 43.7 40.0 - 54.0 % Final     Platelets   Date Value Ref Range Status   01/13/2023 241 150 - 450 K/uL Final     BUN   Date Value Ref Range Status   01/13/2023 22 (H) 6 - 20 mg/dL Final     Creatinine   Date Value Ref Range Status   01/13/2023 1.0 0.5 - 1.4 mg/dL Final     Glucose   Date Value Ref Range Status   01/13/2023 89 70 - 110 mg/dL Final     Calcium   Date Value Ref Range Status   01/13/2023 9.3 8.7 - 10.5 mg/dL Final     Sodium   Date Value Ref Range Status   01/13/2023 139 136 - 145 mmol/L Final     Potassium   Date Value Ref Range Status   01/13/2023 4.1 3.5 - 5.1 mmol/L Final     Chloride   Date Value Ref Range Status   01/13/2023 105 95 - 110 mmol/L Final     Magnesium   Date Value Ref Range Status   07/21/2022 2.0 1.6 - 2.6 mg/dL Final     AST   Date Value Ref Range Status   01/13/2023 31 10 - 40 U/L Final     ALT   Date Value Ref Range Status   01/13/2023 30 10 - 44 U/L Final     Alkaline Phosphatase   Date Value Ref Range Status   01/13/2023 72 55 - 135 U/L Final     Total Bilirubin   Date Value Ref Range Status   01/13/2023 0.7 0.1 - 1.0  mg/dL Final     Comment:     For infants and newborns, interpretation of results should be based  on gestational age, weight and in agreement with clinical  observations.    Premature Infant recommended reference ranges:  Up to 24 hours.............<8.0 mg/dL  Up to 48 hours............<12.0 mg/dL  3-5 days..................<15.0 mg/dL  6-29 days.................<15.0 mg/dL       Albumin   Date Value Ref Range Status   01/13/2023 3.5 3.5 - 5.2 g/dL Final     INR   Date Value Ref Range Status   04/22/2022 1.0 0.8 - 1.2 Final     Comment:     Coumadin Therapy:  2.0 - 3.0 for INR for all indicators except mechanical heart valves  and antiphospholipid syndromes which should use 2.5 - 3.5.           Assessment/Plan:       1.Abnormal LFTs:  -Work up for infectious/autoimmune/genetic disorders of the liver has been negative, will check Anti trypsin, cerulopasmin levels to complete w/u, ferritin high but likely an acute phase reactant, TSAT is 20, phenotype for HHC is negative  -Short term treatment with MTX can cause transient elevation of serum aminotransferase and long term therapy has been linked to development of fatty liver disease, fibrosis and even cirrhosis.   -Symptoms are usually absent until cirrhosis is present, and liver tests are typically normal or minimally and transiently elevated.  -Routine monitoring of patients with regular liver biopsies done at 1 to 2 year intervals is recommended  -I requested him to discuss with his rheumatologist to consider alternatives to treat RA  -If LFTs continue to stay abnormal or even if they normalize he may need liver biopsy to confirm absence of fibrosis, fibroscan shows F1 fibrosis, recommend yearly VCTE. He defers liver biopsy for now  -A very slow Down trending transaminases with covid-19 is unlikely but possible,  there were no LFTs documented after 9/2021 until 2/2022    2.Hep B core ab +ve:  -Hep B DNA level <10 in 3/2022  -Prior exposure to hep B, has Core ab +ve,   Hep B DNA level less than 10,Hep B Surface ag negative, low risk for reactivation, avoid prednisone combination. Will monitor Hep B DNA and surface antigen q3 months. Ordered today    01/25/2023  Liver enzymes continue to stay within normal range now.  I informed him this would not guarantee prevent progression to cirrhosis of liver, however his recent VCTE shows F1 fibrosis.  I recommended a liver biopsy as baseline some time soon.  Recommended avoiding hepatotoxic drugs, low carb, high-protein diet.    RTC: 6 months     I have reviewed existing labs, imaging, procedures. Educated patient about disease process, prognosis. Ordered required labs, images and discussed treatment plan.     Janell Bradley MD  Transplant Hepatologist  Dept of Hepatology, [place] [facility]

## 2023-01-25 ENCOUNTER — DOCUMENT SCAN (OUTPATIENT)
Dept: HOME HEALTH SERVICES | Facility: HOSPITAL | Age: 58
End: 2023-01-25
Payer: COMMERCIAL

## 2023-01-30 ENCOUNTER — DOCUMENT SCAN (OUTPATIENT)
Dept: HOME HEALTH SERVICES | Facility: HOSPITAL | Age: 58
End: 2023-01-30
Payer: COMMERCIAL

## 2023-02-01 ENCOUNTER — OFFICE VISIT (OUTPATIENT)
Dept: PODIATRY | Facility: CLINIC | Age: 58
End: 2023-02-01
Payer: COMMERCIAL

## 2023-02-01 VITALS — WEIGHT: 240 LBS | BODY MASS INDEX: 27.77 KG/M2 | HEIGHT: 78 IN

## 2023-02-01 DIAGNOSIS — G60.9 IDIOPATHIC PERIPHERAL NEUROPATHY: Primary | ICD-10-CM

## 2023-02-01 DIAGNOSIS — B35.1 ONYCHOMYCOSIS OF TOENAIL: ICD-10-CM

## 2023-02-01 PROCEDURE — 1160F PR REVIEW ALL MEDS BY PRESCRIBER/CLIN PHARMACIST DOCUMENTED: ICD-10-PCS | Mod: CPTII,S$GLB,, | Performed by: PODIATRIST

## 2023-02-01 PROCEDURE — 3044F HG A1C LEVEL LT 7.0%: CPT | Mod: CPTII,S$GLB,, | Performed by: PODIATRIST

## 2023-02-01 PROCEDURE — 1160F RVW MEDS BY RX/DR IN RCRD: CPT | Mod: CPTII,S$GLB,, | Performed by: PODIATRIST

## 2023-02-01 PROCEDURE — 99499 NO LOS: ICD-10-PCS | Mod: S$GLB,,, | Performed by: PODIATRIST

## 2023-02-01 PROCEDURE — 99499 UNLISTED E&M SERVICE: CPT | Mod: S$GLB,,, | Performed by: PODIATRIST

## 2023-02-01 PROCEDURE — 99999 PR PBB SHADOW E&M-EST. PATIENT-LVL III: ICD-10-PCS | Mod: PBBFAC,,, | Performed by: PODIATRIST

## 2023-02-01 PROCEDURE — 3008F PR BODY MASS INDEX (BMI) DOCUMENTED: ICD-10-PCS | Mod: CPTII,S$GLB,, | Performed by: PODIATRIST

## 2023-02-01 PROCEDURE — 11721 DEBRIDE NAIL 6 OR MORE: CPT | Mod: S$GLB,,, | Performed by: PODIATRIST

## 2023-02-01 PROCEDURE — 1159F PR MEDICATION LIST DOCUMENTED IN MEDICAL RECORD: ICD-10-PCS | Mod: CPTII,S$GLB,, | Performed by: PODIATRIST

## 2023-02-01 PROCEDURE — 3044F PR MOST RECENT HEMOGLOBIN A1C LEVEL <7.0%: ICD-10-PCS | Mod: CPTII,S$GLB,, | Performed by: PODIATRIST

## 2023-02-01 PROCEDURE — 11721 PR DEBRIDEMENT OF NAILS, 6 OR MORE: ICD-10-PCS | Mod: S$GLB,,, | Performed by: PODIATRIST

## 2023-02-01 PROCEDURE — 1159F MED LIST DOCD IN RCRD: CPT | Mod: CPTII,S$GLB,, | Performed by: PODIATRIST

## 2023-02-01 PROCEDURE — 3008F BODY MASS INDEX DOCD: CPT | Mod: CPTII,S$GLB,, | Performed by: PODIATRIST

## 2023-02-01 PROCEDURE — 99999 PR PBB SHADOW E&M-EST. PATIENT-LVL III: CPT | Mod: PBBFAC,,, | Performed by: PODIATRIST

## 2023-02-03 ENCOUNTER — DOCUMENT SCAN (OUTPATIENT)
Dept: HOME HEALTH SERVICES | Facility: HOSPITAL | Age: 58
End: 2023-02-03
Payer: COMMERCIAL

## 2023-02-04 PROCEDURE — G0179 MD RECERTIFICATION HHA PT: HCPCS | Mod: ,,, | Performed by: PODIATRIST

## 2023-02-04 PROCEDURE — G0179 PR HOME HEALTH MD RECERTIFICATION: ICD-10-PCS | Mod: ,,, | Performed by: PODIATRIST

## 2023-02-13 NOTE — PROGRESS NOTES
Subjective:     Patient ID: Sachin Gonzalez is a 57 y.o. male.    Chief Complaint: Nail Care (No c/o pain, Wears tennis shoes with socks, non-diabetic Pt, wears surgical shoe with sock, last seen on 10/11/22 with Margarita Beltrán NP)      Sachin is a 57 y.o. male who presents to the clinic for evaluation and treatment of high risk feet. 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 The patient's chief complaint is long, thick toenails. This patient has documented high risk feet requiring routine maintenance secondary to peripheral neuropathy.     PCP: MAHAD Greenberg Jr, MD    Date Last Seen by PCP: 07/21/2022    Current shoe gear:  Affected Foot: Surgical shoe     Unaffected Foot: Tennis shoes    Last encounter in this department: 3/30/2022    Hemoglobin A1C   Date Value Ref Range Status   01/13/2023 5.3 4.0 - 5.6 % Final     Comment:     ADA Screening Guidelines:  5.7-6.4%  Consistent with prediabetes  >or=6.5%  Consistent with diabetes    High levels of fetal hemoglobin interfere with the HbA1C  assay. Heterozygous hemoglobin variants (HbS, HgC, etc)do  not significantly interfere with this assay.   However, presence of multiple variants may affect accuracy.     04/12/2017 5.7 4.5 - 6.2 % Final     Comment:     According to ADA guidelines, hemoglobin A1C <7.0% represents  optimal control in non-pregnant diabetic patients.  Different  metrics may apply to specific populations.   Standards of Medical Care in Diabetes - 2016.  For the purpose of screening for the presence of diabetes:  <5.7%     Consistent with the absence of diabetes  5.7-6.4%  Consistent with increasing risk for diabetes   (prediabetes)  >or=6.5%  Consistent with diabetes  Currently no consensus exists for use of hemoglobin A1C  for  diagnosis of diabetes for children.         Patient Active Problem List   Diagnosis    History of prostate cancer    RA (rheumatoid arthritis)    Status post prostatectomy (06/07/12)    Erectile dysfunction    Peyronie's disease    Long-term use of immunosuppressant medication    Cephalic vein thrombosis-post op    Essential hypertension    Tension-type headache, not intractable    Lumbar foraminal stenosis    Parotid sialolithiasis    Gastroesophageal reflux disease without esophagitis    Mixed hyperlipidemia    Melena    Screen for colon cancer    Prostate cancer    Gross hematuria    Stricture of bulbous urethra in male       Medication List with Changes/Refills   Current Medications    ASCORBIC ACID, VITAMIN C, (VITAMIN C) 250 MG TABLET    Take 250 mg by mouth once daily.    CHOLECALCIFEROL, VITAMIN D3, 25 MCG (1,000 UNIT) CHEW    Take by mouth.    FOLIC ACID (FOLVITE) 1 MG TABLET    Take 1 tablet (1,000 mcg total) by mouth once daily.    GABAPENTIN (NEURONTIN) 300 MG CAPSULE    Take 1 capsule (300 mg total) by mouth 2 (two) times daily.    LINACLOTIDE (LINZESS) 72 MCG CAP CAPSULE    Take 1 capsule (72 mcg total) by mouth before breakfast.    METHOTREXATE 2.5 MG TAB    Take 4 tablets (10 mg total) by mouth every 7 days. This medication requires periodic lab monitoring    METOPROLOL SUCCINATE (TOPROL-XL) 50 MG 24 HR TABLET    Take 1 tablet (50 mg total) by mouth once daily.    MULTIVITAMIN CAPSULE    Take 1 capsule by mouth once daily.    SUCRALFATE (CARAFATE) 1 GRAM TABLET    Take 1 tablet (1 g total) by mouth 3 (three) times daily as needed (reflux indigestion).    ZINC SULFATE (ZINC-220 ORAL)    Take 1 tablet by mouth as needed.       Review of patient's allergies indicates:  No Known Allergies    Past Surgical History:   Procedure Laterality Date    COLONOSCOPY N/A 4/21/2021    Procedure: COLONOSCOPY covid test scheduled on 4/19/21;  Surgeon: Darrell Worthington MD;  Location: [facility];  Service: Endoscopy;   "Laterality: N/A;    CYSTOURETHROSCOPY WITH DIRECT VISION INTERNAL URETHROTOMY N/A 5/31/2022    Procedure: CYSTOSCOPY, WITH DIRECT VISION INTERNAL URETHROTOMY;  Surgeon: Yaniv Murray MD;  Location: [facility] OR;  Service: Urology;  Laterality: N/A;    ESOPHAGOGASTRODUODENOSCOPY      ESOPHAGOGASTRODUODENOSCOPY N/A 4/5/2021    Procedure: EGD (ESOPHAGOGASTRODUODENOSCOPY) Rapid Covid test needed;  Surgeon: Darrell Worthington MD;  Location: [facility];  Service: Endoscopy;  Laterality: N/A;    OSTEOTOMY OF METATARSAL BONE Right 11/21/2022    Procedure: OSTEOTOMY, METATARSAL BONE;  Surgeon: Daniel Ramirez DPM;  Location: [facility] OR;  Service: Podiatry;  Laterality: Right;    PROSTATECTOMY  2011    RELEASE OF CONTRACTURE OF JOINT Right 11/21/2022    Procedure: RELEASE, CONTRACTURE, JOINT;  Surgeon: Daniel Ramirez DPM;  Location: [facility] OR;  Service: Podiatry;  Laterality: Right;    RESECTION OF GASTROCNEMIUS MUSCLE Right 11/21/2022    Procedure: RESECTION, MUSCLE, GASTROCNEMIUS;  Surgeon: Daniel Ramirez DPM;  Location: [facility] OR;  Service: Podiatry;  Laterality: Right;       Family History   Problem Relation Age of Onset    Stroke Father     Alcohol abuse Father     Arthritis Mother     Hypertension Mother     Anxiety disorder Mother     No Known Problems Sister     Kidney disease Brother        Social History     Socioeconomic History    Marital status: Single   Tobacco Use    Smoking status: Never    Smokeless tobacco: Never   Substance and Sexual Activity    Alcohol use: Never    Drug use: No    Sexual activity: Yes     Partners: Female   Social History Narrative    No pets or smokers in household.       Vitals:    02/01/23 1152   Weight: 108.9 kg (240 lb)   Height: 6' 7" (2.007 m)   PainSc: 0-No pain   PainLoc: Foot       Hemoglobin A1C   Date Value Ref Range Status   01/13/2023 5.3 4.0 - 5.6 % Final     Comment:     ADA Screening Guidelines:  5.7-6.4%  Consistent with prediabetes  >or=6.5%  Consistent with " diabetes    High levels of fetal hemoglobin interfere with the HbA1C  assay. Heterozygous hemoglobin variants (HbS, HgC, etc)do  not significantly interfere with this assay.   However, presence of multiple variants may affect accuracy.     04/12/2017 5.7 4.5 - 6.2 % Final     Comment:     According to ADA guidelines, hemoglobin A1C <7.0% represents  optimal control in non-pregnant diabetic patients.  Different  metrics may apply to specific populations.   Standards of Medical Care in Diabetes - 2016.  For the purpose of screening for the presence of diabetes:  <5.7%     Consistent with the absence of diabetes  5.7-6.4%  Consistent with increasing risk for diabetes   (prediabetes)  >or=6.5%  Consistent with diabetes  Currently no consensus exists for use of hemoglobin A1C  for diagnosis of diabetes for children.         Review of Systems   Constitutional:  Negative for chills and fever.   Respiratory:  Negative for shortness of breath.    Cardiovascular:  Negative for chest pain, palpitations, orthopnea, claudication and leg swelling.   Gastrointestinal:  Negative for diarrhea, nausea and vomiting.   Musculoskeletal:  Negative for joint pain.   Skin:  Negative for rash.   Neurological:  Positive for tingling and sensory change.   Psychiatric/Behavioral: Negative.         Objective:      PHYSICAL EXAM: Apperance: Alert and orient in no distress,well developed, and with good attention to grooming and body habits    LOWER EXTREMITY EXAM:  VASCULAR: Dorsalis pedis pulses 2/4 bilateral and Posterior Tibial pulses 2/4 bilateral. Capillary fill time <4 seconds bilateral. No edema observed bilateral. Varicosities absent bilateral. Skin temperature of the lower extremities is warm to warm, proximal to distal. Hair growth WNL bilateral.  DERMATOLOGICAL: No skin rashes, subcutaneous nodules, lesions, or ulcers observed bilateral. Nails 1,3,4,5 bilateral elongated, thickened, and discolored with subungual debris. Webspaces  1,2,3,4 bilateral clean, dry and without evidence of break in skin integrity.   NEUROLOGICAL: Light touch, sharp-dull, proprioception all present and equal bilaterally.  Vibratory sensation diminished at bilateral hallux and intact at bilateral navicular. Protective sensation absent at 6/10 sites as tested with a [place] 5.07 monofilament.   MUSCULOSKELETAL: Muscle strength is 5/5 for foot inverters, everters, plantarflexors, and dorsiflexors. Muscle tone is normal.           Assessment:       ICD-10-CM ICD-9-CM   1. Idiopathic peripheral neuropathy  G60.9 356.9   2. Onychomycosis of toenail  B35.1 110.1         Plan:   Idiopathic peripheral neuropathy    Onychomycosis of toenail    I counseled the patient on his conditions, regarding findings of my examination, my impressions, and usual treatment plan.   Appointment spent on education about the neuropathy, and prevention of limb loss.  Shoe inspection. Patient instructed on proper foot hygeine. We discussed wearing proper shoe gear, daily foot inspections, never walking without protective shoe gear, never putting sharp instruments to feet.    With patient's permission, nails 1-5 bilateral were debrided/trimmed in length and thickness aggressively to their soft tissue attachment mechanically and with electric , removing all offending nail and debris. Patient relates relief following the procedure.  Patient  will continue to monitor the areas daily, inspect feet, wear protective shoe gear when ambulatory, moisturizer to maintain skin integrity.  Patient to return 2 months or sooner if needed.          Reina Guzman DPM  [facility]

## 2023-02-16 ENCOUNTER — OFFICE VISIT (OUTPATIENT)
Dept: INTERNAL MEDICINE | Facility: CLINIC | Age: 58
End: 2023-02-16
Payer: COMMERCIAL

## 2023-02-16 VITALS
HEART RATE: 92 BPM | HEIGHT: 78 IN | OXYGEN SATURATION: 99 % | WEIGHT: 241.38 LBS | DIASTOLIC BLOOD PRESSURE: 76 MMHG | SYSTOLIC BLOOD PRESSURE: 130 MMHG | BODY MASS INDEX: 27.93 KG/M2 | RESPIRATION RATE: 16 BRPM | TEMPERATURE: 98 F

## 2023-02-16 DIAGNOSIS — J06.9 UPPER RESPIRATORY TRACT INFECTION, UNSPECIFIED TYPE: Primary | ICD-10-CM

## 2023-02-16 DIAGNOSIS — Z13.220 LIPID SCREENING: ICD-10-CM

## 2023-02-16 DIAGNOSIS — Z12.5 PROSTATE CANCER SCREENING: ICD-10-CM

## 2023-02-16 PROCEDURE — 3075F SYST BP GE 130 - 139MM HG: CPT | Mod: CPTII,S$GLB,, | Performed by: PEDIATRICS

## 2023-02-16 PROCEDURE — 1159F MED LIST DOCD IN RCRD: CPT | Mod: CPTII,S$GLB,, | Performed by: PEDIATRICS

## 2023-02-16 PROCEDURE — 1160F RVW MEDS BY RX/DR IN RCRD: CPT | Mod: CPTII,S$GLB,, | Performed by: PEDIATRICS

## 2023-02-16 PROCEDURE — 3078F DIAST BP <80 MM HG: CPT | Mod: CPTII,S$GLB,, | Performed by: PEDIATRICS

## 2023-02-16 PROCEDURE — 99999 PR PBB SHADOW E&M-EST. PATIENT-LVL V: CPT | Mod: PBBFAC,,, | Performed by: PEDIATRICS

## 2023-02-16 PROCEDURE — 99999 PR PBB SHADOW E&M-EST. PATIENT-LVL V: ICD-10-PCS | Mod: PBBFAC,,, | Performed by: PEDIATRICS

## 2023-02-16 PROCEDURE — 3008F PR BODY MASS INDEX (BMI) DOCUMENTED: ICD-10-PCS | Mod: CPTII,S$GLB,, | Performed by: PEDIATRICS

## 2023-02-16 PROCEDURE — 3075F PR MOST RECENT SYSTOLIC BLOOD PRESS GE 130-139MM HG: ICD-10-PCS | Mod: CPTII,S$GLB,, | Performed by: PEDIATRICS

## 2023-02-16 PROCEDURE — 1160F PR REVIEW ALL MEDS BY PRESCRIBER/CLIN PHARMACIST DOCUMENTED: ICD-10-PCS | Mod: CPTII,S$GLB,, | Performed by: PEDIATRICS

## 2023-02-16 PROCEDURE — 1159F PR MEDICATION LIST DOCUMENTED IN MEDICAL RECORD: ICD-10-PCS | Mod: CPTII,S$GLB,, | Performed by: PEDIATRICS

## 2023-02-16 PROCEDURE — 99213 OFFICE O/P EST LOW 20 MIN: CPT | Mod: S$GLB,,, | Performed by: PEDIATRICS

## 2023-02-16 PROCEDURE — 3044F PR MOST RECENT HEMOGLOBIN A1C LEVEL <7.0%: ICD-10-PCS | Mod: CPTII,S$GLB,, | Performed by: PEDIATRICS

## 2023-02-16 PROCEDURE — 99213 PR OFFICE/OUTPT VISIT, EST, LEVL III, 20-29 MIN: ICD-10-PCS | Mod: S$GLB,,, | Performed by: PEDIATRICS

## 2023-02-16 PROCEDURE — 3078F PR MOST RECENT DIASTOLIC BLOOD PRESSURE < 80 MM HG: ICD-10-PCS | Mod: CPTII,S$GLB,, | Performed by: PEDIATRICS

## 2023-02-16 PROCEDURE — 3044F HG A1C LEVEL LT 7.0%: CPT | Mod: CPTII,S$GLB,, | Performed by: PEDIATRICS

## 2023-02-16 PROCEDURE — 3008F BODY MASS INDEX DOCD: CPT | Mod: CPTII,S$GLB,, | Performed by: PEDIATRICS

## 2023-02-16 RX ORDER — METHYLPREDNISOLONE 4 MG/1
TABLET ORAL
Qty: 1 EACH | Refills: 0 | Status: SHIPPED | OUTPATIENT
Start: 2023-02-16 | End: 2023-06-06

## 2023-02-16 NOTE — PROGRESS NOTES
"Subjective:       Patient ID: Sachin Gonzalez is a 57 y.o. male.    Chief Complaint: Nasal Congestion, Cough, runny nose (3 days /), and Headache    Sachin Gonzalez is a 57 y.o. male who presents to clinic for URI Sx. Has been going on for 1-2 weeks. was around someone who had a "cold." Current Sx of HA, cough, sneeze, runny nose, congestion, PND, and sore throat. Denies fever, smell or taste loss, n/v/d CP and SOB. COVID vaccinated and did not get flu.     Review of Systems   Constitutional:  Negative for activity change, appetite change, chills, diaphoresis, fatigue, fever and unexpected weight change.   HENT:  Positive for nasal congestion, postnasal drip, rhinorrhea, sinus pressure/congestion and sore throat. Negative for ear pain, mouth sores, nosebleeds and sneezing.    Eyes:  Negative for photophobia, pain, discharge, redness and visual disturbance.   Respiratory:  Positive for cough. Negative for chest tightness, shortness of breath, wheezing and stridor.    Cardiovascular:  Negative for chest pain, palpitations and leg swelling.   Gastrointestinal:  Negative for abdominal distention, blood in stool, constipation, diarrhea, nausea and vomiting.   Genitourinary:  Negative for decreased urine volume, difficulty urinating, dysuria, flank pain, frequency, genital sores, hematuria and urgency.   Musculoskeletal:  Negative for arthralgias, back pain, joint swelling, neck pain and neck stiffness.   Integumentary:  Negative for color change, pallor, rash and wound.   Neurological:  Positive for headaches. Negative for dizziness, syncope, speech difficulty, weakness and light-headedness.   Hematological:  Negative for adenopathy. Does not bruise/bleed easily.   Psychiatric/Behavioral:  Negative for confusion, decreased concentration, dysphoric mood, hallucinations, sleep disturbance and suicidal ideas. The patient is not nervous/anxious.    All other systems reviewed and are negative.      Objective:    "   Physical Exam  Vitals and nursing note reviewed.   Constitutional:       General: He is not in acute distress.     Appearance: He is well-developed.   HENT:      Head: Normocephalic and atraumatic.      Right Ear: Tympanic membrane, ear canal and external ear normal. There is no impacted cerumen.      Left Ear: Tympanic membrane, ear canal and external ear normal. There is no impacted cerumen.      Nose: Congestion and rhinorrhea present.      Mouth/Throat:      Mouth: Mucous membranes are moist.      Pharynx: Oropharynx is clear. Posterior oropharyngeal erythema (w/ PND) present.   Eyes:      Extraocular Movements: Extraocular movements intact.      Conjunctiva/sclera: Conjunctivae normal.      Pupils: Pupils are equal, round, and reactive to light.   Neck:      Thyroid: No thyromegaly.      Vascular: No JVD.   Cardiovascular:      Rate and Rhythm: Normal rate and regular rhythm.      Heart sounds: Normal heart sounds. No murmur heard.  Pulmonary:      Effort: Pulmonary effort is normal. No respiratory distress.      Breath sounds: Normal breath sounds. No wheezing or rales.   Abdominal:      General: There is no distension.      Palpations: Abdomen is soft. There is no mass.      Tenderness: There is no abdominal tenderness. There is no guarding.   Musculoskeletal:      Right lower leg: No edema.      Left lower leg: No edema.   Lymphadenopathy:      Cervical: No cervical adenopathy.   Skin:     Capillary Refill: Capillary refill takes less than 2 seconds.      Findings: No rash.   Neurological:      General: No focal deficit present.      Mental Status: He is alert and oriented to person, place, and time.      Cranial Nerves: No cranial nerve deficit.      Coordination: Coordination normal.   Psychiatric:         Mood and Affect: Mood normal.         Behavior: Behavior normal.         Thought Content: Thought content normal.         Judgment: Judgment normal.       Assessment:       Problem List Items Addressed  This Visit    None  Visit Diagnoses       Upper respiratory tract infection, unspecified type    -  Primary    Lipid screening        Relevant Orders    Lipid Panel    Prostate cancer screening        Relevant Orders    PSA, Screening            Plan:     Upper respiratory tract infection, unspecified type    Lipid screening  -     Lipid Panel; Future; Expected date: 02/16/2023    Prostate cancer screening  -     PSA, Screening; Future; Expected date: 02/16/2023    Other orders  -     methylPREDNISolone (MEDROL DOSEPACK) 4 mg tablet; use as directed  Dispense: 1 each; Refill: 0           Symptomatic Tx. COVID and Flu negative. Flu vaccine recommend, we are currently out. Follow up if not better. Follow up in 3mo with annual PCV 20 at that time.     Scribe Attestation:   I, Roberto Cast, am scribing for, and in the presence of, Dr. Santy Greenberg Jr. I performed the above scribed service and the documentation accurately describes the services I performed. I attest to the accuracy of the note.    I, Dr. Santy Greenberg Jr, reviewed documentation as scribed above. I personally performed the services described in this documentation.  I agree that the record reflects my personal performance and is accurate and complete. Santy Greenberg Jr., MD.  02/16/2023

## 2023-02-17 ENCOUNTER — PATIENT MESSAGE (OUTPATIENT)
Dept: PODIATRY | Facility: CLINIC | Age: 58
End: 2023-02-17
Payer: COMMERCIAL

## 2023-02-18 ENCOUNTER — PATIENT MESSAGE (OUTPATIENT)
Dept: GASTROENTEROLOGY | Facility: CLINIC | Age: 58
End: 2023-02-18
Payer: COMMERCIAL

## 2023-02-20 ENCOUNTER — TELEPHONE (OUTPATIENT)
Dept: GASTROENTEROLOGY | Facility: CLINIC | Age: 58
End: 2023-02-20
Payer: COMMERCIAL

## 2023-02-20 NOTE — TELEPHONE ENCOUNTER
----- Message from Luis Fernando Thomas sent at 2/20/2023 11:43 AM CST -----  Contact: [phone]  Type:  Sooner Apoointment Request    Caller is requesting a sooner appointment.  Caller declined first available appointment listed below.  Caller will not accept being placed on the waitlist and is requesting a message be sent to doctor.  Name of Caller:Thelander   When is the first available appointment?4/2023   Symptoms:f/u   Would the patient rather a call back or a [facility]? Call back   Best Call Back Number:[phone]  Additional Information:

## 2023-02-22 ENCOUNTER — OFFICE VISIT (OUTPATIENT)
Dept: PODIATRY | Facility: CLINIC | Age: 58
End: 2023-02-22
Payer: COMMERCIAL

## 2023-02-22 DIAGNOSIS — M79.671 PAIN IN RIGHT FOOT: ICD-10-CM

## 2023-02-22 DIAGNOSIS — Z98.890 HISTORY OF TOE SURGERY: Primary | ICD-10-CM

## 2023-02-22 DIAGNOSIS — R22.41 LOCALIZED SWELLING OF RIGHT LOWER EXTREMITY: ICD-10-CM

## 2023-02-22 PROCEDURE — 1160F RVW MEDS BY RX/DR IN RCRD: CPT | Mod: CPTII,S$GLB,, | Performed by: PODIATRIST

## 2023-02-22 PROCEDURE — 1159F PR MEDICATION LIST DOCUMENTED IN MEDICAL RECORD: ICD-10-PCS | Mod: CPTII,S$GLB,, | Performed by: PODIATRIST

## 2023-02-22 PROCEDURE — 1160F PR REVIEW ALL MEDS BY PRESCRIBER/CLIN PHARMACIST DOCUMENTED: ICD-10-PCS | Mod: CPTII,S$GLB,, | Performed by: PODIATRIST

## 2023-02-22 PROCEDURE — 99214 OFFICE O/P EST MOD 30 MIN: CPT | Mod: S$GLB,,, | Performed by: PODIATRIST

## 2023-02-22 PROCEDURE — 99999 PR PBB SHADOW E&M-EST. PATIENT-LVL III: ICD-10-PCS | Mod: PBBFAC,,, | Performed by: PODIATRIST

## 2023-02-22 PROCEDURE — 99999 PR PBB SHADOW E&M-EST. PATIENT-LVL III: CPT | Mod: PBBFAC,,, | Performed by: PODIATRIST

## 2023-02-22 PROCEDURE — 1159F MED LIST DOCD IN RCRD: CPT | Mod: CPTII,S$GLB,, | Performed by: PODIATRIST

## 2023-02-22 PROCEDURE — 99214 PR OFFICE/OUTPT VISIT, EST, LEVL IV, 30-39 MIN: ICD-10-PCS | Mod: S$GLB,,, | Performed by: PODIATRIST

## 2023-02-22 PROCEDURE — 3044F HG A1C LEVEL LT 7.0%: CPT | Mod: CPTII,S$GLB,, | Performed by: PODIATRIST

## 2023-02-22 PROCEDURE — 3044F PR MOST RECENT HEMOGLOBIN A1C LEVEL <7.0%: ICD-10-PCS | Mod: CPTII,S$GLB,, | Performed by: PODIATRIST

## 2023-02-22 NOTE — PROGRESS NOTES
Subjective:       Patient ID: Sachin Gonzalez is a 57 y.o. male.    Chief Complaint: Follow-up (Patient in for follow up after surgery pain level is rated as a six takes tylenol for pain that helps sometimes.  )    HPI:  Sachin Gonzalez presents to the office today, s/p 11/21/22 RLE hammertoe repair, 2nd through 4th. Patient states mild pains to the dorsal. Weight-bearing with Sx. Shoe. Did complete follow up at the [facility].    Hemoglobin A1C   Date Value Ref Range Status   01/13/2023 5.3 4.0 - 5.6 % Final     Comment:     ADA Screening Guidelines:  5.7-6.4%  Consistent with prediabetes  >or=6.5%  Consistent with diabetes    High levels of fetal hemoglobin interfere with the HbA1C  assay. Heterozygous hemoglobin variants (HbS, HgC, etc)do  not significantly interfere with this assay.   However, presence of multiple variants may affect accuracy.     04/12/2017 5.7 4.5 - 6.2 % Final     Comment:     According to ADA guidelines, hemoglobin A1C <7.0% represents  optimal control in non-pregnant diabetic patients.  Different  metrics may apply to specific populations.   Standards of Medical Care in Diabetes - 2016.  For the purpose of screening for the presence of diabetes:  <5.7%     Consistent with the absence of diabetes  5.7-6.4%  Consistent with increasing risk for diabetes   (prediabetes)  >or=6.5%  Consistent with diabetes  Currently no consensus exists for use of hemoglobin A1C  for diagnosis of diabetes for children.         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       Family History   Problem Relation Age of Onset    Stroke Father     Alcohol abuse Father     Arthritis Mother     Hypertension Mother     Anxiety disorder  Mother     No Known Problems Sister     Kidney disease Brother        Social History     Socioeconomic History    Marital status: Single   Tobacco Use    Smoking status: Never    Smokeless tobacco: Never   Substance and Sexual Activity    Alcohol use: Never    Drug use: No    Sexual activity: Yes     Partners: Female   Social History Narrative    No pets or smokers in household.       Past Surgical History:   Procedure Laterality Date    COLONOSCOPY N/A 4/21/2021    Procedure: COLONOSCOPY covid test scheduled on 4/19/21;  Surgeon: Darrell Worthington MD;  Location: [facility];  Service: Endoscopy;  Laterality: N/A;    CYSTOURETHROSCOPY WITH DIRECT VISION INTERNAL URETHROTOMY N/A 5/31/2022    Procedure: CYSTOSCOPY, WITH DIRECT VISION INTERNAL URETHROTOMY;  Surgeon: Yaniv Murray MD;  Location: [facility] OR;  Service: Urology;  Laterality: N/A;    ESOPHAGOGASTRODUODENOSCOPY      ESOPHAGOGASTRODUODENOSCOPY N/A 4/5/2021    Procedure: EGD (ESOPHAGOGASTRODUODENOSCOPY) Rapid Covid test needed;  Surgeon: Darrell Worthington MD;  Location: [facility];  Service: Endoscopy;  Laterality: N/A;    OSTEOTOMY OF METATARSAL BONE Right 11/21/2022    Procedure: OSTEOTOMY, METATARSAL BONE;  Surgeon: Daniel Ramirez DPM;  Location: [facility] OR;  Service: Podiatry;  Laterality: Right;    PROSTATECTOMY  2011    RELEASE OF CONTRACTURE OF JOINT Right 11/21/2022    Procedure: RELEASE, CONTRACTURE, JOINT;  Surgeon: Daniel Ramirez DPM;  Location: [facility] OR;  Service: Podiatry;  Laterality: Right;    RESECTION OF GASTROCNEMIUS MUSCLE Right 11/21/2022    Procedure: RESECTION, MUSCLE, GASTROCNEMIUS;  Surgeon: Daniel Ramirez DPM;  Location: [facility] OR;  Service: Podiatry;  Laterality: Right;       Review of Systems   Constitutional:  Negative for chills, fatigue and fever.   HENT:  Negative for hearing loss.    Eyes:  Negative for photophobia and visual disturbance.   Respiratory:  Negative for cough, chest tightness, shortness of breath and wheezing.     Cardiovascular:  Negative for chest pain and palpitations.   Gastrointestinal:  Negative for constipation, diarrhea, nausea and vomiting.   Endocrine: Negative for cold intolerance and heat intolerance.   Genitourinary:  Negative for flank pain.   Musculoskeletal:  Negative for neck pain and neck stiffness.   Skin:  Negative for wound.   Neurological:  Negative for light-headedness and headaches.   Psychiatric/Behavioral:  Negative for sleep disturbance.         Objective:   There were no vitals taken for this visit.     Physical Exam  LOWER EXTREMITY PHYSICAL EXAMINATION  NEUROLOGY: Proprioception is intact. Sensation to light touch is intact. Shonna-incisional sensation is intact.    DERMATOLOGY: Skin is supple, dry and intact.    ORTHOPEDIC: Minimal to no pedal or digital edema is noted. Anatomic alignment of the 2nd-4th toes.    Assessment:     1. History of toe surgery    2. Pain in right foot    3. Localized swelling of right lower extremity          Plan:     History of toe surgery  -     SUBSEQUENT HOME HEALTH ORDERS    Pain in right foot    Localized swelling of right lower extremity  -     HME - OTHER      Thorough discussion is had with the patient today, concerning the diagnosis, its etiology, and the treatment algorithm at present.    D/C home health.    D/C surgical shoe.              Future Appointments   Date Time Provider Department Center   3/1/2023  3:00 PM Lydia Holden NP [facility] GASTRO [facility]   4/5/2023  2:00 PM Reina Guzman DPM HG POD [facility]   5/9/2023  8:05 AM LABORATORY, [facility] LAB [facility]   5/16/2023  3:00 PM KEYONA Greenberg Jr., MD [facility] IM [facility]   7/24/2023  9:00 AM FIBROSCAN, [facility] HEPATOLOGY [facility] HEPAT [facility]   7/24/2023  9:30 AM LABORATORY, [facility] HGV LAB [facility]

## 2023-02-23 ENCOUNTER — PATIENT MESSAGE (OUTPATIENT)
Dept: PODIATRY | Facility: CLINIC | Age: 58
End: 2023-02-23
Payer: COMMERCIAL

## 2023-02-23 ENCOUNTER — TELEPHONE (OUTPATIENT)
Dept: GASTROENTEROLOGY | Facility: CLINIC | Age: 58
End: 2023-02-23
Payer: COMMERCIAL

## 2023-02-23 NOTE — TELEPHONE ENCOUNTER
Patient wanted an appointment with Dr Worthington. But I informed patient that MD is booked until April. Offered appt with NP here and patient declined. Patient will see a GI closer to his home. Patient said that he has been having dark stools. I asked patient if he has ever taken Pepto and patient said yes. I informed patient that the pepto would turn his stool dark.

## 2023-02-23 NOTE — TELEPHONE ENCOUNTER
----- Message from Liana Ayala sent at 2/23/2023 11:46 AM CST -----  Contact: thelander  Patient is calling to speak with the nurse regarding questions and concerns. Reports having dark stool and needs medication call in for it. Please give the patient a call back at [phone]   Thanks abebe

## 2023-02-28 ENCOUNTER — TELEPHONE (OUTPATIENT)
Dept: GASTROENTEROLOGY | Facility: CLINIC | Age: 58
End: 2023-02-28
Payer: COMMERCIAL

## 2023-02-28 ENCOUNTER — EXTERNAL HOME HEALTH (OUTPATIENT)
Dept: HOME HEALTH SERVICES | Facility: HOSPITAL | Age: 58
End: 2023-02-28
Payer: COMMERCIAL

## 2023-03-07 ENCOUNTER — PATIENT MESSAGE (OUTPATIENT)
Dept: PODIATRY | Facility: CLINIC | Age: 58
End: 2023-03-07
Payer: COMMERCIAL

## 2023-03-21 ENCOUNTER — TELEPHONE (OUTPATIENT)
Dept: RHEUMATOLOGY | Facility: CLINIC | Age: 58
End: 2023-03-21
Payer: COMMERCIAL

## 2023-03-21 DIAGNOSIS — Z79.631 LONG TERM METHOTREXATE USER: Primary | ICD-10-CM

## 2023-04-04 ENCOUNTER — PATIENT MESSAGE (OUTPATIENT)
Dept: PODIATRY | Facility: CLINIC | Age: 58
End: 2023-04-04
Payer: COMMERCIAL

## 2023-04-05 ENCOUNTER — LAB VISIT (OUTPATIENT)
Dept: LAB | Facility: HOSPITAL | Age: 58
End: 2023-04-05
Attending: PEDIATRICS
Payer: COMMERCIAL

## 2023-04-05 ENCOUNTER — OFFICE VISIT (OUTPATIENT)
Dept: PODIATRY | Facility: CLINIC | Age: 58
End: 2023-04-05
Payer: COMMERCIAL

## 2023-04-05 VITALS
HEART RATE: 78 BPM | SYSTOLIC BLOOD PRESSURE: 129 MMHG | HEIGHT: 78 IN | WEIGHT: 241 LBS | DIASTOLIC BLOOD PRESSURE: 83 MMHG | BODY MASS INDEX: 27.88 KG/M2

## 2023-04-05 DIAGNOSIS — Z79.631 LONG TERM METHOTREXATE USER: ICD-10-CM

## 2023-04-05 DIAGNOSIS — B35.1 ONYCHOMYCOSIS OF TOENAIL: ICD-10-CM

## 2023-04-05 DIAGNOSIS — G60.9 IDIOPATHIC PERIPHERAL NEUROPATHY: Primary | ICD-10-CM

## 2023-04-05 LAB
ALBUMIN SERPL BCP-MCNC: 3.6 G/DL (ref 3.5–5.2)
ALP SERPL-CCNC: 80 U/L (ref 55–135)
ALT SERPL W/O P-5'-P-CCNC: 15 U/L (ref 10–44)
ANION GAP SERPL CALC-SCNC: 10 MMOL/L (ref 8–16)
AST SERPL-CCNC: 24 U/L (ref 10–40)
BASOPHILS # BLD AUTO: 0.02 K/UL (ref 0–0.2)
BASOPHILS NFR BLD: 0.4 % (ref 0–1.9)
BILIRUB SERPL-MCNC: 0.4 MG/DL (ref 0.1–1)
BUN SERPL-MCNC: 15 MG/DL (ref 6–20)
CALCIUM SERPL-MCNC: 9.7 MG/DL (ref 8.7–10.5)
CHLORIDE SERPL-SCNC: 102 MMOL/L (ref 95–110)
CO2 SERPL-SCNC: 25 MMOL/L (ref 23–29)
CREAT SERPL-MCNC: 1 MG/DL (ref 0.5–1.4)
CRP SERPL-MCNC: 3.5 MG/L (ref 0–8.2)
DIFFERENTIAL METHOD: ABNORMAL
EOSINOPHIL # BLD AUTO: 0.1 K/UL (ref 0–0.5)
EOSINOPHIL NFR BLD: 2.3 % (ref 0–8)
ERYTHROCYTE [DISTWIDTH] IN BLOOD BY AUTOMATED COUNT: 13.6 % (ref 11.5–14.5)
EST. GFR  (NO RACE VARIABLE): >60 ML/MIN/1.73 M^2
GLUCOSE SERPL-MCNC: 79 MG/DL (ref 70–110)
HCT VFR BLD AUTO: 44.6 % (ref 40–54)
HGB BLD-MCNC: 14.1 G/DL (ref 14–18)
IMM GRANULOCYTES # BLD AUTO: 0.02 K/UL (ref 0–0.04)
IMM GRANULOCYTES NFR BLD AUTO: 0.4 % (ref 0–0.5)
LYMPHOCYTES # BLD AUTO: 1 K/UL (ref 1–4.8)
LYMPHOCYTES NFR BLD: 18.7 % (ref 18–48)
MCH RBC QN AUTO: 30.9 PG (ref 27–31)
MCHC RBC AUTO-ENTMCNC: 31.6 G/DL (ref 32–36)
MCV RBC AUTO: 98 FL (ref 82–98)
MONOCYTES # BLD AUTO: 0.5 K/UL (ref 0.3–1)
MONOCYTES NFR BLD: 9.2 % (ref 4–15)
NEUTROPHILS # BLD AUTO: 3.6 K/UL (ref 1.8–7.7)
NEUTROPHILS NFR BLD: 69 % (ref 38–73)
NRBC BLD-RTO: 0 /100 WBC
PLATELET # BLD AUTO: 215 K/UL (ref 150–450)
PMV BLD AUTO: 10.8 FL (ref 9.2–12.9)
POTASSIUM SERPL-SCNC: 4 MMOL/L (ref 3.5–5.1)
PROT SERPL-MCNC: 7.1 G/DL (ref 6–8.4)
RBC # BLD AUTO: 4.57 M/UL (ref 4.6–6.2)
SODIUM SERPL-SCNC: 137 MMOL/L (ref 136–145)
WBC # BLD AUTO: 5.24 K/UL (ref 3.9–12.7)

## 2023-04-05 PROCEDURE — 99999 PR PBB SHADOW E&M-EST. PATIENT-LVL IV: CPT | Mod: PBBFAC,,, | Performed by: PODIATRIST

## 2023-04-05 PROCEDURE — 3008F BODY MASS INDEX DOCD: CPT | Mod: CPTII,S$GLB,, | Performed by: PODIATRIST

## 2023-04-05 PROCEDURE — 85652 RBC SED RATE AUTOMATED: CPT | Performed by: INTERNAL MEDICINE

## 2023-04-05 PROCEDURE — 3074F SYST BP LT 130 MM HG: CPT | Mod: CPTII,S$GLB,, | Performed by: PODIATRIST

## 2023-04-05 PROCEDURE — 1160F RVW MEDS BY RX/DR IN RCRD: CPT | Mod: CPTII,S$GLB,, | Performed by: PODIATRIST

## 2023-04-05 PROCEDURE — 80053 COMPREHEN METABOLIC PANEL: CPT | Performed by: INTERNAL MEDICINE

## 2023-04-05 PROCEDURE — 3008F PR BODY MASS INDEX (BMI) DOCUMENTED: ICD-10-PCS | Mod: CPTII,S$GLB,, | Performed by: PODIATRIST

## 2023-04-05 PROCEDURE — 36415 COLL VENOUS BLD VENIPUNCTURE: CPT | Performed by: INTERNAL MEDICINE

## 2023-04-05 PROCEDURE — 99499 NO LOS: ICD-10-PCS | Mod: S$GLB,,, | Performed by: PODIATRIST

## 2023-04-05 PROCEDURE — 11721 PR DEBRIDEMENT OF NAILS, 6 OR MORE: ICD-10-PCS | Mod: S$GLB,,, | Performed by: PODIATRIST

## 2023-04-05 PROCEDURE — 99499 UNLISTED E&M SERVICE: CPT | Mod: S$GLB,,, | Performed by: PODIATRIST

## 2023-04-05 PROCEDURE — 1159F MED LIST DOCD IN RCRD: CPT | Mod: CPTII,S$GLB,, | Performed by: PODIATRIST

## 2023-04-05 PROCEDURE — 11721 DEBRIDE NAIL 6 OR MORE: CPT | Mod: S$GLB,,, | Performed by: PODIATRIST

## 2023-04-05 PROCEDURE — 85025 COMPLETE CBC W/AUTO DIFF WBC: CPT | Performed by: INTERNAL MEDICINE

## 2023-04-05 PROCEDURE — 3044F PR MOST RECENT HEMOGLOBIN A1C LEVEL <7.0%: ICD-10-PCS | Mod: CPTII,S$GLB,, | Performed by: PODIATRIST

## 2023-04-05 PROCEDURE — 3074F PR MOST RECENT SYSTOLIC BLOOD PRESSURE < 130 MM HG: ICD-10-PCS | Mod: CPTII,S$GLB,, | Performed by: PODIATRIST

## 2023-04-05 PROCEDURE — 3079F DIAST BP 80-89 MM HG: CPT | Mod: CPTII,S$GLB,, | Performed by: PODIATRIST

## 2023-04-05 PROCEDURE — 99999 PR PBB SHADOW E&M-EST. PATIENT-LVL IV: ICD-10-PCS | Mod: PBBFAC,,, | Performed by: PODIATRIST

## 2023-04-05 PROCEDURE — 1159F PR MEDICATION LIST DOCUMENTED IN MEDICAL RECORD: ICD-10-PCS | Mod: CPTII,S$GLB,, | Performed by: PODIATRIST

## 2023-04-05 PROCEDURE — 3044F HG A1C LEVEL LT 7.0%: CPT | Mod: CPTII,S$GLB,, | Performed by: PODIATRIST

## 2023-04-05 PROCEDURE — 3079F PR MOST RECENT DIASTOLIC BLOOD PRESSURE 80-89 MM HG: ICD-10-PCS | Mod: CPTII,S$GLB,, | Performed by: PODIATRIST

## 2023-04-05 PROCEDURE — 1160F PR REVIEW ALL MEDS BY PRESCRIBER/CLIN PHARMACIST DOCUMENTED: ICD-10-PCS | Mod: CPTII,S$GLB,, | Performed by: PODIATRIST

## 2023-04-05 PROCEDURE — 86140 C-REACTIVE PROTEIN: CPT | Performed by: INTERNAL MEDICINE

## 2023-04-06 LAB — ERYTHROCYTE [SEDIMENTATION RATE] IN BLOOD BY PHOTOMETRIC METHOD: 18 MM/HR (ref 0–23)

## 2023-04-12 NOTE — PROGRESS NOTES
Subjective:     Patient ID: Sachin Gonzalez is a 57 y.o. male.    Chief Complaint: Nail Care (No c/o pain, Non-diabetic Pt, wears tennis shoes with socks, Last seen on 02/16/23 with PCP Dr. Greenberg )      Sachin is a 57 y.o. male who presents to the clinic for evaluation and treatment of high risk feet. 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 The patient's chief complaint is long, thick toenails. This patient has documented high risk feet requiring routine maintenance secondary to peripheral neuropathy.     PCP: MAHAD Greenberg Jr, MD    Date Last Seen by PCP: 02/16/2023    Current shoe gear:  Affected Foot: Tennis shoes     Unaffected Foot: Tennis shoes    Last encounter in this department: 3/30/2022    Hemoglobin A1C   Date Value Ref Range Status   01/13/2023 5.3 4.0 - 5.6 % Final     Comment:     ADA Screening Guidelines:  5.7-6.4%  Consistent with prediabetes  >or=6.5%  Consistent with diabetes    High levels of fetal hemoglobin interfere with the HbA1C  assay. Heterozygous hemoglobin variants (HbS, HgC, etc)do  not significantly interfere with this assay.   However, presence of multiple variants may affect accuracy.     04/12/2017 5.7 4.5 - 6.2 % Final     Comment:     According to ADA guidelines, hemoglobin A1C <7.0% represents  optimal control in non-pregnant diabetic patients.  Different  metrics may apply to specific populations.   Standards of Medical Care in Diabetes - 2016.  For the purpose of screening for the presence of diabetes:  <5.7%     Consistent with the absence of diabetes  5.7-6.4%  Consistent with increasing risk for diabetes   (prediabetes)  >or=6.5%  Consistent with diabetes  Currently no consensus exists for use of hemoglobin A1C  for diagnosis of diabetes for children.          Patient Active Problem List   Diagnosis    History of prostate cancer    RA (rheumatoid arthritis)    Status post prostatectomy (06/07/12)    Erectile dysfunction    Peyronie's disease    Long-term use of immunosuppressant medication    Cephalic vein thrombosis-post op    Essential hypertension    Tension-type headache, not intractable    Lumbar foraminal stenosis    Parotid sialolithiasis    Gastroesophageal reflux disease without esophagitis    Mixed hyperlipidemia    Melena    Screen for colon cancer    Prostate cancer    Gross hematuria    Stricture of bulbous urethra in male       Medication List with Changes/Refills   Current Medications    ASCORBIC ACID, VITAMIN C, (VITAMIN C) 250 MG TABLET    Take 250 mg by mouth once daily.    CHOLECALCIFEROL, VITAMIN D3, 25 MCG (1,000 UNIT) CHEW    Take by mouth.    FOLIC ACID (FOLVITE) 1 MG TABLET    Take 1 tablet (1,000 mcg total) by mouth once daily.    GABAPENTIN (NEURONTIN) 300 MG CAPSULE    Take 1 capsule (300 mg total) by mouth 2 (two) times daily.    LINACLOTIDE (LINZESS) 72 MCG CAP CAPSULE    Take 1 capsule (72 mcg total) by mouth before breakfast.    METHOTREXATE 2.5 MG TAB    Take 4 tablets (10 mg total) by mouth every 7 days. This medication requires periodic lab monitoring    METHYLPREDNISOLONE (MEDROL DOSEPACK) 4 MG TABLET    use as directed    METOPROLOL SUCCINATE (TOPROL-XL) 50 MG 24 HR TABLET    Take 1 tablet (50 mg total) by mouth once daily.    MULTIVITAMIN CAPSULE    Take 1 capsule by mouth once daily.    SUCRALFATE (CARAFATE) 1 GRAM TABLET    Take 1 tablet (1 g total) by mouth 3 (three) times daily as needed (reflux indigestion).    ZINC SULFATE (ZINC-220 ORAL)    Take 1 tablet by mouth as needed.       Review of patient's allergies indicates:  No Known Allergies    Past Surgical History:   Procedure Laterality Date    COLONOSCOPY N/A 4/21/2021    Procedure: COLONOSCOPY covid test scheduled on 4/19/21;  Surgeon: Darrell Worthington MD;  Location:  "[facility] ENDO;  Service: Endoscopy;  Laterality: N/A;    CYSTOURETHROSCOPY WITH DIRECT VISION INTERNAL URETHROTOMY N/A 5/31/2022    Procedure: CYSTOSCOPY, WITH DIRECT VISION INTERNAL URETHROTOMY;  Surgeon: Yaniv Murray MD;  Location: [facility];  Service: Urology;  Laterality: N/A;    ESOPHAGOGASTRODUODENOSCOPY      ESOPHAGOGASTRODUODENOSCOPY N/A 4/5/2021    Procedure: EGD (ESOPHAGOGASTRODUODENOSCOPY) Rapid Covid test needed;  Surgeon: Darrell Worthington MD;  Location: [facility];  Service: Endoscopy;  Laterality: N/A;    OSTEOTOMY OF METATARSAL BONE Right 11/21/2022    Procedure: OSTEOTOMY, METATARSAL BONE;  Surgeon: Daniel Ramirez DPM;  Location: [facility];  Service: Podiatry;  Laterality: Right;    PROSTATECTOMY  2011    RELEASE OF CONTRACTURE OF JOINT Right 11/21/2022    Procedure: RELEASE, CONTRACTURE, JOINT;  Surgeon: Daniel Ramirez DPM;  Location: [facility];  Service: Podiatry;  Laterality: Right;    RESECTION OF GASTROCNEMIUS MUSCLE Right 11/21/2022    Procedure: RESECTION, MUSCLE, GASTROCNEMIUS;  Surgeon: Daniel Ramirez DPM;  Location: [facility] OR;  Service: Podiatry;  Laterality: Right;       Family History   Problem Relation Age of Onset    Stroke Father     Alcohol abuse Father     Arthritis Mother     Hypertension Mother     Anxiety disorder Mother     No Known Problems Sister     Kidney disease Brother        Social History     Socioeconomic History    Marital status: Single   Tobacco Use    Smoking status: Never    Smokeless tobacco: Never   Substance and Sexual Activity    Alcohol use: Never    Drug use: No    Sexual activity: Yes     Partners: Female   Social History Narrative    No pets or smokers in household.       Vitals:    04/05/23 1400   BP: 129/83   Pulse: 78   Weight: 109.3 kg (241 lb)   Height: 6' 7" (2.007 m)   PainSc: 0-No pain   PainLoc: Foot       Hemoglobin A1C   Date Value Ref Range Status   01/13/2023 5.3 4.0 - 5.6 % Final     Comment:     ADA Screening Guidelines:  5.7-6.4%  Consistent " with prediabetes  >or=6.5%  Consistent with diabetes    High levels of fetal hemoglobin interfere with the HbA1C  assay. Heterozygous hemoglobin variants (HbS, HgC, etc)do  not significantly interfere with this assay.   However, presence of multiple variants may affect accuracy.     04/12/2017 5.7 4.5 - 6.2 % Final     Comment:     According to ADA guidelines, hemoglobin A1C <7.0% represents  optimal control in non-pregnant diabetic patients.  Different  metrics may apply to specific populations.   Standards of Medical Care in Diabetes - 2016.  For the purpose of screening for the presence of diabetes:  <5.7%     Consistent with the absence of diabetes  5.7-6.4%  Consistent with increasing risk for diabetes   (prediabetes)  >or=6.5%  Consistent with diabetes  Currently no consensus exists for use of hemoglobin A1C  for diagnosis of diabetes for children.         Review of Systems   Constitutional:  Negative for chills and fever.   Respiratory:  Negative for shortness of breath.    Cardiovascular:  Negative for chest pain, palpitations, orthopnea, claudication and leg swelling.   Gastrointestinal:  Negative for diarrhea, nausea and vomiting.   Musculoskeletal:  Negative for joint pain.   Skin:  Negative for rash.   Neurological:  Positive for tingling and sensory change.   Psychiatric/Behavioral: Negative.         Objective:      PHYSICAL EXAM: Apperance: Alert and orient in no distress,well developed, and with good attention to grooming and body habits    LOWER EXTREMITY EXAM:  VASCULAR: Dorsalis pedis pulses 2/4 bilateral and Posterior Tibial pulses 2/4 bilateral. Capillary fill time <4 seconds bilateral. No edema observed bilateral. Varicosities absent bilateral. Skin temperature of the lower extremities is warm to warm, proximal to distal. Hair growth WNL bilateral.  DERMATOLOGICAL: No skin rashes, subcutaneous nodules, lesions, or ulcers observed bilateral. Nails 1,3,4,5 bilateral elongated, thickened, and  discolored with subungual debris. Webspaces 1,2,3,4 bilateral clean, dry and without evidence of break in skin integrity.   NEUROLOGICAL: Light touch, sharp-dull, proprioception all present and equal bilaterally.  Vibratory sensation diminished at bilateral hallux and intact at bilateral navicular. Protective sensation absent at 6/10 sites as tested with a Colon-Ansley 5.07 monofilament.   MUSCULOSKELETAL: Muscle strength is 5/5 for foot inverters, everters, plantarflexors, and dorsiflexors. Muscle tone is normal.           Assessment:       ICD-10-CM ICD-9-CM   1. Idiopathic peripheral neuropathy  G60.9 356.9   2. Onychomycosis of toenail  B35.1 110.1         Plan:   Idiopathic peripheral neuropathy    Onychomycosis of toenail    I counseled the patient on his conditions, regarding findings of my examination, my impressions, and usual treatment plan.   Appointment spent on education about the neuropathy, and prevention of limb loss.  Shoe inspection. Patient instructed on proper foot hygeine. We discussed wearing proper shoe gear, daily foot inspections, never walking without protective shoe gear, never putting sharp instruments to feet.    With patient's permission, nails 1-5 bilateral were debrided/trimmed in length and thickness aggressively to their soft tissue attachment mechanically and with electric , removing all offending nail and debris. Patient relates relief following the procedure.  Patient  will continue to monitor the areas daily, inspect feet, wear protective shoe gear when ambulatory, moisturizer to maintain skin integrity.  Patient to return 2 months or sooner if needed.          Reina Guzman DPM  [facility]

## 2023-04-13 ENCOUNTER — PATIENT MESSAGE (OUTPATIENT)
Dept: PODIATRY | Facility: CLINIC | Age: 58
End: 2023-04-13
Payer: COMMERCIAL

## 2023-04-13 DIAGNOSIS — Z98.890 HISTORY OF TOE SURGERY: Primary | ICD-10-CM

## 2023-04-17 ENCOUNTER — PATIENT MESSAGE (OUTPATIENT)
Dept: PODIATRY | Facility: CLINIC | Age: 58
End: 2023-04-17
Payer: COMMERCIAL

## 2023-04-18 ENCOUNTER — HOSPITAL ENCOUNTER (OUTPATIENT)
Dept: RADIOLOGY | Facility: HOSPITAL | Age: 58
Discharge: HOME | End: 2023-04-18
Attending: PODIATRIST
Payer: COMMERCIAL

## 2023-04-18 DIAGNOSIS — Z98.890 HISTORY OF TOE SURGERY: ICD-10-CM

## 2023-04-18 PROCEDURE — 73630 XR FOOT COMPLETE 3 VIEW RIGHT: ICD-10-PCS | Mod: 26,RT,, | Performed by: RADIOLOGY

## 2023-04-18 PROCEDURE — 73630 X-RAY EXAM OF FOOT: CPT | Mod: TC,FY,PO,RT

## 2023-04-18 PROCEDURE — 73630 X-RAY EXAM OF FOOT: CPT | Mod: 26,RT,, | Performed by: RADIOLOGY

## 2023-05-09 ENCOUNTER — LAB VISIT (OUTPATIENT)
Dept: LAB | Facility: HOSPITAL | Age: 58
End: 2023-05-09
Attending: PEDIATRICS
Payer: COMMERCIAL

## 2023-05-09 ENCOUNTER — CLINICAL SUPPORT (OUTPATIENT)
Dept: INTERNAL MEDICINE | Facility: CLINIC | Age: 58
End: 2023-05-09
Payer: COMMERCIAL

## 2023-05-09 DIAGNOSIS — Z12.5 PROSTATE CANCER SCREENING: ICD-10-CM

## 2023-05-09 DIAGNOSIS — Z13.220 LIPID SCREENING: ICD-10-CM

## 2023-05-09 LAB
CHOLEST SERPL-MCNC: 194 MG/DL (ref 120–199)
CHOLEST/HDLC SERPL: 4.4 {RATIO} (ref 2–5)
COMPLEXED PSA SERPL-MCNC: <0.01 NG/ML (ref 0–4)
HDLC SERPL-MCNC: 44 MG/DL (ref 40–75)
HDLC SERPL: 22.7 % (ref 20–50)
LDLC SERPL CALC-MCNC: 132.8 MG/DL (ref 63–159)
NONHDLC SERPL-MCNC: 150 MG/DL
TRIGL SERPL-MCNC: 86 MG/DL (ref 30–150)

## 2023-05-09 PROCEDURE — 90677 PNEUMOCOCCAL CONJUGATE VACCINE 20-VALENT: ICD-10-PCS | Mod: S$GLB,,, | Performed by: PEDIATRICS

## 2023-05-09 PROCEDURE — 90471 IMMUNIZATION ADMIN: CPT | Mod: S$GLB,,, | Performed by: PEDIATRICS

## 2023-05-09 PROCEDURE — 84153 ASSAY OF PSA TOTAL: CPT | Performed by: PEDIATRICS

## 2023-05-09 PROCEDURE — 36415 COLL VENOUS BLD VENIPUNCTURE: CPT | Performed by: PEDIATRICS

## 2023-05-09 PROCEDURE — 80061 LIPID PANEL: CPT | Performed by: PEDIATRICS

## 2023-05-09 PROCEDURE — 90677 PCV20 VACCINE IM: CPT | Mod: S$GLB,,, | Performed by: PEDIATRICS

## 2023-05-09 PROCEDURE — 99999 PR PBB SHADOW E&M-EST. PATIENT-LVL I: ICD-10-PCS | Mod: PBBFAC,,,

## 2023-05-09 PROCEDURE — 90471 PNEUMOCOCCAL CONJUGATE VACCINE 20-VALENT: ICD-10-PCS | Mod: S$GLB,,, | Performed by: PEDIATRICS

## 2023-05-09 PROCEDURE — 99999 PR PBB SHADOW E&M-EST. PATIENT-LVL I: CPT | Mod: PBBFAC,,,

## 2023-05-09 NOTE — PROGRESS NOTES
Pt came into clinic for PCV 20 vaccine. Vaccine administered, pt tolerated well. Advised pt to wait in clinic at least 15 minutes in case of allergic reaction and after they are okay to go. Pt verbalized understanding.

## 2023-05-11 ENCOUNTER — PATIENT MESSAGE (OUTPATIENT)
Dept: PODIATRY | Facility: CLINIC | Age: 58
End: 2023-05-11
Payer: COMMERCIAL

## 2023-05-15 ENCOUNTER — TELEPHONE (OUTPATIENT)
Dept: INTERNAL MEDICINE | Facility: CLINIC | Age: 58
End: 2023-05-15
Payer: COMMERCIAL

## 2023-05-17 ENCOUNTER — OFFICE VISIT (OUTPATIENT)
Dept: ORTHOPEDICS | Facility: CLINIC | Age: 58
End: 2023-05-17
Payer: COMMERCIAL

## 2023-05-17 VITALS — BODY MASS INDEX: 27.88 KG/M2 | WEIGHT: 241 LBS | HEIGHT: 78 IN

## 2023-05-17 DIAGNOSIS — M65.30 TRIGGER FINGER, ACQUIRED: Primary | ICD-10-CM

## 2023-05-17 PROCEDURE — 1159F PR MEDICATION LIST DOCUMENTED IN MEDICAL RECORD: ICD-10-PCS | Mod: CPTII,S$GLB,, | Performed by: ORTHOPAEDIC SURGERY

## 2023-05-17 PROCEDURE — 20550 TENDON SHEATH: ICD-10-PCS | Mod: F5,S$GLB,, | Performed by: ORTHOPAEDIC SURGERY

## 2023-05-17 PROCEDURE — 3044F PR MOST RECENT HEMOGLOBIN A1C LEVEL <7.0%: ICD-10-PCS | Mod: CPTII,S$GLB,, | Performed by: ORTHOPAEDIC SURGERY

## 2023-05-17 PROCEDURE — 3008F PR BODY MASS INDEX (BMI) DOCUMENTED: ICD-10-PCS | Mod: CPTII,S$GLB,, | Performed by: ORTHOPAEDIC SURGERY

## 2023-05-17 PROCEDURE — 99213 PR OFFICE/OUTPT VISIT, EST, LEVL III, 20-29 MIN: ICD-10-PCS | Mod: 25,S$GLB,, | Performed by: ORTHOPAEDIC SURGERY

## 2023-05-17 PROCEDURE — 1159F MED LIST DOCD IN RCRD: CPT | Mod: CPTII,S$GLB,, | Performed by: ORTHOPAEDIC SURGERY

## 2023-05-17 PROCEDURE — 20550 NJX 1 TENDON SHEATH/LIGAMENT: CPT | Mod: F5,S$GLB,, | Performed by: ORTHOPAEDIC SURGERY

## 2023-05-17 PROCEDURE — 1160F PR REVIEW ALL MEDS BY PRESCRIBER/CLIN PHARMACIST DOCUMENTED: ICD-10-PCS | Mod: CPTII,S$GLB,, | Performed by: ORTHOPAEDIC SURGERY

## 2023-05-17 PROCEDURE — 3044F HG A1C LEVEL LT 7.0%: CPT | Mod: CPTII,S$GLB,, | Performed by: ORTHOPAEDIC SURGERY

## 2023-05-17 PROCEDURE — 1160F RVW MEDS BY RX/DR IN RCRD: CPT | Mod: CPTII,S$GLB,, | Performed by: ORTHOPAEDIC SURGERY

## 2023-05-17 PROCEDURE — 99999 PR PBB SHADOW E&M-EST. PATIENT-LVL III: CPT | Mod: PBBFAC,,, | Performed by: ORTHOPAEDIC SURGERY

## 2023-05-17 PROCEDURE — 99213 OFFICE O/P EST LOW 20 MIN: CPT | Mod: 25,S$GLB,, | Performed by: ORTHOPAEDIC SURGERY

## 2023-05-17 PROCEDURE — 3008F BODY MASS INDEX DOCD: CPT | Mod: CPTII,S$GLB,, | Performed by: ORTHOPAEDIC SURGERY

## 2023-05-17 PROCEDURE — 99999 PR PBB SHADOW E&M-EST. PATIENT-LVL III: ICD-10-PCS | Mod: PBBFAC,,, | Performed by: ORTHOPAEDIC SURGERY

## 2023-05-17 RX ORDER — TRIAMCINOLONE ACETONIDE 40 MG/ML
40 INJECTION, SUSPENSION INTRA-ARTICULAR; INTRAMUSCULAR
Status: DISCONTINUED | OUTPATIENT
Start: 2023-05-17 | End: 2023-05-17 | Stop reason: HOSPADM

## 2023-05-17 RX ADMIN — TRIAMCINOLONE ACETONIDE 40 MG: 40 INJECTION, SUSPENSION INTRA-ARTICULAR; INTRAMUSCULAR at 01:05

## 2023-05-17 NOTE — PROGRESS NOTES
Subjective:     Patient ID: Sachin Gonzalez is a 57 y.o. male.    Chief Complaint: Pain of the Right Hand      HPI:  The patient is a 57-year-old male with bilateral trigger thumbs last injected 07/12/2022 with good results.  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     Past Surgical History:   Procedure Laterality Date    COLONOSCOPY N/A 4/21/2021    Procedure: COLONOSCOPY covid test scheduled on 4/19/21;  Surgeon: Darrell Worthington MD;  Location: [facility];  Service: Endoscopy;  Laterality: N/A;    CYSTOURETHROSCOPY WITH DIRECT VISION INTERNAL URETHROTOMY N/A 5/31/2022    Procedure: CYSTOSCOPY, WITH DIRECT VISION INTERNAL URETHROTOMY;  Surgeon: Yaniv Murray MD;  Location: [facility];  Service: Urology;  Laterality: N/A;    ESOPHAGOGASTRODUODENOSCOPY      ESOPHAGOGASTRODUODENOSCOPY N/A 4/5/2021    Procedure: EGD (ESOPHAGOGASTRODUODENOSCOPY) Rapid Covid test needed;  Surgeon: Darrell Worthington MD;  Location: [facility];  Service: Endoscopy;  Laterality: N/A;    OSTEOTOMY OF METATARSAL BONE Right 11/21/2022    Procedure: OSTEOTOMY, METATARSAL BONE;  Surgeon: Daniel Ramirez DPM;  Location: [facility] OR;  Service: Podiatry;  Laterality: Right;    PROSTATECTOMY  2011    RELEASE OF CONTRACTURE OF JOINT Right 11/21/2022    Procedure: RELEASE, CONTRACTURE, JOINT;  Surgeon: Daniel Ramirez DPM;  Location: [facility] OR;  Service: Podiatry;  Laterality: Right;    RESECTION OF GASTROCNEMIUS MUSCLE Right 11/21/2022    Procedure: RESECTION, MUSCLE, GASTROCNEMIUS;  Surgeon: Daniel Ramirez DPM;  Location: [facility] OR;  Service: Podiatry;  Laterality: Right;     Family History   Problem Relation Age of Onset    Stroke Father      Alcohol abuse Father     Arthritis Mother     Hypertension Mother     Anxiety disorder Mother     No Known Problems Sister     Kidney disease Brother      Social History     Socioeconomic History    Marital status: Single   Tobacco Use    Smoking status: Never    Smokeless tobacco: Never   Substance and Sexual Activity    Alcohol use: Never    Drug use: No    Sexual activity: Yes     Partners: Female   Social History Narrative    No pets or smokers in household.     Medication List with Changes/Refills   Current Medications    ASCORBIC ACID, VITAMIN C, (VITAMIN C) 250 MG TABLET    Take 250 mg by mouth once daily.    CHOLECALCIFEROL, VITAMIN D3, 25 MCG (1,000 UNIT) CHEW    Take by mouth.    FOLIC ACID (FOLVITE) 1 MG TABLET    Take 1 tablet (1,000 mcg total) by mouth once daily.    GABAPENTIN (NEURONTIN) 300 MG CAPSULE    Take 1 capsule (300 mg total) by mouth 2 (two) times daily.    LINACLOTIDE (LINZESS) 72 MCG CAP CAPSULE    Take 1 capsule (72 mcg total) by mouth before breakfast.    METHOTREXATE 2.5 MG TAB    Take 4 tablets (10 mg total) by mouth every 7 days. This medication requires periodic lab monitoring    METHYLPREDNISOLONE (MEDROL DOSEPACK) 4 MG TABLET    use as directed    METOPROLOL SUCCINATE (TOPROL-XL) 50 MG 24 HR TABLET    Take 1 tablet (50 mg total) by mouth once daily.    MULTIVITAMIN CAPSULE    Take 1 capsule by mouth once daily.    SUCRALFATE (CARAFATE) 1 GRAM TABLET    Take 1 tablet (1 g total) by mouth 3 (three) times daily as needed (reflux indigestion).    ZINC SULFATE (ZINC-220 ORAL)    Take 1 tablet by mouth as needed.     Review of patient's allergies indicates:  No Known Allergies  Review of Systems   Constitutional: Negative for malaise/fatigue.   HENT:  Negative for hearing loss.    Eyes:  Negative for double vision and visual disturbance.   Cardiovascular:  Negative for chest pain.   Respiratory:  Negative for shortness of breath.    Endocrine: Negative for cold intolerance.    Hematologic/Lymphatic: Does not bruise/bleed easily.   Skin:  Negative for poor wound healing and suspicious lesions.   Musculoskeletal:  Positive for arthritis, back pain, joint pain and joint swelling. Negative for gout.   Gastrointestinal:  Positive for heartburn. Negative for nausea and vomiting.   Genitourinary:  Positive for hematuria. Negative for dysuria.   Neurological:  Negative for numbness, paresthesias and sensory change.   Psychiatric/Behavioral:  Negative for depression, memory loss and substance abuse. The patient is not nervous/anxious.    Allergic/Immunologic: Negative for persistent infections.     Objective:   Body mass index is 27.15 kg/m².  There were no vitals filed for this visit.             General    Constitutional: He is oriented to person, place, and time. He appears well-developed and well-nourished. No distress.   HENT:   Head: Normocephalic.   Mouth/Throat: Oropharynx is clear and moist.   Eyes: EOM are normal.   Cardiovascular:  Normal rate.            Pulmonary/Chest: Effort normal.   Abdominal: Soft.   Neurological: He is alert and oriented to person, place, and time. No cranial nerve deficit.   Psychiatric: He has a normal mood and affect.             Right Hand/Wrist Exam     Inspection   Scars: Wrist - absent Hand -  absent  Effusion: Wrist - absent Hand -  absent    Pain   Hand - The patient exhibits pain of the thumb MCP.    Other     Neuorologic Exam    Median Distribution: normal  Ulnar Distribution: normal  Radial Distribution: normal    Comments:  The right thumb has active triggering.  There is a palpable nodule that moves with tendon excursion.          Vascular Exam       Capillary Refill  Right Hand: normal capillary refill      Relevant imaging results reviewed and interpreted by me, discussed with the patient and / or family today radiographs were not obtained today  Assessment:     Encounter Diagnosis   Name Primary?    Trigger finger, acquired Yes      Right  trigger thumb  Plan:       The patient was injected right trigger thumb with 0.5 cc of Kenalog and 0.5 cc of 2% plain lidocaine under sterile technique.  He was counseled regarding a right trigger thumb release.  Risk complications and alternatives were discussed including the risk of infection, anesthetic risk injury to nerves and vessels, loss of motion, and possible need for additional surgeries were discussed.  He seems to understand and agree that surgery.  All questions were answered.              Disclaimer: This note was prepared using a voice recognition system and is likely to have sound alike errors within the text.

## 2023-05-17 NOTE — PROCEDURES
Tendon Sheath    Date/Time: 5/17/2023 1:30 PM  Performed by: Obi Connell MD  Authorized by: Obi Connell MD     Consent Done?:  Yes (Verbal)  Indications:  Pain  Timeout: prior to procedure the correct patient, procedure, and site was verified    Prep: patient was prepped and draped in usual sterile fashion      Local anesthesia used?: Yes    Local anesthetic:  Lidocaine 2% without epinephrine  Anesthetic total (ml):  0.5    Location:  Thumb  Site:  R thumb flexor tendon sheath  Ultrasonic guidance for needle placement?: No    Needle size:  25 G  Approach:  Volar  Medications:  40 mg triamcinolone acetonide 40 mg/mL

## 2023-05-31 ENCOUNTER — OFFICE VISIT (OUTPATIENT)
Dept: INTERNAL MEDICINE | Facility: CLINIC | Age: 58
End: 2023-05-31
Payer: COMMERCIAL

## 2023-05-31 ENCOUNTER — HOSPITAL ENCOUNTER (OUTPATIENT)
Dept: RADIOLOGY | Facility: HOSPITAL | Age: 58
Discharge: HOME | End: 2023-05-31
Attending: PEDIATRICS
Payer: COMMERCIAL

## 2023-05-31 VITALS
SYSTOLIC BLOOD PRESSURE: 126 MMHG | HEIGHT: 78 IN | DIASTOLIC BLOOD PRESSURE: 82 MMHG | TEMPERATURE: 98 F | HEART RATE: 84 BPM | BODY MASS INDEX: 27.42 KG/M2 | WEIGHT: 237 LBS

## 2023-05-31 DIAGNOSIS — C61 PROSTATE CANCER: ICD-10-CM

## 2023-05-31 DIAGNOSIS — M79.89 RIGHT LEG SWELLING: ICD-10-CM

## 2023-05-31 DIAGNOSIS — D84.821 DRUG-INDUCED IMMUNODEFICIENCY: ICD-10-CM

## 2023-05-31 DIAGNOSIS — Z79.899 DRUG-INDUCED IMMUNODEFICIENCY: ICD-10-CM

## 2023-05-31 DIAGNOSIS — G44.229 CHRONIC TENSION-TYPE HEADACHE, NOT INTRACTABLE: Primary | ICD-10-CM

## 2023-05-31 DIAGNOSIS — R26.9 GAIT DISTURBANCE: ICD-10-CM

## 2023-05-31 DIAGNOSIS — Z00.00 WELL ADULT EXAM: ICD-10-CM

## 2023-05-31 PROCEDURE — 3044F PR MOST RECENT HEMOGLOBIN A1C LEVEL <7.0%: ICD-10-PCS | Mod: CPTII,S$GLB,, | Performed by: PEDIATRICS

## 2023-05-31 PROCEDURE — 93971 EXTREMITY STUDY: CPT | Mod: TC,RT

## 2023-05-31 PROCEDURE — 93971 US LOWER EXTREMITY VEINS RIGHT: ICD-10-PCS | Mod: 26,RT,, | Performed by: STUDENT IN AN ORGANIZED HEALTH CARE EDUCATION/TRAINING PROGRAM

## 2023-05-31 PROCEDURE — 3079F DIAST BP 80-89 MM HG: CPT | Mod: CPTII,S$GLB,, | Performed by: PEDIATRICS

## 2023-05-31 PROCEDURE — 3044F HG A1C LEVEL LT 7.0%: CPT | Mod: CPTII,S$GLB,, | Performed by: PEDIATRICS

## 2023-05-31 PROCEDURE — 1160F RVW MEDS BY RX/DR IN RCRD: CPT | Mod: CPTII,S$GLB,, | Performed by: PEDIATRICS

## 2023-05-31 PROCEDURE — 99396 PREV VISIT EST AGE 40-64: CPT | Mod: S$GLB,,, | Performed by: PEDIATRICS

## 2023-05-31 PROCEDURE — 99396 PR PREVENTIVE VISIT,EST,40-64: ICD-10-PCS | Mod: S$GLB,,, | Performed by: PEDIATRICS

## 2023-05-31 PROCEDURE — 3074F PR MOST RECENT SYSTOLIC BLOOD PRESSURE < 130 MM HG: ICD-10-PCS | Mod: CPTII,S$GLB,, | Performed by: PEDIATRICS

## 2023-05-31 PROCEDURE — 93971 EXTREMITY STUDY: CPT | Mod: 26,RT,, | Performed by: STUDENT IN AN ORGANIZED HEALTH CARE EDUCATION/TRAINING PROGRAM

## 2023-05-31 PROCEDURE — 3074F SYST BP LT 130 MM HG: CPT | Mod: CPTII,S$GLB,, | Performed by: PEDIATRICS

## 2023-05-31 PROCEDURE — 1159F PR MEDICATION LIST DOCUMENTED IN MEDICAL RECORD: ICD-10-PCS | Mod: CPTII,S$GLB,, | Performed by: PEDIATRICS

## 2023-05-31 PROCEDURE — 99999 PR PBB SHADOW E&M-EST. PATIENT-LVL V: ICD-10-PCS | Mod: PBBFAC,,, | Performed by: PEDIATRICS

## 2023-05-31 PROCEDURE — 99999 PR PBB SHADOW E&M-EST. PATIENT-LVL V: CPT | Mod: PBBFAC,,, | Performed by: PEDIATRICS

## 2023-05-31 PROCEDURE — 3079F PR MOST RECENT DIASTOLIC BLOOD PRESSURE 80-89 MM HG: ICD-10-PCS | Mod: CPTII,S$GLB,, | Performed by: PEDIATRICS

## 2023-05-31 PROCEDURE — 3008F BODY MASS INDEX DOCD: CPT | Mod: CPTII,S$GLB,, | Performed by: PEDIATRICS

## 2023-05-31 PROCEDURE — 1160F PR REVIEW ALL MEDS BY PRESCRIBER/CLIN PHARMACIST DOCUMENTED: ICD-10-PCS | Mod: CPTII,S$GLB,, | Performed by: PEDIATRICS

## 2023-05-31 PROCEDURE — 1159F MED LIST DOCD IN RCRD: CPT | Mod: CPTII,S$GLB,, | Performed by: PEDIATRICS

## 2023-05-31 PROCEDURE — 3008F PR BODY MASS INDEX (BMI) DOCUMENTED: ICD-10-PCS | Mod: CPTII,S$GLB,, | Performed by: PEDIATRICS

## 2023-05-31 NOTE — PROGRESS NOTES
Subjective     Patient ID: Sachin Gonzalez is a 57 y.o. male.    Chief Complaint: leg stiffness    Sachin Gonzalez is a 57 y.o. male who presents to the clinic for leg stiffness and pain. Pt states that his R leg and foot are swelling and warm to the touch. No sores or drainage.Pt had surgery on the R leg and foot November 21st at [facility]. Feels he has some improvement in sxs since surgery but still experiences pain and stiffness in the area. He feels he can't resume running as he had in past. He also is concerned that he might have a blood clot. He was also scheduled for his annual.       1)Hx of prostate cancer/Erectile dysfunction/Peyronie's disease/Stricture of bulbous urethra in male: had prostatectomy 2011, PSA un measurable, followed by urology  2) RA/Long-term use of immunosuppressant medication: on methotrexate, overdue to follow up with rheum  3) HTN: on Metoprolol, BP good, no HTNive sxs  4) Lumbar foraminal stenosis: occasionally Sx, takes PRN ibuprofen. Not taking Gabapentin.  5) GERD: PRN Carafate   6)Mixed hyperlipidemia: diet controlled  7)Chronic tension headache: recent flare this week, does not like taking any pain medicines for.              Review of Systems   Constitutional:  Negative for chills, diaphoresis, fatigue and fever.   HENT:  Negative for sore throat and trouble swallowing.    Respiratory:  Negative for cough and shortness of breath.    Cardiovascular:  Positive for leg swelling. Negative for chest pain.   Gastrointestinal:  Negative for abdominal pain, anal bleeding, constipation, diarrhea, nausea and vomiting.   Endocrine: Negative for cold intolerance, heat intolerance, polydipsia, polyphagia and polyuria.   Musculoskeletal:  Positive for leg pain.   Neurological:  Positive for headaches.   All other systems reviewed and are negative.       Objective     Physical Exam  Constitutional:       General: He is not in acute distress.     Appearance: Normal appearance. He is not  ill-appearing or toxic-appearing.   HENT:      Right Ear: Tympanic membrane and ear canal normal.      Left Ear: Tympanic membrane and ear canal normal.      Nose: Nose normal. No congestion or rhinorrhea.      Mouth/Throat:      Pharynx: No oropharyngeal exudate or posterior oropharyngeal erythema.   Eyes:      Extraocular Movements: Extraocular movements intact.      Conjunctiva/sclera: Conjunctivae normal.      Pupils: Pupils are equal, round, and reactive to light.      Comments: No temporal artery tenderness   Cardiovascular:      Rate and Rhythm: Normal rate and regular rhythm.      Pulses: Normal pulses.      Heart sounds: Normal heart sounds. No murmur heard.    No friction rub. No gallop.   Pulmonary:      Effort: Pulmonary effort is normal.      Breath sounds: Normal breath sounds.   Abdominal:      General: There is no distension.      Palpations: There is no mass.      Tenderness: There is no abdominal tenderness. There is no guarding or rebound.      Hernia: No hernia is present.   Musculoskeletal:      Right lower leg: Edema (trace) present.      Left lower leg: No edema.      Comments: Hyperpigmentation of his incisions from surgery on calf and distal foot. Also has chronic scarring from old injuries on lentz. Negative Homann's, negative varicosities, and dorsalis pedis intact.    Neurological:      Mental Status: He is alert and oriented to person, place, and time.   Psychiatric:         Mood and Affect: Mood normal.         Behavior: Behavior normal.         Thought Content: Thought content normal.         Judgment: Judgment normal.          Assessment and Plan     1. Chronic tension-type headache, not intractable    2. Right leg swelling  -     US Lower Extremity Veins Right; Future; Expected date: 05/31/2023    3. Gait disturbance  -     Ambulatory referral/consult to Physical/Occupational Therapy; Future; Expected date: 06/07/2023    4. Drug-induced immunodeficiency    5. Prostate cancer  -      PSA, Screening; Future; Expected date: 05/31/2023    6. Well adult exam  -     Lipid Panel; Future; Expected date: 05/31/2023  -     Comprehensive Metabolic Panel; Future; Expected date: 05/31/2023        HMI discussed with pt. I have recommended OTC pain meds for his tension headache. Obtain venous US of R leg to make sure no clot but I suspect he has post surgical changes that may not fully clear with time. Start PT to work towards normal activities. F/U as needed with podiatry. Maintain baseline medicines. Follow up yearly. Shingles vaccine recommended.          Follow up in about 1 year (around 5/31/2024).

## 2023-06-01 ENCOUNTER — PATIENT MESSAGE (OUTPATIENT)
Dept: PODIATRY | Facility: CLINIC | Age: 58
End: 2023-06-01
Payer: COMMERCIAL

## 2023-06-02 ENCOUNTER — OFFICE VISIT (OUTPATIENT)
Dept: UROLOGY | Facility: CLINIC | Age: 58
End: 2023-06-02
Payer: COMMERCIAL

## 2023-06-02 VITALS
HEIGHT: 78 IN | SYSTOLIC BLOOD PRESSURE: 113 MMHG | WEIGHT: 236 LBS | BODY MASS INDEX: 27.31 KG/M2 | HEART RATE: 96 BPM | DIASTOLIC BLOOD PRESSURE: 76 MMHG

## 2023-06-02 DIAGNOSIS — N35.812 OTHER STRICTURE OF BULBOUS URETHRA IN MALE: Primary | ICD-10-CM

## 2023-06-02 PROCEDURE — 99214 PR OFFICE/OUTPT VISIT, EST, LEVL IV, 30-39 MIN: ICD-10-PCS | Mod: S$GLB,,, | Performed by: UROLOGY

## 2023-06-02 PROCEDURE — 3078F PR MOST RECENT DIASTOLIC BLOOD PRESSURE < 80 MM HG: ICD-10-PCS | Mod: CPTII,S$GLB,, | Performed by: UROLOGY

## 2023-06-02 PROCEDURE — 3074F PR MOST RECENT SYSTOLIC BLOOD PRESSURE < 130 MM HG: ICD-10-PCS | Mod: CPTII,S$GLB,, | Performed by: UROLOGY

## 2023-06-02 PROCEDURE — 1159F MED LIST DOCD IN RCRD: CPT | Mod: CPTII,S$GLB,, | Performed by: UROLOGY

## 2023-06-02 PROCEDURE — 99214 OFFICE O/P EST MOD 30 MIN: CPT | Mod: S$GLB,,, | Performed by: UROLOGY

## 2023-06-02 PROCEDURE — 1160F PR REVIEW ALL MEDS BY PRESCRIBER/CLIN PHARMACIST DOCUMENTED: ICD-10-PCS | Mod: CPTII,S$GLB,, | Performed by: UROLOGY

## 2023-06-02 PROCEDURE — 3044F PR MOST RECENT HEMOGLOBIN A1C LEVEL <7.0%: ICD-10-PCS | Mod: CPTII,S$GLB,, | Performed by: UROLOGY

## 2023-06-02 PROCEDURE — 3078F DIAST BP <80 MM HG: CPT | Mod: CPTII,S$GLB,, | Performed by: UROLOGY

## 2023-06-02 PROCEDURE — 3044F HG A1C LEVEL LT 7.0%: CPT | Mod: CPTII,S$GLB,, | Performed by: UROLOGY

## 2023-06-02 PROCEDURE — 1159F PR MEDICATION LIST DOCUMENTED IN MEDICAL RECORD: ICD-10-PCS | Mod: CPTII,S$GLB,, | Performed by: UROLOGY

## 2023-06-02 PROCEDURE — 3008F BODY MASS INDEX DOCD: CPT | Mod: CPTII,S$GLB,, | Performed by: UROLOGY

## 2023-06-02 PROCEDURE — 3074F SYST BP LT 130 MM HG: CPT | Mod: CPTII,S$GLB,, | Performed by: UROLOGY

## 2023-06-02 PROCEDURE — 99999 PR PBB SHADOW E&M-EST. PATIENT-LVL III: ICD-10-PCS | Mod: PBBFAC,,, | Performed by: UROLOGY

## 2023-06-02 PROCEDURE — 99999 PR PBB SHADOW E&M-EST. PATIENT-LVL III: CPT | Mod: PBBFAC,,, | Performed by: UROLOGY

## 2023-06-02 PROCEDURE — 3008F PR BODY MASS INDEX (BMI) DOCUMENTED: ICD-10-PCS | Mod: CPTII,S$GLB,, | Performed by: UROLOGY

## 2023-06-02 PROCEDURE — 1160F RVW MEDS BY RX/DR IN RCRD: CPT | Mod: CPTII,S$GLB,, | Performed by: UROLOGY
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
## 2023-06-02 NOTE — PROGRESS NOTES
Chief Complaint:   Encounter Diagnosis   Name Primary?    Other stricture of bulbous urethra in male Yes         HPI:   06/02/2023 - patient returns today for follow-up, notes that his stream has worsened, started about two months ago, notes that his stream has no pressure behind it and that he has decreased emptying, denies any gross hematuria or dysuria, recent PSA remains undetectable    8/4/22- patient states his flow is much better, still having a couple episodes of gross hematuria usually after a bowel movement.  Not due as of yet for a PSA.      Allergies:  No known allergies    Medications:  has a current medication list which includes the following prescription(s): pantoprazole, sucralfate, zinc sulfate, ascorbic acid (vitamin c), cholecalciferol (vitamin d3), famotidine, folic acid, hydrochlorothiazide, ibuprofen, magnesium, methotrexate, and metoprolol succinate.    Review of Systems:  General: No fever, chills  Skin: No rashes  Chest:  Denies cough and sputum production  Heart: Denies chest pain  Resp: Denies dyspnea  Abdomen: Denies diarrhea, abdominal pain, hematemesis, or blood in stool.  Musculoskeletal: No joint stiffness or swelling. Denies back pain.  : see HPI  Neuro: no dizziness or weakness      PMH:   has a past medical history of GERD (gastroesophageal reflux disease), Hypertension, Prostate cancer, RA (rheumatoid arthritis), Traumatic osteoarthritis of ankle or foot (8/23/2012), and Traumatic osteoarthritis of knee or lower leg (8/23/2012).    PSH:   has a past surgical history that includes Prostatectomy (2011) and Esophagogastroduodenoscopy.    FamHx: family history includes Alcohol abuse in his father; Anxiety disorder in his mother; Arthritis in his mother; Hypertension in his mother; Kidney disease in his brother; No Known Problems in his sister; Stroke in his father.    SocHx:  reports that he has never smoked. He has never used smokeless tobacco. He reports that he does not drink  alcohol and does not use drugs.      Physical Exam:  There were no vitals filed for this visit.  General: A&Ox3, no apparent distress, no deformities  Neck: No masses, normal thyroid  Lungs: normal inspiration, no use of accessory muscles  Heart: normal pulse, no arrhythmias  Abdomen: Soft, NT, ND  Skin: The skin is warm and dry. No jaundice.  Ext: No c/c/e.    Labs/Studies:   pvr 26 ml 12/21  Cystoscopy normal 5/21  PSA <0.01 12/21  CT urogram bladder outflow obstruction 3/21    Impression/Plan:      1. Prostate cancer-  RP pT3b Gl7 with adjuvant XRT  6/7/12    PSA has been stable, repeat in 4 months.    2. Urethral stricture disease-  DVIU  5/31/22, sounds like his urethral stricture has recurred, recommend proceeding with office cystoscopy as the initial step, patient will look at his schedule and message us with a date that is convenient for him    Patient believes that is flow is better after our procedure, call with a recurrence.    3. Gross hematuria- structural evaluation relatively clear, continue to monitor expectantly.  Concerned that this could be due to radiation changes, episodes now occur immediately following a bowel movement.  Instructed him to keep his stools clear, avoid constipation and increase hydration.  If this were to occur or become worse, he will need to contact us immediately.    4. Erectile dysfunction- MARÍA works but not sufficient.  Cialis, trimix and muse were all a failure.  At this point this is not an issue.

## 2023-06-06 ENCOUNTER — OFFICE VISIT (OUTPATIENT)
Dept: RHEUMATOLOGY | Facility: CLINIC | Age: 58
End: 2023-06-06
Payer: COMMERCIAL

## 2023-06-06 VITALS
DIASTOLIC BLOOD PRESSURE: 88 MMHG | HEIGHT: 78 IN | HEART RATE: 70 BPM | WEIGHT: 242.94 LBS | BODY MASS INDEX: 28.11 KG/M2 | SYSTOLIC BLOOD PRESSURE: 126 MMHG

## 2023-06-06 DIAGNOSIS — M15.9 GENERALIZED OSTEOARTHRITIS OF MULTIPLE SITES: ICD-10-CM

## 2023-06-06 DIAGNOSIS — K29.60 BILE REFLUX GASTRITIS: ICD-10-CM

## 2023-06-06 DIAGNOSIS — Z55.9 EDUCATIONAL CIRCUMSTANCE: ICD-10-CM

## 2023-06-06 DIAGNOSIS — Z79.631 LONG TERM METHOTREXATE USER: ICD-10-CM

## 2023-06-06 DIAGNOSIS — M05.79 RHEUMATOID ARTHRITIS INVOLVING MULTIPLE SITES WITH POSITIVE RHEUMATOID FACTOR: Primary | ICD-10-CM

## 2023-06-06 PROCEDURE — 99214 PR OFFICE/OUTPT VISIT, EST, LEVL IV, 30-39 MIN: ICD-10-PCS | Mod: S$GLB,,, | Performed by: INTERNAL MEDICINE

## 2023-06-06 PROCEDURE — 3074F PR MOST RECENT SYSTOLIC BLOOD PRESSURE < 130 MM HG: ICD-10-PCS | Mod: CPTII,S$GLB,, | Performed by: INTERNAL MEDICINE

## 2023-06-06 PROCEDURE — 1160F RVW MEDS BY RX/DR IN RCRD: CPT | Mod: CPTII,S$GLB,, | Performed by: INTERNAL MEDICINE

## 2023-06-06 PROCEDURE — 3079F PR MOST RECENT DIASTOLIC BLOOD PRESSURE 80-89 MM HG: ICD-10-PCS | Mod: CPTII,S$GLB,, | Performed by: INTERNAL MEDICINE

## 2023-06-06 PROCEDURE — 3044F PR MOST RECENT HEMOGLOBIN A1C LEVEL <7.0%: ICD-10-PCS | Mod: CPTII,S$GLB,, | Performed by: INTERNAL MEDICINE

## 2023-06-06 PROCEDURE — 3008F PR BODY MASS INDEX (BMI) DOCUMENTED: ICD-10-PCS | Mod: CPTII,S$GLB,, | Performed by: INTERNAL MEDICINE

## 2023-06-06 PROCEDURE — 99214 OFFICE O/P EST MOD 30 MIN: CPT | Mod: S$GLB,,, | Performed by: INTERNAL MEDICINE

## 2023-06-06 PROCEDURE — 1159F MED LIST DOCD IN RCRD: CPT | Mod: CPTII,S$GLB,, | Performed by: INTERNAL MEDICINE

## 2023-06-06 PROCEDURE — 3008F BODY MASS INDEX DOCD: CPT | Mod: CPTII,S$GLB,, | Performed by: INTERNAL MEDICINE

## 2023-06-06 PROCEDURE — 99999 PR PBB SHADOW E&M-EST. PATIENT-LVL IV: ICD-10-PCS | Mod: PBBFAC,,, | Performed by: INTERNAL MEDICINE

## 2023-06-06 PROCEDURE — 1160F PR REVIEW ALL MEDS BY PRESCRIBER/CLIN PHARMACIST DOCUMENTED: ICD-10-PCS | Mod: CPTII,S$GLB,, | Performed by: INTERNAL MEDICINE

## 2023-06-06 PROCEDURE — 3079F DIAST BP 80-89 MM HG: CPT | Mod: CPTII,S$GLB,, | Performed by: INTERNAL MEDICINE

## 2023-06-06 PROCEDURE — 99999 PR PBB SHADOW E&M-EST. PATIENT-LVL IV: CPT | Mod: PBBFAC,,, | Performed by: INTERNAL MEDICINE

## 2023-06-06 PROCEDURE — 3074F SYST BP LT 130 MM HG: CPT | Mod: CPTII,S$GLB,, | Performed by: INTERNAL MEDICINE

## 2023-06-06 PROCEDURE — 3044F HG A1C LEVEL LT 7.0%: CPT | Mod: CPTII,S$GLB,, | Performed by: INTERNAL MEDICINE

## 2023-06-06 PROCEDURE — 1159F PR MEDICATION LIST DOCUMENTED IN MEDICAL RECORD: ICD-10-PCS | Mod: CPTII,S$GLB,, | Performed by: INTERNAL MEDICINE

## 2023-06-06 RX ORDER — METHOTREXATE 2.5 MG/1
15 TABLET ORAL
Qty: 90 TABLET | Refills: 1 | Status: SHIPPED | OUTPATIENT
Start: 2023-06-06

## 2023-06-06 RX ORDER — SUCRALFATE 1 G/1
1 TABLET ORAL 3 TIMES DAILY PRN
Qty: 270 TABLET | Refills: 0 | Status: SHIPPED | OUTPATIENT
Start: 2023-06-06 | End: 2024-04-02 | Stop reason: SDUPTHER

## 2023-06-06 SDOH — SOCIAL DETERMINANTS OF HEALTH (SDOH): PROBLEMS RELATED TO EDUCATION AND LITERACY, UNSPECIFIED: Z55.9

## 2023-06-06 ASSESSMENT — ROUTINE ASSESSMENT OF PATIENT INDEX DATA (RAPID3)
PSYCHOLOGICAL DISTRESS SCORE: 3.3
FATIGUE SCORE: 6
AM STIFFNESS SCORE: 1, YES
TOTAL RAPID3 SCORE: 4.61
PATIENT GLOBAL ASSESSMENT SCORE: 4.5
PAIN SCORE: 7
MDHAQ FUNCTION SCORE: 0.7

## 2023-06-06 NOTE — PATIENT INSTRUCTIONS
Do quadriceps strengthening exercises.     You may want to try a good brand of glucosamine/chondroitin for your knees  I prefer Cosamin ASU  Osteobiflex is also good.  Doug's brand may also be ok.  Take x 3 months before deciding if it's helped.    The Mediterranean Diet is a plant placed diet and is good for you.

## 2023-06-07 ENCOUNTER — OFFICE VISIT (OUTPATIENT)
Dept: PODIATRY | Facility: CLINIC | Age: 58
End: 2023-06-07
Payer: COMMERCIAL

## 2023-06-07 ENCOUNTER — HOSPITAL ENCOUNTER (OUTPATIENT)
Dept: RADIOLOGY | Facility: HOSPITAL | Age: 58
Discharge: HOME | End: 2023-06-07
Attending: PODIATRIST
Payer: COMMERCIAL

## 2023-06-07 VITALS — WEIGHT: 242 LBS | HEIGHT: 78 IN | BODY MASS INDEX: 28 KG/M2

## 2023-06-07 DIAGNOSIS — G89.29 CHRONIC FOOT PAIN, LEFT: ICD-10-CM

## 2023-06-07 DIAGNOSIS — M79.672 CHRONIC FOOT PAIN, LEFT: ICD-10-CM

## 2023-06-07 DIAGNOSIS — B35.1 ONYCHOMYCOSIS OF TOENAIL: Primary | ICD-10-CM

## 2023-06-07 PROCEDURE — 73630 X-RAY EXAM OF FOOT: CPT | Mod: 26,LT,, | Performed by: RADIOLOGY

## 2023-06-07 PROCEDURE — 17999 UNLISTD PX SKN MUC MEMB SUBQ: CPT | Mod: CSM,S$GLB,, | Performed by: PODIATRIST

## 2023-06-07 PROCEDURE — 99999 PR PBB SHADOW E&M-EST. PATIENT-LVL III: ICD-10-PCS | Mod: PBBFAC,,, | Performed by: PODIATRIST

## 2023-06-07 PROCEDURE — 99499 NO LOS: ICD-10-PCS | Mod: ,,, | Performed by: PODIATRIST

## 2023-06-07 PROCEDURE — 73630 XR FOOT COMPLETE 3 VIEW LEFT: ICD-10-PCS | Mod: 26,LT,, | Performed by: RADIOLOGY

## 2023-06-07 PROCEDURE — 73630 X-RAY EXAM OF FOOT: CPT | Mod: TC,LT

## 2023-06-07 PROCEDURE — 99499 UNLISTED E&M SERVICE: CPT | Mod: ,,, | Performed by: PODIATRIST

## 2023-06-07 PROCEDURE — 17999 PR NON-COVERED FOOT CARE: ICD-10-PCS | Mod: CSM,S$GLB,, | Performed by: PODIATRIST

## 2023-06-07 PROCEDURE — 99999 PR PBB SHADOW E&M-EST. PATIENT-LVL III: CPT | Mod: PBBFAC,,, | Performed by: PODIATRIST

## 2023-06-07 NOTE — PROGRESS NOTES
Subjective:     Patient ID: Sachin Gonzalez is a 58 y.o. male.    Chief Complaint: Nail Care (No c/o pain, wears tennis shoes with socks, diabetic Pt, last seen on 05/31/23 with PCP Dr. Greenberg )      Sachin is a 58 y.o. male who presents to the clinic for evaluation and treatment of high risk feet. 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 The patient's chief complaint is long, thick toenails. This patient has documented high risk feet requiring routine maintenance secondary to peripheral neuropathy. Patient complains of an aching pain of the left 2nd and 3rd toes.     PCP: MAHAD Greenberg Jr, MD    Date Last Seen by PCP: 05/31/2023    Current shoe gear:  Affected Foot: Tennis shoes     Unaffected Foot: Tennis shoes    Last encounter in this department: 3/30/2022    Hemoglobin A1C   Date Value Ref Range Status   01/13/2023 5.3 4.0 - 5.6 % Final     Comment:     ADA Screening Guidelines:  5.7-6.4%  Consistent with prediabetes  >or=6.5%  Consistent with diabetes    High levels of fetal hemoglobin interfere with the HbA1C  assay. Heterozygous hemoglobin variants (HbS, HgC, etc)do  not significantly interfere with this assay.   However, presence of multiple variants may affect accuracy.     04/12/2017 5.7 4.5 - 6.2 % Final     Comment:     According to ADA guidelines, hemoglobin A1C <7.0% represents  optimal control in non-pregnant diabetic patients.  Different  metrics may apply to specific populations.   Standards of Medical Care in Diabetes - 2016.  For the purpose of screening for the presence of diabetes:  <5.7%     Consistent with the absence of diabetes  5.7-6.4%  Consistent with increasing risk for diabetes   (prediabetes)  >or=6.5%  Consistent with diabetes  Currently no consensus exists for use  of hemoglobin A1C  for diagnosis of diabetes for children.         Patient Active Problem List   Diagnosis    History of prostate cancer    RA (rheumatoid arthritis)    Status post prostatectomy (06/07/12)    Erectile dysfunction    Peyronie's disease    Long-term use of immunosuppressant medication    Cephalic vein thrombosis-post op    Essential hypertension    Tension-type headache, not intractable    Lumbar foraminal stenosis    Parotid sialolithiasis    Gastroesophageal reflux disease without esophagitis    Mixed hyperlipidemia    Melena    Screen for colon cancer    Prostate cancer    Gross hematuria    Stricture of bulbous urethra in male    Drug-induced immunodeficiency       Medication List with Changes/Refills   Current Medications    ASCORBIC ACID, VITAMIN C, (VITAMIN C) 250 MG TABLET    Take 250 mg by mouth once daily.    CHOLECALCIFEROL, VITAMIN D3, 25 MCG (1,000 UNIT) CHEW    Take by mouth.    FOLIC ACID (FOLVITE) 1 MG TABLET    Take 1 tablet (1,000 mcg total) by mouth once daily.    LINACLOTIDE (LINZESS) 72 MCG CAP CAPSULE    Take 1 capsule (72 mcg total) by mouth before breakfast.    METHOTREXATE 2.5 MG TAB    Take 6 tablets (15 mg total) by mouth every 7 days. This medication requires periodic lab monitoring    METOPROLOL SUCCINATE (TOPROL-XL) 50 MG 24 HR TABLET    Take 1 tablet (50 mg total) by mouth once daily.    MULTIVITAMIN CAPSULE    Take 1 capsule by mouth once daily.    SUCRALFATE (CARAFATE) 1 GRAM TABLET    Take 1 tablet (1 g total) by mouth 3 (three) times daily as needed (reflux indigestion).    ZINC SULFATE (ZINC-220 ORAL)    Take 1 tablet by mouth as needed.       Review of patient's allergies indicates:  No Known Allergies    Past Surgical History:   Procedure Laterality Date    COLONOSCOPY N/A 4/21/2021    Procedure: COLONOSCOPY covid test scheduled on 4/19/21;  Surgeon: Darrell Worthington MD;  Location: [facility];  Service: Endoscopy;  Laterality: N/A;    CYSTOURETHROSCOPY WITH DIRECT  "VISION INTERNAL URETHROTOMY N/A 5/31/2022    Procedure: CYSTOSCOPY, WITH DIRECT VISION INTERNAL URETHROTOMY;  Surgeon: Yaniv Murray MD;  Location: [facility] OR;  Service: Urology;  Laterality: N/A;    ESOPHAGOGASTRODUODENOSCOPY      ESOPHAGOGASTRODUODENOSCOPY N/A 4/5/2021    Procedure: EGD (ESOPHAGOGASTRODUODENOSCOPY) Rapid Covid test needed;  Surgeon: Darrell Worthington MD;  Location: [facility] ENDO;  Service: Endoscopy;  Laterality: N/A;    OSTEOTOMY OF METATARSAL BONE Right 11/21/2022    Procedure: OSTEOTOMY, METATARSAL BONE;  Surgeon: Daniel Ramirez DPM;  Location: [facility] OR;  Service: Podiatry;  Laterality: Right;    PROSTATECTOMY  2011    RELEASE OF CONTRACTURE OF JOINT Right 11/21/2022    Procedure: RELEASE, CONTRACTURE, JOINT;  Surgeon: Daniel Ramirez DPM;  Location: [facility] OR;  Service: Podiatry;  Laterality: Right;    RESECTION OF GASTROCNEMIUS MUSCLE Right 11/21/2022    Procedure: RESECTION, MUSCLE, GASTROCNEMIUS;  Surgeon: Daniel Ramirez DPM;  Location: [facility] OR;  Service: Podiatry;  Laterality: Right;       Family History   Problem Relation Age of Onset    Stroke Father     Alcohol abuse Father     Arthritis Mother     Hypertension Mother     Anxiety disorder Mother     No Known Problems Sister     Kidney disease Brother        Social History     Socioeconomic History    Marital status: Single   Tobacco Use    Smoking status: Never    Smokeless tobacco: Never   Substance and Sexual Activity    Alcohol use: Never    Drug use: No    Sexual activity: Yes     Partners: Female   Social History Narrative    No pets or smokers in household.       Vitals:    06/07/23 1333   Weight: 109.8 kg (242 lb)   Height: 6' 7" (2.007 m)   PainSc: 0-No pain   PainLoc: Foot       Hemoglobin A1C   Date Value Ref Range Status   01/13/2023 5.3 4.0 - 5.6 % Final     Comment:     ADA Screening Guidelines:  5.7-6.4%  Consistent with prediabetes  >or=6.5%  Consistent with diabetes    High levels of fetal hemoglobin interfere with the " HbA1C  assay. Heterozygous hemoglobin variants (HbS, HgC, etc)do  not significantly interfere with this assay.   However, presence of multiple variants may affect accuracy.     04/12/2017 5.7 4.5 - 6.2 % Final     Comment:     According to ADA guidelines, hemoglobin A1C <7.0% represents  optimal control in non-pregnant diabetic patients.  Different  metrics may apply to specific populations.   Standards of Medical Care in Diabetes - 2016.  For the purpose of screening for the presence of diabetes:  <5.7%     Consistent with the absence of diabetes  5.7-6.4%  Consistent with increasing risk for diabetes   (prediabetes)  >or=6.5%  Consistent with diabetes  Currently no consensus exists for use of hemoglobin A1C  for diagnosis of diabetes for children.         Review of Systems   Constitutional:  Negative for chills and fever.   Respiratory:  Negative for shortness of breath.    Cardiovascular:  Negative for chest pain, palpitations, orthopnea, claudication and leg swelling.   Gastrointestinal:  Negative for diarrhea, nausea and vomiting.   Musculoskeletal:  Negative for joint pain.   Skin:  Negative for rash.   Neurological:  Positive for tingling and sensory change.   Psychiatric/Behavioral: Negative.         Objective:      PHYSICAL EXAM: Apperance: Alert and orient in no distress,well developed, and with good attention to grooming and body habits    LOWER EXTREMITY EXAM:  VASCULAR: Dorsalis pedis pulses 2/4 bilateral and Posterior Tibial pulses 2/4 bilateral. Capillary fill time <4 seconds bilateral. No edema observed bilateral. Varicosities absent bilateral. Skin temperature of the lower extremities is warm to warm, proximal to distal. Hair growth WNL bilateral.  DERMATOLOGICAL: No skin rashes, subcutaneous nodules, lesions, or ulcers observed bilateral. Nails 1,3,4,5 bilateral elongated, thickened, and discolored with subungual debris. Webspaces 1,2,3,4 bilateral clean, dry and without evidence of break in skin  integrity.   NEUROLOGICAL: Light touch, sharp-dull, proprioception all present and equal bilaterally.  Vibratory sensation diminished at bilateral hallux and intact at bilateral navicular. Protective sensation absent at 6/10 sites as tested with a [place] 5.07 monofilament.   MUSCULOSKELETAL: Muscle strength is 5/5 for foot inverters, everters, plantarflexors, and dorsiflexors. Muscle tone is normal.           Assessment:       ICD-10-CM ICD-9-CM   1. Onychomycosis of toenail  B35.1 110.1   2. Chronic foot pain, left  M79.672 729.5    G89.29 338.29         Plan:   Onychomycosis of toenail    Chronic foot pain, left  -     X-Ray Foot Complete Left; Future; Expected date: 06/07/2023    I counseled the patient on his conditions, regarding findings of my examination, my impressions, and usual treatment plan.   Appointment spent on education about the neuropathy, and prevention of limb loss.  Shoe inspection. Patient instructed on proper foot hygeine. We discussed wearing proper shoe gear, daily foot inspections, never walking without protective shoe gear, never putting sharp instruments to feet.    With patient's permission, nails 1-5 bilateral were debrided/trimmed in length and thickness aggressively to their soft tissue attachment mechanically and with electric , removing all offending nail and debris. Patient relates relief following the procedure.  Ordered left foot x-rays to be completed today.   Patient  will continue to monitor the areas daily, inspect feet, wear protective shoe gear when ambulatory, moisturizer to maintain skin integrity.  Patient to return 2 months or sooner if needed.          Reina Guzman DPM  [facility]

## 2023-07-11 ENCOUNTER — CLINICAL SUPPORT (OUTPATIENT)
Dept: REHABILITATION | Facility: HOSPITAL | Age: 58
End: 2023-07-11
Attending: PEDIATRICS
Payer: COMMERCIAL

## 2023-07-11 DIAGNOSIS — R26.9 GAIT DISTURBANCE: ICD-10-CM

## 2023-07-11 DIAGNOSIS — M62.561 MUSCLE WASTING AND ATROPHY, NOT ELSEWHERE CLASSIFIED, RIGHT LOWER LEG: ICD-10-CM

## 2023-07-11 DIAGNOSIS — R26.9 ABNORMAL GAIT: ICD-10-CM

## 2023-07-11 DIAGNOSIS — M25.571 PAIN IN JOINT INVOLVING ANKLE AND FOOT, RIGHT: Primary | ICD-10-CM

## 2023-07-11 PROCEDURE — 97162 PT EVAL MOD COMPLEX 30 MIN: CPT

## 2023-07-11 PROCEDURE — 97140 MANUAL THERAPY 1/> REGIONS: CPT

## 2023-07-11 PROCEDURE — 97112 NEUROMUSCULAR REEDUCATION: CPT

## 2023-07-11 NOTE — PROGRESS NOTES
[facility]   Physical Therapy Initial Evaluation      Date: 7/11/2023   Name: Sachin Gonzalez  [facility] Number: [id number]    Therapy Diagnosis:    Encounter Diagnoses   Name Primary?    Pain in joint involving ankle and foot, right Yes    Gait disturbance     Muscle wasting and atrophy, not elsewhere classified, right lower leg     Abnormal gait       Physician: Santy Greenberg MD     Physician Orders: PT Eval and Treat  Medical Diagnosis from Referral: Gait disturbance  Evaluation Date: 7/11/2023  Authorization Period Expiration: 05/30/2024  Plan of Care Expiration: 09/05/2023  Progress Note Due: 08/10/2023  Visit # / Visits authorized: 1/1   FOTO: 1/3 (last performed on 7/11/2023)    Precautions: Standard and hypertension    Time In: 1430  Time Out: 1515  Total Billable Time (timed & untimed codes): 45 minutes    Subjective     Date of onset: 2021    History of current condition - Sachin reports having history of foot pain and weakness that  that onset with no clear mechanism of injury in approximately 2021. Patient states the symptoms over time worsened and was treated surgically. Patient has rods placed in 2nd-4th toes fused as well as contracture release of gastroc. Patient states the toes and feet are symptomatic if he is walking for extended periods, walking on hard floors, or if the toes are bumped into something. Patient states he has pain in the bottom of the foot that is worse in the mornings. 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 Patient notes he has a shoe insole for arch support but feels it affects his symptoms minimally.    Falls: 0    Imaging: [x] Xray [] MRI [] CT: Performed on: 06/07/2023  -Mild osteoarthritic change first MTP joint.  Plantar calcaneal enthesopathy/spurring.  Pes planus deformity.   No acute fracture is evident.    Pain:  Current  7/10, worst 10/10, best 4/10   Location: [x] Right   [] Left:  right foot  Description: aching , burning, and sharp  Aggravating Factors: walking on hard floors, increased activity  Easing Factors: activity avoidance, rest, elevating    Prior Therapy:   [] N/A    [x] Yes: [id number] extreme deconditioning  Social History: Pt lives alone. No steps or stairs.   Occupation: Pt is  and is a  for work.  Prior Level of Function: Independent and pain free with all ADL, IADL, community mobility and functional activities.   Current Level of Function: Independent with all ADL, IADL, community mobility and functional activities with reports of increased pain and need for increased time and frequent breaks.      Dominant Extremity:    [x] Right    [] Left    Pts goals: Pt reported goals are to decrease overall pain levels in order to return to prior functional level. Patient would like to be able to return to refereeing basketball games, running, going for walks, and gardening without pain.     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    Medications:   Sachin has a current medication list which includes the following prescription(s): ascorbic acid (vitamin c), cholecalciferol (vitamin d3), folic acid, linaclotide,  methotrexate, metoprolol succinate, multivitamin, sucralfate, and zinc sulfate.    Allergies:   Review of patient's allergies indicates:  No Known Allergies     Objective        RANGE OF MOTION:   Ankle/Foot AROM/PROM Right Left Pain/Dysfunction with Movement Goal   Dorsiflexion (20º) 5 5  10   Plantarflexion (50º) 35 45  45   Inversion (35º) 20 35  30   Eversion (15º) 10 10  15         STRENGTH:   L/E MMT Right  (spine) Left Pain/Dysfunction with Movement Goal   Hip Flexion  4/5 4/5  4+/5 B   Hip Extension  4-/5 4-/5  4+/5 B   Hip Abduction  4-/5 4-/5  4+/5 B   Knee Extension 4+/5 4+/5  5/5 B   Knee Flexion 4+/5 4+/5  5/5 B   Hip IR 4+/5 4+/5  4+/5 B   Hip ER 4/5 4/5  4+/5 B   Ankle DF 4+/5 4+/5  5/5 B   Ankle PF 4-/5 4/5  5/5 B   Ankle Inversion 4+/5 4+/5  5/5 B   Ankle Eversion 4+/5 4+/5  5/5 B          MUSCLE LENGTH:   Muscle Tested  Right Left  Limitation Goal   Gastrocnemius  [] Normal  [x] Limited [] Normal  [] Limited  Normal B   Soleus  [] Normal  [x] Limited [] Normal  [] Limited  Normal B          JOINT MOBILITY:   Joint Motion Right Mobility   Left Mobility Goal   Talar Distraction  [] Hypo     [x] Normal     [] Hyper [] Hypo     [x] Normal     [] Hyper Normal    AP Talar Glide  [x] Hypo     [] Normal     [] Hyper [] Hypo     [x] Normal     [] Hyper Normal    PA Talar Glide  [x] Hypo     [] Normal     [] Hyper [] Hypo     [x] Normal     [] Hyper Normal    Medial Talar Glide  [] Hypo     [x] Normal     [] Hyper [] Hypo     [x] Normal     [] Hyper Normal    Lateral Talar Glide  [] Hypo     [x] Normal     [] Hyper [] Hypo     [x] Normal     [] Hyper Normal    Calcaneal Inversion  [x] Hypo     [] Normal     [] Hyper [] Hypo     [x] Normal     [] Hyper Normal    Calcaneal Eversion  [x] Hypo     [] Normal     [] Hyper [] Hypo     [x] Normal     [] Hyper Normal    Midfoot:  [] Hypo     [x] Normal     [] Hyper [] Hypo     [x] Normal     [] Hyper Normal    Forefoot:  [x] Hypo     [] Normal     [] Hyper [] Hypo      [x] Normal     [] Hyper Normal        SENSATION  [x] Intact to Light Touch   [] Impaired:      PALPATION: Muscles: Increased tone and tenderness to palpation of: right , gastrocnemius, soleus, peroneals, foot intrinsics. Structures: Increased tenderness to palpation of: right , LOWER STRUCTURES : achilles tendon, plantar fascia      POSTURE:  Pt presents with postural abnormalities which include:    [x] Forward Head   [] Increased Lumbar Lordosis   [x] Rounded Shoulder   [] Genu Recurvatum   [] Increased Thoracic Kyphosis [] Genu Valgus   [] Trunk Deviated    [] Pes Planus   [] Scapular Winging    [] Other:         GAIT ANALYSIS The patient ambulated with the following assistive device: none; the pt presents with the following gait abnormalities: trendelenburg, decreased step length on left, decreased stance time on right, and decreased push off on right   BALANCE:   Right   (seconds) Left  (seconds) Pain/dysfunction Noted Goal   Narrow Base of Support 60+ ---  60+ seconds   Tandem Stance 15 15  60+ seconds   Single Leg Stance 20 20  60+ seconds     Function:     Intake Outcome Measure for FOTO Ankle Survey    Therapist reviewed FOTO scores for Sachin on 7/11/2023.   FOTO documents entered into EPIC - see Media section.    Intake Score: 65%         Treatment     Total Treatment time (time-based codes) separate from Evaluation: (24) minutes     Sachin received the treatments listed below:    MANUAL THERAPY TECHNIQUES were applied for (12) minutes, including:    Manual Intervention Performed Today    Soft Tissue Mobilization [x] right  gastrocnemius, soleus, foot intrinsics   Joint Mobilizations [x] Interphalangeal mobilizations     []     []    Functional Dry Needling  []      Plan for Next Visit: Continue as needed       NEUROMUSCULAR RE-EDUCATION ACTIVITIES to improve Balance, Coordination, Kinesthetic, Sense, Proprioception, and Posture for (12) minutes.  The following were included:    Intervention  Performed Today    Home exercise plan  x Demonstration, education, performance   Toe Yoga (great toe ext/flex)     Seated Calf Raise     Runners Lunge Gastroc Stretch on Wall     Short foot (doming)                      Plan for Next Visit: Bike, Calf board stretch, ankle 4 way, balance       Patient Education and Home Exercises     Education provided: time included in treatment.   PURPOSE: Patient educated on the impairments noted above and the effects of physical therapy intervention to improve overall condition and QOL.   EXERCISE: Patient was educated on all the above exercise prior/during/after for proper posture, positioning, and execution for safe performance with home exercise program.   STRENGTH: Patient educated on the importance of improved core and extremity strength in order to improve alignment of the spine and extremities with static positions and dynamic movement.   GAIT & BALANCE: Patient educated on the importance of strong core and lower extremity musculature in order to improve both static and dynamic balance, improve gait mechanics, reduce fall risk and improve household and community mobility.     Written Home Exercises Provided: yes.  Exercises were reviewed and Sachin was able to demonstrate them prior to the end of the session.  Sachin demonstrated good  understanding of the education provided. See EMR under Patient Instructions for exercises provided during therapy sessions.    Assessment     Sachin is a 58 y.o. male referred to outpatient Physical Therapy with a medical diagnosis of Gait disturbance. Pt presents with impairments in the following categories: IMPAIRMENTS: ROM, strength, endurance, joint mobility, muscle length, balance, and gait mechanics    Pt prognosis is Good  Pt will benefit from skilled outpatient Physical Therapy to address the deficits stated above and in the chart below, provide pt/family education, and to maximize pt's level of independence.     Plan of  "care discussed with patient: Yes  Pt's spiritual, cultural and educational needs considered and patient is agreeable to the plan of care and goals as stated below:     Anticipated Barriers for therapy: co-morbidities and chronicity of condition    Medical Necessity is demonstrated by the following  History  Co-morbidities and personal factors that may impact the plan of care [] LOW: no personal factors / co-morbidities  [x] MODERATE: 1-2 personal factors / co-morbidities  [] HIGH: 3+ personal factors / co-morbidities    Moderate / High Support Documentation:   Past Medical History:   Diagnosis Date    GERD (gastroesophageal reflux disease)     Hypertension     Prostate cancer     RA (rheumatoid arthritis)     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        Examination  Body Structures and Functions, activity limitations and participation restrictions that may impact the plan of care [] LOW: addressing 1-2 elements  [x] MODERATE: 3+ elements  [] HIGH: 4+ elements (please support below)    Moderate / High Support Documentation: See above in "Current Level of Function"      Clinical Presentation [] LOW: stable  [x] MODERATE: Evolving  [] HIGH: Unstable     Decision Making/ Complexity Score: moderate         Short Term Goals:  4 weeks Status  Date Met   PAIN: Pt will report worst pain of 7/10 in order to progress toward max functional ability and improve quality of life. [x] Progressing  [] Met  [] Not Met    FUNCTION: Patient will demonstrate improved function as indicated by a score of greater than or equal to 65 out of 100 on FOTO. [x] Progressing  [] Met  [] Not Met    MOBILITY: Patient will improve AROM to 50% of stated goals, listed in objective measures above, in order to progress towards independence with functional activities.  [x] Progressing  [] Met  [] Not Met    STRENGTH: Patient will improve strength to 50% of stated goals, listed in " objective measures above, in order to progress towards independence with functional activities. [x] Progressing  [] Met  [] Not Met    POSTURE: Patient will correct postural deviations in sitting and standing, to decrease pain and promote long term stability.  [x] Progressing  [] Met  [] Not Met    GAIT: Patient will demonstrate improved gait mechanics including normalize gait in order to improve functional mobility, improve quality of life, and decrease risk of further injury or fall.  [x] Progressing  [] Met  [] Not Met    HEP: Patient will demonstrate independence with HEP in order to progress toward functional independence. [x] Progressing  [] Met  [] Not Met      Long Term Goals:  8 weeks Status Date Met   PAIN: Pt will report worst pain of 3/10 in order to progress toward max functional ability and improve quality of life [x] Progressing  [] Met  [] Not Met    FUNCTION: Patient will demonstrate improved function as indicated by a score of greater than or equal to 71 out of 100 on FOTO. [x] Progressing  [] Met  [] Not Met    MOBILITY: Patient will improve AROM to stated goals, listed in objective measures above, in order to return to maximal functional potential and improve quality of life.  [x] Progressing  [] Met  [] Not Met    STRENGTH: Patient will improve strength to stated goals, listed in objective measures above, in order to improve functional independence and quality of life.  [x] Progressing  [] Met  [] Not Met    GAIT: Patient will demonstrate normalized gait mechanics with minimal compensation in order to return to PLOF. [x] Progressing  [] Met  [] Not Met    Patient will return to normal ADL's, IADL's, community involvement, recreational activities, and work-related activities with less than or equal to 2/10 pain and maximal function.  [x] Progressing  [] Met  [] Not Met      Plan     Plan of care Certification: 7/11/2023 to 09/05/2023.    Outpatient Physical Therapy 2 times weekly for 8 weeks to  include any combination of the following interventions: virtual visits, dry needling, modalities, electrical stimulation (IFC, Pre-Mod, Attended with Functional Dry Needling), Cervical/Lumbar Traction, Gait Training, Manual Therapy, Neuromuscular Re-ed, Patient Education, Self Care, Therapeutic Exercise, and Therapeutic Activites     George Wolff, PT, DPT

## 2023-07-11 NOTE — PLAN OF CARE
[facility]   Physical Therapy Initial Evaluation      Date: 7/11/2023   Name: Sachin Gonzalez  [facility] Number: [id number]    Therapy Diagnosis:    Encounter Diagnoses   Name Primary?    Pain in joint involving ankle and foot, right Yes    Gait disturbance     Muscle wasting and atrophy, not elsewhere classified, right lower leg     Abnormal gait       Physician: Santy Greenberg MD     Physician Orders: PT Eval and Treat  Medical Diagnosis from Referral: Gait disturbance  Evaluation Date: 7/11/2023  Authorization Period Expiration: 05/30/2024  Plan of Care Expiration: 09/05/2023  Progress Note Due: 08/10/2023  Visit # / Visits authorized: 1/1   FOTO: 1/3 (last performed on 7/11/2023)    Precautions: Standard and hypertension    Time In: 1430  Time Out: 1515  Total Billable Time (timed & untimed codes): 45 minutes    Subjective     Date of onset: 2021    History of current condition - Sachin reports having history of foot pain and weakness that  that onset with no clear mechanism of injury in approximately 2021. Patient states the symptoms over time worsened and was treated surgically. Patient has rods placed in 2nd-4th toes fused as well as contracture release of gastroc. Patient states the toes and feet are symptomatic if he is walking for extended periods, walking on hard floors, or if the toes are bumped into something. Patient states he has pain in the bottom of the foot that is worse in the mornings. 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 Patient notes he has a shoe insole for arch support but feels it affects his symptoms minimally.    Falls: 0    Imaging: [x] Xray [] MRI [] CT: Performed on: 06/07/2023  -Mild osteoarthritic change first MTP joint.  Plantar calcaneal enthesopathy/spurring.  Pes planus deformity.   No acute fracture is evident.    Pain:  Current  7/10, worst 10/10, best 4/10   Location: [x] Right   [] Left:  right foot  Description: aching , burning, and sharp  Aggravating Factors: walking on hard floors, increased activity  Easing Factors: activity avoidance, rest, elevating    Prior Therapy:   [] N/A    [x] Yes: 1302-0002 extreme deconditioning  Social History: Pt lives alone. No steps or stairs.   Occupation: Pt is  and is a  for work.  Prior Level of Function: Independent and pain free with all ADL, IADL, community mobility and functional activities.   Current Level of Function: Independent with all ADL, IADL, community mobility and functional activities with reports of increased pain and need for increased time and frequent breaks.      Dominant Extremity:    [x] Right    [] Left    Pts goals: Pt reported goals are to decrease overall pain levels in order to return to prior functional level. Patient would like to be able to return to refereeing basketball games, running, going for walks, and gardening without pain.     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    Medications:   Sachin has a current medication list which includes the following prescription(s): ascorbic acid (vitamin c), cholecalciferol (vitamin d3), folic acid, linaclotide,  methotrexate, metoprolol succinate, multivitamin, sucralfate, and zinc sulfate.    Allergies:   Review of patient's allergies indicates:  No Known Allergies     Objective        RANGE OF MOTION:   Ankle/Foot AROM/PROM Right Left Pain/Dysfunction with Movement Goal   Dorsiflexion (20º) 5 5  10   Plantarflexion (50º) 35 45  45   Inversion (35º) 20 35  30   Eversion (15º) 10 10  15         STRENGTH:   L/E MMT Right  (spine) Left Pain/Dysfunction with Movement Goal   Hip Flexion  4/5 4/5  4+/5 B   Hip Extension  4-/5 4-/5  4+/5 B   Hip Abduction  4-/5 4-/5  4+/5 B   Knee Extension 4+/5 4+/5  5/5 B   Knee Flexion 4+/5 4+/5  5/5 B   Hip IR 4+/5 4+/5  4+/5 B   Hip ER 4/5 4/5  4+/5 B   Ankle DF 4+/5 4+/5  5/5 B   Ankle PF 4-/5 4/5  5/5 B   Ankle Inversion 4+/5 4+/5  5/5 B   Ankle Eversion 4+/5 4+/5  5/5 B          MUSCLE LENGTH:   Muscle Tested  Right Left  Limitation Goal   Gastrocnemius  [] Normal  [x] Limited [] Normal  [] Limited  Normal B   Soleus  [] Normal  [x] Limited [] Normal  [] Limited  Normal B          JOINT MOBILITY:   Joint Motion Right Mobility   Left Mobility Goal   Talar Distraction  [] Hypo     [x] Normal     [] Hyper [] Hypo     [x] Normal     [] Hyper Normal    AP Talar Glide  [x] Hypo     [] Normal     [] Hyper [] Hypo     [x] Normal     [] Hyper Normal    PA Talar Glide  [x] Hypo     [] Normal     [] Hyper [] Hypo     [x] Normal     [] Hyper Normal    Medial Talar Glide  [] Hypo     [x] Normal     [] Hyper [] Hypo     [x] Normal     [] Hyper Normal    Lateral Talar Glide  [] Hypo     [x] Normal     [] Hyper [] Hypo     [x] Normal     [] Hyper Normal    Calcaneal Inversion  [x] Hypo     [] Normal     [] Hyper [] Hypo     [x] Normal     [] Hyper Normal    Calcaneal Eversion  [x] Hypo     [] Normal     [] Hyper [] Hypo     [x] Normal     [] Hyper Normal    Midfoot:  [] Hypo     [x] Normal     [] Hyper [] Hypo     [x] Normal     [] Hyper Normal    Forefoot:  [x] Hypo     [] Normal     [] Hyper [] Hypo      [x] Normal     [] Hyper Normal        SENSATION  [x] Intact to Light Touch   [] Impaired:      PALPATION: Muscles: Increased tone and tenderness to palpation of: right , gastrocnemius, soleus, peroneals, foot intrinsics. Structures: Increased tenderness to palpation of: right , LOWER STRUCTURES : achilles tendon, plantar fascia      POSTURE:  Pt presents with postural abnormalities which include:    [x] Forward Head   [] Increased Lumbar Lordosis   [x] Rounded Shoulder   [] Genu Recurvatum   [] Increased Thoracic Kyphosis [] Genu Valgus   [] Trunk Deviated    [] Pes Planus   [] Scapular Winging    [] Other:         GAIT ANALYSIS The patient ambulated with the following assistive device: none; the pt presents with the following gait abnormalities: trendelenburg, decreased step length on left, decreased stance time on right, and decreased push off on right   BALANCE:   Right   (seconds) Left  (seconds) Pain/dysfunction Noted Goal   Narrow Base of Support 60+ ---  60+ seconds   Tandem Stance 15 15  60+ seconds   Single Leg Stance 20 20  60+ seconds     Function:     Intake Outcome Measure for FOTO Ankle Survey    Therapist reviewed FOTO scores for Sachin on 7/11/2023.   FOTO documents entered into EPIC - see Media section.    Intake Score: 65%         Treatment     Total Treatment time (time-based codes) separate from Evaluation: (24) minutes     Sachin received the treatments listed below:    MANUAL THERAPY TECHNIQUES were applied for (12) minutes, including:    Manual Intervention Performed Today    Soft Tissue Mobilization [x] right  gastrocnemius, soleus, foot intrinsics   Joint Mobilizations [x] Interphalangeal mobilizations     []     []    Functional Dry Needling  []      Plan for Next Visit: Continue as needed       NEUROMUSCULAR RE-EDUCATION ACTIVITIES to improve Balance, Coordination, Kinesthetic, Sense, Proprioception, and Posture for (12) minutes.  The following were included:    Intervention  Performed Today    Home exercise plan  x Demonstration, education, performance   Toe Yoga (great toe ext/flex)     Seated Calf Raise     Runners Lunge Gastroc Stretch on Wall     Short foot (doming)                      Plan for Next Visit: Bike, Calf board stretch, ankle 4 way, balance       Patient Education and Home Exercises     Education provided: time included in treatment.   PURPOSE: Patient educated on the impairments noted above and the effects of physical therapy intervention to improve overall condition and QOL.   EXERCISE: Patient was educated on all the above exercise prior/during/after for proper posture, positioning, and execution for safe performance with home exercise program.   STRENGTH: Patient educated on the importance of improved core and extremity strength in order to improve alignment of the spine and extremities with static positions and dynamic movement.   GAIT & BALANCE: Patient educated on the importance of strong core and lower extremity musculature in order to improve both static and dynamic balance, improve gait mechanics, reduce fall risk and improve household and community mobility.     Written Home Exercises Provided: yes.  Exercises were reviewed and Sachin was able to demonstrate them prior to the end of the session.  Sachin demonstrated good  understanding of the education provided. See EMR under Patient Instructions for exercises provided during therapy sessions.    Assessment     Sachin is a 58 y.o. male referred to outpatient Physical Therapy with a medical diagnosis of Gait disturbance. Pt presents with impairments in the following categories: IMPAIRMENTS: ROM, strength, endurance, joint mobility, muscle length, balance, and gait mechanics    Pt prognosis is Good  Pt will benefit from skilled outpatient Physical Therapy to address the deficits stated above and in the chart below, provide pt/family education, and to maximize pt's level of independence.     Plan of  "care discussed with patient: Yes  Pt's spiritual, cultural and educational needs considered and patient is agreeable to the plan of care and goals as stated below:     Anticipated Barriers for therapy: co-morbidities and chronicity of condition    Medical Necessity is demonstrated by the following  History  Co-morbidities and personal factors that may impact the plan of care [] LOW: no personal factors / co-morbidities  [x] MODERATE: 1-2 personal factors / co-morbidities  [] HIGH: 3+ personal factors / co-morbidities    Moderate / High Support Documentation:   Past Medical History:   Diagnosis Date    GERD (gastroesophageal reflux disease)     Hypertension     Prostate cancer     RA (rheumatoid arthritis)     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        Examination  Body Structures and Functions, activity limitations and participation restrictions that may impact the plan of care [] LOW: addressing 1-2 elements  [x] MODERATE: 3+ elements  [] HIGH: 4+ elements (please support below)    Moderate / High Support Documentation: See above in "Current Level of Function"      Clinical Presentation [] LOW: stable  [x] MODERATE: Evolving  [] HIGH: Unstable     Decision Making/ Complexity Score: moderate         Short Term Goals:  4 weeks Status  Date Met   PAIN: Pt will report worst pain of 7/10 in order to progress toward max functional ability and improve quality of life. [x] Progressing  [] Met  [] Not Met    FUNCTION: Patient will demonstrate improved function as indicated by a score of greater than or equal to 65 out of 100 on FOTO. [x] Progressing  [] Met  [] Not Met    MOBILITY: Patient will improve AROM to 50% of stated goals, listed in objective measures above, in order to progress towards independence with functional activities.  [x] Progressing  [] Met  [] Not Met    STRENGTH: Patient will improve strength to 50% of stated goals, listed in " objective measures above, in order to progress towards independence with functional activities. [x] Progressing  [] Met  [] Not Met    POSTURE: Patient will correct postural deviations in sitting and standing, to decrease pain and promote long term stability.  [x] Progressing  [] Met  [] Not Met    GAIT: Patient will demonstrate improved gait mechanics including normalize gait in order to improve functional mobility, improve quality of life, and decrease risk of further injury or fall.  [x] Progressing  [] Met  [] Not Met    HEP: Patient will demonstrate independence with HEP in order to progress toward functional independence. [x] Progressing  [] Met  [] Not Met      Long Term Goals:  8 weeks Status Date Met   PAIN: Pt will report worst pain of 3/10 in order to progress toward max functional ability and improve quality of life [x] Progressing  [] Met  [] Not Met    FUNCTION: Patient will demonstrate improved function as indicated by a score of greater than or equal to 71 out of 100 on FOTO. [x] Progressing  [] Met  [] Not Met    MOBILITY: Patient will improve AROM to stated goals, listed in objective measures above, in order to return to maximal functional potential and improve quality of life.  [x] Progressing  [] Met  [] Not Met    STRENGTH: Patient will improve strength to stated goals, listed in objective measures above, in order to improve functional independence and quality of life.  [x] Progressing  [] Met  [] Not Met    GAIT: Patient will demonstrate normalized gait mechanics with minimal compensation in order to return to PLOF. [x] Progressing  [] Met  [] Not Met    Patient will return to normal ADL's, IADL's, community involvement, recreational activities, and work-related activities with less than or equal to 2/10 pain and maximal function.  [x] Progressing  [] Met  [] Not Met      Plan     Plan of care Certification: 7/11/2023 to 09/05/2023.    Outpatient Physical Therapy 2 times weekly for 8 weeks to  include any combination of the following interventions: virtual visits, dry needling, modalities, electrical stimulation (IFC, Pre-Mod, Attended with Functional Dry Needling), Cervical/Lumbar Traction, Gait Training, Manual Therapy, Neuromuscular Re-ed, Patient Education, Self Care, Therapeutic Exercise, and Therapeutic Activites     George Wolff, PT, DPT

## 2023-07-18 ENCOUNTER — CLINICAL SUPPORT (OUTPATIENT)
Dept: REHABILITATION | Facility: HOSPITAL | Age: 58
End: 2023-07-18
Payer: COMMERCIAL

## 2023-07-18 DIAGNOSIS — R26.9 ABNORMAL GAIT: ICD-10-CM

## 2023-07-18 DIAGNOSIS — M25.571 PAIN IN JOINT INVOLVING ANKLE AND FOOT, RIGHT: Primary | ICD-10-CM

## 2023-07-18 DIAGNOSIS — M62.561 MUSCLE WASTING AND ATROPHY, NOT ELSEWHERE CLASSIFIED, RIGHT LOWER LEG: ICD-10-CM

## 2023-07-18 PROCEDURE — 97110 THERAPEUTIC EXERCISES: CPT

## 2023-07-18 PROCEDURE — 97530 THERAPEUTIC ACTIVITIES: CPT

## 2023-07-18 PROCEDURE — 97140 MANUAL THERAPY 1/> REGIONS: CPT

## 2023-07-18 PROCEDURE — 97112 NEUROMUSCULAR REEDUCATION: CPT

## 2023-07-18 NOTE — PROGRESS NOTES
[facility] OUTPATIENT THERAPY AND WELLNESS   Physical Therapy Treatment Note        Name: Sachin Gonzalez  [facility] Number: [id number]    Therapy Diagnosis:   Encounter Diagnoses   Name Primary?    Pain in joint involving ankle and foot, right Yes    Muscle wasting and atrophy, not elsewhere classified, right lower leg     Abnormal gait      Physician: Santy Greenberg MD    Visit Date: 7/18/2023    Physician Orders: PT Eval and Treat  Medical Diagnosis from Referral: Gait disturbance  Evaluation Date: 7/11/2023  Authorization Period Expiration: 08/10/2023  Plan of Care Expiration: 09/05/2023  Progress Note Due: 08/10/2023  Visit # / Visits authorized: 2/6 (+1 evaluation)   FOTO: 1/3 (last performed on 7/11/2023)     Precautions: Standard and hypertension  PTA Visit #: 0/5     Time In: 1119  Time Out: 1209  Total Billable Time: 50 minutes (Billing reflects 1 on 1 treatment time spent with patient)    Subjective     Patient reports: he has been able to perform home exercise plan consistently but feels he is not able perform some of the exercises as well as he'd like to be able to.    He/She was compliant with home exercise program.  Response to previous treatment: no soreness.   Functional change: performance of home exercise plan     Pain: -/10     Location: right foot / toes    Objective      Objective Measures updated at progress report or POC update only unless otherwise noted.       Treatment     Sachin received the treatments listed below:     MANUAL THERAPY TECHNIQUES were applied for (24) minutes, including:     Manual Intervention Performed Today     Soft Tissue Mobilization [x]  right  gastrocnemius, soleus, foot intrinsics   Joint Mobilizations [x]  Interphalangeal mobilizations    Mobilizations with Movement [x]  Toe flexion/extension     []      Functional Dry Needling  []         Plan for Next Visit: Continue as needed       THERAPEUTIC EXERCISES: to develop strength, endurance, ROM, flexibility,  posture and core stabilization for (16)  minutes including: x = performed today    Therapeutic Exercise 7/18/2023    Bike x 6 minutes L2   Seated Calf Raise x 3x15   Calf Stretch     Seated Dorsiflexion x 3x10   Resisted Great Toe Flexion x 3x10 GTB         BOLD = new this visit  Plan for Next Visit:        NEUROMUSCULAR RE-EDUCATION ACTIVITIES to improve Balance, Coordination, Kinesthetic, Sense, Proprioception, and Posture for (9) minutes.  The following were included:     Intervention Performed Today     Toe Yoga  x   x 2' Great toe extension  2' Lesser toes extension   Short foot (doming) x  2'                              Plan for Next Visit: Bike, Calf board stretch, ankle 4 way, balance      Patient Education and Home Exercises       Home Exercises Provided and Patient Education Provided     Education provided: time included in treatment time.   PURPOSE: Patient educated on the impairments noted above and the effects of physical therapy intervention to improve overall condition and QOL.   EXERCISE: Patient was educated on all the above exercise prior/during/after for proper posture, positioning, and execution for safe performance with home exercise program.   STRENGTH: Patient educated on the importance of improved core and extremity strength in order to improve alignment of the spine and extremities with static positions and dynamic movement.   GAIT & BALANCE: Patient educated on the importance of strong core and lower extremity musculature in order to improve both static and dynamic balance, improve gait mechanics, reduce fall risk and improve household and community mobility.   POSTURE: Patient educated on postural awareness to reduce stress and maintain optimal alignment of the spine with static positions and dynamic movement     Written Home Exercises Provided: yes.  Exercises were reviewed and Sachin was able to demonstrate them prior to the end of the session.  Sachin demonstrated good   understanding of the education provided. See EMR under Patient Instructions for exercises provided during therapy sessions.    Assessment     Patient tolerated today's session well. Patient able to demonstrate home exercise plan with difficulty and mild cueing. Patient has severe tenderness to palpation of lesser toes over scars. Patient progressed with great toe flexion strengthening as well as dorsiflexion strengthening.    Thelander is progressing well towards his goals.   Patient prognosis is Good.     Patient will continue to benefit from skilled outpatient physical therapy to address the deficits listed in the problem list box on initial evaluation, provide pt/family education and to maximize patient's level of independence in the home and community environment.     Patient's spiritual, cultural and educational needs considered and pt agreeable to plan of care and goals.     Anticipated Barriers for therapy: co-morbidities and chronicity of condition       Short Term Goals:  4 weeks Status  Date Met   PAIN: Pt will report worst pain of 7/10 in order to progress toward max functional ability and improve quality of life. [x] Progressing  [] Met  [] Not Met     FUNCTION: Patient will demonstrate improved function as indicated by a score of greater than or equal to 65 out of 100 on FOTO. [x] Progressing  [] Met  [] Not Met     MOBILITY: Patient will improve AROM to 50% of stated goals, listed in objective measures above, in order to progress towards independence with functional activities.  [x] Progressing  [] Met  [] Not Met     STRENGTH: Patient will improve strength to 50% of stated goals, listed in objective measures above, in order to progress towards independence with functional activities. [x] Progressing  [] Met  [] Not Met     POSTURE: Patient will correct postural deviations in sitting and standing, to decrease pain and promote long term stability.  [x] Progressing  [] Met  [] Not Met     GAIT: Patient  will demonstrate improved gait mechanics including normalize gait in order to improve functional mobility, improve quality of life, and decrease risk of further injury or fall.  [x] Progressing  [] Met  [] Not Met     HEP: Patient will demonstrate independence with HEP in order to progress toward functional independence. [x] Progressing  [] Met  [] Not Met        Long Term Goals:  8 weeks Status Date Met   PAIN: Pt will report worst pain of 3/10 in order to progress toward max functional ability and improve quality of life [x] Progressing  [] Met  [] Not Met     FUNCTION: Patient will demonstrate improved function as indicated by a score of greater than or equal to 71 out of 100 on FOTO. [x] Progressing  [] Met  [] Not Met     MOBILITY: Patient will improve AROM to stated goals, listed in objective measures above, in order to return to maximal functional potential and improve quality of life.  [x] Progressing  [] Met  [] Not Met     STRENGTH: Patient will improve strength to stated goals, listed in objective measures above, in order to improve functional independence and quality of life.  [x] Progressing  [] Met  [] Not Met     GAIT: Patient will demonstrate normalized gait mechanics with minimal compensation in order to return to PLOF. [x] Progressing  [] Met  [] Not Met     Patient will return to normal ADL's, IADL's, community involvement, recreational activities, and work-related activities with less than or equal to 2/10 pain and maximal function.  [x] Progressing  [] Met  [] Not Met          Plan     Continue Plan of Care (POC) and progress per patient tolerance. See treatment section for details on planned progressions next session.      George Wolff, PT

## 2023-07-20 ENCOUNTER — HOSPITAL ENCOUNTER (OUTPATIENT)
Dept: RADIOLOGY | Facility: HOSPITAL | Age: 58
Discharge: HOME | End: 2023-07-20
Attending: PODIATRIST
Payer: COMMERCIAL

## 2023-07-20 ENCOUNTER — OFFICE VISIT (OUTPATIENT)
Dept: PODIATRY | Facility: CLINIC | Age: 58
End: 2023-07-20
Payer: COMMERCIAL

## 2023-07-20 VITALS — BODY MASS INDEX: 28 KG/M2 | HEIGHT: 78 IN | WEIGHT: 242 LBS

## 2023-07-20 DIAGNOSIS — M48.061 LUMBAR FORAMINAL STENOSIS: ICD-10-CM

## 2023-07-20 DIAGNOSIS — M54.16 LUMBAR RADICULAR PAIN: ICD-10-CM

## 2023-07-20 DIAGNOSIS — G57.90 NEUROPATHY OF FOOT, UNSPECIFIED LATERALITY: Primary | ICD-10-CM

## 2023-07-20 PROCEDURE — 99999 PR PBB SHADOW E&M-EST. PATIENT-LVL III: ICD-10-PCS | Mod: PBBFAC,,, | Performed by: PODIATRIST

## 2023-07-20 PROCEDURE — 72114 X-RAY EXAM L-S SPINE BENDING: CPT | Mod: 26,,, | Performed by: RADIOLOGY

## 2023-07-20 PROCEDURE — 99213 OFFICE O/P EST LOW 20 MIN: CPT | Mod: S$GLB,,, | Performed by: PODIATRIST

## 2023-07-20 PROCEDURE — 3008F PR BODY MASS INDEX (BMI) DOCUMENTED: ICD-10-PCS | Mod: CPTII,S$GLB,, | Performed by: PODIATRIST

## 2023-07-20 PROCEDURE — 1160F PR REVIEW ALL MEDS BY PRESCRIBER/CLIN PHARMACIST DOCUMENTED: ICD-10-PCS | Mod: CPTII,S$GLB,, | Performed by: PODIATRIST

## 2023-07-20 PROCEDURE — 99999 PR PBB SHADOW E&M-EST. PATIENT-LVL III: CPT | Mod: PBBFAC,,, | Performed by: PODIATRIST

## 2023-07-20 PROCEDURE — 99213 PR OFFICE/OUTPT VISIT, EST, LEVL III, 20-29 MIN: ICD-10-PCS | Mod: S$GLB,,, | Performed by: PODIATRIST

## 2023-07-20 PROCEDURE — 1160F RVW MEDS BY RX/DR IN RCRD: CPT | Mod: CPTII,S$GLB,, | Performed by: PODIATRIST

## 2023-07-20 PROCEDURE — 3044F PR MOST RECENT HEMOGLOBIN A1C LEVEL <7.0%: ICD-10-PCS | Mod: CPTII,S$GLB,, | Performed by: PODIATRIST

## 2023-07-20 PROCEDURE — 1159F MED LIST DOCD IN RCRD: CPT | Mod: CPTII,S$GLB,, | Performed by: PODIATRIST

## 2023-07-20 PROCEDURE — 72114 X-RAY EXAM L-S SPINE BENDING: CPT | Mod: TC

## 2023-07-20 PROCEDURE — 72114 XR LUMBAR SPINE 5 VIEW WITH FLEX AND EXT: ICD-10-PCS | Mod: 26,,, | Performed by: RADIOLOGY

## 2023-07-20 PROCEDURE — 3008F BODY MASS INDEX DOCD: CPT | Mod: CPTII,S$GLB,, | Performed by: PODIATRIST

## 2023-07-20 PROCEDURE — 1159F PR MEDICATION LIST DOCUMENTED IN MEDICAL RECORD: ICD-10-PCS | Mod: CPTII,S$GLB,, | Performed by: PODIATRIST

## 2023-07-20 PROCEDURE — 3044F HG A1C LEVEL LT 7.0%: CPT | Mod: CPTII,S$GLB,, | Performed by: PODIATRIST

## 2023-07-20 RX ORDER — PREGABALIN 75 MG/1
75 CAPSULE ORAL 2 TIMES DAILY
Qty: 60 CAPSULE | Refills: 0 | Status: SHIPPED | OUTPATIENT
Start: 2023-07-20

## 2023-07-24 ENCOUNTER — PATIENT MESSAGE (OUTPATIENT)
Dept: HEPATOLOGY | Facility: CLINIC | Age: 58
End: 2023-07-24

## 2023-07-24 ENCOUNTER — PROCEDURE VISIT (OUTPATIENT)
Dept: HEPATOLOGY | Facility: CLINIC | Age: 58
End: 2023-07-24
Attending: INTERNAL MEDICINE
Payer: COMMERCIAL

## 2023-07-24 ENCOUNTER — PATIENT MESSAGE (OUTPATIENT)
Dept: TRANSPLANT | Facility: CLINIC | Age: 58
End: 2023-07-24
Payer: COMMERCIAL

## 2023-07-24 ENCOUNTER — TELEPHONE (OUTPATIENT)
Dept: HEPATOLOGY | Facility: CLINIC | Age: 58
End: 2023-07-24

## 2023-07-24 VITALS — WEIGHT: 239.19 LBS | HEIGHT: 78 IN | BODY MASS INDEX: 27.67 KG/M2

## 2023-07-24 DIAGNOSIS — R79.89 ABNORMAL LFTS: ICD-10-CM

## 2023-07-24 PROCEDURE — 76981 PR US, ELASTOGRAPHY, PARENCHYMA: ICD-10-PCS | Mod: S$GLB,,, | Performed by: NURSE PRACTITIONER

## 2023-07-24 PROCEDURE — 76981 USE PARENCHYMA: CPT | Mod: S$GLB,,, | Performed by: NURSE PRACTITIONER

## 2023-07-24 NOTE — TELEPHONE ENCOUNTER
----- Message from CARTER Cosme sent at 7/24/2023 12:47 PM CDT -----  No fibrosis with moderate steatosis

## 2023-07-24 NOTE — PROCEDURES
Fibroscan Procedure     Name: Sachin Gonzalez  Date of Procedure : 2023   :: CARTER Pan  Diagnosis: other    Probe: XL    Fibroscan readin.4 KPa    Fibrosis:F 0-1     CAP readin dB/m    Steatosis: :S2       Interpretation:   No fibrosis with moderate steatosis

## 2023-07-24 NOTE — PROGRESS NOTES
Patient presented in clinic today for fibroscan examination of their liver. Patient verbalized that they have fasted 4 hours prior to their examination. Patient denies having any active implantable metal devices; such as a pacemaker, defibrillator, or pump.     GOLDEN WashingtonN, RN  RN Nurse Navigator  Hepatology Department  [facility]

## 2023-07-26 ENCOUNTER — CLINICAL SUPPORT (OUTPATIENT)
Dept: REHABILITATION | Facility: HOSPITAL | Age: 58
End: 2023-07-26
Payer: COMMERCIAL

## 2023-07-26 DIAGNOSIS — R26.9 ABNORMAL GAIT: ICD-10-CM

## 2023-07-26 DIAGNOSIS — M25.571 PAIN IN JOINT INVOLVING ANKLE AND FOOT, RIGHT: Primary | ICD-10-CM

## 2023-07-26 DIAGNOSIS — M62.561 MUSCLE WASTING AND ATROPHY, NOT ELSEWHERE CLASSIFIED, RIGHT LOWER LEG: ICD-10-CM

## 2023-07-26 PROCEDURE — 97112 NEUROMUSCULAR REEDUCATION: CPT

## 2023-07-26 PROCEDURE — 97110 THERAPEUTIC EXERCISES: CPT

## 2023-07-26 PROCEDURE — 97140 MANUAL THERAPY 1/> REGIONS: CPT

## 2023-07-27 ENCOUNTER — CLINICAL SUPPORT (OUTPATIENT)
Dept: REHABILITATION | Facility: HOSPITAL | Age: 58
End: 2023-07-27
Payer: COMMERCIAL

## 2023-07-27 DIAGNOSIS — M62.561 MUSCLE WASTING AND ATROPHY, NOT ELSEWHERE CLASSIFIED, RIGHT LOWER LEG: ICD-10-CM

## 2023-07-27 DIAGNOSIS — R26.9 ABNORMAL GAIT: ICD-10-CM

## 2023-07-27 DIAGNOSIS — M25.571 PAIN IN JOINT INVOLVING ANKLE AND FOOT, RIGHT: Primary | ICD-10-CM

## 2023-07-27 PROCEDURE — 97140 MANUAL THERAPY 1/> REGIONS: CPT

## 2023-07-27 PROCEDURE — 97112 NEUROMUSCULAR REEDUCATION: CPT

## 2023-07-27 PROCEDURE — 97110 THERAPEUTIC EXERCISES: CPT

## 2023-07-27 NOTE — PROGRESS NOTES
[facility]   Physical Therapy Treatment Note        Name: Sachin Gonzalez  [facility] Number: [id number]    Therapy Diagnosis:   Encounter Diagnoses   Name Primary?    Pain in joint involving ankle and foot, right Yes    Muscle wasting and atrophy, not elsewhere classified, right lower leg     Abnormal gait        Physician: Santy Greenberg MD    Visit Date: 7/27/2023    Physician Orders: PT Eval and Treat  Medical Diagnosis from Referral: Gait disturbance  Evaluation Date: 7/11/2023  Authorization Period Expiration: 08/10/2023  Plan of Care Expiration: 09/05/2023  Progress Note Due: 08/10/2023  Visit # / Visits authorized: 3/6 (+1 evaluation)   FOTO: 1/3 (last performed on 7/11/2023)     Precautions: Standard and hypertension  PTA Visit #: 0/5     Time In: 1301  Time Out: 1346  Total Billable Time: 45 minutes (Billing reflects 1 on 1 treatment time spent with patient)    Subjective     Patient reports: he had mild soreness yesterday following session but is feeling a little better today.     He/She was compliant with home exercise program.  Response to previous treatment: no soreness.   Functional change: performance of home exercise plan     Pain: 6/10     Location: right foot / toes    Objective      Objective Measures updated at progress report or POC update only unless otherwise noted.       Treatment     Sachin received the treatments listed below:     MANUAL THERAPY TECHNIQUES were applied for (25) minutes, including:     Manual Intervention Performed Today     Soft Tissue Mobilization [x]  right  gastrocnemius, soleus, foot intrinsics   Joint Mobilizations [x]  Interphalangeal mobilizations    Mobilizations with Movement [x]  Toe flexion/extension     []      Functional Dry Needling  []         Plan for Next Visit: Continue as needed       THERAPEUTIC EXERCISES: to develop strength, endurance, ROM, flexibility, posture and core stabilization for (12)  minutes including: x =  "performed today    Therapeutic Exercise 7/27/2023    Bike x 7 minutes L2   Seated Calf Raise x 3x15   Calf Stretch x 5x30"   Resisted Great Toe Flexion x 2 minutes, RTB         BOLD = new this visit  Plan for Next Visit:        NEUROMUSCULAR RE-EDUCATION ACTIVITIES to improve Balance, Coordination, Kinesthetic, Sense, Proprioception, and Posture for (8) minutes.  The following were included:     Intervention Performed Today     Toe Yoga  x   x 2' Great toe extension  2' Lesser toes extension   Short foot (doming) x  2'   Tandem Balance  X30"   Single Limb Stance   2x30" on right lower extremity              Plan for Next Visit: Bike, Calf board stretch, ankle 4 way, balance      Patient Education and Home Exercises       Home Exercises Provided and Patient Education Provided     Education provided: time included in treatment time.   PURPOSE: Patient educated on the impairments noted above and the effects of physical therapy intervention to improve overall condition and QOL.   EXERCISE: Patient was educated on all the above exercise prior/during/after for proper posture, positioning, and execution for safe performance with home exercise program.   STRENGTH: Patient educated on the importance of improved core and extremity strength in order to improve alignment of the spine and extremities with static positions and dynamic movement.   GAIT & BALANCE: Patient educated on the importance of strong core and lower extremity musculature in order to improve both static and dynamic balance, improve gait mechanics, reduce fall risk and improve household and community mobility.   POSTURE: Patient educated on postural awareness to reduce stress and maintain optimal alignment of the spine with static positions and dynamic movement     Written Home Exercises Provided: yes.  Exercises were reviewed and Sachin was able to demonstrate them prior to the end of the session.  Sachin demonstrated good  understanding of the " education provided. See EMR under Patient Instructions for exercises provided during therapy sessions.    Assessment     Patient tolerated today's session well. Patient presents with increased tenderness to palpation and soreness with passive range of motion today, likely associated with having previous session being yesterday. Patient continues to demonstrate improved active motion with toe flexion and extension with motion coming form tarsal/metatarsal joints. Patient progressed with standing calf stretch notes significant tightness. Patient able to stretch toes into extension when performing calf raises in sitting but begins to have some discomfort with repetition.     Thelander is progressing well towards his goals.   Patient prognosis is Good.     Patient will continue to benefit from skilled outpatient physical therapy to address the deficits listed in the problem list box on initial evaluation, provide pt/family education and to maximize patient's level of independence in the home and community environment.     Patient's spiritual, cultural and educational needs considered and pt agreeable to plan of care and goals.     Anticipated Barriers for therapy: co-morbidities and chronicity of condition       Short Term Goals:  4 weeks Status  Date Met   PAIN: Pt will report worst pain of 7/10 in order to progress toward max functional ability and improve quality of life. [x] Progressing  [] Met  [] Not Met     FUNCTION: Patient will demonstrate improved function as indicated by a score of greater than or equal to 65 out of 100 on FOTO. [x] Progressing  [] Met  [] Not Met     MOBILITY: Patient will improve AROM to 50% of stated goals, listed in objective measures above, in order to progress towards independence with functional activities.  [x] Progressing  [] Met  [] Not Met     STRENGTH: Patient will improve strength to 50% of stated goals, listed in objective measures above, in order to progress towards  independence with functional activities. [x] Progressing  [] Met  [] Not Met     POSTURE: Patient will correct postural deviations in sitting and standing, to decrease pain and promote long term stability.  [x] Progressing  [] Met  [] Not Met     GAIT: Patient will demonstrate improved gait mechanics including normalize gait in order to improve functional mobility, improve quality of life, and decrease risk of further injury or fall.  [x] Progressing  [] Met  [] Not Met     HEP: Patient will demonstrate independence with HEP in order to progress toward functional independence. [x] Progressing  [] Met  [] Not Met        Long Term Goals:  8 weeks Status Date Met   PAIN: Pt will report worst pain of 3/10 in order to progress toward max functional ability and improve quality of life [x] Progressing  [] Met  [] Not Met     FUNCTION: Patient will demonstrate improved function as indicated by a score of greater than or equal to 71 out of 100 on FOTO. [x] Progressing  [] Met  [] Not Met     MOBILITY: Patient will improve AROM to stated goals, listed in objective measures above, in order to return to maximal functional potential and improve quality of life.  [x] Progressing  [] Met  [] Not Met     STRENGTH: Patient will improve strength to stated goals, listed in objective measures above, in order to improve functional independence and quality of life.  [x] Progressing  [] Met  [] Not Met     GAIT: Patient will demonstrate normalized gait mechanics with minimal compensation in order to return to PLOF. [x] Progressing  [] Met  [] Not Met     Patient will return to normal ADL's, IADL's, community involvement, recreational activities, and work-related activities with less than or equal to 2/10 pain and maximal function.  [x] Progressing  [] Met  [] Not Met          Plan     Continue Plan of Care (POC) and progress per patient tolerance. See treatment section for details on planned progressions next session.      George Wolff,  PT

## 2023-07-27 NOTE — PROGRESS NOTES
[facility]   Physical Therapy Treatment Note        Name: Sachin Gonzalez  [facility] Number: [id number]    Therapy Diagnosis:   Encounter Diagnoses   Name Primary?    Pain in joint involving ankle and foot, right Yes    Muscle wasting and atrophy, not elsewhere classified, right lower leg     Abnormal gait      Physician: Santy Greenberg MD    Visit Date: 7/26/2023    Physician Orders: PT Eval and Treat  Medical Diagnosis from Referral: Gait disturbance  Evaluation Date: 7/11/2023  Authorization Period Expiration: 08/10/2023  Plan of Care Expiration: 09/05/2023  Progress Note Due: 08/10/2023  Visit # / Visits authorized: 3/6 (+1 evaluation)   FOTO: 1/3 (last performed on 7/11/2023)     Precautions: Standard and hypertension  PTA Visit #: 0/5     Time In: 1355  Time Out: 1440  Total Billable Time: 45 minutes (Billing reflects 1 on 1 treatment time spent with patient)    Subjective     Patient reports: he continues to have pain on dorsal right foot. Patient notes he has been massaging foot as tolerated but toes continue to be extremely sensitive.     He/She was compliant with home exercise program.  Response to previous treatment: no soreness.   Functional change: performance of home exercise plan     Pain: -/10     Location: right foot / toes    Objective      Objective Measures updated at progress report or POC update only unless otherwise noted.       Treatment     Sachin received the treatments listed below:     MANUAL THERAPY TECHNIQUES were applied for (24) minutes, including:     Manual Intervention Performed Today     Soft Tissue Mobilization [x]  right  gastrocnemius, soleus, foot intrinsics   Joint Mobilizations [x]  Interphalangeal mobilizations    Mobilizations with Movement [x]  Toe flexion/extension     []      Functional Dry Needling  []         Plan for Next Visit: Continue as needed       THERAPEUTIC EXERCISES: to develop strength, endurance, ROM, flexibility, posture  "and core stabilization for (9)  minutes including: x = performed today    Therapeutic Exercise 7/26/2023    Bike x 7 minutes L2   Seated Calf Raise  3x15   Calf Stretch     Seated Dorsiflexion  3x10   Resisted Great Toe Flexion  3x10 GTB         BOLD = new this visit  Plan for Next Visit:        NEUROMUSCULAR RE-EDUCATION ACTIVITIES to improve Balance, Coordination, Kinesthetic, Sense, Proprioception, and Posture for (10) minutes.  The following were included:     Intervention Performed Today     Toe Yoga  x   x 2' Great toe extension  2' Lesser toes extension   Short foot (doming) x  2'   Tandem Balance x X30"   Single Limb Stance  x 2x30" on right lower extremity              Plan for Next Visit: Bike, Calf board stretch, ankle 4 way, balance      Patient Education and Home Exercises       Home Exercises Provided and Patient Education Provided     Education provided: time included in treatment time.   PURPOSE: Patient educated on the impairments noted above and the effects of physical therapy intervention to improve overall condition and QOL.   EXERCISE: Patient was educated on all the above exercise prior/during/after for proper posture, positioning, and execution for safe performance with home exercise program.   STRENGTH: Patient educated on the importance of improved core and extremity strength in order to improve alignment of the spine and extremities with static positions and dynamic movement.   GAIT & BALANCE: Patient educated on the importance of strong core and lower extremity musculature in order to improve both static and dynamic balance, improve gait mechanics, reduce fall risk and improve household and community mobility.   POSTURE: Patient educated on postural awareness to reduce stress and maintain optimal alignment of the spine with static positions and dynamic movement     Written Home Exercises Provided: yes.  Exercises were reviewed and Shuner was able to demonstrate them prior to the end of " the session.  Sachin demonstrated good  understanding of the education provided. See EMR under Patient Instructions for exercises provided during therapy sessions.    Assessment     Patient tolerated today's session well. Patient continues to have severe tenderness to palpation in right toes. Patient tolerance to massage in dorsal midfoot is improved. Patient demonstrates improved toe flexion/extension getting movement from tarsal/metatarsal joints.     Sachin is progressing well towards his goals.   Patient prognosis is Good.     Patient will continue to benefit from skilled outpatient physical therapy to address the deficits listed in the problem list box on initial evaluation, provide pt/family education and to maximize patient's level of independence in the home and community environment.     Patient's spiritual, cultural and educational needs considered and pt agreeable to plan of care and goals.     Anticipated Barriers for therapy: co-morbidities and chronicity of condition       Short Term Goals:  4 weeks Status  Date Met   PAIN: Pt will report worst pain of 7/10 in order to progress toward max functional ability and improve quality of life. [x] Progressing  [] Met  [] Not Met     FUNCTION: Patient will demonstrate improved function as indicated by a score of greater than or equal to 65 out of 100 on FOTO. [x] Progressing  [] Met  [] Not Met     MOBILITY: Patient will improve AROM to 50% of stated goals, listed in objective measures above, in order to progress towards independence with functional activities.  [x] Progressing  [] Met  [] Not Met     STRENGTH: Patient will improve strength to 50% of stated goals, listed in objective measures above, in order to progress towards independence with functional activities. [x] Progressing  [] Met  [] Not Met     POSTURE: Patient will correct postural deviations in sitting and standing, to decrease pain and promote long term stability.  [x] Progressing  []  Met  [] Not Met     GAIT: Patient will demonstrate improved gait mechanics including normalize gait in order to improve functional mobility, improve quality of life, and decrease risk of further injury or fall.  [x] Progressing  [] Met  [] Not Met     HEP: Patient will demonstrate independence with HEP in order to progress toward functional independence. [x] Progressing  [] Met  [] Not Met        Long Term Goals:  8 weeks Status Date Met   PAIN: Pt will report worst pain of 3/10 in order to progress toward max functional ability and improve quality of life [x] Progressing  [] Met  [] Not Met     FUNCTION: Patient will demonstrate improved function as indicated by a score of greater than or equal to 71 out of 100 on FOTO. [x] Progressing  [] Met  [] Not Met     MOBILITY: Patient will improve AROM to stated goals, listed in objective measures above, in order to return to maximal functional potential and improve quality of life.  [x] Progressing  [] Met  [] Not Met     STRENGTH: Patient will improve strength to stated goals, listed in objective measures above, in order to improve functional independence and quality of life.  [x] Progressing  [] Met  [] Not Met     GAIT: Patient will demonstrate normalized gait mechanics with minimal compensation in order to return to PLOF. [x] Progressing  [] Met  [] Not Met     Patient will return to normal ADL's, IADL's, community involvement, recreational activities, and work-related activities with less than or equal to 2/10 pain and maximal function.  [x] Progressing  [] Met  [] Not Met          Plan     Continue Plan of Care (POC) and progress per patient tolerance. See treatment section for details on planned progressions next session.      George Wolff, PT

## 2023-07-29 NOTE — PROGRESS NOTES
Subjective:     Patient ID: Sachin Gonzalez is a 58 y.o. male.    Chief Complaint: Foot Pain (Wears tennis shoes with socks, diabetic Pt, last seen on 05/31/23 with PCP Dr. Greenberg)    Sachin is a 58 y.o. male who presents to the clinic for evaluation and treatment of high risk feet. 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 The patient's chief complaint is bilateral foot pain. This patient has documented high risk feet requiring routine maintenance secondary to peripheral neuropathy. Patient complains of an aching pain of the left 2nd and 3rd toes.     PCP: MAHAD Greenberg Jr, MD    Date Last Seen by PCP: 05/31/2023    Current shoe gear:  Affected Foot: Tennis shoes     Unaffected Foot: Tennis shoes    Last encounter in this department: 3/30/2022    Hemoglobin A1C   Date Value Ref Range Status   01/13/2023 5.3 4.0 - 5.6 % Final     Comment:     ADA Screening Guidelines:  5.7-6.4%  Consistent with prediabetes  >or=6.5%  Consistent with diabetes    High levels of fetal hemoglobin interfere with the HbA1C  assay. Heterozygous hemoglobin variants (HbS, HgC, etc)do  not significantly interfere with this assay.   However, presence of multiple variants may affect accuracy.     04/12/2017 5.7 4.5 - 6.2 % Final     Comment:     According to ADA guidelines, hemoglobin A1C <7.0% represents  optimal control in non-pregnant diabetic patients.  Different  metrics may apply to specific populations.   Standards of Medical Care in Diabetes - 2016.  For the purpose of screening for the presence of diabetes:  <5.7%     Consistent with the absence of diabetes  5.7-6.4%  Consistent with increasing risk for diabetes   (prediabetes)  >or=6.5%  Consistent with diabetes  Currently no consensus exists for use of hemoglobin  A1C  for diagnosis of diabetes for children.         Patient Active Problem List   Diagnosis    History of prostate cancer    RA (rheumatoid arthritis)    Status post prostatectomy (06/07/12)    Erectile dysfunction    Peyronie's disease    Long-term use of immunosuppressant medication    Cephalic vein thrombosis-post op    Essential hypertension    Tension-type headache, not intractable    Lumbar foraminal stenosis    Parotid sialolithiasis    Gastroesophageal reflux disease without esophagitis    Mixed hyperlipidemia    Melena    Screen for colon cancer    Prostate cancer    Gross hematuria    Stricture of bulbous urethra in male    Drug-induced immunodeficiency    Pain in joint involving ankle and foot, right    Muscle wasting and atrophy, not elsewhere classified, right lower leg    Abnormal gait       Medication List with Changes/Refills   New Medications    PREGABALIN (LYRICA) 75 MG CAPSULE    Take 1 capsule (75 mg total) by mouth 2 (two) times daily.   Current Medications    ASCORBIC ACID, VITAMIN C, (VITAMIN C) 250 MG TABLET    Take 250 mg by mouth once daily.    CHOLECALCIFEROL, VITAMIN D3, 25 MCG (1,000 UNIT) CHEW    Take by mouth.    FOLIC ACID (FOLVITE) 1 MG TABLET    Take 1 tablet (1,000 mcg total) by mouth once daily.    LINACLOTIDE (LINZESS) 72 MCG CAP CAPSULE    Take 1 capsule (72 mcg total) by mouth before breakfast.    METHOTREXATE 2.5 MG TAB    Take 6 tablets (15 mg total) by mouth every 7 days. This medication requires periodic lab monitoring    METOPROLOL SUCCINATE (TOPROL-XL) 50 MG 24 HR TABLET    Take 1 tablet (50 mg total) by mouth once daily.    MULTIVITAMIN CAPSULE    Take 1 capsule by mouth once daily.    SUCRALFATE (CARAFATE) 1 GRAM TABLET    Take 1 tablet (1 g total) by mouth 3 (three) times daily as needed (reflux indigestion).    ZINC SULFATE (ZINC-220 ORAL)    Take 1 tablet by mouth as needed.       Review of patient's allergies indicates:  No Known Allergies    Past Surgical History:  "  Procedure Laterality Date    COLONOSCOPY N/A 4/21/2021    Procedure: COLONOSCOPY covid test scheduled on 4/19/21;  Surgeon: Darrell Worthington MD;  Location: [facility];  Service: Endoscopy;  Laterality: N/A;    CYSTOURETHROSCOPY WITH DIRECT VISION INTERNAL URETHROTOMY N/A 5/31/2022    Procedure: CYSTOSCOPY, WITH DIRECT VISION INTERNAL URETHROTOMY;  Surgeon: Yaniv Murray MD;  Location: [facility] OR;  Service: Urology;  Laterality: N/A;    ESOPHAGOGASTRODUODENOSCOPY      ESOPHAGOGASTRODUODENOSCOPY N/A 4/5/2021    Procedure: EGD (ESOPHAGOGASTRODUODENOSCOPY) Rapid Covid test needed;  Surgeon: Darrell Worthington MD;  Location: [facility] ENDO;  Service: Endoscopy;  Laterality: N/A;    OSTEOTOMY OF METATARSAL BONE Right 11/21/2022    Procedure: OSTEOTOMY, METATARSAL BONE;  Surgeon: Daniel Ramirez DPM;  Location: [facility] OR;  Service: Podiatry;  Laterality: Right;    PROSTATECTOMY  2011    RELEASE OF CONTRACTURE OF JOINT Right 11/21/2022    Procedure: RELEASE, CONTRACTURE, JOINT;  Surgeon: Daniel Ramirez DPM;  Location: [facility] OR;  Service: Podiatry;  Laterality: Right;    RESECTION OF GASTROCNEMIUS MUSCLE Right 11/21/2022    Procedure: RESECTION, MUSCLE, GASTROCNEMIUS;  Surgeon: Daniel Ramirez DPM;  Location: [facility] OR;  Service: Podiatry;  Laterality: Right;       Family History   Problem Relation Age of Onset    Stroke Father     Alcohol abuse Father     Arthritis Mother     Hypertension Mother     Anxiety disorder Mother     No Known Problems Sister     Kidney disease Brother        Social History     Socioeconomic History    Marital status: Single   Tobacco Use    Smoking status: Never    Smokeless tobacco: Never   Substance and Sexual Activity    Alcohol use: Never    Drug use: No    Sexual activity: Yes     Partners: Female   Social History Narrative    No pets or smokers in household.       Vitals:    07/20/23 0829   Weight: 109.8 kg (242 lb)   Height: 6' 7" (2.007 m)   PainSc: 0-No pain   PainLoc: Foot       Hemoglobin " A1C   Date Value Ref Range Status   01/13/2023 5.3 4.0 - 5.6 % Final     Comment:     ADA Screening Guidelines:  5.7-6.4%  Consistent with prediabetes  >or=6.5%  Consistent with diabetes    High levels of fetal hemoglobin interfere with the HbA1C  assay. Heterozygous hemoglobin variants (HbS, HgC, etc)do  not significantly interfere with this assay.   However, presence of multiple variants may affect accuracy.     04/12/2017 5.7 4.5 - 6.2 % Final     Comment:     According to ADA guidelines, hemoglobin A1C <7.0% represents  optimal control in non-pregnant diabetic patients.  Different  metrics may apply to specific populations.   Standards of Medical Care in Diabetes - 2016.  For the purpose of screening for the presence of diabetes:  <5.7%     Consistent with the absence of diabetes  5.7-6.4%  Consistent with increasing risk for diabetes   (prediabetes)  >or=6.5%  Consistent with diabetes  Currently no consensus exists for use of hemoglobin A1C  for diagnosis of diabetes for children.         Review of Systems   Constitutional:  Negative for chills and fever.   Respiratory:  Negative for shortness of breath.    Cardiovascular:  Negative for chest pain, palpitations, orthopnea, claudication and leg swelling.   Gastrointestinal:  Negative for diarrhea, nausea and vomiting.   Musculoskeletal:  Negative for joint pain.   Skin:  Negative for rash.   Neurological:  Positive for tingling and sensory change.   Psychiatric/Behavioral: Negative.           Objective:      PHYSICAL EXAM: Apperance: Alert and orient in no distress,well developed, and with good attention to grooming and body habits  LOWER EXTREMITY EXAM:  VASCULAR: Dorsalis pedis pulses 2/4 bilateral and Posterior Tibial pulses 2/4 bilateral. Capillary fill time <4 seconds bilateral. No edema observed bilateral. Varicosities absent bilateral. Skin temperature of the lower extremities is warm to warm, proximal to distal. Hair growth WNL  bilateral.  DERMATOLOGICAL: No skin rashes, subcutaneous nodules, lesions, or ulcers observed bilateral. Nails 1,3,4,5 bilateral elongated, thickened, and discolored with subungual debris. Webspaces 1,2,3,4 bilateral clean, dry and without evidence of break in skin integrity.   NEUROLOGICAL: Light touch, sharp-dull, proprioception all present and equal bilaterally.  Vibratory sensation diminished at bilateral hallux and intact at bilateral navicular. Protective sensation absent at 6/10 sites as tested with a [place] 5.07 monofilament.   MUSCULOSKELETAL: Muscle strength is 5/5 for foot inverters, everters, plantarflexors, and dorsiflexors. Muscle tone is normal.           Assessment:       ICD-10-CM ICD-9-CM   1. Neuropathy of foot, unspecified laterality  G57.90 355.8   2. Lumbar radicular pain  M54.16 724.4   3. Lumbar foraminal stenosis  M48.061 724.02         Plan:   Neuropathy of foot, unspecified laterality    Lumbar radicular pain  -     X-Ray Lumbar Complete Including Flex And Ext; Future; Expected date: 07/20/2023  -     pregabalin (LYRICA) 75 MG capsule; Take 1 capsule (75 mg total) by mouth 2 (two) times daily.  Dispense: 60 capsule; Refill: 0  -     Ambulatory referral/consult to Pain Clinic; Future; Expected date: 07/27/2023    Lumbar foraminal stenosis  -     Ambulatory referral/consult to Pain Clinic; Future; Expected date: 07/27/2023    I counseled the patient on his conditions, regarding findings of my examination, my impressions, and usual treatment plan.   Appointment spent on education about the neuropathy, and prevention of limb loss.  Shoe inspection. Patient instructed on proper foot hygeine. We discussed wearing proper shoe gear, daily foot inspections, never walking without protective shoe gear, never putting sharp instruments to feet.   Discussed with patient treatments for neuropathy consisting of topical or oral medication.  Prescription written for Lyrica 75mg to be taken once  nightly. Informed patient of possible side effects including but not limited to disorientation and drowsiness. Patient instructed to discontinue use if there are any adverse effects. Patient states he understands.   Referral completed for Intervention pain management.   Counseled patient on daily foot inspections and proper shoe gear.  Patient to return in 4-6 weeks or sooner if needed.        Reina Guzman DPM  [facility]

## 2023-08-07 ENCOUNTER — TELEPHONE (OUTPATIENT)
Dept: PAIN MEDICINE | Facility: CLINIC | Age: 58
End: 2023-08-07

## 2023-08-07 NOTE — TELEPHONE ENCOUNTER
Attempt to reach patient to schedule appointment with one of our pain providers. The patient did not answer. V.m box not set up.     Jared Lock  Medical

## 2023-08-07 NOTE — TELEPHONE ENCOUNTER
----- Message from Aline Madrigal sent at 8/7/2023 11:43 AM CDT -----  Contact: Sachin  Type:  Patient Returning Call    Who Called: Sachin  Who Left Message for Patient: Jared  Does the patient know what this is regarding?: an appointment  Would the patient rather a call back or a response via [facility]? call  Best Call Back Number: [phone]   Additional Information: Patient reports missing a call and request another call back.

## 2023-08-07 NOTE — TELEPHONE ENCOUNTER
Called patient to confirm appointment . Patent Answered and did confirmed.     Jared Lock  Medical Assistant

## 2023-08-07 NOTE — TELEPHONE ENCOUNTER
----- Message from Mimi Powers sent at 8/7/2023 11:06 AM CDT -----  Pt would like to reschedule the appt today to a later date. Call the pt back at .[phone]. Thx. EL

## 2023-08-09 ENCOUNTER — OFFICE VISIT (OUTPATIENT)
Dept: PODIATRY | Facility: CLINIC | Age: 58
End: 2023-08-09
Payer: COMMERCIAL

## 2023-08-09 DIAGNOSIS — B35.1 ONYCHOMYCOSIS OF TOENAIL: ICD-10-CM

## 2023-08-09 DIAGNOSIS — G60.9 IDIOPATHIC PERIPHERAL NEUROPATHY: Primary | ICD-10-CM

## 2023-08-09 PROCEDURE — 11721 DEBRIDE NAIL 6 OR MORE: CPT | Mod: S$GLB,,, | Performed by: PODIATRIST

## 2023-08-09 PROCEDURE — 99999 PR PBB SHADOW E&M-EST. PATIENT-LVL II: ICD-10-PCS | Mod: PBBFAC,,, | Performed by: PODIATRIST

## 2023-08-09 PROCEDURE — 99999 PR PBB SHADOW E&M-EST. PATIENT-LVL II: CPT | Mod: PBBFAC,,, | Performed by: PODIATRIST

## 2023-08-09 PROCEDURE — 99499 NO LOS: ICD-10-PCS | Mod: S$GLB,,, | Performed by: PODIATRIST

## 2023-08-09 PROCEDURE — 11721 PR DEBRIDEMENT OF NAILS, 6 OR MORE: ICD-10-PCS | Mod: S$GLB,,, | Performed by: PODIATRIST

## 2023-08-09 PROCEDURE — 99499 UNLISTED E&M SERVICE: CPT | Mod: S$GLB,,, | Performed by: PODIATRIST

## 2023-08-20 NOTE — PROGRESS NOTES
Subjective:     Patient ID: Sachin Gonzalez is a 58 y.o. male.    Chief Complaint: Nail Care (Patient in for two month nail care, he is not a diabetic and has on tennis shoes and socks, last seen by pcp on 5/31/2023)    Sachin is a 58 y.o. male who presents to the clinic for evaluation and treatment of high risk feet. 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 The patient's chief complaint is long, thick toenails. This patient has documented high risk feet requiring routine maintenance secondary to peripheral neuropathy. Patient complains of an aching pain of the left 2nd and 3rd toes.     PCP: Santy Greenberg MD    Date Last Seen by PCP: 05/31/2023    Current shoe gear:  Affected Foot: Tennis shoes     Unaffected Foot: Tennis shoes    Last encounter in this department: 3/30/2022    Hemoglobin A1C   Date Value Ref Range Status   01/13/2023 5.3 4.0 - 5.6 % Final     Comment:     ADA Screening Guidelines:  5.7-6.4%  Consistent with prediabetes  >or=6.5%  Consistent with diabetes    High levels of fetal hemoglobin interfere with the HbA1C  assay. Heterozygous hemoglobin variants (HbS, HgC, etc)do  not significantly interfere with this assay.   However, presence of multiple variants may affect accuracy.     04/12/2017 5.7 4.5 - 6.2 % Final     Comment:     According to ADA guidelines, hemoglobin A1C <7.0% represents  optimal control in non-pregnant diabetic patients.  Different  metrics may apply to specific populations.   Standards of Medical Care in Diabetes - 2016.  For the purpose of screening for the presence of diabetes:  <5.7%     Consistent with the absence of diabetes  5.7-6.4%  Consistent with increasing risk for diabetes   (prediabetes)  >or=6.5%  Consistent with diabetes  Currently no  consensus exists for use of hemoglobin A1C  for diagnosis of diabetes for children.         Patient Active Problem List   Diagnosis    History of prostate cancer    RA (rheumatoid arthritis)    Status post prostatectomy (06/07/12)    Erectile dysfunction    Peyronie's disease    Long-term use of immunosuppressant medication    Cephalic vein thrombosis-post op    Essential hypertension    Tension-type headache, not intractable    Lumbar foraminal stenosis    Parotid sialolithiasis    Gastroesophageal reflux disease without esophagitis    Mixed hyperlipidemia    Melena    Screen for colon cancer    Prostate cancer    Gross hematuria    Stricture of bulbous urethra in male    Drug-induced immunodeficiency    Pain in joint involving ankle and foot, right    Muscle wasting and atrophy, not elsewhere classified, right lower leg    Abnormal gait       Medication List with Changes/Refills   Current Medications    ASCORBIC ACID, VITAMIN C, (VITAMIN C) 250 MG TABLET    Take 250 mg by mouth once daily.    CHOLECALCIFEROL, VITAMIN D3, 25 MCG (1,000 UNIT) CHEW    Take by mouth.    FOLIC ACID (FOLVITE) 1 MG TABLET    Take 1 tablet (1,000 mcg total) by mouth once daily.    LINACLOTIDE (LINZESS) 72 MCG CAP CAPSULE    Take 1 capsule (72 mcg total) by mouth before breakfast.    METHOTREXATE 2.5 MG TAB    Take 6 tablets (15 mg total) by mouth every 7 days. This medication requires periodic lab monitoring    METOPROLOL SUCCINATE (TOPROL-XL) 50 MG 24 HR TABLET    Take 1 tablet (50 mg total) by mouth once daily.    MULTIVITAMIN CAPSULE    Take 1 capsule by mouth once daily.    PREGABALIN (LYRICA) 75 MG CAPSULE    Take 1 capsule (75 mg total) by mouth 2 (two) times daily.    SUCRALFATE (CARAFATE) 1 GRAM TABLET    Take 1 tablet (1 g total) by mouth 3 (three) times daily as needed (reflux indigestion).    ZINC SULFATE (ZINC-220 ORAL)    Take 1 tablet by mouth as needed.       Review of patient's allergies indicates:  No Known  Allergies    Past Surgical History:   Procedure Laterality Date    COLONOSCOPY N/A 4/21/2021    Procedure: COLONOSCOPY covid test scheduled on 4/19/21;  Surgeon: Darrell Worthington MD;  Location: [facility];  Service: Endoscopy;  Laterality: N/A;    CYSTOURETHROSCOPY WITH DIRECT VISION INTERNAL URETHROTOMY N/A 5/31/2022    Procedure: CYSTOSCOPY, WITH DIRECT VISION INTERNAL URETHROTOMY;  Surgeon: Yaniv Murray MD;  Location: [facility] OR;  Service: Urology;  Laterality: N/A;    ESOPHAGOGASTRODUODENOSCOPY      ESOPHAGOGASTRODUODENOSCOPY N/A 4/5/2021    Procedure: EGD (ESOPHAGOGASTRODUODENOSCOPY) Rapid Covid test needed;  Surgeon: Darrell Worthington MD;  Location: [facility] ENDO;  Service: Endoscopy;  Laterality: N/A;    OSTEOTOMY OF METATARSAL BONE Right 11/21/2022    Procedure: OSTEOTOMY, METATARSAL BONE;  Surgeon: Daniel Ramirez DPM;  Location: [facility] OR;  Service: Podiatry;  Laterality: Right;    PROSTATECTOMY  2011    RELEASE OF CONTRACTURE OF JOINT Right 11/21/2022    Procedure: RELEASE, CONTRACTURE, JOINT;  Surgeon: Daniel Ramirez DPM;  Location: [facility] OR;  Service: Podiatry;  Laterality: Right;    RESECTION OF GASTROCNEMIUS MUSCLE Right 11/21/2022    Procedure: RESECTION, MUSCLE, GASTROCNEMIUS;  Surgeon: Daniel Ramirez DPM;  Location: [facility] OR;  Service: Podiatry;  Laterality: Right;       Family History   Problem Relation Age of Onset    Stroke Father     Alcohol abuse Father     Arthritis Mother     Hypertension Mother     Anxiety disorder Mother     No Known Problems Sister     Kidney disease Brother        Social History     Socioeconomic History    Marital status: Single   Tobacco Use    Smoking status: Never    Smokeless tobacco: Never   Substance and Sexual Activity    Alcohol use: Never    Drug use: No    Sexual activity: Yes     Partners: Female   Social History Narrative    No pets or smokers in household.     Social Determinants of Health     Stress: No Stress Concern Present (12/12/2019)    [place]  [employer] - Occupational Stress Questionnaire     Feeling of Stress : Not at all       There were no vitals filed for this visit.      Hemoglobin A1C   Date Value Ref Range Status   01/13/2023 5.3 4.0 - 5.6 % Final     Comment:     ADA Screening Guidelines:  5.7-6.4%  Consistent with prediabetes  >or=6.5%  Consistent with diabetes    High levels of fetal hemoglobin interfere with the HbA1C  assay. Heterozygous hemoglobin variants (HbS, HgC, etc)do  not significantly interfere with this assay.   However, presence of multiple variants may affect accuracy.     04/12/2017 5.7 4.5 - 6.2 % Final     Comment:     According to ADA guidelines, hemoglobin A1C <7.0% represents  optimal control in non-pregnant diabetic patients.  Different  metrics may apply to specific populations.   Standards of Medical Care in Diabetes - 2016.  For the purpose of screening for the presence of diabetes:  <5.7%     Consistent with the absence of diabetes  5.7-6.4%  Consistent with increasing risk for diabetes   (prediabetes)  >or=6.5%  Consistent with diabetes  Currently no consensus exists for use of hemoglobin A1C  for diagnosis of diabetes for children.         Review of Systems   Constitutional:  Negative for chills and fever.   Respiratory:  Negative for shortness of breath.    Cardiovascular:  Negative for chest pain, palpitations, orthopnea, claudication and leg swelling.   Gastrointestinal:  Negative for diarrhea, nausea and vomiting.   Musculoskeletal:  Negative for joint pain.   Skin:  Negative for rash.   Neurological:  Positive for tingling and sensory change.   Psychiatric/Behavioral: Negative.           Objective:      PHYSICAL EXAM: Apperance: Alert and orient in no distress,well developed, and with good attention to grooming and body habits  Patient ambulating in tennis shoes.   LOWER EXTREMITY EXAM:  VASCULAR: Dorsalis pedis pulses 2/4 bilateral and Posterior Tibial pulses 2/4 bilateral. Capillary fill  time <4 seconds bilateral. No edema observed bilateral. Varicosities absent bilateral. Skin temperature of the lower extremities is warm to warm, proximal to distal. Hair growth WNL bilateral.  DERMATOLOGICAL: No skin rashes, subcutaneous nodules, lesions, or ulcers observed bilateral. Nails 1,3,4,5 bilateral elongated, thickened, and discolored with subungual debris. Webspaces 1,2,3,4 bilateral clean, dry and without evidence of break in skin integrity.   NEUROLOGICAL: Light touch, sharp-dull, proprioception all present and equal bilaterally.  Vibratory sensation diminished at bilateral hallux and intact at bilateral navicular. Protective sensation absent at 6/10 sites as tested with a [place] 5.07 monofilament.   MUSCULOSKELETAL: Muscle strength is 5/5 for foot inverters, everters, plantarflexors, and dorsiflexors. Muscle tone is normal.           Assessment:       ICD-10-CM ICD-9-CM   1. Idiopathic peripheral neuropathy  G60.9 356.9   2. Onychomycosis of toenail  B35.1 110.1         Plan:   Idiopathic peripheral neuropathy    Onychomycosis of toenail    I counseled the patient on his conditions, regarding findings of my examination, my impressions, and usual treatment plan.   Appointment spent on education about the neuropathy, and prevention of limb loss.  Shoe inspection. Patient instructed on proper foot hygeine. We discussed wearing proper shoe gear, daily foot inspections, never walking without protective shoe gear, never putting sharp instruments to feet.    With patient's permission, nails 1-5 bilateral were debrided/trimmed in length and thickness aggressively to their soft tissue attachment mechanically and with electric , removing all offending nail and debris. Patient relates relief following the procedure.  Patient  will continue to monitor the areas daily, inspect feet, wear protective shoe gear when ambulatory, moisturizer to maintain skin integrity.  Patient to return 2 months or  sooner if needed.          Reina Guzman DPM  [facility]

## 2023-09-06 ENCOUNTER — DOCUMENTATION ONLY (OUTPATIENT)
Dept: REHABILITATION | Facility: HOSPITAL | Age: 58
End: 2023-09-06
Payer: COMMERCIAL

## 2023-09-06 DIAGNOSIS — M62.561 MUSCLE WASTING AND ATROPHY, NOT ELSEWHERE CLASSIFIED, RIGHT LOWER LEG: ICD-10-CM

## 2023-09-06 DIAGNOSIS — R26.9 ABNORMAL GAIT: ICD-10-CM

## 2023-09-06 DIAGNOSIS — M25.571 PAIN IN JOINT INVOLVING ANKLE AND FOOT, RIGHT: Primary | ICD-10-CM

## 2023-09-06 NOTE — PROGRESS NOTES
[facility]  Physical Therapy Discharge Note    Name: [facility] Number: [id number]    Therapy Diagnosis:   Encounter Diagnoses   Name Primary?    Pain in joint involving ankle and foot, right Yes    Muscle wasting and atrophy, not elsewhere classified, right lower leg     Abnormal gait      Physician: Santy Greenberg MD     Physician Orders: PT Eval and Treat  Medical Diagnosis from Referral: Gait disturbance  Evaluation Date: 7/11/2023      Date of Last visit: 07/27/2023  Total Visits Received: 4    ASSESSMENT      See daily note    Discharge reason: Patient has not attended therapy since 07/27/2023.    Discharge FOTO Score: see daily note    Goals: see daily note    PLAN   This patient is discharged from Physical Therapy      George Wolff PT

## 2023-09-11 ENCOUNTER — OFFICE VISIT (OUTPATIENT)
Dept: UROLOGY | Facility: CLINIC | Age: 58
End: 2023-09-11
Payer: COMMERCIAL

## 2023-09-11 VITALS
SYSTOLIC BLOOD PRESSURE: 111 MMHG | BODY MASS INDEX: 26.52 KG/M2 | DIASTOLIC BLOOD PRESSURE: 74 MMHG | HEART RATE: 101 BPM | WEIGHT: 235.44 LBS

## 2023-09-11 DIAGNOSIS — R31.0 GROSS HEMATURIA: ICD-10-CM

## 2023-09-11 DIAGNOSIS — N52.9 ERECTILE DYSFUNCTION, UNSPECIFIED ERECTILE DYSFUNCTION TYPE: ICD-10-CM

## 2023-09-11 DIAGNOSIS — N35.912 STRICTURE OF BULBOUS URETHRA IN MALE, UNSPECIFIED STRICTURE TYPE: ICD-10-CM

## 2023-09-11 DIAGNOSIS — C61 PROSTATE CANCER: Primary | ICD-10-CM

## 2023-09-11 PROCEDURE — 1160F RVW MEDS BY RX/DR IN RCRD: CPT | Mod: CPTII,S$GLB,, | Performed by: UROLOGY

## 2023-09-11 PROCEDURE — 3078F DIAST BP <80 MM HG: CPT | Mod: CPTII,S$GLB,, | Performed by: UROLOGY

## 2023-09-11 PROCEDURE — 99999 PR PBB SHADOW E&M-EST. PATIENT-LVL III: CPT | Mod: PBBFAC,,, | Performed by: UROLOGY

## 2023-09-11 PROCEDURE — 1159F PR MEDICATION LIST DOCUMENTED IN MEDICAL RECORD: ICD-10-PCS | Mod: CPTII,S$GLB,, | Performed by: UROLOGY

## 2023-09-11 PROCEDURE — 3008F PR BODY MASS INDEX (BMI) DOCUMENTED: ICD-10-PCS | Mod: CPTII,S$GLB,, | Performed by: UROLOGY

## 2023-09-11 PROCEDURE — 99999 PR PBB SHADOW E&M-EST. PATIENT-LVL III: ICD-10-PCS | Mod: PBBFAC,,, | Performed by: UROLOGY

## 2023-09-11 PROCEDURE — 3044F HG A1C LEVEL LT 7.0%: CPT | Mod: CPTII,S$GLB,, | Performed by: UROLOGY

## 2023-09-11 PROCEDURE — 3008F BODY MASS INDEX DOCD: CPT | Mod: CPTII,S$GLB,, | Performed by: UROLOGY

## 2023-09-11 PROCEDURE — 3078F PR MOST RECENT DIASTOLIC BLOOD PRESSURE < 80 MM HG: ICD-10-PCS | Mod: CPTII,S$GLB,, | Performed by: UROLOGY

## 2023-09-11 PROCEDURE — 3074F PR MOST RECENT SYSTOLIC BLOOD PRESSURE < 130 MM HG: ICD-10-PCS | Mod: CPTII,S$GLB,, | Performed by: UROLOGY

## 2023-09-11 PROCEDURE — 3074F SYST BP LT 130 MM HG: CPT | Mod: CPTII,S$GLB,, | Performed by: UROLOGY

## 2023-09-11 PROCEDURE — 1160F PR REVIEW ALL MEDS BY PRESCRIBER/CLIN PHARMACIST DOCUMENTED: ICD-10-PCS | Mod: CPTII,S$GLB,, | Performed by: UROLOGY

## 2023-09-11 PROCEDURE — 99214 OFFICE O/P EST MOD 30 MIN: CPT | Mod: S$GLB,,, | Performed by: UROLOGY

## 2023-09-11 PROCEDURE — 99214 PR OFFICE/OUTPT VISIT, EST, LEVL IV, 30-39 MIN: ICD-10-PCS | Mod: S$GLB,,, | Performed by: UROLOGY

## 2023-09-11 PROCEDURE — 3044F PR MOST RECENT HEMOGLOBIN A1C LEVEL <7.0%: ICD-10-PCS | Mod: CPTII,S$GLB,, | Performed by: UROLOGY

## 2023-09-11 PROCEDURE — 1159F MED LIST DOCD IN RCRD: CPT | Mod: CPTII,S$GLB,, | Performed by: UROLOGY

## 2023-09-11 RX ORDER — LEVOFLOXACIN 500 MG/1
500 TABLET, FILM COATED ORAL DAILY
Qty: 10 TABLET | Refills: 0 | Status: SHIPPED | OUTPATIENT
Start: 2023-09-11 | End: 2023-09-21

## 2023-09-11 NOTE — PROGRESS NOTES
Chief Complaint:   Encounter Diagnoses   Name Primary?    Prostate cancer Yes    Stricture of bulbous urethra in male, unspecified stricture type     Gross hematuria     Erectile dysfunction, unspecified erectile dysfunction type          HPI:   9/11/23- PSA previously checked was normal, patient believes that he may have a recurrent stricture.  No evidence of gross hematuria, he would like to discuss his erections little bit further.  Of note he has had some urethral irritation.      Allergies:  No known allergies    Medications:  has a current medication list which includes the following prescription(s): pantoprazole, sucralfate, zinc sulfate, ascorbic acid (vitamin c), cholecalciferol (vitamin d3), famotidine, folic acid, hydrochlorothiazide, ibuprofen, magnesium, methotrexate, and metoprolol succinate.    Review of Systems:  General: No fever, chills, fatigability, or weight loss.  Skin: No rashes, itching, or changes in color or texture of skin.  Chest: Denies TYLER, cyanosis, wheezing, cough, and sputum production.  Abdomen: Appetite fine. No weight loss. Denies diarrhea, abdominal pain, hematemesis, or blood in stool.  Musculoskeletal: No joint stiffness or swelling. Denies back pain.  : As above.  All other review of systems negative.    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    PSH:   has a past surgical history that includes Prostatectomy (2011) and Esophagogastroduodenoscopy.    FamHx: family history includes Alcohol abuse in his father; Anxiety disorder in his mother; Arthritis in his mother; Hypertension in his mother; Kidney disease in his brother; No Known Problems in his sister; Stroke in his father.    SocHx:  reports that he has never smoked. He has never used smokeless tobacco. He  reports that he does not drink alcohol and does not use drugs.      Physical Exam:  There were no vitals filed for this visit.  General: A&Ox3, no apparent distress, no deformities  Neck: No masses, normal thyroid  Lungs: normal inspiration, no use of accessory muscles  Heart: normal pulse, no arrhythmias  Abdomen: Soft, NT, ND  Skin: The skin is warm and dry. No jaundice.  Ext: No c/c/e.    Labs/Studies:   pvr 26 ml 12/21  Cystoscopy normal 5/21  PSA <0.01 5/23  CT urogram bladder outflow obstruction 3/21    Impression/Plan:       1. Prostate cancer-  RP pT3b Gl7 with adjuvant XRT  6/7/12    PSA previously stable and will continue yearly.    2. Urethral stricture disease-  DVIU  5/31/22    Patient believes that is flow has reduced a little bit further, concerned that there could be recurrence.  Therefore I will bring him back for cystoscopy, 10 days of Levaquin in the meantime to treat possible urethritis.    3. Gross hematuria- structural evaluation relatively clear, continue to monitor expectantly.  This could have all been secondary to radiation effect.  Call with a recurrence, otherwise will continue to monitor.    4. Erectile dysfunction- MARÍA works but not sufficient.  Cialis, trimix and muse were all a failure.  At this point this is not an issue.  But he has considered further trials, may use the MARÍA for therapy.

## 2023-09-14 ENCOUNTER — LAB VISIT (OUTPATIENT)
Dept: LAB | Facility: HOSPITAL | Age: 58
End: 2023-09-14
Attending: INTERNAL MEDICINE
Payer: COMMERCIAL

## 2023-09-14 DIAGNOSIS — Z79.631 LONG TERM METHOTREXATE USER: ICD-10-CM

## 2023-09-14 DIAGNOSIS — M05.79 RHEUMATOID ARTHRITIS INVOLVING MULTIPLE SITES WITH POSITIVE RHEUMATOID FACTOR: ICD-10-CM

## 2023-09-14 LAB — RHEUMATOID FACT SERPL-ACNC: 13 IU/ML (ref 0–15)

## 2023-09-14 PROCEDURE — 86431 RHEUMATOID FACTOR QUANT: CPT | Performed by: INTERNAL MEDICINE

## 2023-09-14 PROCEDURE — 86200 CCP ANTIBODY: CPT | Performed by: INTERNAL MEDICINE

## 2023-09-14 PROCEDURE — 85025 COMPLETE CBC W/AUTO DIFF WBC: CPT | Performed by: INTERNAL MEDICINE

## 2023-09-14 PROCEDURE — 86140 C-REACTIVE PROTEIN: CPT | Performed by: INTERNAL MEDICINE

## 2023-09-14 PROCEDURE — 36415 COLL VENOUS BLD VENIPUNCTURE: CPT | Performed by: INTERNAL MEDICINE

## 2023-09-14 PROCEDURE — 80053 COMPREHEN METABOLIC PANEL: CPT | Performed by: INTERNAL MEDICINE

## 2023-09-14 PROCEDURE — 85652 RBC SED RATE AUTOMATED: CPT | Performed by: INTERNAL MEDICINE

## 2023-09-15 LAB
ALBUMIN SERPL BCP-MCNC: 3.6 G/DL (ref 3.5–5.2)
ALBUMIN SERPL BCP-MCNC: 3.6 G/DL (ref 3.5–5.2)
ALP SERPL-CCNC: 79 U/L (ref 55–135)
ALP SERPL-CCNC: 79 U/L (ref 55–135)
ALT SERPL W/O P-5'-P-CCNC: 11 U/L (ref 10–44)
ALT SERPL W/O P-5'-P-CCNC: 11 U/L (ref 10–44)
ANION GAP SERPL CALC-SCNC: 7 MMOL/L (ref 8–16)
ANION GAP SERPL CALC-SCNC: 7 MMOL/L (ref 8–16)
AST SERPL-CCNC: 21 U/L (ref 10–40)
AST SERPL-CCNC: 21 U/L (ref 10–40)
BASOPHILS # BLD AUTO: 0.03 K/UL (ref 0–0.2)
BASOPHILS # BLD AUTO: 0.03 K/UL (ref 0–0.2)
BASOPHILS NFR BLD: 0.6 % (ref 0–1.9)
BASOPHILS NFR BLD: 0.6 % (ref 0–1.9)
BILIRUB SERPL-MCNC: 0.4 MG/DL (ref 0.1–1)
BILIRUB SERPL-MCNC: 0.4 MG/DL (ref 0.1–1)
BUN SERPL-MCNC: 15 MG/DL (ref 6–20)
BUN SERPL-MCNC: 15 MG/DL (ref 6–20)
CALCIUM SERPL-MCNC: 9 MG/DL (ref 8.7–10.5)
CALCIUM SERPL-MCNC: 9 MG/DL (ref 8.7–10.5)
CCP AB SER IA-ACNC: 43.3 U/ML
CHLORIDE SERPL-SCNC: 107 MMOL/L (ref 95–110)
CHLORIDE SERPL-SCNC: 107 MMOL/L (ref 95–110)
CO2 SERPL-SCNC: 25 MMOL/L (ref 23–29)
CO2 SERPL-SCNC: 25 MMOL/L (ref 23–29)
CREAT SERPL-MCNC: 1.1 MG/DL (ref 0.5–1.4)
CREAT SERPL-MCNC: 1.1 MG/DL (ref 0.5–1.4)
CRP SERPL-MCNC: 14.7 MG/L (ref 0–8.2)
CRP SERPL-MCNC: 14.7 MG/L (ref 0–8.2)
DIFFERENTIAL METHOD: ABNORMAL
DIFFERENTIAL METHOD: ABNORMAL
EOSINOPHIL # BLD AUTO: 0.1 K/UL (ref 0–0.5)
EOSINOPHIL # BLD AUTO: 0.1 K/UL (ref 0–0.5)
EOSINOPHIL NFR BLD: 2.3 % (ref 0–8)
EOSINOPHIL NFR BLD: 2.3 % (ref 0–8)
ERYTHROCYTE [DISTWIDTH] IN BLOOD BY AUTOMATED COUNT: 13.6 % (ref 11.5–14.5)
ERYTHROCYTE [DISTWIDTH] IN BLOOD BY AUTOMATED COUNT: 13.6 % (ref 11.5–14.5)
ERYTHROCYTE [SEDIMENTATION RATE] IN BLOOD BY PHOTOMETRIC METHOD: 23 MM/HR (ref 0–23)
ERYTHROCYTE [SEDIMENTATION RATE] IN BLOOD BY PHOTOMETRIC METHOD: 23 MM/HR (ref 0–23)
EST. GFR  (NO RACE VARIABLE): >60 ML/MIN/1.73 M^2
EST. GFR  (NO RACE VARIABLE): >60 ML/MIN/1.73 M^2
GLUCOSE SERPL-MCNC: 88 MG/DL (ref 70–110)
GLUCOSE SERPL-MCNC: 88 MG/DL (ref 70–110)
HCT VFR BLD AUTO: 41.5 % (ref 40–54)
HCT VFR BLD AUTO: 41.5 % (ref 40–54)
HGB BLD-MCNC: 13.6 G/DL (ref 14–18)
HGB BLD-MCNC: 13.6 G/DL (ref 14–18)
IMM GRANULOCYTES # BLD AUTO: 0.01 K/UL (ref 0–0.04)
IMM GRANULOCYTES # BLD AUTO: 0.01 K/UL (ref 0–0.04)
IMM GRANULOCYTES NFR BLD AUTO: 0.2 % (ref 0–0.5)
IMM GRANULOCYTES NFR BLD AUTO: 0.2 % (ref 0–0.5)
LYMPHOCYTES # BLD AUTO: 0.8 K/UL (ref 1–4.8)
LYMPHOCYTES # BLD AUTO: 0.8 K/UL (ref 1–4.8)
LYMPHOCYTES NFR BLD: 17.8 % (ref 18–48)
LYMPHOCYTES NFR BLD: 17.8 % (ref 18–48)
MCH RBC QN AUTO: 31.6 PG (ref 27–31)
MCH RBC QN AUTO: 31.6 PG (ref 27–31)
MCHC RBC AUTO-ENTMCNC: 32.8 G/DL (ref 32–36)
MCHC RBC AUTO-ENTMCNC: 32.8 G/DL (ref 32–36)
MCV RBC AUTO: 97 FL (ref 82–98)
MCV RBC AUTO: 97 FL (ref 82–98)
MONOCYTES # BLD AUTO: 0.4 K/UL (ref 0.3–1)
MONOCYTES # BLD AUTO: 0.4 K/UL (ref 0.3–1)
MONOCYTES NFR BLD: 7.4 % (ref 4–15)
MONOCYTES NFR BLD: 7.4 % (ref 4–15)
NEUTROPHILS # BLD AUTO: 3.4 K/UL (ref 1.8–7.7)
NEUTROPHILS # BLD AUTO: 3.4 K/UL (ref 1.8–7.7)
NEUTROPHILS NFR BLD: 71.7 % (ref 38–73)
NEUTROPHILS NFR BLD: 71.7 % (ref 38–73)
NRBC BLD-RTO: 0 /100 WBC
NRBC BLD-RTO: 0 /100 WBC
PLATELET # BLD AUTO: 243 K/UL (ref 150–450)
PLATELET # BLD AUTO: 243 K/UL (ref 150–450)
PMV BLD AUTO: 10.4 FL (ref 9.2–12.9)
PMV BLD AUTO: 10.4 FL (ref 9.2–12.9)
POTASSIUM SERPL-SCNC: 4 MMOL/L (ref 3.5–5.1)
POTASSIUM SERPL-SCNC: 4 MMOL/L (ref 3.5–5.1)
PROT SERPL-MCNC: 7.2 G/DL (ref 6–8.4)
PROT SERPL-MCNC: 7.2 G/DL (ref 6–8.4)
RBC # BLD AUTO: 4.3 M/UL (ref 4.6–6.2)
RBC # BLD AUTO: 4.3 M/UL (ref 4.6–6.2)
SODIUM SERPL-SCNC: 139 MMOL/L (ref 136–145)
SODIUM SERPL-SCNC: 139 MMOL/L (ref 136–145)
WBC # BLD AUTO: 4.72 K/UL (ref 3.9–12.7)
WBC # BLD AUTO: 4.72 K/UL (ref 3.9–12.7)

## 2023-09-26 ENCOUNTER — TELEPHONE (OUTPATIENT)
Dept: RHEUMATOLOGY | Facility: CLINIC | Age: 58
End: 2023-09-26
Payer: COMMERCIAL

## 2023-09-26 ENCOUNTER — PATIENT MESSAGE (OUTPATIENT)
Dept: RHEUMATOLOGY | Facility: CLINIC | Age: 58
End: 2023-09-26
Payer: COMMERCIAL

## 2023-09-26 NOTE — TELEPHONE ENCOUNTER
"Several attempts to reach patient for lab results.  Unable to leave message--"mailbox has not been set  up.  [employer] message sent to patient.  "

## 2023-10-02 NOTE — PROGRESS NOTES
New Patient Chronic Pain Note (Initial Visit)    Referring Physician: Reina Guzman DPM    PCP: Santy Greenberg MD    Chief Complaint:   Chief Complaint   Patient presents with    Low-back Pain    Leg Pain     Stop at knee         SUBJECTIVE:    Sachin Gonzalez is a 58 y.o. male with past medical history significant for tension headache, hypertension, hyperlipidemia, erectile dysfunction/Peyronie's disease, prostate cancer status post history of prostatectomy 06/2012, rheumatoid arthritis, GERD who presents to the clinic for the evaluation of lower back pain.  Patient reports pain began after prior foot surgery 11/21/2022.  Chart review reveals history of metatarsalgia, hammertoe, bursitis and right ankle contracture status post joint contracture release, joint fusion, metatarsal osteotomy and gastrocnemius muscle resection with Dr. Ramirez.     Today he reports pain in a bandlike distribution in the lower back.  Pain is intermittent and today is rated a 10/10.  Pain is described as aching in nature.  Patient denies more distal radiculopathy into the lower extremities or feet but does report intermittent isolated knee pain.  He denies weakness in the lower extremities, bowel or bladder incontinence or saddle anesthesia.  Pain is particularly exacerbated with bending, lifting as well as when moving from a lying supine to a sitting up position.  Pain is improved by immobility and rest.  Of note patient was prescribed Lyrica for idiopathic peripheral neuropathy by Dr. Guzman but reports he takes 75 mg, on average 2-3 times weekly.  He is not noticed improvement in his lower back pain on this medication.  Patient has not yet trialed physical therapy.  He is adamant to pursue intervention at this time.    Patient denies night fever/night sweats, urinary incontinence, bowel incontinence, significant weight loss, significant motor weakness, and loss of sensations.    Pain Disability Index Review:          10/3/2023     1:48 PM   Last 3 PDI Scores   Pain Disability Index (PDI) 60       Non-Pharmacologic Treatments:  Physical Therapy/Home Exercise: no  Ice/Heat:no  TENS: no  Acupuncture: no  Massage: no  Chiropractic: no    Other: yes; sx      Pain Medications:  - Adjuvant Medications: Lyrica ( Pregabalin)    Pain Procedures:   Dr. Connell:  -05/17/2023: Right thumb flexor tendon sheath injection with triamcinolone    Past Medical History:   Diagnosis Date    GERD (gastroesophageal reflux disease)     Hypertension     Prostate cancer     RA (rheumatoid arthritis)     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     Past Surgical History:   Procedure Laterality Date    COLONOSCOPY N/A 4/21/2021    Procedure: COLONOSCOPY covid test scheduled on 4/19/21;  Surgeon: Darrell Worthington MD;  Location: [facility];  Service: Endoscopy;  Laterality: N/A;    CYSTOURETHROSCOPY WITH DIRECT VISION INTERNAL URETHROTOMY N/A 5/31/2022    Procedure: CYSTOSCOPY, WITH DIRECT VISION INTERNAL URETHROTOMY;  Surgeon: Yaniv Murray MD;  Location: [facility];  Service: Urology;  Laterality: N/A;    ESOPHAGOGASTRODUODENOSCOPY      ESOPHAGOGASTRODUODENOSCOPY N/A 4/5/2021    Procedure: EGD (ESOPHAGOGASTRODUODENOSCOPY) Rapid Covid test needed;  Surgeon: Darrell Worthington MD;  Location: [facility];  Service: Endoscopy;  Laterality: N/A;    OSTEOTOMY OF METATARSAL BONE Right 11/21/2022    Procedure: OSTEOTOMY, METATARSAL BONE;  Surgeon: Daniel Ramirez DPM;  Location: [facility];  Service: Podiatry;  Laterality: Right;    PROSTATECTOMY  2011    RELEASE OF CONTRACTURE OF JOINT Right 11/21/2022    Procedure: RELEASE, CONTRACTURE, JOINT;  Surgeon: Daniel Ramirez DPM;  Location: [facility];  Service: Podiatry;  Laterality: Right;    RESECTION OF GASTROCNEMIUS MUSCLE Right 11/21/2022    Procedure: RESECTION, MUSCLE, GASTROCNEMIUS;  Surgeon: Daniel Ramirez DPM;  Location: [facility];  Service:  Podiatry;  Laterality: Right;     Review of patient's allergies indicates:   Allergen Reactions    Pollen extracts        Current Outpatient Medications   Medication Sig    ascorbic acid, vitamin C, (VITAMIN C) 250 MG tablet Take 250 mg by mouth once daily.    cholecalciferol, vitamin D3, 25 mcg (1,000 unit) Chew Take by mouth.    folic acid (FOLVITE) 1 MG tablet Take 1 tablet (1,000 mcg total) by mouth once daily.    linaCLOtide (LINZESS) 72 mcg Cap capsule Take 1 capsule (72 mcg total) by mouth before breakfast.    methotrexate 2.5 MG Tab Take 6 tablets (15 mg total) by mouth every 7 days. This medication requires periodic lab monitoring    metoprolol succinate (TOPROL-XL) 50 MG 24 hr tablet Take 1 tablet (50 mg total) by mouth once daily.    multivitamin capsule Take 1 capsule by mouth once daily.    pregabalin (LYRICA) 75 MG capsule Take 1 capsule (75 mg total) by mouth 2 (two) times daily.    sucralfate (CARAFATE) 1 gram tablet Take 1 tablet (1 g total) by mouth 3 (three) times daily as needed (reflux indigestion).    zinc sulfate (ZINC-220 ORAL) Take 1 tablet by mouth as needed.     No current facility-administered medications for this visit.       Review of Systems     GENERAL:  No weight loss, malaise or fevers.  HEENT:   No recent changes in vision or hearing  NECK:  Negative for lumps, no difficulty with swallowing.  RESPIRATORY:  Negative for cough, wheezing or shortness of breath, patient denies any recent URI.  CARDIOVASCULAR:  Negative for chest pain or palpitations.  GI:  Negative for abdominal discomfort, blood in stools or black stools or change in bowel habits.  MUSCULOSKELETAL:  See HPI.  SKIN:  Negative for lesions, rash, and itching.  PSYCH:  No mood disorder or recent psychosocial stressors.   HEMATOLOGY/LYMPHOLOGY:  Negative for prolonged bleeding, bruising easily or swollen nodes.    NEURO:   No history of syncope, paralysis, seizures or tremors.  All other reviewed and negative other than  "HPI.    OBJECTIVE:    BP (!) 141/86   Pulse 98   Resp 17   Ht 6' 7" (2.007 m)   Wt 106 kg (233 lb 11 oz)   BMI 26.33 kg/m²       Physical Exam    GENERAL: Well appearing, in no acute distress, alert and oriented x3.  PSYCH:  Mood and affect appropriate.  SKIN: Skin color, texture, turgor normal, no rashes or lesions.  HEAD/FACE:  Normocephalic, atraumatic. Cranial nerves grossly intact.    CV: RRR with palpation of the radial artery.  PULM: No evidence of respiratory difficulty, symmetric chest rise.  GI:  Soft and non-tender.    BACK: Straight leg raising in the sitting and supine positions is negative to radicular pain.  pain to palpation over the facet joints of the lumbar spine or spinous processes. Normal range of motion without pain reproduction.  EXTREMITIES: Peripheral joint ROM is full and pain free without obvious instability or laxity in all four extremities. No deformities, edema, or skin discoloration. Good capillary refill.  MUSCULOSKELETAL: UnAble to stand on heels & toes on right   hip, and knee provocative maneuvers are negative.  There is no pain with palpation over the sacroiliac joints bilaterally.  Gaenslen's, Distraction/Compression and  FABERs test is positive.  Facet loading test is positive bilaterally.   Bilateral upper and lower extremity strength is normal and symmetric.  No atrophy or tone abnormalities are noted.    RIGHT Lower extremity: Hip flexion 5/5, Hip Abduction 5/5, Hip Adduction 5/5, Knee extension 5/5, Knee flexion 5/5, Ankle dorsiflexion5/5, Extensor hallucis longus 5/5, Ankle plantarflexion 5/5  LEFT Lower extremity:  Hip flexion 5/5, Hip Abduction 5/5,Hip Adduction 5/5, Knee extension 5/5, Knee flexion 5/5, Ankle dorsiflexion 5/5, Extensor hallucis longus 5/5, Ankle plantarflexion 5/5  -Normal testing knee (patellar) jerk and ankle (achilles) jerk    NEURO: Bilateral upper and lower extremity coordination and muscle stretch reflexes are physiologic and symmetric. No " loss of sensation is noted.  GAIT: normal.    Imagin/20/23    X-Ray Lumbar Complete Including Flex And Ext    Narrative  EXAM: XR LUMBAR SPINE 5 VIEW WITH FLEX AND EXT      History: Lumbar radiculopathy.    FINDINGS: Standard 5 views were obtained along with lateral flexion and extension.    There are 5 non-rib-bearing lumbar vertebra.  Facet hypertrophy within the mid to lower lumbar spine.  No fracture or compression deformity.  Disc space narrowing at L4-L5.  No pars defects.    Grade 1 anterolisthesis of L4 on L5 measuring 6 mm.    No dynamic instability.    Impression  Grade 1 anterolisthesis of L4 on L5.  No pars defects or dynamic instability.    Degenerative changes as discussed.         06/10/21    X-Ray Cervical Spine Complete 5 view    Narrative  EXAMINATION:  XR CERVICAL SPINE COMPLETE 5 VIEW    CLINICAL HISTORY:  . Rheumatoid arthritis with rheumatoid factor of multiple sites without organ or systems involvement    TECHNIQUE:  AP, Lateral, bilateral oblique and open mouth views of the cervical spine were performed.    COMPARISON:  08/15/2018    FINDINGS:  Cervical vertebral body heights and alignment are preserved with some straightening of the normal cervical lordosis.    Lower cervical mild spondylitic spurring and disc narrowing at C4-5, C5-6, and C6-7.  Posterior elements and neural foramina appear intact.  C1-2 articulation is maintained.    No prevertebral soft tissue swelling        ASSESSMENT: 58 y.o. year old male with lower back pain, consistent with     1. Lumbar facet arthropathy  Ambulatory referral/consult to Physical/Occupational Therapy      2. Myofascial pain syndrome of lumbar spine  Ambulatory referral/consult to Pain Clinic      3. DDD (degenerative disc disease), lumbar  Ambulatory referral/consult to Physical/Occupational Therapy      4. Lumbar spondylosis              PLAN:   - Interventions:  We discussed considering a bilateral L3-5 lumbar medial branch block to address  axial back pain.  Explained the risks and benefits of the procedure in detail with the patient today in clinic along with alternative treatment options, and the patient deferred the intervention at this time.      - Anticoagulation use: No no anticoagulation     report:  Reviewed and consistent with medication use as prescribed.    - Medications:  -continue Lyrica 1-2 times daily per Dr. Guzman.  We have discussed the benefit of taking this membrane stabilizing agent daily to reduce neuropathic pain.    - Therapy:   We discussed initiating physical therapy to help manage the patient/s painful condition. The patient was counseled that muscle strengthening will improve the long term prognosis in regards to pain and may also help increase range of motion and mobility. They were told that one of the goals of physical therapy is that they learn how to do the exercises so that they can do them independently at home daily upon completion. The patient's questions were answered and they were agreeable to this course. A referral for physical therapy was provided to the patient.    - Imaging: Reviewed available imaging with patient and answered any questions they had regarding study    - Follow up visit: return to clinic in  3 mo      The above plan and management options were discussed at length with patient. Patient is in agreement with the above and verbalized understanding.    - I discussed the goals of interventional chronic pain management with the patient on today's visit. We discussed a multimodal and systematic approach to pain.  This includes diagnostic and therapeutic injections, adjuvant pharmacologic treatment, physical therapy, and at times psychiatry.  I emphasized the importance of regular exercise, core strengthening and stretching, diet and weight loss as a cornerstone of long-term pain management.    - This condition does not require this patient to take time off of work, and the primary goal of our Pain  Management services is to improve the patient's functional capacity.  - Patient Questions: Answered all of the patient's questions regarding diagnoses, therapy, treatment and next steps        Kristen Wei MD  Interventional Pain Management  [facility]    Disclaimer:  This note was prepared using voice recognition system and is likely to have sound alike errors that may have been overlooked even after proof reading.  Please call me with any questions

## 2023-10-03 ENCOUNTER — OFFICE VISIT (OUTPATIENT)
Dept: PAIN MEDICINE | Facility: CLINIC | Age: 58
End: 2023-10-03
Attending: PEDIATRICS
Payer: COMMERCIAL

## 2023-10-03 VITALS
HEART RATE: 98 BPM | SYSTOLIC BLOOD PRESSURE: 141 MMHG | WEIGHT: 233.69 LBS | RESPIRATION RATE: 17 BRPM | HEIGHT: 78 IN | DIASTOLIC BLOOD PRESSURE: 86 MMHG | BODY MASS INDEX: 27.04 KG/M2

## 2023-10-03 DIAGNOSIS — M47.816 LUMBAR FACET ARTHROPATHY: Primary | ICD-10-CM

## 2023-10-03 DIAGNOSIS — M51.36 DDD (DEGENERATIVE DISC DISEASE), LUMBAR: ICD-10-CM

## 2023-10-03 DIAGNOSIS — M47.816 LUMBAR SPONDYLOSIS: ICD-10-CM

## 2023-10-03 DIAGNOSIS — M79.18 MYOFASCIAL PAIN SYNDROME OF LUMBAR SPINE: ICD-10-CM

## 2023-10-03 PROCEDURE — 99204 PR OFFICE/OUTPT VISIT, NEW, LEVL IV, 45-59 MIN: ICD-10-PCS | Mod: S$GLB,,, | Performed by: ANESTHESIOLOGY

## 2023-10-03 PROCEDURE — 99204 OFFICE O/P NEW MOD 45 MIN: CPT | Mod: S$GLB,,, | Performed by: ANESTHESIOLOGY

## 2023-10-03 PROCEDURE — 3079F PR MOST RECENT DIASTOLIC BLOOD PRESSURE 80-89 MM HG: ICD-10-PCS | Mod: CPTII,S$GLB,, | Performed by: ANESTHESIOLOGY

## 2023-10-03 PROCEDURE — 3079F DIAST BP 80-89 MM HG: CPT | Mod: CPTII,S$GLB,, | Performed by: ANESTHESIOLOGY

## 2023-10-03 PROCEDURE — 3044F PR MOST RECENT HEMOGLOBIN A1C LEVEL <7.0%: ICD-10-PCS | Mod: CPTII,S$GLB,, | Performed by: ANESTHESIOLOGY

## 2023-10-03 PROCEDURE — 3008F BODY MASS INDEX DOCD: CPT | Mod: CPTII,S$GLB,, | Performed by: ANESTHESIOLOGY

## 2023-10-03 PROCEDURE — 3044F HG A1C LEVEL LT 7.0%: CPT | Mod: CPTII,S$GLB,, | Performed by: ANESTHESIOLOGY

## 2023-10-03 PROCEDURE — 3008F PR BODY MASS INDEX (BMI) DOCUMENTED: ICD-10-PCS | Mod: CPTII,S$GLB,, | Performed by: ANESTHESIOLOGY

## 2023-10-03 PROCEDURE — 3077F SYST BP >= 140 MM HG: CPT | Mod: CPTII,S$GLB,, | Performed by: ANESTHESIOLOGY

## 2023-10-03 PROCEDURE — 99999 PR PBB SHADOW E&M-EST. PATIENT-LVL V: ICD-10-PCS | Mod: PBBFAC,,, | Performed by: ANESTHESIOLOGY

## 2023-10-03 PROCEDURE — 1160F PR REVIEW ALL MEDS BY PRESCRIBER/CLIN PHARMACIST DOCUMENTED: ICD-10-PCS | Mod: CPTII,S$GLB,, | Performed by: ANESTHESIOLOGY

## 2023-10-03 PROCEDURE — 99999 PR PBB SHADOW E&M-EST. PATIENT-LVL V: CPT | Mod: PBBFAC,,, | Performed by: ANESTHESIOLOGY

## 2023-10-03 PROCEDURE — 1159F MED LIST DOCD IN RCRD: CPT | Mod: CPTII,S$GLB,, | Performed by: ANESTHESIOLOGY

## 2023-10-03 PROCEDURE — 1160F RVW MEDS BY RX/DR IN RCRD: CPT | Mod: CPTII,S$GLB,, | Performed by: ANESTHESIOLOGY

## 2023-10-03 PROCEDURE — 3077F PR MOST RECENT SYSTOLIC BLOOD PRESSURE >= 140 MM HG: ICD-10-PCS | Mod: CPTII,S$GLB,, | Performed by: ANESTHESIOLOGY

## 2023-10-03 PROCEDURE — 1159F PR MEDICATION LIST DOCUMENTED IN MEDICAL RECORD: ICD-10-PCS | Mod: CPTII,S$GLB,, | Performed by: ANESTHESIOLOGY

## 2023-10-17 ENCOUNTER — PATIENT MESSAGE (OUTPATIENT)
Dept: UROLOGY | Facility: CLINIC | Age: 58
End: 2023-10-17
Payer: COMMERCIAL

## 2023-10-17 ENCOUNTER — TELEPHONE (OUTPATIENT)
Dept: UROLOGY | Facility: CLINIC | Age: 58
End: 2023-10-17
Payer: COMMERCIAL

## 2023-10-17 NOTE — TELEPHONE ENCOUNTER
Message sent to patient regarding to the rescheduling of the procedure.     Loraine Milanes RN     ----- Message from Toyin Gordon sent at 10/17/2023  4:00 PM CDT -----  Contact: [place]  Type:  Sooner Apoointment Request    Caller is requesting a sooner appointment. Caller will not accept being placed on the waitlist and is requesting a message be sent to doctor.  Name of Caller:Thelandcarin  When is the first available appointment?scheduled 10/20/23  Symptoms:procedure  Would the patient rather a call back or a response via [facility]? call  Best Call Back Number:[phone]   Additional Information: Patient request to reschedule procedure for a later date.  Thank you,  GH

## 2023-10-20 ENCOUNTER — PROCEDURE VISIT (OUTPATIENT)
Dept: UROLOGY | Facility: CLINIC | Age: 58
End: 2023-10-20
Payer: COMMERCIAL

## 2023-10-20 DIAGNOSIS — N52.9 ERECTILE DYSFUNCTION, UNSPECIFIED ERECTILE DYSFUNCTION TYPE: ICD-10-CM

## 2023-10-20 DIAGNOSIS — C61 PROSTATE CANCER: Primary | ICD-10-CM

## 2023-10-20 DIAGNOSIS — R31.0 GROSS HEMATURIA: ICD-10-CM

## 2023-10-20 DIAGNOSIS — N35.912 STRICTURE OF BULBOUS URETHRA IN MALE, UNSPECIFIED STRICTURE TYPE: ICD-10-CM

## 2023-10-20 PROCEDURE — 52000 PR CYSTOURETHROSCOPY: ICD-10-PCS | Mod: S$GLB,,, | Performed by: UROLOGY

## 2023-10-20 PROCEDURE — 52000 CYSTOURETHROSCOPY: CPT | Mod: S$GLB,,, | Performed by: UROLOGY

## 2023-10-20 RX ORDER — LIDOCAINE HYDROCHLORIDE 20 MG/ML
JELLY TOPICAL
Status: COMPLETED | OUTPATIENT
Start: 2023-10-20 | End: 2023-10-20

## 2023-10-20 RX ORDER — CIPROFLOXACIN 500 MG/1
500 TABLET ORAL
Status: COMPLETED | OUTPATIENT
Start: 2023-10-20 | End: 2023-10-20

## 2023-10-20 RX ORDER — SILDENAFIL 100 MG/1
100 TABLET, FILM COATED ORAL DAILY PRN
Qty: 10 TABLET | Refills: 11 | Status: SHIPPED | OUTPATIENT
Start: 2023-10-20

## 2023-10-20 RX ADMIN — CIPROFLOXACIN 500 MG: 500 TABLET ORAL at 02:10

## 2023-10-20 RX ADMIN — LIDOCAINE HYDROCHLORIDE 10 ML: 20 JELLY TOPICAL at 02:10

## 2023-10-20 NOTE — PROCEDURES
Procedures  Chief Complaint:   Encounter Diagnoses   Name Primary?    Prostate cancer Yes    Stricture of bulbous urethra in male, unspecified stricture type     Gross hematuria     Erectile dysfunction, unspecified erectile dysfunction type          HPI:   10/20/23- here today for cystoscopy.  Has questions about erectile dysfunction.      Allergies:  No known allergies    Medications:  has a current medication list which includes the following prescription(s): pantoprazole, sucralfate, zinc sulfate, ascorbic acid (vitamin c), cholecalciferol (vitamin d3), famotidine, folic acid, hydrochlorothiazide, ibuprofen, magnesium, methotrexate, and metoprolol succinate.    Review of Systems:  General: No fever, chills, fatigability, or weight loss.  Skin: No rashes, itching, or changes in color or texture of skin.  Chest: Denies TYLER, cyanosis, wheezing, cough, and sputum production.  Abdomen: Appetite fine. No weight loss. Denies diarrhea, abdominal pain, hematemesis, or blood in stool.  Musculoskeletal: No joint stiffness or swelling. Denies back pain.  : As above.  All other review of systems negative.    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    PSH:   has a past surgical history that includes Prostatectomy (2011) and Esophagogastroduodenoscopy.    FamHx: family history includes Alcohol abuse in his father; Anxiety disorder in his mother; Arthritis in his mother; Hypertension in his mother; Kidney disease in his brother; No Known Problems in his sister; Stroke in his father.    SocHx:  reports that he has never smoked. He has never used smokeless tobacco. He reports that he does not drink alcohol and does not use drugs.      Physical Exam:  There were no vitals filed for this visit.  General: A&Ox3, no  apparent distress, no deformities  Neck: No masses, normal thyroid  Lungs: normal inspiration, no use of accessory muscles  Heart: normal pulse, no arrhythmias  Abdomen: Soft, NT, ND  Skin: The skin is warm and dry. No jaundice.  Ext: No c/c/e.  : 10/23- Test desc michael, no abnormalities of epididymus. Normal penile and scrotal skin. Meatus normal.    Labs/Studies:   pvr 26 ml 12/21  Cystoscopy bulbourethral stricture 10/23  Cystoscopy normal 5/21  PSA <0.01 5/23  CT urogram bladder outflow obstruction 3/21    Procedure: Diagnostic Cystoscopy    Procedure in Detail: After proper consents were obtained, the patient was prepped and draped in normal sterile fashion for diagnostic cystoscopy. 5 ml of lidocaine jelly was instilled in the urethra. The flexible cystoscope was then introduced into the urethra we immediately encountered a stricture at the bulbous urethra, could not pass beyond.  Procedure was aborted.      Findings:  Bulbourethral stricture      Impression/Plan:       1. Prostate cancer-  RP pT3b Gl7 with adjuvant XRT  6/7/12    PSA previously stable and will continue yearly.    2. Urethral stricture disease-  DVIU  5/31/22    Recurrent stricture, patient instead of pursuing DVIU would like to discuss urethroplasty with our physicians in [place] and we will arrange.  If for some reason he chooses not to pursue this option he will contact me and we can pursue DVIU with [facility] postprocedure.  Otherwise will have the patient return after consultation with them.    3. Gross hematuria- structural evaluation relatively clear, continue to monitor expectantly.  This could have all been secondary to radiation effect.  Call with a recurrence, cytology pending from today.    4. Erectile dysfunction- MARÍA works but not sufficient.  Cialis, trimix and muse were all a failure.  Patient would like to attempt sildenafil 100 mg 1 more time and we will prescribe.  Call with any complaints.  He may also use this in conjunction  with the MARÍA.  Patient does not want to pursue TriMix, but would like information on an IPP.

## 2023-10-25 ENCOUNTER — OFFICE VISIT (OUTPATIENT)
Dept: PODIATRY | Facility: CLINIC | Age: 58
End: 2023-10-25
Payer: COMMERCIAL

## 2023-10-25 ENCOUNTER — IMMUNIZATION (OUTPATIENT)
Dept: INTERNAL MEDICINE | Facility: CLINIC | Age: 58
End: 2023-10-25
Payer: COMMERCIAL

## 2023-10-25 VITALS — BODY MASS INDEX: 27.04 KG/M2 | WEIGHT: 233.69 LBS | HEIGHT: 78 IN

## 2023-10-25 DIAGNOSIS — G60.9 IDIOPATHIC PERIPHERAL NEUROPATHY: ICD-10-CM

## 2023-10-25 DIAGNOSIS — B35.1 ONYCHOMYCOSIS OF TOENAIL: ICD-10-CM

## 2023-10-25 DIAGNOSIS — M79.672 INFLAMMATORY HEEL PAIN, LEFT: ICD-10-CM

## 2023-10-25 DIAGNOSIS — M72.2 PLANTAR FASCIITIS: Primary | ICD-10-CM

## 2023-10-25 PROCEDURE — 99213 OFFICE O/P EST LOW 20 MIN: CPT | Mod: 25,S$GLB,, | Performed by: PODIATRIST

## 2023-10-25 PROCEDURE — 1160F RVW MEDS BY RX/DR IN RCRD: CPT | Mod: CPTII,S$GLB,, | Performed by: PODIATRIST

## 2023-10-25 PROCEDURE — 1159F MED LIST DOCD IN RCRD: CPT | Mod: CPTII,S$GLB,, | Performed by: PODIATRIST

## 2023-10-25 PROCEDURE — 90471 IMMUNIZATION ADMIN: CPT | Mod: S$GLB,,, | Performed by: FAMILY MEDICINE

## 2023-10-25 PROCEDURE — 3008F BODY MASS INDEX DOCD: CPT | Mod: CPTII,S$GLB,, | Performed by: PODIATRIST

## 2023-10-25 PROCEDURE — 11721 DEBRIDE NAIL 6 OR MORE: CPT | Mod: 59,S$GLB,, | Performed by: PODIATRIST

## 2023-10-25 PROCEDURE — 3008F PR BODY MASS INDEX (BMI) DOCUMENTED: ICD-10-PCS | Mod: CPTII,S$GLB,, | Performed by: PODIATRIST

## 2023-10-25 PROCEDURE — 1160F PR REVIEW ALL MEDS BY PRESCRIBER/CLIN PHARMACIST DOCUMENTED: ICD-10-PCS | Mod: CPTII,S$GLB,, | Performed by: PODIATRIST

## 2023-10-25 PROCEDURE — 3044F HG A1C LEVEL LT 7.0%: CPT | Mod: CPTII,S$GLB,, | Performed by: PODIATRIST

## 2023-10-25 PROCEDURE — 20550 NJX 1 TENDON SHEATH/LIGAMENT: CPT | Mod: LT,S$GLB,, | Performed by: PODIATRIST

## 2023-10-25 PROCEDURE — 99999 PR PBB SHADOW E&M-EST. PATIENT-LVL III: ICD-10-PCS | Mod: PBBFAC,,, | Performed by: PODIATRIST

## 2023-10-25 PROCEDURE — 11721 PR DEBRIDEMENT OF NAILS, 6 OR MORE: ICD-10-PCS | Mod: 59,S$GLB,, | Performed by: PODIATRIST

## 2023-10-25 PROCEDURE — 90686 FLU VACCINE (QUAD) GREATER THAN OR EQUAL TO 3YO PRESERVATIVE FREE IM: ICD-10-PCS | Mod: S$GLB,,, | Performed by: FAMILY MEDICINE

## 2023-10-25 PROCEDURE — 20550 PR INJECT TENDON SHEATH/LIGAMENT: ICD-10-PCS | Mod: LT,S$GLB,, | Performed by: PODIATRIST

## 2023-10-25 PROCEDURE — 3044F PR MOST RECENT HEMOGLOBIN A1C LEVEL <7.0%: ICD-10-PCS | Mod: CPTII,S$GLB,, | Performed by: PODIATRIST

## 2023-10-25 PROCEDURE — 99213 PR OFFICE/OUTPT VISIT, EST, LEVL III, 20-29 MIN: ICD-10-PCS | Mod: 25,S$GLB,, | Performed by: PODIATRIST

## 2023-10-25 PROCEDURE — 90471 FLU VACCINE (QUAD) GREATER THAN OR EQUAL TO 3YO PRESERVATIVE FREE IM: ICD-10-PCS | Mod: S$GLB,,, | Performed by: FAMILY MEDICINE

## 2023-10-25 PROCEDURE — 90686 IIV4 VACC NO PRSV 0.5 ML IM: CPT | Mod: S$GLB,,, | Performed by: FAMILY MEDICINE

## 2023-10-25 PROCEDURE — 1159F PR MEDICATION LIST DOCUMENTED IN MEDICAL RECORD: ICD-10-PCS | Mod: CPTII,S$GLB,, | Performed by: PODIATRIST

## 2023-10-25 PROCEDURE — 99999 PR PBB SHADOW E&M-EST. PATIENT-LVL III: CPT | Mod: PBBFAC,,, | Performed by: PODIATRIST

## 2023-10-25 RX ORDER — METHYLPREDNISOLONE 4 MG/1
4 TABLET ORAL DAILY
Qty: 1 EACH | Refills: 0 | Status: SHIPPED | OUTPATIENT
Start: 2023-10-25 | End: 2023-11-16 | Stop reason: SDUPTHER

## 2023-10-25 NOTE — PROGRESS NOTES
Subjective:     Patient ID: Sachin Gonzalez is a 58 y.o. male.    Chief Complaint: Nail Care (Nail care, left heel pain  (non-diabetic pt, wearing slippers and socks, last seen PCP Dr. Greenberg 05/31/23))    Sachin is a 58 y.o. male who presents to the clinic for evaluation and treatment of high risk feet. 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 The patient's chief complaint is long, thick toenails. This patient has documented high risk feet requiring routine maintenance secondary to peripheral neuropathy. Patient complains of left heel pain. Patient rates left heel pain 4/10. Patient states the pain is worse in the morning and at the end of day.     PCP: Santy Greenberg MD    Date Last Seen by PCP: 05/31/2023    Current shoe gear:  Affected Foot: Slides     Unaffected Foot: Slides    Last encounter in this department: 3/30/2022    Hemoglobin A1C   Date Value Ref Range Status   01/13/2023 5.3 4.0 - 5.6 % Final     Comment:     ADA Screening Guidelines:  5.7-6.4%  Consistent with prediabetes  >or=6.5%  Consistent with diabetes    High levels of fetal hemoglobin interfere with the HbA1C  assay. Heterozygous hemoglobin variants (HbS, HgC, etc)do  not significantly interfere with this assay.   However, presence of multiple variants may affect accuracy.     04/12/2017 5.7 4.5 - 6.2 % Final     Comment:     According to ADA guidelines, hemoglobin A1C <7.0% represents  optimal control in non-pregnant diabetic patients.  Different  metrics may apply to specific populations.   Standards of Medical Care in Diabetes - 2016.  For the purpose of screening for the presence of diabetes:  <5.7%     Consistent with the absence of diabetes  5.7-6.4%  Consistent with increasing risk for diabetes    (prediabetes)  >or=6.5%  Consistent with diabetes  Currently no consensus exists for use of hemoglobin A1C  for diagnosis of diabetes for children.         Patient Active Problem List   Diagnosis    History of prostate cancer    RA (rheumatoid arthritis)    Status post prostatectomy (06/07/12)    Erectile dysfunction    Peyronie's disease    Long-term use of immunosuppressant medication    Cephalic vein thrombosis-post op    Essential hypertension    Tension-type headache, not intractable    Lumbar foraminal stenosis    Parotid sialolithiasis    Gastroesophageal reflux disease without esophagitis    Mixed hyperlipidemia    Melena    Screen for colon cancer    Prostate cancer    Gross hematuria    Stricture of bulbous urethra in male    Drug-induced immunodeficiency       Medication List with Changes/Refills   New Medications    METHYLPREDNISOLONE (MEDROL DOSEPACK) 4 MG TABLET    Take 1 tablet (4 mg total) by mouth once daily. Use as instructed on dose pack   Current Medications    ASCORBIC ACID, VITAMIN C, (VITAMIN C) 250 MG TABLET    Take 250 mg by mouth once daily.    CHOLECALCIFEROL, VITAMIN D3, 25 MCG (1,000 UNIT) CHEW    Take by mouth.    FOLIC ACID (FOLVITE) 1 MG TABLET    Take 1 tablet (1,000 mcg total) by mouth once daily.    LINACLOTIDE (LINZESS) 72 MCG CAP CAPSULE    Take 1 capsule (72 mcg total) by mouth before breakfast.    METHOTREXATE 2.5 MG TAB    Take 6 tablets (15 mg total) by mouth every 7 days. This medication requires periodic lab monitoring    METOPROLOL SUCCINATE (TOPROL-XL) 50 MG 24 HR TABLET    Take 1 tablet (50 mg total) by mouth once daily.    MULTIVITAMIN CAPSULE    Take 1 capsule by mouth once daily.    PREGABALIN (LYRICA) 75 MG CAPSULE    Take 1 capsule (75 mg total) by mouth 2 (two) times daily.    SILDENAFIL (VIAGRA) 100 MG TABLET    Take 1 tablet (100 mg total) by mouth daily as needed for Erectile Dysfunction.    SUCRALFATE (CARAFATE) 1 GRAM TABLET    Take 1 tablet (1 g total) by  mouth 3 (three) times daily as needed (reflux indigestion).    ZINC SULFATE (ZINC-220 ORAL)    Take 1 tablet by mouth as needed.       Review of patient's allergies indicates:   Allergen Reactions    Pollen extracts        Past Surgical History:   Procedure Laterality Date    COLONOSCOPY N/A 4/21/2021    Procedure: COLONOSCOPY covid test scheduled on 4/19/21;  Surgeon: Darrell Worthington MD;  Location: [facility];  Service: Endoscopy;  Laterality: N/A;    CYSTOURETHROSCOPY WITH DIRECT VISION INTERNAL URETHROTOMY N/A 5/31/2022    Procedure: CYSTOSCOPY, WITH DIRECT VISION INTERNAL URETHROTOMY;  Surgeon: Yaniv Murray MD;  Location: [facility] OR;  Service: Urology;  Laterality: N/A;    ESOPHAGOGASTRODUODENOSCOPY      ESOPHAGOGASTRODUODENOSCOPY N/A 4/5/2021    Procedure: EGD (ESOPHAGOGASTRODUODENOSCOPY) Rapid Covid test needed;  Surgeon: Darrell Worthington MD;  Location: [facility];  Service: Endoscopy;  Laterality: N/A;    OSTEOTOMY OF METATARSAL BONE Right 11/21/2022    Procedure: OSTEOTOMY, METATARSAL BONE;  Surgeon: Daniel Ramirez DPM;  Location: [facility] OR;  Service: Podiatry;  Laterality: Right;    PROSTATECTOMY  2011    RELEASE OF CONTRACTURE OF JOINT Right 11/21/2022    Procedure: RELEASE, CONTRACTURE, JOINT;  Surgeon: Daniel Ramirez DPM;  Location: [facility] OR;  Service: Podiatry;  Laterality: Right;    RESECTION OF GASTROCNEMIUS MUSCLE Right 11/21/2022    Procedure: RESECTION, MUSCLE, GASTROCNEMIUS;  Surgeon: Daniel Ramirez DPM;  Location: [facility] OR;  Service: Podiatry;  Laterality: Right;       Family History   Problem Relation Age of Onset    Stroke Father     Alcohol abuse Father     Arthritis Mother     Hypertension Mother     Anxiety disorder Mother     No Known Problems Sister     Kidney disease Brother        Social History     Socioeconomic History    Marital status: Single   Tobacco Use    Smoking status: Never    Smokeless tobacco: Never   Substance and Sexual Activity    Alcohol use: Never    Drug use: No  "   Sexual activity: Yes     Partners: Female   Social History Narrative    No pets or smokers in household.     Social Determinants of Health     Stress: No Stress Concern Present (12/12/2019)    [employer] - Occupational Stress Questionnaire     Feeling of Stress : Not at all       Vitals:    10/25/23 1408   Weight: 106 kg (233 lb 11 oz)   Height: 6' 7" (2.007 m)   PainSc: 0-No pain         Hemoglobin A1C   Date Value Ref Range Status   01/13/2023 5.3 4.0 - 5.6 % Final     Comment:     ADA Screening Guidelines:  5.7-6.4%  Consistent with prediabetes  >or=6.5%  Consistent with diabetes    High levels of fetal hemoglobin interfere with the HbA1C  assay. Heterozygous hemoglobin variants (HbS, HgC, etc)do  not significantly interfere with this assay.   However, presence of multiple variants may affect accuracy.     04/12/2017 5.7 4.5 - 6.2 % Final     Comment:     According to ADA guidelines, hemoglobin A1C <7.0% represents  optimal control in non-pregnant diabetic patients.  Different  metrics may apply to specific populations.   Standards of Medical Care in Diabetes - 2016.  For the purpose of screening for the presence of diabetes:  <5.7%     Consistent with the absence of diabetes  5.7-6.4%  Consistent with increasing risk for diabetes   (prediabetes)  >or=6.5%  Consistent with diabetes  Currently no consensus exists for use of hemoglobin A1C  for diagnosis of diabetes for children.         Review of Systems   Constitutional:  Negative for chills and fever.   Respiratory:  Negative for shortness of breath.    Cardiovascular:  Negative for chest pain, palpitations, orthopnea, claudication and leg swelling.   Gastrointestinal:  Negative for diarrhea, nausea and vomiting.   Musculoskeletal:  Negative for joint pain.   Skin:  Negative for rash.   Neurological:  Positive for tingling and sensory change.   Psychiatric/Behavioral: Negative.           Objective:      PHYSICAL EXAM: Apperance: " Alert and orient in no distress,well developed, and with good attention to grooming and body habits  Patient ambulating in slides.   LOWER EXTREMITY EXAM:  VASCULAR: Dorsalis pedis pulses 2/4 bilateral and Posterior Tibial pulses 2/4 bilateral. Capillary fill time <4 seconds bilateral. No edema observed bilateral. Varicosities absent bilateral. Skin temperature of the lower extremities is warm to warm, proximal to distal. Hair growth WNL bilateral.  DERMATOLOGICAL: No skin rashes, subcutaneous nodules, lesions, or ulcers observed bilateral. Nails 1,3,4,5 bilateral elongated, thickened, and discolored with subungual debris. Webspaces 1,2,3,4 bilateral clean, dry and without evidence of break in skin integrity.   NEUROLOGICAL: Light touch, sharp-dull, proprioception all present and equal bilaterally.  Vibratory sensation diminished at bilateral hallux and intact at bilateral navicular. Protective sensation absent at 6/10 sites as tested with a [place] 5.07 monofilament.   MUSCULOSKELETAL: Muscle strength is 5/5 for foot inverters, everters, plantarflexors, and dorsiflexors. Muscle tone is normal. Ankle joints left shows decreased ROM. left ankle ROM shows greater decrease in dorsiflexion with knee extended. Ankle joint ROM is pain free and without crepitus left. Pain to palpation left plantar medial tubercle. Plantar medial aspect of left heels shows tenderness to palpation. No pain on medial-lateral compression of the calcaneus.           Assessment:       ICD-10-CM ICD-9-CM   1. Plantar fasciitis  M72.2 728.71   2. Idiopathic peripheral neuropathy  G60.9 356.9   3. Onychomycosis of toenail  B35.1 110.1   4. Inflammatory heel pain, left  M79.672 729.5       Plan:   Plantar fasciitis  -     methylPREDNISolone (MEDROL DOSEPACK) 4 mg tablet; Take 1 tablet (4 mg total) by mouth once daily. Use as instructed on dose pack  Dispense: 1 each; Refill: 0  -     triamcinolone acetonide injection 40 mg    Idiopathic  peripheral neuropathy    Onychomycosis of toenail    Inflammatory heel pain, left  -     methylPREDNISolone (MEDROL DOSEPACK) 4 mg tablet; Take 1 tablet (4 mg total) by mouth once daily. Use as instructed on dose pack  Dispense: 1 each; Refill: 0  -     triamcinolone acetonide injection 40 mg    I counseled the patient on his conditions, regarding findings of my examination, my impressions, and usual treatment plan.   I explained to the patient that etiology and treatment options for heel pain including rest,  ice messages, stretching exercises, strappings/tappings, NSAID's, injections, new shoegear with orthotic inserts, and/or surgical treatment.   Patient agreed to oral and injection therapy today.   I gave written and verbal instructions on heel cord stretching and this was demonstrated for the patient. Patient expressed understanding.Patient agreed to injection therapy today. After sterilizing the area left medial heel with an alcohol prep, the affected area was injected with a solution containing 1 cc of 1% lidocaine plain, 1cc of 0.5% Marcaine plain and 1 cc of Kenalog 40%.  Injection area was then covered with band-aid. The patient tolerated the injection well and reported comfort to the area.  Prescribed Medrol Dosepak to be taken as directed on package. Discussed possible increase in blood sugar with taking steroid medication. Patient advised on the possible elevation of blood pressure sugar and caution to take pills as prescribed and to discontinue use if symptoms arise, patient agreed.   Patient instructed on adequate icing techniques. Patient should ice the affected area at least once per day x 10 minutes for 10 days . I advised the  patient that extra icing would also be beneficial to ensure adequate anti inflammatory effect.   Shoe inspection. Patient instructed on proper foot hygeine. We discussed wearing proper shoe gear, daily foot inspections, never walking without protective shoe gear, never  putting sharp instruments to feet.    With patient's permission, nails 1-5 bilateral were debrided/trimmed in length and thickness aggressively to their soft tissue attachment mechanically and with electric , removing all offending nail and debris. Patient relates relief following the procedure.  Patient  will continue to monitor the areas daily, inspect feet, wear protective shoe gear when ambulatory, moisturizer to maintain skin integrity.  Patient to return 2 months or sooner if needed.          Reina Guzman DPM  [facility]

## 2023-11-05 RX ORDER — TRIAMCINOLONE ACETONIDE 40 MG/ML
40 INJECTION, SUSPENSION INTRA-ARTICULAR; INTRAMUSCULAR
Status: COMPLETED | OUTPATIENT
Start: 2023-10-25 | End: 2023-11-05

## 2023-11-05 RX ADMIN — TRIAMCINOLONE ACETONIDE 40 MG: 40 INJECTION, SUSPENSION INTRA-ARTICULAR; INTRAMUSCULAR at 06:11

## 2023-11-15 ENCOUNTER — TELEPHONE (OUTPATIENT)
Dept: PODIATRY | Facility: CLINIC | Age: 58
End: 2023-11-15
Payer: COMMERCIAL

## 2023-11-15 NOTE — TELEPHONE ENCOUNTER
Spoke with the pt and he would Dr Guzman to send back in the medicine he never picked-up fot pain.         ----- Message from Masoud Topete sent at 11/15/2023  4:01 PM CST -----  Contact: [phone]  Patient would like to consult with a nurse in regards to ongoing hell pain he is having. Please call pt back at [phone]. Thanks KB

## 2023-11-16 DIAGNOSIS — M72.2 PLANTAR FASCIITIS: ICD-10-CM

## 2023-11-16 DIAGNOSIS — M79.672 INFLAMMATORY HEEL PAIN, LEFT: ICD-10-CM

## 2023-11-16 RX ORDER — METHYLPREDNISOLONE 4 MG/1
4 TABLET ORAL DAILY
Qty: 1 EACH | Refills: 0 | Status: SHIPPED | OUTPATIENT
Start: 2023-11-16

## 2023-12-01 ENCOUNTER — TELEPHONE (OUTPATIENT)
Dept: UROLOGY | Facility: CLINIC | Age: 58
End: 2023-12-01
Payer: COMMERCIAL

## 2023-12-01 NOTE — TELEPHONE ENCOUNTER
Returned call to pt; states he had been waiting to hear from us regarding an appt with a doctor in [place] for a consult for Urethroplasty..  I was able to schedule an appt with Dr. Husain in Feb 24 and sent message requesting a sooner appt.

## 2023-12-21 ENCOUNTER — OFFICE VISIT (OUTPATIENT)
Dept: UROLOGY | Facility: CLINIC | Age: 58
End: 2023-12-21
Payer: COMMERCIAL

## 2023-12-21 VITALS
HEART RATE: 84 BPM | BODY MASS INDEX: 27.17 KG/M2 | HEIGHT: 78 IN | WEIGHT: 234.81 LBS | DIASTOLIC BLOOD PRESSURE: 79 MMHG | OXYGEN SATURATION: 99 % | SYSTOLIC BLOOD PRESSURE: 127 MMHG | RESPIRATION RATE: 18 BRPM

## 2023-12-21 DIAGNOSIS — R31.0 GROSS HEMATURIA: ICD-10-CM

## 2023-12-21 DIAGNOSIS — C61 PROSTATE CANCER: Primary | ICD-10-CM

## 2023-12-21 DIAGNOSIS — N52.9 ERECTILE DYSFUNCTION, UNSPECIFIED ERECTILE DYSFUNCTION TYPE: ICD-10-CM

## 2023-12-21 DIAGNOSIS — N35.912 STRICTURE OF BULBOUS URETHRA IN MALE, UNSPECIFIED STRICTURE TYPE: ICD-10-CM

## 2023-12-21 PROCEDURE — 3078F PR MOST RECENT DIASTOLIC BLOOD PRESSURE < 80 MM HG: ICD-10-PCS | Mod: CPTII,S$GLB,, | Performed by: UROLOGY

## 2023-12-21 PROCEDURE — 3078F DIAST BP <80 MM HG: CPT | Mod: CPTII,S$GLB,, | Performed by: UROLOGY

## 2023-12-21 PROCEDURE — 3074F SYST BP LT 130 MM HG: CPT | Mod: CPTII,S$GLB,, | Performed by: UROLOGY

## 2023-12-21 PROCEDURE — 3074F PR MOST RECENT SYSTOLIC BLOOD PRESSURE < 130 MM HG: ICD-10-PCS | Mod: CPTII,S$GLB,, | Performed by: UROLOGY

## 2023-12-21 PROCEDURE — 3008F BODY MASS INDEX DOCD: CPT | Mod: CPTII,S$GLB,, | Performed by: UROLOGY

## 2023-12-21 PROCEDURE — 3044F HG A1C LEVEL LT 7.0%: CPT | Mod: CPTII,S$GLB,, | Performed by: UROLOGY

## 2023-12-21 PROCEDURE — 3044F PR MOST RECENT HEMOGLOBIN A1C LEVEL <7.0%: ICD-10-PCS | Mod: CPTII,S$GLB,, | Performed by: UROLOGY

## 2023-12-21 PROCEDURE — 1159F MED LIST DOCD IN RCRD: CPT | Mod: CPTII,S$GLB,, | Performed by: UROLOGY

## 2023-12-21 PROCEDURE — 99214 OFFICE O/P EST MOD 30 MIN: CPT | Mod: S$GLB,,, | Performed by: UROLOGY

## 2023-12-21 PROCEDURE — 3008F PR BODY MASS INDEX (BMI) DOCUMENTED: ICD-10-PCS | Mod: CPTII,S$GLB,, | Performed by: UROLOGY

## 2023-12-21 PROCEDURE — 99214 PR OFFICE/OUTPT VISIT, EST, LEVL IV, 30-39 MIN: ICD-10-PCS | Mod: S$GLB,,, | Performed by: UROLOGY

## 2023-12-21 PROCEDURE — 99999 PR PBB SHADOW E&M-EST. PATIENT-LVL IV: ICD-10-PCS | Mod: PBBFAC,,, | Performed by: UROLOGY

## 2023-12-21 PROCEDURE — 1160F RVW MEDS BY RX/DR IN RCRD: CPT | Mod: CPTII,S$GLB,, | Performed by: UROLOGY

## 2023-12-21 PROCEDURE — 99999 PR PBB SHADOW E&M-EST. PATIENT-LVL IV: CPT | Mod: PBBFAC,,, | Performed by: UROLOGY

## 2023-12-21 PROCEDURE — 1159F PR MEDICATION LIST DOCUMENTED IN MEDICAL RECORD: ICD-10-PCS | Mod: CPTII,S$GLB,, | Performed by: UROLOGY

## 2023-12-21 PROCEDURE — 1160F PR REVIEW ALL MEDS BY PRESCRIBER/CLIN PHARMACIST DOCUMENTED: ICD-10-PCS | Mod: CPTII,S$GLB,, | Performed by: UROLOGY

## 2023-12-21 RX ORDER — LEVOFLOXACIN 500 MG/1
500 TABLET, FILM COATED ORAL DAILY
Qty: 10 TABLET | Refills: 0 | Status: SHIPPED | OUTPATIENT
Start: 2023-12-21 | End: 2023-12-31

## 2023-12-21 NOTE — PROGRESS NOTES
Chief Complaint:   Encounter Diagnoses   Name Primary?    Prostate cancer Yes    Stricture of bulbous urethra in male, unspecified stricture type     Gross hematuria     Erectile dysfunction, unspecified erectile dysfunction type        HPI:   12/21/23- patient has not seen our urologist in [place] but currently scheduled.  Knows that he has a little bit of decreased flow, does not want pursue incisions as of yet though.  Mild penile discharge, no gross hematuria, no change in erections.      Allergies:  No known allergies    Medications:  has a current medication list which includes the following prescription(s): pantoprazole, sucralfate, zinc sulfate, ascorbic acid (vitamin c), cholecalciferol (vitamin d3), famotidine, folic acid, hydrochlorothiazide, ibuprofen, magnesium, methotrexate, and metoprolol succinate.    Review of Systems:  General: No fever, chills, fatigability, or weight loss.  Skin: No rashes, itching, or changes in color or texture of skin.  Chest: Denies TYLER, cyanosis, wheezing, cough, and sputum production.  Abdomen: Appetite fine. No weight loss. Denies diarrhea, abdominal pain, hematemesis, or blood in stool.  Musculoskeletal: No joint stiffness or swelling. Denies back pain.  : As above.  All other review of systems negative.    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    PSH:   has a past surgical history that includes Prostatectomy (2011) and Esophagogastroduodenoscopy.    FamHx: family history includes Alcohol abuse in his father; Anxiety disorder in his mother; Arthritis in his mother; Hypertension in his mother; Kidney disease in his brother; No Known Problems in his sister; Stroke in his father.    SocHx:  reports that he has never smoked. He has never used smokeless  tobacco. He reports that he does not drink alcohol and does not use drugs.      Physical Exam:  There were no vitals filed for this visit.  General: A&Ox3, no apparent distress, no deformities  Neck: No masses, normal thyroid  Lungs: normal inspiration, no use of accessory muscles  Heart: normal pulse, no arrhythmias  Abdomen: Soft, NT, ND  Skin: The skin is warm and dry. No jaundice.  Ext: No c/c/e.  : 10/23- Test desc michael, no abnormalities of epididymus. Normal penile and scrotal skin. Meatus normal.    Labs/Studies:   pvr 26 ml 12/21  Cystoscopy bulbourethral stricture 10/23  Cystoscopy normal 5/21  PSA <0.01 5/23  CT urogram bladder outflow obstruction 3/21    Impression/Plan:       1. Prostate cancer-  RP pT3b Gl7 with adjuvant XRT  6/7/12    PSA previously stable and will continue yearly.    2. Urethral stricture disease-  DVIU  5/31/22    Recurrent stricture, patient instead of pursuing DVIU would like to discuss urethroplasty with our physicians in [place], which have been scheduled.  We have again discussed alternatives such as CIC postprocedure from DVIU, he would rather pursue a more definitive urethroplasty, ane we will see him back after this appointment.  Levaquin times 10 days due to penile discharge, call if this does not improve though.  Call with any further difficulty in voiding prior to his appointment with them.     3. Gross hematuria- structural evaluation relatively clear, continue to monitor expectantly.  This could have all been secondary to radiation effect.  Call with a recurrence, continue surveillance screening.    4. Erectile dysfunction- sildenafil in the MARÍA work but not sufficient.  Cialis, trimix and muse were all a failure.  Patient does not want to pursue TriMix again, and wants to hold for now in pursuing an IPP.

## 2024-01-04 ENCOUNTER — OFFICE VISIT (OUTPATIENT)
Dept: PODIATRY | Facility: CLINIC | Age: 59
End: 2024-01-04
Payer: COMMERCIAL

## 2024-01-04 VITALS — HEIGHT: 78 IN | BODY MASS INDEX: 27.17 KG/M2 | WEIGHT: 234.81 LBS

## 2024-01-04 DIAGNOSIS — B35.1 ONYCHOMYCOSIS OF TOENAIL: ICD-10-CM

## 2024-01-04 DIAGNOSIS — G60.9 IDIOPATHIC PERIPHERAL NEUROPATHY: Primary | ICD-10-CM

## 2024-01-04 PROCEDURE — 99499 UNLISTED E&M SERVICE: CPT | Mod: S$GLB,,, | Performed by: PODIATRIST

## 2024-01-04 PROCEDURE — 99999 PR PBB SHADOW E&M-EST. PATIENT-LVL III: CPT | Mod: PBBFAC,,, | Performed by: PODIATRIST

## 2024-01-04 PROCEDURE — 11721 DEBRIDE NAIL 6 OR MORE: CPT | Mod: S$GLB,,, | Performed by: PODIATRIST

## 2024-01-08 ENCOUNTER — TELEPHONE (OUTPATIENT)
Dept: PAIN MEDICINE | Facility: CLINIC | Age: 59
End: 2024-01-08
Payer: COMMERCIAL

## 2024-01-14 NOTE — PROGRESS NOTES
Subjective:     Patient ID: Sachin Gonzalez is a 58 y.o. male.    Chief Complaint: Nail Care (Nail Care (non-diabetic pt, wearing casual shoes and socks, last seen PCP Dr. Greenberg 05/31/23)))    Sachin is a 58 y.o. male who presents to the clinic for evaluation and treatment of high risk feet. 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 The patient's chief complaint is long, thick toenails. This patient has documented high risk feet requiring routine maintenance secondary to peripheral neuropathy. Patient complains of an aching pain of the left 2nd and 3rd toes.     PCP: Santy Greenberg MD    Date Last Seen by PCP: 05/31/2023    Current shoe gear:  Affected Foot: Dress shoes     Unaffected Foot: Dress shoes    Last encounter in this department: 3/30/2022    Hemoglobin A1C   Date Value Ref Range Status   01/13/2023 5.3 4.0 - 5.6 % Final     Comment:     ADA Screening Guidelines:  5.7-6.4%  Consistent with prediabetes  >or=6.5%  Consistent with diabetes    High levels of fetal hemoglobin interfere with the HbA1C  assay. Heterozygous hemoglobin variants (HbS, HgC, etc)do  not significantly interfere with this assay.   However, presence of multiple variants may affect accuracy.     04/12/2017 5.7 4.5 - 6.2 % Final     Comment:     According to ADA guidelines, hemoglobin A1C <7.0% represents  optimal control in non-pregnant diabetic patients.  Different  metrics may apply to specific populations.   Standards of Medical Care in Diabetes - 2016.  For the purpose of screening for the presence of diabetes:  <5.7%     Consistent with the absence of diabetes  5.7-6.4%  Consistent with increasing risk for diabetes   (prediabetes)  >or=6.5%  Consistent with diabetes  Currently no consensus exists for use of  hemoglobin A1C  for diagnosis of diabetes for children.         Patient Active Problem List   Diagnosis    History of prostate cancer    RA (rheumatoid arthritis)    Status post prostatectomy (06/07/12)    Erectile dysfunction    Peyronie's disease    Long-term use of immunosuppressant medication    Cephalic vein thrombosis-post op    Essential hypertension    Tension-type headache, not intractable    Lumbar foraminal stenosis    Parotid sialolithiasis    Gastroesophageal reflux disease without esophagitis    Mixed hyperlipidemia    Melena    Screen for colon cancer    Prostate cancer    Gross hematuria    Stricture of bulbous urethra in male    Drug-induced immunodeficiency       Medication List with Changes/Refills   Current Medications    ASCORBIC ACID, VITAMIN C, (VITAMIN C) 250 MG TABLET    Take 250 mg by mouth once daily.    CHOLECALCIFEROL, VITAMIN D3, 25 MCG (1,000 UNIT) CHEW    Take by mouth.    FOLIC ACID (FOLVITE) 1 MG TABLET    Take 1 tablet (1,000 mcg total) by mouth once daily.    LINACLOTIDE (LINZESS) 72 MCG CAP CAPSULE    Take 1 capsule (72 mcg total) by mouth before breakfast.    METHOTREXATE 2.5 MG TAB    Take 6 tablets (15 mg total) by mouth every 7 days. This medication requires periodic lab monitoring    METHYLPREDNISOLONE (MEDROL DOSEPACK) 4 MG TABLET    Take 1 tablet (4 mg total) by mouth once daily. Use as instructed on dose pack    METOPROLOL SUCCINATE (TOPROL-XL) 50 MG 24 HR TABLET    Take 1 tablet (50 mg total) by mouth once daily.    MULTIVITAMIN CAPSULE    Take 1 capsule by mouth once daily.    PREGABALIN (LYRICA) 75 MG CAPSULE    Take 1 capsule (75 mg total) by mouth 2 (two) times daily.    SILDENAFIL (VIAGRA) 100 MG TABLET    Take 1 tablet (100 mg total) by mouth daily as needed for Erectile Dysfunction.    SUCRALFATE (CARAFATE) 1 GRAM TABLET    Take 1 tablet (1 g total) by mouth 3 (three) times daily as needed (reflux indigestion).    ZINC SULFATE (ZINC-220 ORAL)    Take 1 tablet by  mouth as needed.       Review of patient's allergies indicates:   Allergen Reactions    Pollen extracts        Past Surgical History:   Procedure Laterality Date    COLONOSCOPY N/A 4/21/2021    Procedure: COLONOSCOPY covid test scheduled on 4/19/21;  Surgeon: Darrell Worthington MD;  Location: [facility];  Service: Endoscopy;  Laterality: N/A;    CYSTOURETHROSCOPY WITH DIRECT VISION INTERNAL URETHROTOMY N/A 5/31/2022    Procedure: CYSTOSCOPY, WITH DIRECT VISION INTERNAL URETHROTOMY;  Surgeon: Yaniv Murray MD;  Location: [facility] OR;  Service: Urology;  Laterality: N/A;    ESOPHAGOGASTRODUODENOSCOPY      ESOPHAGOGASTRODUODENOSCOPY N/A 4/5/2021    Procedure: EGD (ESOPHAGOGASTRODUODENOSCOPY) Rapid Covid test needed;  Surgeon: Darrell Worthington MD;  Location: [facility];  Service: Endoscopy;  Laterality: N/A;    OSTEOTOMY OF METATARSAL BONE Right 11/21/2022    Procedure: OSTEOTOMY, METATARSAL BONE;  Surgeon: Daniel Ramirez DPM;  Location: [facility] OR;  Service: Podiatry;  Laterality: Right;    PROSTATECTOMY  2011    RELEASE OF CONTRACTURE OF JOINT Right 11/21/2022    Procedure: RELEASE, CONTRACTURE, JOINT;  Surgeon: Daniel Ramirez DPM;  Location: [facility] OR;  Service: Podiatry;  Laterality: Right;    RESECTION OF GASTROCNEMIUS MUSCLE Right 11/21/2022    Procedure: RESECTION, MUSCLE, GASTROCNEMIUS;  Surgeon: Daniel Ramirez DPM;  Location: [facility] OR;  Service: Podiatry;  Laterality: Right;       Family History   Problem Relation Age of Onset    Stroke Father     Alcohol abuse Father     Arthritis Mother     Hypertension Mother     Anxiety disorder Mother     No Known Problems Sister     Kidney disease Brother        Social History     Socioeconomic History    Marital status: Single   Tobacco Use    Smoking status: Never    Smokeless tobacco: Never   Substance and Sexual Activity    Alcohol use: Never    Drug use: No    Sexual activity: Yes     Partners: Female   Social History Narrative    No pets or smokers in household.  "    Social Determinants of Health     Stress: No Stress Concern Present (12/12/2019)    [place] [employer] - Occupational Stress Questionnaire     Feeling of Stress : Not at all       Vitals:    01/04/24 1349   Weight: 106.5 kg (234 lb 12.6 oz)   Height: 6' 7" (2.007 m)         Hemoglobin A1C   Date Value Ref Range Status   01/13/2023 5.3 4.0 - 5.6 % Final     Comment:     ADA Screening Guidelines:  5.7-6.4%  Consistent with prediabetes  >or=6.5%  Consistent with diabetes    High levels of fetal hemoglobin interfere with the HbA1C  assay. Heterozygous hemoglobin variants (HbS, HgC, etc)do  not significantly interfere with this assay.   However, presence of multiple variants may affect accuracy.     04/12/2017 5.7 4.5 - 6.2 % Final     Comment:     According to ADA guidelines, hemoglobin A1C <7.0% represents  optimal control in non-pregnant diabetic patients.  Different  metrics may apply to specific populations.   Standards of Medical Care in Diabetes - 2016.  For the purpose of screening for the presence of diabetes:  <5.7%     Consistent with the absence of diabetes  5.7-6.4%  Consistent with increasing risk for diabetes   (prediabetes)  >or=6.5%  Consistent with diabetes  Currently no consensus exists for use of hemoglobin A1C  for diagnosis of diabetes for children.         Review of Systems   Constitutional:  Negative for chills and fever.   Respiratory:  Negative for shortness of breath.    Cardiovascular:  Negative for chest pain, palpitations, orthopnea, claudication and leg swelling.   Gastrointestinal:  Negative for diarrhea, nausea and vomiting.   Musculoskeletal:  Negative for joint pain.   Skin:  Negative for rash.   Neurological:  Positive for tingling and sensory change.   Psychiatric/Behavioral: Negative.           Objective:      PHYSICAL EXAM: Apperance: Alert and orient in no distress,well developed, and with good attention to grooming and body habits  Patient ambulating in " tennis shoes.   LOWER EXTREMITY EXAM:  VASCULAR: Dorsalis pedis pulses 2/4 bilateral and Posterior Tibial pulses 2/4 bilateral. Capillary fill time <4 seconds bilateral. No edema observed bilateral. Varicosities absent bilateral. Skin temperature of the lower extremities is warm to warm, proximal to distal. Hair growth WNL bilateral.  DERMATOLOGICAL: No skin rashes, subcutaneous nodules, lesions, or ulcers observed bilateral. Nails 1,3,4,5 bilateral elongated, thickened, and discolored with subungual debris. Webspaces 1,2,3,4 bilateral clean, dry and without evidence of break in skin integrity.   NEUROLOGICAL: Light touch, sharp-dull, proprioception all present and equal bilaterally.  Vibratory sensation diminished at bilateral hallux and intact at bilateral navicular. Protective sensation absent at 6/10 sites as tested with a Campbell-Ans[place] 5.07 monofilament.   MUSCULOSKELETAL: Muscle strength is 5/5 for foot inverters, everters, plantarflexors, and dorsiflexors. Muscle tone is normal.           Assessment:       ICD-10-CM ICD-9-CM   1. Idiopathic peripheral neuropathy  G60.9 356.9   2. Onychomycosis of toenail  B35.1 110.1         Plan:   Idiopathic peripheral neuropathy    Onychomycosis of toenail    I counseled the patient on his conditions, regarding findings of my examination, my impressions, and usual treatment plan.   Appointment spent on education about the neuropathy, and prevention of limb loss.  Shoe inspection. Patient instructed on proper foot hygeine. We discussed wearing proper shoe gear, daily foot inspections, never walking without protective shoe gear, never putting sharp instruments to feet.    With patient's permission, nails 1-5 bilateral were debrided/trimmed in length and thickness aggressively to their soft tissue attachment mechanically and with electric , removing all offending nail and debris. Patient relates relief following the procedure.  Patient  will continue to monitor the  areas daily, inspect feet, wear protective shoe gear when ambulatory, moisturizer to maintain skin integrity.  Patient to return 2 months or sooner if needed.          Reina Guzman DPM  [facility]

## 2024-01-22 RX ORDER — IBUPROFEN 800 MG/1
800 TABLET ORAL EVERY 6 HOURS PRN
Qty: 60 TABLET | Refills: 1 | Status: SHIPPED | OUTPATIENT
Start: 2024-01-22

## 2024-02-06 ENCOUNTER — OFFICE VISIT (OUTPATIENT)
Dept: UROLOGY | Facility: CLINIC | Age: 59
End: 2024-02-06
Payer: COMMERCIAL

## 2024-02-06 VITALS
WEIGHT: 233 LBS | SYSTOLIC BLOOD PRESSURE: 129 MMHG | HEART RATE: 67 BPM | HEIGHT: 78 IN | BODY MASS INDEX: 26.96 KG/M2 | DIASTOLIC BLOOD PRESSURE: 84 MMHG

## 2024-02-06 DIAGNOSIS — N99.112 POSTPROCEDURAL MEMBRANOUS URETHRAL STRICTURE: ICD-10-CM

## 2024-02-06 DIAGNOSIS — Z85.46 HISTORY OF PROSTATE CANCER: Primary | ICD-10-CM

## 2024-02-06 DIAGNOSIS — N99.114 POSTPROCEDURAL MALE URETHRAL STRICTURE: Primary | ICD-10-CM

## 2024-02-06 PROCEDURE — 3008F BODY MASS INDEX DOCD: CPT | Mod: CPTII,S$GLB,, | Performed by: UROLOGY

## 2024-02-06 PROCEDURE — 3079F DIAST BP 80-89 MM HG: CPT | Mod: CPTII,S$GLB,, | Performed by: UROLOGY

## 2024-02-06 PROCEDURE — 1159F MED LIST DOCD IN RCRD: CPT | Mod: CPTII,S$GLB,, | Performed by: UROLOGY

## 2024-02-06 PROCEDURE — 99999 PR PBB SHADOW E&M-EST. PATIENT-LVL III: CPT | Mod: PBBFAC,,, | Performed by: UROLOGY

## 2024-02-06 PROCEDURE — 3074F SYST BP LT 130 MM HG: CPT | Mod: CPTII,S$GLB,, | Performed by: UROLOGY

## 2024-02-06 PROCEDURE — 99213 OFFICE O/P EST LOW 20 MIN: CPT | Mod: S$GLB,,, | Performed by: UROLOGY

## 2024-02-06 RX ORDER — DOXYCYCLINE HYCLATE 100 MG
100 TABLET ORAL ONCE
Status: CANCELLED | OUTPATIENT
Start: 2024-02-06 | End: 2024-02-06

## 2024-02-06 RX ORDER — LIDOCAINE HYDROCHLORIDE 20 MG/ML
JELLY TOPICAL ONCE
Status: CANCELLED | OUTPATIENT
Start: 2024-02-06 | End: 2024-02-06

## 2024-02-06 NOTE — PROGRESS NOTES
Postprocedural male urethral stricture  -     Cystoscopy; Future  -     FL Urethrogram Retrograde (xpd); Future; Expected date: 02/06/2024    Other orders  -     LIDOcaine HCl 2% urojet  -     doxycycline tablet 100 mg

## 2024-02-06 NOTE — PROGRESS NOTES
Chief Complaint:   Encounter Diagnoses   Name Primary?    History of prostate cancer Yes    Postprocedural membranous urethral stricture    CC: urethral stricture    HPI:   This is a 58 year AA man referred by Dr. Yaniv Murray, a urologist at [facility].  S/p RALP and XRT for prostate cancer  underwent robotic assisted laparoscopic prostatectomy on 6/7/12. The prostate measured 3 x 4 x 4 cm and weighed 40 grams. 25% of the gland was involved with a [place] 7 (3+4) adenocarcinoma. The tumor involved both the Rt. and Lt. lobes of the prostate.     Noted to have a recurrent urethral stricture. Had DVIU done by Dr. Murray in 5/2022.    Date of Procedure: 05/31/2022   PREOP DIAGNOSIS:  Urethral stricture disease.   POSTOP DIAGNOSIS:  Urethral stricture disease.   PROCEDURES:   Direct visual incision of urethral stricture disease  SURGEON:  Yaniv Murray M.D.  FINDINGS:  Clear channel at the conclusion    PROCEDURE IN DETAIL:  Patient was brought to the operative suite and placed under general anesthesia and positioned into the dorsal lithotomy position.  After being sterilely prepped and draped a 20 Swedish urethrotome was inserted into a normal urethra.  Stricture was immediately identified, using a blade we were able to incise at approximately the 3 and 9 o'clock positions.  At the conclusion the scope passed easily without restrictions.     12/21/23- patient has not seen our urologist in [place] but currently scheduled.  Knows that he has a little bit of decreased flow, does not want pursue incisions as of yet though.  Mild penile discharge, no gross hematuria, no change in erections.      Allergies:  No known allergies    Medications:  has a current medication list which includes the following prescription(s): pantoprazole, sucralfate, zinc sulfate, ascorbic acid (vitamin c), cholecalciferol (vitamin d3), famotidine, folic acid, hydrochlorothiazide, ibuprofen, magnesium, methotrexate, and  metoprolol succinate.    Review of Systems:  General: No fever, chills, fatigability, or weight loss.  Skin: No rashes, itching, or changes in color or texture of skin.  Chest: Denies TYLER, cyanosis, wheezing, cough, and sputum production.  Abdomen: Appetite fine. No weight loss. Denies diarrhea, abdominal pain, hematemesis, or blood in stool.  Musculoskeletal: No joint stiffness or swelling. Denies back pain.  : As above.  All other review of systems negative.    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    PSH:   has a past surgical history that includes Prostatectomy (2011) and Esophagogastroduodenoscopy.    FamHx: family history includes Alcohol abuse in his father; Anxiety disorder in his mother; Arthritis in his mother; Hypertension in his mother; Kidney disease in his brother; No Known Problems in his sister; Stroke in his father.    SocHx:  reports that he has never smoked. He has never used smokeless tobacco. He reports that he does not drink alcohol and does not use drugs.      Physical Exam:  There were no vitals filed for this visit.  General: A&Ox3, no apparent distress, no deformities  Neck: No masses, normal thyroid  Lungs: normal inspiration, no use of accessory muscles  Heart: normal pulse, no arrhythmias  Abdomen: Soft, NT, ND  Skin: The skin is warm and dry. No jaundice.  Ext: No c/c/e.  : 10/23- Test desc michael, no abnormalities of epididymus. Normal penile and scrotal skin. Meatus normal.    Labs/Studies:   pvr 26 ml 12/21  Cystoscopy bulbourethral stricture 10/23  Cystoscopy normal 5/21  PSA <0.01 5/23  CT urogram bladder outflow obstruction 3/21    Lab Results   Component Value Date    PSA <0.01 05/09/2023    PSA <0.01 09/14/2020    PSA <0.01 12/28/2017    PSADIAG <0.01 12/08/2021     PSADIAG <0.01 12/04/2019    PSADIAG <0.01 03/18/2014    PSATOTAL <0.01 04/12/2017    PSATOTAL 8.8 (H) 09/06/2011    PSAFREE <0.01 04/12/2017    PSAFREE 0.38 09/06/2011    PSAFREEPCT Unable to calculate 04/12/2017    PSAFREEPCT 4.32 09/06/2011        UA clear today    Impression/Plan:     History of prostate cancer    Postprocedural membranous urethral stricture       Based on his history, I suspect bladder neck contracture or urethral stricture near the external sphincter.  If that is the case, I am not sure whether I can help him with urethorplasty.  He may consider repeating endoscopic surgery with catheterization for a long term.    I would like to evaluate him with cysto or otherwise, he can continue to follow up with Dr. Read.    1. Prostate cancer-  RP pT3b Gl7 with adjuvant XRT  6/7/12    PSA previously stable and will continue yearly.    2. Urethral stricture disease-  DVIU  5/31/22    Recurrent stricture, patient instead of pursuing DVIU would like to discuss urethroplasty with our physicians in [place], which have been scheduled.  We have again discussed alternatives such as CIC postprocedure from DVIU, he would rather pursue a more definitive urethroplasty, ane we will see him back after this appointment.  Levaquin times 10 days due to penile discharge, call if this does not improve though.  Call with any further difficulty in voiding prior to his appointment with them.     3. Gross hematuria- structural evaluation relatively clear, continue to monitor expectantly.  This could have all been secondary to radiation effect.  Call with a recurrence, continue surveillance screening.    4. Erectile dysfunction- sildenafil in the MARÍA work but not sufficient.  Cialis, trimix and muse were all a failure.  Patient does not want to pursue TriMix again, and wants to hold for now in pursuing an IPP.    Follow up cysto.

## 2024-02-15 ENCOUNTER — TELEPHONE (OUTPATIENT)
Dept: UROLOGY | Facility: CLINIC | Age: 59
End: 2024-02-15
Payer: COMMERCIAL

## 2024-02-15 ENCOUNTER — PROCEDURE VISIT (OUTPATIENT)
Dept: UROLOGY | Facility: CLINIC | Age: 59
End: 2024-02-15
Payer: COMMERCIAL

## 2024-02-15 VITALS
DIASTOLIC BLOOD PRESSURE: 89 MMHG | WEIGHT: 233 LBS | TEMPERATURE: 98 F | HEART RATE: 80 BPM | SYSTOLIC BLOOD PRESSURE: 132 MMHG | HEIGHT: 78 IN | BODY MASS INDEX: 26.96 KG/M2

## 2024-02-15 DIAGNOSIS — N35.813 OTHER STRICTURE OF MEMBRANOUS URETHRA IN MALE: Primary | ICD-10-CM

## 2024-02-15 DIAGNOSIS — N99.114 POSTPROCEDURAL MALE URETHRAL STRICTURE: ICD-10-CM

## 2024-02-15 PROCEDURE — 52281 CYSTOSCOPY AND TREATMENT: CPT | Mod: S$GLB,,, | Performed by: UROLOGY

## 2024-02-15 RX ORDER — CIPROFLOXACIN 500 MG/1
500 TABLET ORAL
Status: COMPLETED | OUTPATIENT
Start: 2024-02-15 | End: 2024-02-15

## 2024-02-15 RX ORDER — LIDOCAINE HYDROCHLORIDE 20 MG/ML
JELLY TOPICAL ONCE
Status: COMPLETED | OUTPATIENT
Start: 2024-02-15 | End: 2024-02-15

## 2024-02-15 RX ORDER — DOXYCYCLINE HYCLATE 100 MG
100 TABLET ORAL ONCE
Status: COMPLETED | OUTPATIENT
Start: 2024-02-15 | End: 2024-02-15

## 2024-02-15 RX ORDER — CIPROFLOXACIN 500 MG/1
500 TABLET ORAL 2 TIMES DAILY
Qty: 14 TABLET | Refills: 0 | Status: SHIPPED | OUTPATIENT
Start: 2024-02-15 | End: 2024-02-22

## 2024-02-15 RX ADMIN — LIDOCAINE HYDROCHLORIDE: 20 JELLY TOPICAL at 02:02

## 2024-02-15 RX ADMIN — Medication 100 MG: at 02:02

## 2024-02-15 RX ADMIN — CIPROFLOXACIN 500 MG: 500 TABLET ORAL at 03:02

## 2024-02-15 NOTE — PATIENT INSTRUCTIONS
_                                                                                                                                                                                             If any problems after hours or weekends, you may call [phone] and ask for the urology resident on call.CYSTOSCOPY   UROLOGY CLINIC DISCHARGE INSTRUCTIONS    You have had a procedure that will require time to properly heal. Follow the instructions you have been given on how to care for yourself once you are home. Below is additional information to help in your recovery.    ACTIVITY  There are no restrictions in activity. Start doing again the things you did before the procedure.  You may experience a slight burning sensation. You may notice a small amount of blood in your urine. This will clear up within a day. Call the clinic if this continues beyond 48 hours.    DIET  Continue your normal diet. You may eat the same foods you ate before your procedure.  Drink plenty of fluids during the first 24-48 hours following your procedure.    MEDICATIONS  Resume all other previous medications from your prescribing physician.  Continue any pre=procedure antibiotics until they are all gone.    SIGNS AND SYMPTOMS TO REPORT TO THE DOCTOR  Chills or fever greater than 101° F within 24 hours of procedure.  Changes in urination, such as increased bleeding, foul smell, cloudy urine, or painful urination.  Call your doctor with any questions or concerns.    For any emergency situation, call 911 immediately or go to your nearest emergency room.    [facility]  [phone]

## 2024-02-15 NOTE — TELEPHONE ENCOUNTER
----- Message from Lulu Patrick sent at 2/15/2024  8:41 AM CST -----  Contact: Baljit Smiley is needing a call back in regards to his appt. Please give him a call back at [phone]

## 2024-02-15 NOTE — PROCEDURES
Cystoscopy    Date/Time: 2/15/2024 2:15 PM    Performed by: Jose Husain MD  Authorized by: Jose Husain MD    Comments:      Date of Procedure: 02/15/2024    Procedure:  1. Male Diagnostic Cystourethroscopy  2.  Urethral calibration and dilation.    Pre-op diagnosis: Urethral stricture, s/p RALP, s/p XRT  Post-op diagnosis: recurrent urethral stricture at the external sphincter  Anesthesia: Local  Surgeon:  Jose Husain MD    Findings:  Urethra: pinhole urethral stricture 5 F in size involving a short segment just distal to the external sphincter.  Guidewire placed under direct vision thru the stricture area and the stricture was dilated up to 18 F in size by using Haymen dilators.   Sphincter: competent.  Prostate: absent from s/p RALP  Bladder neck: patent with no stricture  Bladder:  Normal bladder. Mild radiation cystitis change.  Normal ureteral orifices bilaterally.   Moderate trabeculation.     Description of Procedure:                                                         Informed Consent:                                                            - Risks, benefits and alternatives of procedure discussed with               patient and informed consent obtained.       Patient Position:   - Supine. --- Bladder ---   Prep and Drape:   - Patient prepped and draped in usual sterile fashion using povidone     iodine (Betadine).   Instruments:   - 16 Fr flexible cystoscope with 0 degree lens.   Procedure Details:   - Cystoscope passed under vision into bladder.   - Bladder and urethra examined in their entirety with findings as     above.     Conclusion:  1. Recurrent urethral stricture involving the short segment at the external sphincter area.  This was dilated using Haymen dilator from 10 F to 18 F in size without difficulty.  Following his cystoscopy, he was able to void well.    I don't think he will be a good candidate for open urethroplasty for his recurrent urethral stricture.  Actually, I favor  urethral dilation of the stricture given the nature and the location of his urethral stricture.  Recommend that he should follow up with Dr. Read in [place] [facility] and consider self urethral dilation      Plan:  Patient was discharged home in a stable condition.  Medications: cipro  Follow up:  with Dr. Read    · Other stricture of membranous urethra in male   ciprofloxacin HCl (CIPRO) 500 MG tablet; Take 1 tablet (500 mg total) by mouth 2 (two) times daily. for 14 doses  Dispense: 14 tablet; Refill: 0    · Postprocedural male urethral stricture   Cystoscopy   ciprofloxacin HCl (CIPRO) 500 MG tablet; Take 1 tablet (500 mg total) by mouth 2 (two) times daily. for 14 doses  Dispense: 14 tablet; Refill: 0    · Other orders   LIDOcaine HCl 2% urojet   doxycycline tablet 100 mg

## 2024-02-15 NOTE — LETTER
This communication is flagged as high priority.      February 15, 2024        Yaniv Murray MD  [address]  [place] [address]             [facility]rology  [address] [place] [id number]  Phone: [phone]   Patient: Sachin Gonzalez   MR Number: [id number]   YOB: 1965   Date of Visit: 2/15/2024       Dear Dr. Murray:    Thank you for referring Sachin Gonzalez to me for evaluation. Below are the relevant portions of my assessment and plan of care.     Recommend urethral dilation.       If you have questions, please do not hesitate to call me. I look forward to following Sachin along with you.    Sincerely,      Jose Husain MD           CC  No Recipients

## 2024-03-12 ENCOUNTER — OFFICE VISIT (OUTPATIENT)
Dept: PODIATRY | Facility: CLINIC | Age: 59
End: 2024-03-12
Payer: COMMERCIAL

## 2024-03-12 VITALS — WEIGHT: 233 LBS | BODY MASS INDEX: 26.96 KG/M2 | HEIGHT: 78 IN

## 2024-03-12 DIAGNOSIS — B35.1 ONYCHOMYCOSIS OF TOENAIL: ICD-10-CM

## 2024-03-12 DIAGNOSIS — G60.9 IDIOPATHIC PERIPHERAL NEUROPATHY: Primary | ICD-10-CM

## 2024-03-12 PROCEDURE — 11721 DEBRIDE NAIL 6 OR MORE: CPT | Mod: S$GLB,,, | Performed by: PODIATRIST

## 2024-03-12 PROCEDURE — 99999 PR PBB SHADOW E&M-EST. PATIENT-LVL III: CPT | Mod: PBBFAC,,, | Performed by: PODIATRIST

## 2024-03-12 PROCEDURE — 99499 UNLISTED E&M SERVICE: CPT | Mod: S$GLB,,, | Performed by: PODIATRIST

## 2024-03-14 NOTE — PROGRESS NOTES
Subjective:     Patient ID: Sachin Gonzalez is a 58 y.o. male.    Chief Complaint: Nail Care ((Nail Care (non-diabetic pt, wearing casual shoes and socks, last seen PCP Dr. Greenberg 05/31/23))    Sachin is a 58 y.o. male who presents to the clinic for evaluation and treatment of high risk feet. 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 The patient's chief complaint is long, thick toenails. This patient has documented high risk feet requiring routine maintenance secondary to peripheral neuropathy.     PCP: Santy Greenberg MD    Date Last Seen by PCP: 05/31/2023    Current shoe gear:  Affected Foot: Casual shoes     Unaffected Foot: Casual shoes    Last encounter in this department: 3/30/2022    Hemoglobin A1C   Date Value Ref Range Status   01/13/2023 5.3 4.0 - 5.6 % Final     Comment:     ADA Screening Guidelines:  5.7-6.4%  Consistent with prediabetes  >or=6.5%  Consistent with diabetes    High levels of fetal hemoglobin interfere with the HbA1C  assay. Heterozygous hemoglobin variants (HbS, HgC, etc)do  not significantly interfere with this assay.   However, presence of multiple variants may affect accuracy.     04/12/2017 5.7 4.5 - 6.2 % Final     Comment:     According to ADA guidelines, hemoglobin A1C <7.0% represents  optimal control in non-pregnant diabetic patients.  Different  metrics may apply to specific populations.   Standards of Medical Care in Diabetes - 2016.  For the purpose of screening for the presence of diabetes:  <5.7%     Consistent with the absence of diabetes  5.7-6.4%  Consistent with increasing risk for diabetes   (prediabetes)  >or=6.5%  Consistent with diabetes  Currently no consensus exists for use of hemoglobin A1C  for diagnosis of diabetes for children.          Patient Active Problem List   Diagnosis    History of prostate cancer    RA (rheumatoid arthritis)    Status post prostatectomy (06/07/12)    Erectile dysfunction    Peyronie's disease    Long-term use of immunosuppressant medication    Cephalic vein thrombosis-post op    Essential hypertension    Tension-type headache, not intractable    Lumbar foraminal stenosis    Parotid sialolithiasis    Gastroesophageal reflux disease without esophagitis    Mixed hyperlipidemia    Melena    Screen for colon cancer    Prostate cancer    Gross hematuria    Stricture of bulbous urethra in male    Drug-induced immunodeficiency    Postprocedural membranous urethral stricture       Medication List with Changes/Refills   Current Medications    ASCORBIC ACID, VITAMIN C, (VITAMIN C) 250 MG TABLET    Take 250 mg by mouth once daily.    CHOLECALCIFEROL, VITAMIN D3, 25 MCG (1,000 UNIT) CHEW    Take by mouth.    FOLIC ACID (FOLVITE) 1 MG TABLET    Take 1 tablet (1,000 mcg total) by mouth once daily.    IBUPROFEN (ADVIL,MOTRIN) 800 MG TABLET    Take 1 tablet (800 mg total) by mouth every 6 (six) hours as needed for Pain.    LINACLOTIDE (LINZESS) 72 MCG CAP CAPSULE    Take 1 capsule (72 mcg total) by mouth before breakfast.    METHOTREXATE 2.5 MG TAB    Take 6 tablets (15 mg total) by mouth every 7 days. This medication requires periodic lab monitoring    METHYLPREDNISOLONE (MEDROL DOSEPACK) 4 MG TABLET    Take 1 tablet (4 mg total) by mouth once daily. Use as instructed on dose pack    METOPROLOL SUCCINATE (TOPROL-XL) 50 MG 24 HR TABLET    Take 1 tablet (50 mg total) by mouth once daily.    MULTIVITAMIN CAPSULE    Take 1 capsule by mouth once daily.    PREGABALIN (LYRICA) 75 MG CAPSULE    Take 1 capsule (75 mg total) by mouth 2 (two) times daily.    SILDENAFIL (VIAGRA) 100 MG TABLET    Take 1 tablet (100 mg total) by mouth daily as needed for Erectile Dysfunction.    SUCRALFATE (CARAFATE) 1 GRAM TABLET    Take 1 tablet (1 g total) by  mouth 3 (three) times daily as needed (reflux indigestion).    ZINC SULFATE (ZINC-220 ORAL)    Take 1 tablet by mouth as needed.       Review of patient's allergies indicates:   Allergen Reactions    Pollen extracts        Past Surgical History:   Procedure Laterality Date    COLONOSCOPY N/A 4/21/2021    Procedure: COLONOSCOPY covid test scheduled on 4/19/21;  Surgeon: Darrell Worthington MD;  Location: [facility];  Service: Endoscopy;  Laterality: N/A;    CYSTOURETHROSCOPY WITH DIRECT VISION INTERNAL URETHROTOMY N/A 5/31/2022    Procedure: CYSTOSCOPY, WITH DIRECT VISION INTERNAL URETHROTOMY;  Surgeon: Yaniv Murray MD;  Location: [facility] OR;  Service: Urology;  Laterality: N/A;    ESOPHAGOGASTRODUODENOSCOPY      ESOPHAGOGASTRODUODENOSCOPY N/A 4/5/2021    Procedure: EGD (ESOPHAGOGASTRODUODENOSCOPY) Rapid Covid test needed;  Surgeon: Darrell Worthington MD;  Location: [facility];  Service: Endoscopy;  Laterality: N/A;    OSTEOTOMY OF METATARSAL BONE Right 11/21/2022    Procedure: OSTEOTOMY, METATARSAL BONE;  Surgeon: Daniel Ramirez DPM;  Location: [facility] OR;  Service: Podiatry;  Laterality: Right;    PROSTATECTOMY  2011    RELEASE OF CONTRACTURE OF JOINT Right 11/21/2022    Procedure: RELEASE, CONTRACTURE, JOINT;  Surgeon: Daniel Ramirez DPM;  Location: [facility] OR;  Service: Podiatry;  Laterality: Right;    RESECTION OF GASTROCNEMIUS MUSCLE Right 11/21/2022    Procedure: RESECTION, MUSCLE, GASTROCNEMIUS;  Surgeon: Daniel Ramierz DPM;  Location: [facility] OR;  Service: Podiatry;  Laterality: Right;       Family History   Problem Relation Age of Onset    Stroke Father     Alcohol abuse Father     Arthritis Mother     Hypertension Mother     Anxiety disorder Mother     No Known Problems Sister     Kidney disease Brother        Social History     Socioeconomic History    Marital status: Single   Tobacco Use    Smoking status: Never    Smokeless tobacco: Never   Substance and Sexual Activity    Alcohol use: Never    Drug use: No  "   Sexual activity: Yes     Partners: Female   Social History Narrative    No pets or smokers in household.     Social Determinants of Health     Stress: No Stress Concern Present (12/12/2019)    [employer] - Occupational Stress Questionnaire     Feeling of Stress : Not at all       Vitals:    03/12/24 1607   Weight: 105.7 kg (233 lb 0.4 oz)   Height: 6' 7" (2.007 m)         Hemoglobin A1C   Date Value Ref Range Status   01/13/2023 5.3 4.0 - 5.6 % Final     Comment:     ADA Screening Guidelines:  5.7-6.4%  Consistent with prediabetes  >or=6.5%  Consistent with diabetes    High levels of fetal hemoglobin interfere with the HbA1C  assay. Heterozygous hemoglobin variants (HbS, HgC, etc)do  not significantly interfere with this assay.   However, presence of multiple variants may affect accuracy.     04/12/2017 5.7 4.5 - 6.2 % Final     Comment:     According to ADA guidelines, hemoglobin A1C <7.0% represents  optimal control in non-pregnant diabetic patients.  Different  metrics may apply to specific populations.   Standards of Medical Care in Diabetes - 2016.  For the purpose of screening for the presence of diabetes:  <5.7%     Consistent with the absence of diabetes  5.7-6.4%  Consistent with increasing risk for diabetes   (prediabetes)  >or=6.5%  Consistent with diabetes  Currently no consensus exists for use of hemoglobin A1C  for diagnosis of diabetes for children.         Review of Systems   Constitutional:  Negative for chills and fever.   Respiratory:  Negative for shortness of breath.    Cardiovascular:  Negative for chest pain, palpitations, orthopnea, claudication and leg swelling.   Gastrointestinal:  Negative for diarrhea, nausea and vomiting.   Musculoskeletal:  Negative for joint pain.   Skin:  Negative for rash.   Neurological:  Positive for tingling and sensory change.   Psychiatric/Behavioral: Negative.           Objective:      PHYSICAL EXAM: Apperance: Alert and orient " in no distress,well developed, and with good attention to grooming and body habits  Patient ambulating in tennis shoes.   LOWER EXTREMITY EXAM:  VASCULAR: Dorsalis pedis pulses 2/4 bilateral and Posterior Tibial pulses 2/4 bilateral. Capillary fill time <4 seconds bilateral. No edema observed bilateral. Varicosities absent bilateral. Skin temperature of the lower extremities is warm to warm, proximal to distal. Hair growth WNL bilateral.  DERMATOLOGICAL: No skin rashes, subcutaneous nodules, lesions, or ulcers observed bilateral. Nails 1,3,4,5 bilateral elongated, thickened, and discolored with subungual debris. Webspaces 1,2,3,4 bilateral clean, dry and without evidence of break in skin integrity.   NEUROLOGICAL: Light touch, sharp-dull, proprioception all present and equal bilaterally.  Vibratory sensation diminished at bilateral hallux and intact at bilateral navicular. Protective sensation absent at 6/10 sites as tested with a [place] 5.07 monofilament.   MUSCULOSKELETAL: Muscle strength is 5/5 for foot inverters, everters, plantarflexors, and dorsiflexors. Muscle tone is normal.           Assessment:       ICD-10-CM ICD-9-CM   1. Idiopathic peripheral neuropathy  G60.9 356.9   2. Onychomycosis of toenail  B35.1 110.1         Plan:   Idiopathic peripheral neuropathy    Onychomycosis of toenail    I counseled the patient on his conditions, regarding findings of my examination, my impressions, and usual treatment plan.   Appointment spent on education about the neuropathy, and prevention of limb loss.  Shoe inspection. Patient instructed on proper foot hygeine. We discussed wearing proper shoe gear, daily foot inspections, never walking without protective shoe gear, never putting sharp instruments to feet.    With patient's permission, nails 1-5 bilateral were debrided/trimmed in length and thickness aggressively to their soft tissue attachment mechanically and with electric , removing all  offending nail and debris. Patient relates relief following the procedure.  Patient  will continue to monitor the areas daily, inspect feet, wear protective shoe gear when ambulatory, moisturizer to maintain skin integrity.  Patient to return 2 months or sooner if needed.          Reina Guzman DPM  [facility]

## 2024-03-18 NOTE — PROGRESS NOTES
Subjective:       Patient ID: Sachin Gonzalez is a 54 y.o. male.    Chief Complaint: Jaw Pain (right )    Pt is a 54 year old who was seen about dec 6 for right parotid gland inflammation. Pt was given clindamycin 300 mg and may feel a little better but still complaining of tender to touch.     Review of Systems   Constitutional: Negative.    HENT: Positive for facial swelling.    Respiratory: Negative.    Cardiovascular: Negative.    Genitourinary: Negative.    Neurological: Negative.        Objective:      Physical Exam   Constitutional: He is oriented to person, place, and time. He appears well-developed and well-nourished.   HENT:   Head:       Cardiovascular: Normal rate and regular rhythm.   Pulmonary/Chest: Effort normal and breath sounds normal. No stridor. He has no wheezes.   Neurological: He is alert and oriented to person, place, and time.       Assessment:       1. Parotiditis    2. Rheumatoid arthritis of hand, unspecified laterality, unspecified rheumatoid factor presence    3. Prostate cancer        Plan:       Parotiditis  Comments:  US and add augmentin  Orders:  -     US Soft Tissue Head Neck Thyroid; Future; Expected date: 12/12/2019    Rheumatoid arthritis of hand, unspecified laterality, unspecified rheumatoid factor presence  Comments:  Discussed use of antibiotic with rheumatology    Prostate cancer  Comments:  PSA is <.1    Other orders  -     ciprofloxacin HCl (CIPRO) 750 MG tablet; Take 1 tablet (750 mg total) by mouth every 12 (twelve) hours.  Dispense: 20 tablet; Refill: 0  -     methylPREDNISolone (MEDROL DOSEPACK) 4 mg tablet; use as directed  Dispense: 1 Package; Refill: 0          (4) walks frequently

## 2024-04-02 ENCOUNTER — HOSPITAL ENCOUNTER (OUTPATIENT)
Dept: RADIOLOGY | Facility: HOSPITAL | Age: 59
Discharge: HOME | End: 2024-04-02
Attending: PEDIATRICS
Payer: COMMERCIAL

## 2024-04-02 ENCOUNTER — OFFICE VISIT (OUTPATIENT)
Dept: INTERNAL MEDICINE | Facility: CLINIC | Age: 59
End: 2024-04-02
Payer: COMMERCIAL

## 2024-04-02 VITALS
TEMPERATURE: 97 F | BODY MASS INDEX: 26.68 KG/M2 | HEART RATE: 91 BPM | SYSTOLIC BLOOD PRESSURE: 110 MMHG | WEIGHT: 230.63 LBS | OXYGEN SATURATION: 97 % | DIASTOLIC BLOOD PRESSURE: 72 MMHG | HEIGHT: 78 IN

## 2024-04-02 DIAGNOSIS — R10.9 ABDOMINAL PAIN, UNSPECIFIED ABDOMINAL LOCATION: Primary | ICD-10-CM

## 2024-04-02 DIAGNOSIS — K29.60 BILE REFLUX GASTRITIS: ICD-10-CM

## 2024-04-02 DIAGNOSIS — R10.9 ABDOMINAL PAIN, UNSPECIFIED ABDOMINAL LOCATION: ICD-10-CM

## 2024-04-02 PROCEDURE — 3008F BODY MASS INDEX DOCD: CPT | Mod: CPTII,S$GLB,, | Performed by: PEDIATRICS

## 2024-04-02 PROCEDURE — 1160F RVW MEDS BY RX/DR IN RCRD: CPT | Mod: CPTII,S$GLB,, | Performed by: PEDIATRICS

## 2024-04-02 PROCEDURE — 1159F MED LIST DOCD IN RCRD: CPT | Mod: CPTII,S$GLB,, | Performed by: PEDIATRICS

## 2024-04-02 PROCEDURE — 74019 RADEX ABDOMEN 2 VIEWS: CPT | Mod: 26,,, | Performed by: RADIOLOGY

## 2024-04-02 PROCEDURE — 99999 PR PBB SHADOW E&M-EST. PATIENT-LVL V: CPT | Mod: PBBFAC,,, | Performed by: PEDIATRICS

## 2024-04-02 PROCEDURE — 74019 RADEX ABDOMEN 2 VIEWS: CPT | Mod: TC

## 2024-04-02 PROCEDURE — 99214 OFFICE O/P EST MOD 30 MIN: CPT | Mod: S$GLB,,, | Performed by: PEDIATRICS

## 2024-04-02 PROCEDURE — 3074F SYST BP LT 130 MM HG: CPT | Mod: CPTII,S$GLB,, | Performed by: PEDIATRICS

## 2024-04-02 PROCEDURE — 3078F DIAST BP <80 MM HG: CPT | Mod: CPTII,S$GLB,, | Performed by: PEDIATRICS

## 2024-04-02 RX ORDER — SUCRALFATE 1 G/1
1 TABLET ORAL 3 TIMES DAILY PRN
Qty: 270 TABLET | Refills: 0 | Status: SHIPPED | OUTPATIENT
Start: 2024-04-02

## 2024-04-02 NOTE — PROGRESS NOTES
Subjective     Patient ID: Sachin Gonzalez is a 58 y.o. male.    Chief Complaint: Gastroesophageal Reflux, GI Problem, and Diarrhea    Sachin Gonzalez is a 58 year old male here for reflux which is chronic in nature. Other sxs include nausea and diarrhea. Pt states after researching his sxs, he was concerned for EPI. Pt states his diarrhea is not very frequent. Lactaid drops did not help. Prior tx includes pepto bismol which did provide some relief. No arthralgias, fever, or weight loss. He had EGD and colonoscopy in 2021 and has seen GI multiple times with workups and a dx of fatty liver. Has not been taking carafate or PPI.    Review of Systems   Constitutional:  Negative for chills and fever.   HENT:  Negative for nasal congestion and rhinorrhea.    Respiratory:  Negative for cough and shortness of breath.    Cardiovascular:  Negative for chest pain.   Gastrointestinal:  Positive for abdominal pain, diarrhea, nausea and reflux. Negative for blood in stool, change in bowel habit, constipation and vomiting.   Endocrine: Negative for cold intolerance, heat intolerance, polydipsia, polyphagia and polyuria.   Genitourinary:  Negative for dysuria.   Neurological:  Negative for weakness.          Objective     Physical Exam  Constitutional:       General: He is not in acute distress.     Appearance: Normal appearance. He is normal weight. He is not ill-appearing, toxic-appearing or diaphoretic.   HENT:      Head: Atraumatic.   Cardiovascular:      Rate and Rhythm: Normal rate and regular rhythm.      Pulses: Normal pulses.      Heart sounds: Normal heart sounds. No murmur heard.  Pulmonary:      Effort: Pulmonary effort is normal. No respiratory distress.      Breath sounds: Normal breath sounds. No stridor. No wheezing, rhonchi or rales.   Chest:      Chest wall: No tenderness.   Abdominal:      General: Abdomen is flat. Bowel sounds are normal. There is no distension.      Palpations: Abdomen is soft. There is no  mass.      Tenderness: There is no abdominal tenderness. There is no guarding.   Neurological:      General: No focal deficit present.      Mental Status: He is alert and oriented to person, place, and time. Mental status is at baseline.      Cranial Nerves: No cranial nerve deficit.      Sensory: No sensory deficit.      Motor: No weakness.      Gait: Gait normal.   Psychiatric:         Mood and Affect: Mood normal.         Behavior: Behavior normal.         Thought Content: Thought content normal.         Judgment: Judgment normal.          Assessment and Plan     1. Abdominal pain, unspecified abdominal location  -     CULTURE, STOOL; Future; Expected date: 04/02/2024  -     Giardia / Cryptosporidum, EIA; Future; Expected date: 04/02/2024  -     Stool Exam-Ova,Cysts,Parasites; Future; Expected date: 04/02/2024  -     Ambulatory referral/consult to Gastroenterology; Future; Expected date: 04/09/2024  -     X-Ray Abdomen Flat And Erect; Future; Expected date: 04/02/2024    2. Bile reflux gastritis  -     sucralfate (CARAFATE) 1 gram tablet; Take 1 tablet (1 g total) by mouth 3 (three) times daily as needed (reflux indigestion).  Dispense: 270 tablet; Refill: 0        Pt will provide stool sample to look for any infectious causes and follow up with GI. His sxs do not sound like classic EPI but possible acid or bile reflux. Probiotics and continued pepto bismol recommended. Await Xray. Carafate prescribed.           No follow-ups on file.

## 2024-04-04 ENCOUNTER — OFFICE VISIT (OUTPATIENT)
Dept: UROLOGY | Facility: CLINIC | Age: 59
End: 2024-04-04
Payer: COMMERCIAL

## 2024-04-04 ENCOUNTER — TELEPHONE (OUTPATIENT)
Dept: UROLOGY | Facility: CLINIC | Age: 59
End: 2024-04-04

## 2024-04-04 ENCOUNTER — HOSPITAL ENCOUNTER (OUTPATIENT)
Dept: CARDIOLOGY | Facility: HOSPITAL | Age: 59
Discharge: HOME | End: 2024-04-04
Attending: UROLOGY
Payer: COMMERCIAL

## 2024-04-04 ENCOUNTER — TELEPHONE (OUTPATIENT)
Dept: RHEUMATOLOGY | Facility: CLINIC | Age: 59
End: 2024-04-04
Payer: COMMERCIAL

## 2024-04-04 ENCOUNTER — HOSPITAL ENCOUNTER (OUTPATIENT)
Dept: RADIOLOGY | Facility: HOSPITAL | Age: 59
Discharge: HOME | End: 2024-04-04
Attending: UROLOGY
Payer: COMMERCIAL

## 2024-04-04 VITALS — HEIGHT: 78 IN | WEIGHT: 230.63 LBS | BODY MASS INDEX: 26.68 KG/M2

## 2024-04-04 DIAGNOSIS — Z01.818 PRE-OP TESTING: ICD-10-CM

## 2024-04-04 DIAGNOSIS — R31.0 GROSS HEMATURIA: ICD-10-CM

## 2024-04-04 DIAGNOSIS — N99.114 POSTPROCEDURAL MALE URETHRAL STRICTURE: Primary | ICD-10-CM

## 2024-04-04 DIAGNOSIS — M05.79 RHEUMATOID ARTHRITIS INVOLVING MULTIPLE SITES WITH POSITIVE RHEUMATOID FACTOR: Primary | ICD-10-CM

## 2024-04-04 DIAGNOSIS — M15.9 GENERALIZED OSTEOARTHRITIS OF MULTIPLE SITES: ICD-10-CM

## 2024-04-04 DIAGNOSIS — Z85.46 HISTORY OF PROSTATE CANCER: ICD-10-CM

## 2024-04-04 DIAGNOSIS — N52.9 ERECTILE DYSFUNCTION, UNSPECIFIED ERECTILE DYSFUNCTION TYPE: ICD-10-CM

## 2024-04-04 LAB
OHS QRS DURATION: 98 MS
OHS QTC CALCULATION: 396 MS

## 2024-04-04 PROCEDURE — 99999 PR PBB SHADOW E&M-EST. PATIENT-LVL IV: CPT | Mod: PBBFAC,,, | Performed by: UROLOGY

## 2024-04-04 PROCEDURE — 1160F RVW MEDS BY RX/DR IN RCRD: CPT | Mod: CPTII,S$GLB,, | Performed by: UROLOGY

## 2024-04-04 PROCEDURE — 3008F BODY MASS INDEX DOCD: CPT | Mod: CPTII,S$GLB,, | Performed by: UROLOGY

## 2024-04-04 PROCEDURE — 1159F MED LIST DOCD IN RCRD: CPT | Mod: CPTII,S$GLB,, | Performed by: UROLOGY

## 2024-04-04 PROCEDURE — 71046 X-RAY EXAM CHEST 2 VIEWS: CPT | Mod: 26,,, | Performed by: RADIOLOGY

## 2024-04-04 PROCEDURE — 71046 X-RAY EXAM CHEST 2 VIEWS: CPT | Mod: TC

## 2024-04-04 PROCEDURE — 93010 ELECTROCARDIOGRAM REPORT: CPT | Mod: ,,, | Performed by: INTERNAL MEDICINE

## 2024-04-04 PROCEDURE — 99214 OFFICE O/P EST MOD 30 MIN: CPT | Mod: S$GLB,,, | Performed by: UROLOGY

## 2024-04-04 PROCEDURE — 93005 ELECTROCARDIOGRAM TRACING: CPT

## 2024-04-04 RX ORDER — CEFAZOLIN SODIUM 2 G/50ML
2 SOLUTION INTRAVENOUS
Status: CANCELLED | OUTPATIENT
Start: 2024-04-04

## 2024-04-04 RX ORDER — SILDENAFIL 100 MG/1
100 TABLET, FILM COATED ORAL DAILY PRN
Qty: 10 TABLET | Refills: 11 | Status: SHIPPED | OUTPATIENT
Start: 2024-04-04 | End: 2024-05-16 | Stop reason: SDUPTHER

## 2024-04-04 NOTE — H&P (VIEW-ONLY)
Chief Complaint:   Encounter Diagnoses   Name Primary?    Postprocedural male urethral stricture Yes    History of prostate cancer     Gross hematuria     Erectile dysfunction, unspecified erectile dysfunction type        HPI:   4/4/24- patient had a stricture release tight in [place], unfortunately it has come back in 2 months.  Patient wants to discuss other options for erectile dysfunction, no evidence of hematuria not due for a PSA.      Allergies:  No known allergies    Medications:  has a current medication list which includes the following prescription(s): pantoprazole, sucralfate, zinc sulfate, ascorbic acid (vitamin c), cholecalciferol (vitamin d3), famotidine, folic acid, hydrochlorothiazide, ibuprofen, magnesium, methotrexate, and metoprolol succinate.    Review of Systems:  General: No fever, chills, fatigability, or weight loss.  Skin: No rashes, itching, or changes in color or texture of skin.  Chest: Denies TYLER, cyanosis, wheezing, cough, and sputum production.  Abdomen: Appetite fine. No weight loss. Denies diarrhea, abdominal pain, hematemesis, or blood in stool.  Musculoskeletal: No joint stiffness or swelling. Denies back pain.  : As above.  All other review of systems negative.    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    PSH:   has a past surgical history that includes Prostatectomy (2011) and Esophagogastroduodenoscopy.    FamHx: family history includes Alcohol abuse in his father; Anxiety disorder in his mother; Arthritis in his mother; Hypertension in his mother; Kidney disease in his brother; No Known Problems in his sister; Stroke in his father.    SocHx:  reports that he has never smoked. He has never used smokeless tobacco. He reports that he does not drink alcohol and  does not use drugs.      Physical Exam:  There were no vitals filed for this visit.  General: A&Ox3, no apparent distress, no deformities  Neck: No masses, normal thyroid  Lungs: normal inspiration, no use of accessory muscles  Heart: normal pulse, no arrhythmias  Abdomen: Soft, NT, ND  Skin: The skin is warm and dry. No jaundice.  Ext: No c/c/e.  : 10/23- Test desc michael, no abnormalities of epididymus. Normal penile and scrotal skin. Meatus normal.    Labs/Studies:   pvr 26 ml 12/21  Cystoscopy bulbourethral stricture 10/23  Cystoscopy normal 5/21  PSA <0.01 5/23  CT urogram bladder outflow obstruction 3/21    Impression/Plan:       1. Prostate cancer-  RP pT3b Gl7 with adjuvant XRT  6/7/12    PSA previously stable and will repeat at the next appointment.    2. Urethral stricture disease-  urethral dilation in [facility]  2/15/24,  DVIU  5/31/22    Stricture dilation performed in [place], at that point they determined him not a candidate for urethroplasty due to location.  Recommendation for intermittent catheters, which he has resisted.  Patient states symptoms have returned and he needs another stricture release.  Therefore will proceed with cystoscopy with direct visual incision of the urethra.  Patient is aware that within 2 weeks after the surgery he will need to initiate CIC, and he is okay with proceeding, please see below in regards to our discussion today.  Call with any complaints in the meantime.    3. Gross hematuria- structural evaluation relatively clear, continue to monitor expectantly.  This could have all been secondary to radiation effect.  Call with a recurrence, continue surveillance screening.    4. Erectile dysfunction- sildenafil with the use of a MARÍA is relatively sufficient.  Of note Cialis, trimix and muse were all a failure in the past.  Patient does not want to pursue TriMix again, may consider pursuing an IPP in the future.    Patient understands the risks, benefits and alternatives of  the above-stated procedure.  These include but not limited to damage to the surrounding structures including the urethra, prostate, ureters and bladder.  Risk of the need for stent placement, perforation requiring open procedures, Salazar catheterization at the conclusion of the procedure will be mandatory.  Risk of recurrent strictures or need for further surgeries.  Risk of pain, hematuria, infections.  Risk of heart attack, stroke, death, DVT and PE.  Patient understanding of all the above he has elected to pursue.

## 2024-04-04 NOTE — PROGRESS NOTES
Chief Complaint:   Encounter Diagnoses   Name Primary?    Postprocedural male urethral stricture Yes    History of prostate cancer     Gross hematuria     Erectile dysfunction, unspecified erectile dysfunction type        HPI:   4/4/24- patient had a stricture release tight in [place], unfortunately it has come back in 2 months.  Patient wants to discuss other options for erectile dysfunction, no evidence of hematuria not due for a PSA.      Allergies:  No known allergies    Medications:  has a current medication list which includes the following prescription(s): pantoprazole, sucralfate, zinc sulfate, ascorbic acid (vitamin c), cholecalciferol (vitamin d3), famotidine, folic acid, hydrochlorothiazide, ibuprofen, magnesium, methotrexate, and metoprolol succinate.    Review of Systems:  General: No fever, chills, fatigability, or weight loss.  Skin: No rashes, itching, or changes in color or texture of skin.  Chest: Denies TYLER, cyanosis, wheezing, cough, and sputum production.  Abdomen: Appetite fine. No weight loss. Denies diarrhea, abdominal pain, hematemesis, or blood in stool.  Musculoskeletal: No joint stiffness or swelling. Denies back pain.  : As above.  All other review of systems negative.    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    PSH:   has a past surgical history that includes Prostatectomy (2011) and Esophagogastroduodenoscopy.    FamHx: family history includes Alcohol abuse in his father; Anxiety disorder in his mother; Arthritis in his mother; Hypertension in his mother; Kidney disease in his brother; No Known Problems in his sister; Stroke in his father.    SocHx:  reports that he has never smoked. He has never used smokeless tobacco. He reports that he does not drink alcohol and  does not use drugs.      Physical Exam:  There were no vitals filed for this visit.  General: A&Ox3, no apparent distress, no deformities  Neck: No masses, normal thyroid  Lungs: normal inspiration, no use of accessory muscles  Heart: normal pulse, no arrhythmias  Abdomen: Soft, NT, ND  Skin: The skin is warm and dry. No jaundice.  Ext: No c/c/e.  : 10/23- Test desc michael, no abnormalities of epididymus. Normal penile and scrotal skin. Meatus normal.    Labs/Studies:   pvr 26 ml 12/21  Cystoscopy bulbourethral stricture 10/23  Cystoscopy normal 5/21  PSA <0.01 5/23  CT urogram bladder outflow obstruction 3/21    Impression/Plan:       1. Prostate cancer-  RP pT3b Gl7 with adjuvant XRT  6/7/12    PSA previously stable and will repeat at the next appointment.    2. Urethral stricture disease-  urethral dilation in [facility]  2/15/24,  DVIU  5/31/22    Stricture dilation performed in [place], at that point they determined him not a candidate for urethroplasty due to location.  Recommendation for intermittent catheters, which he has resisted.  Patient states symptoms have returned and he needs another stricture release.  Therefore will proceed with cystoscopy with direct visual incision of the urethra.  Patient is aware that within 2 weeks after the surgery he will need to initiate CIC, and he is okay with proceeding, please see below in regards to our discussion today.  Call with any complaints in the meantime.    3. Gross hematuria- structural evaluation relatively clear, continue to monitor expectantly.  This could have all been secondary to radiation effect.  Call with a recurrence, continue surveillance screening.    4. Erectile dysfunction- sildenafil with the use of a MARÍA is relatively sufficient.  Of note Cialis, trimix and muse were all a failure in the past.  Patient does not want to pursue TriMix again, may consider pursuing an IPP in the future.    Patient understands the risks, benefits and alternatives of  the above-stated procedure.  These include but not limited to damage to the surrounding structures including the urethra, prostate, ureters and bladder.  Risk of the need for stent placement, perforation requiring open procedures, Salazar catheterization at the conclusion of the procedure will be mandatory.  Risk of recurrent strictures or need for further surgeries.  Risk of pain, hematuria, infections.  Risk of heart attack, stroke, death, DVT and PE.  Patient understanding of all the above he has elected to pursue.

## 2024-04-04 NOTE — TELEPHONE ENCOUNTER
Ordered labs, arthritis survey & hand imaging.  He also needs an appointment to see a rheumatologist, here or in [place].  He could see Dr. Knowles or Dr. Pope.    ----- Message from Andrew Tadeo MA sent at 4/4/2024  2:36 PM CDT -----  Regarding: FW: Pt called needs Xrays sche and Bllodwork  Contact: [phone]    ----- Message -----  From: Anisa Ziegler  Sent: 4/4/2024  12:20 PM CDT  To: Manuel SALAZAR Staff  Subject: Pt called needs Xrays sche and Bllodwork         Name of Who is Calling:DHARMESH DELEON [[id number]]        What is the request in detail:Pt called states he needs the  To order Xrays and Bloodwork for him. Please advise         Can the clinic reply by [facility]:No        What Number [facility] Back [facility]: Telephone Information:  Mobile          [phone]

## 2024-04-04 NOTE — TELEPHONE ENCOUNTER
Called the patient, after verification of name and , the patient was wondering if   He could move his sx to a Thursday or Friday. I explained to the pt that Dr Murray does not do sx on those days. I offered for the pt to change his day to a more convenient time if he prefers pt stated he will keep the date he has now.     ------ Message from Elizabeth Gaming sent at 2024 12:12 PM CDT -----  Contact: Patient  Patient is calling to reschedule surgery dated . Please call patient at .[phone]

## 2024-04-18 ENCOUNTER — HOSPITAL ENCOUNTER (OUTPATIENT)
Dept: RADIOLOGY | Facility: HOSPITAL | Age: 59
Discharge: HOME | End: 2024-04-18
Attending: INTERNAL MEDICINE
Payer: COMMERCIAL

## 2024-04-18 ENCOUNTER — TELEPHONE (OUTPATIENT)
Dept: PREADMISSION TESTING | Facility: HOSPITAL | Age: 59
End: 2024-04-18
Payer: COMMERCIAL

## 2024-04-18 ENCOUNTER — PATIENT MESSAGE (OUTPATIENT)
Dept: PREADMISSION TESTING | Facility: HOSPITAL | Age: 59
End: 2024-04-18
Payer: COMMERCIAL

## 2024-04-18 DIAGNOSIS — M15.9 GENERALIZED OSTEOARTHRITIS OF MULTIPLE SITES: ICD-10-CM

## 2024-04-18 DIAGNOSIS — M05.79 RHEUMATOID ARTHRITIS INVOLVING MULTIPLE SITES WITH POSITIVE RHEUMATOID FACTOR: ICD-10-CM

## 2024-04-18 PROCEDURE — 73120 X-RAY EXAM OF HAND: CPT | Mod: 26,,, | Performed by: RADIOLOGY

## 2024-04-18 PROCEDURE — 73120 X-RAY EXAM OF HAND: CPT | Mod: TC

## 2024-04-18 NOTE — TELEPHONE ENCOUNTER
Good afternoon,    This patient is scheduled for surgery with Dr. Murray for 04/22/24. We have been trying to contact the patient to review his chart and give pre op instructions but he does not have a voice mail box set up. He is not answering his phone either. We have sent him multiple messages on [facility] as well. May you please assist us in reaching out to the patient? Our number is     Thanks,    Pre Admit Testing

## 2024-04-19 ENCOUNTER — ANESTHESIA EVENT (OUTPATIENT)
Dept: SURGERY | Facility: HOSPITAL | Age: 59
End: 2024-04-19
Payer: COMMERCIAL

## 2024-04-19 NOTE — ANESTHESIA PREPROCEDURE EVALUATION
04/19/2024  Sachin Gonzalez is a 58 y.o., male    Patient Active Problem List   Diagnosis    History of prostate cancer    RA (rheumatoid arthritis)    Status post prostatectomy (06/07/12)    Erectile dysfunction    Peyronie's disease    Long-term use of immunosuppressant medication    Cephalic vein thrombosis-post op    Essential hypertension    Tension-type headache, not intractable    Lumbar foraminal stenosis    Parotid sialolithiasis    Gastroesophageal reflux disease without esophagitis    Mixed hyperlipidemia    Melena    Screen for colon cancer    Prostate cancer    Gross hematuria    Stricture of bulbous urethra in male    Drug-induced immunodeficiency    Postprocedural male urethral stricture     Past Medical History:   Diagnosis Date    GERD (gastroesophageal reflux disease)     Hypertension     Prostate cancer     RA (rheumatoid arthritis)     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     Past Surgical History:   Procedure Laterality Date    COLONOSCOPY N/A 4/21/2021    Procedure: COLONOSCOPY covid test scheduled on 4/19/21;  Surgeon: Darrell Worthington MD;  Location: [facility];  Service: Endoscopy;  Laterality: N/A;    CYSTOURETHROSCOPY WITH DIRECT VISION INTERNAL URETHROTOMY N/A 5/31/2022    Procedure: CYSTOSCOPY, WITH DIRECT VISION INTERNAL URETHROTOMY;  Surgeon: Yaniv Murray MD;  Location: [facility];  Service: Urology;  Laterality: N/A;    ESOPHAGOGASTRODUODENOSCOPY      ESOPHAGOGASTRODUODENOSCOPY N/A 4/5/2021    Procedure: EGD (ESOPHAGOGASTRODUODENOSCOPY) Rapid Covid test needed;  Surgeon: Darrell Worthington MD;  Location: [facility];  Service: Endoscopy;  Laterality: N/A;    OSTEOTOMY OF METATARSAL BONE Right 11/21/2022    Procedure: OSTEOTOMY, METATARSAL BONE;  Surgeon: Daniel Ramirez DPM;  Location: [facility];   Service: Podiatry;  Laterality: Right;    PROSTATECTOMY  2011    RELEASE OF CONTRACTURE OF JOINT Right 11/21/2022    Procedure: RELEASE, CONTRACTURE, JOINT;  Surgeon: Daniel Ramirez DPM;  Location: [facility] OR;  Service: Podiatry;  Laterality: Right;    RESECTION OF GASTROCNEMIUS MUSCLE Right 11/21/2022    Procedure: RESECTION, MUSCLE, GASTROCNEMIUS;  Surgeon: Daniel Ramirez DPM;  Location: [facility] OR;  Service: Podiatry;  Laterality: Right;       Pre-op Assessment    I have reviewed the Patient Summary Reports.    I have reviewed the NPO Status.   I have reviewed the Medications.   Prednisone    Review of Systems  Anesthesia Hx:  No problems with previous Anesthesia   History of prior surgery of interest to airway management or planning:  Previous anesthesia: General       Airway issues documented on chart review include laryngeal mask airway used     Denies Family Hx of Anesthesia complications.    Denies Personal Hx of Anesthesia complications.                    Social:  Non-Smoker       Hematology/Oncology:  Hematology Normal   Oncology Normal                                   EENT/Dental:  EENT/Dental Normal           Cardiovascular:     Hypertension              ECG has been reviewed. Normal ECHO                         Pulmonary:  Pulmonary Normal                       Renal/:     Prostatectomy for cancer             Hepatic/GI:     GERD, well controlled  Hepatitis, B           Musculoskeletal:  Arthritis               Neurological:      Headaches                                 Endocrine:  Endocrine Normal            Dermatological:  Skin Normal    Psych:  Psychiatric Normal                    Chemistry        Component Value Date/Time     04/18/2024 1336    K 3.9 04/18/2024 1336     04/18/2024 1336    CO2 26 04/18/2024 1336    BUN 21 (H) 04/18/2024 1336    CREATININE 1.1 04/18/2024 1336    GLU 91 04/18/2024 1336        Component Value Date/Time    CALCIUM 9.2 04/18/2024 1336    ALKPHOS 85  04/18/2024 1336    AST 23 04/18/2024 1336    ALT 13 04/18/2024 1336    BILITOT 0.3 04/18/2024 1336    ESTGFRAFRICA >60.0 07/21/2022 1018    EGFRNONAA >60.0 07/21/2022 1018        Lab Results   Component Value Date    WBC 5.01 04/18/2024    HGB 13.8 (L) 04/18/2024    HCT 41.4 04/18/2024    MCV 98 04/18/2024     04/18/2024           Physical Exam  General: Alert and Oriented    Airway:  Mallampati: II   Mouth Opening: Normal  TM Distance: Normal  Tongue: Normal  Neck ROM: Normal ROM    Dental:  Intact  Patient denies any currently loose or chipped teeth; Patient denies any removable dental appliances    Chest/Lungs:  Clear to auscultation, Normal Respiratory Rate    Heart:  Rate: Normal  Rhythm: Regular Rhythm        Anesthesia Plan  Type of Anesthesia, risks & benefits discussed:    Anesthesia Type: Gen Supraglottic Airway  Intra-op Monitoring Plan: Standard ASA Monitors  Post Op Pain Control Plan: multimodal analgesia and IV/PO Opioids PRN  Induction:  IV  Informed Consent: Informed consent signed with the Patient and all parties understand the risks and agree with anesthesia plan.  All questions answered.   ASA Score: 3  Day of Surgery Review of History & Physical: H&P Update referred to the surgeon/provider.    Ready For Surgery From Anesthesia Perspective.     .

## 2024-04-21 ENCOUNTER — NURSE TRIAGE (OUTPATIENT)
Dept: ADMINISTRATIVE | Facility: CLINIC | Age: 59
End: 2024-04-21
Payer: COMMERCIAL

## 2024-04-21 NOTE — TELEPHONE ENCOUNTER
Pt was calling in regards to his surgery on 4/21/24.  Care advice was information only.  Patient verbally understands, all questions answered, advised to call back for any symptoms or further needs.     Reason for Disposition   Question about upcoming scheduled test, no triage required and triager able to answer question    Protocols used: Information Only Call - No Triage-A-

## 2024-04-22 ENCOUNTER — ANESTHESIA (OUTPATIENT)
Dept: SURGERY | Facility: HOSPITAL | Age: 59
End: 2024-04-22
Payer: COMMERCIAL

## 2024-04-22 ENCOUNTER — HOSPITAL ENCOUNTER (OUTPATIENT)
Facility: HOSPITAL | Age: 59
Discharge: HOME | End: 2024-04-22
Attending: UROLOGY | Admitting: UROLOGY
Payer: COMMERCIAL

## 2024-04-22 ENCOUNTER — TELEPHONE (OUTPATIENT)
Dept: UROLOGY | Facility: CLINIC | Age: 59
End: 2024-04-22
Payer: COMMERCIAL

## 2024-04-22 DIAGNOSIS — N99.114 POSTPROCEDURAL MALE URETHRAL STRICTURE: ICD-10-CM

## 2024-04-22 PROCEDURE — 63600175 PHARM REV CODE 636 W HCPCS: Performed by: NURSE ANESTHETIST, CERTIFIED REGISTERED

## 2024-04-22 PROCEDURE — 25000003 PHARM REV CODE 250: Performed by: NURSE ANESTHETIST, CERTIFIED REGISTERED

## 2024-04-22 PROCEDURE — 36000706: Performed by: UROLOGY

## 2024-04-22 PROCEDURE — 36000707: Performed by: UROLOGY

## 2024-04-22 PROCEDURE — 52276 CYSTOSCOPY AND TREATMENT: CPT | Mod: ,,, | Performed by: UROLOGY

## 2024-04-22 PROCEDURE — 71000016 HC POSTOP RECOV ADDL HR: Performed by: UROLOGY

## 2024-04-22 PROCEDURE — 63600175 PHARM REV CODE 636 W HCPCS: Performed by: ANESTHESIOLOGY

## 2024-04-22 PROCEDURE — 71000033 HC RECOVERY, INTIAL HOUR: Performed by: UROLOGY

## 2024-04-22 PROCEDURE — 71000015 HC POSTOP RECOV 1ST HR: Performed by: UROLOGY

## 2024-04-22 PROCEDURE — D9220A PRA ANESTHESIA: Mod: ,,, | Performed by: NURSE ANESTHETIST, CERTIFIED REGISTERED

## 2024-04-22 PROCEDURE — 37000009 HC ANESTHESIA EA ADD 15 MINS: Performed by: UROLOGY

## 2024-04-22 PROCEDURE — 37000008 HC ANESTHESIA 1ST 15 MINUTES: Performed by: UROLOGY

## 2024-04-22 PROCEDURE — 63600175 PHARM REV CODE 636 W HCPCS: Performed by: UROLOGY

## 2024-04-22 PROCEDURE — 25000003 PHARM REV CODE 250: Performed by: UROLOGY

## 2024-04-22 RX ORDER — SODIUM CHLORIDE, SODIUM LACTATE, POTASSIUM CHLORIDE, CALCIUM CHLORIDE 600; 310; 30; 20 MG/100ML; MG/100ML; MG/100ML; MG/100ML
INJECTION, SOLUTION INTRAVENOUS CONTINUOUS
Status: ACTIVE | OUTPATIENT
Start: 2024-04-22

## 2024-04-22 RX ORDER — ONDANSETRON HYDROCHLORIDE 2 MG/ML
4 INJECTION, SOLUTION INTRAVENOUS DAILY PRN
Status: DISCONTINUED | OUTPATIENT
Start: 2024-04-22 | End: 2024-04-22 | Stop reason: HOSPADM

## 2024-04-22 RX ORDER — FENTANYL CITRATE 50 UG/ML
25 INJECTION, SOLUTION INTRAMUSCULAR; INTRAVENOUS EVERY 5 MIN PRN
Status: DISCONTINUED | OUTPATIENT
Start: 2024-04-22 | End: 2024-04-22 | Stop reason: HOSPADM

## 2024-04-22 RX ORDER — LIDOCAINE HYDROCHLORIDE 20 MG/ML
INJECTION, SOLUTION EPIDURAL; INFILTRATION; INTRACAUDAL; PERINEURAL
Status: DISCONTINUED | OUTPATIENT
Start: 2024-04-22 | End: 2024-04-22

## 2024-04-22 RX ORDER — FENTANYL CITRATE 50 UG/ML
INJECTION, SOLUTION INTRAMUSCULAR; INTRAVENOUS
Status: DISCONTINUED | OUTPATIENT
Start: 2024-04-22 | End: 2024-04-22

## 2024-04-22 RX ORDER — PHENAZOPYRIDINE HYDROCHLORIDE 200 MG/1
200 TABLET, FILM COATED ORAL 3 TIMES DAILY PRN
Qty: 15 TABLET | Refills: 0 | Status: SHIPPED | OUTPATIENT
Start: 2024-04-22

## 2024-04-22 RX ORDER — ONDANSETRON HYDROCHLORIDE 2 MG/ML
INJECTION, SOLUTION INTRAVENOUS
Status: DISCONTINUED | OUTPATIENT
Start: 2024-04-22 | End: 2024-04-22

## 2024-04-22 RX ORDER — CEFDINIR 300 MG/1
300 CAPSULE ORAL 2 TIMES DAILY
Qty: 14 CAPSULE | Refills: 0 | Status: SHIPPED | OUTPATIENT
Start: 2024-04-22 | End: 2024-04-29

## 2024-04-22 RX ORDER — MIDAZOLAM HYDROCHLORIDE 1 MG/ML
INJECTION INTRAMUSCULAR; INTRAVENOUS
Status: DISCONTINUED | OUTPATIENT
Start: 2024-04-22 | End: 2024-04-22

## 2024-04-22 RX ORDER — PROPOFOL 10 MG/ML
VIAL (ML) INTRAVENOUS
Status: DISCONTINUED | OUTPATIENT
Start: 2024-04-22 | End: 2024-04-22

## 2024-04-22 RX ORDER — MEPERIDINE HYDROCHLORIDE 25 MG/ML
12.5 INJECTION INTRAMUSCULAR; INTRAVENOUS; SUBCUTANEOUS ONCE AS NEEDED
Status: DISCONTINUED | OUTPATIENT
Start: 2024-04-22 | End: 2024-04-22 | Stop reason: HOSPADM

## 2024-04-22 RX ORDER — DEXMEDETOMIDINE HYDROCHLORIDE 100 UG/ML
INJECTION, SOLUTION INTRAVENOUS
Status: DISCONTINUED | OUTPATIENT
Start: 2024-04-22 | End: 2024-04-22

## 2024-04-22 RX ORDER — OXYCODONE AND ACETAMINOPHEN 5; 325 MG/1; MG/1
1 TABLET ORAL
Status: DISCONTINUED | OUTPATIENT
Start: 2024-04-22 | End: 2024-04-22 | Stop reason: HOSPADM

## 2024-04-22 RX ORDER — OXYCODONE AND ACETAMINOPHEN 5; 325 MG/1; MG/1
1 TABLET ORAL EVERY 4 HOURS PRN
Qty: 10 TABLET | Refills: 0 | Status: SHIPPED | OUTPATIENT
Start: 2024-04-22

## 2024-04-22 RX ADMIN — MIDAZOLAM HYDROCHLORIDE 2 MG: 1 INJECTION INTRAMUSCULAR; INTRAVENOUS at 08:04

## 2024-04-22 RX ADMIN — PROPOFOL 30 MG: 10 INJECTION, EMULSION INTRAVENOUS at 08:04

## 2024-04-22 RX ADMIN — SODIUM CHLORIDE, SODIUM LACTATE, POTASSIUM CHLORIDE, AND CALCIUM CHLORIDE: 600; 310; 30; 20 INJECTION, SOLUTION INTRAVENOUS at 08:04

## 2024-04-22 RX ADMIN — FENTANYL CITRATE 50 MCG: 50 INJECTION, SOLUTION INTRAMUSCULAR; INTRAVENOUS at 08:04

## 2024-04-22 RX ADMIN — PROPOFOL 20 MG: 10 INJECTION, EMULSION INTRAVENOUS at 08:04

## 2024-04-22 RX ADMIN — ONDANSETRON 4 MG: 2 INJECTION INTRAMUSCULAR; INTRAVENOUS at 08:04

## 2024-04-22 RX ADMIN — LIDOCAINE HYDROCHLORIDE 80 MG: 20 INJECTION, SOLUTION EPIDURAL; INFILTRATION; INTRACAUDAL; PERINEURAL at 08:04

## 2024-04-22 RX ADMIN — DEXMEDETOMIDINE HYDROCHLORIDE 4 MCG: 100 INJECTION, SOLUTION INTRAVENOUS at 08:04

## 2024-04-22 RX ADMIN — PROPOFOL 50 MG: 10 INJECTION, EMULSION INTRAVENOUS at 08:04

## 2024-04-22 RX ADMIN — CEFAZOLIN 2 G: 2 INJECTION, POWDER, FOR SOLUTION INTRAMUSCULAR; INTRAVENOUS at 08:04

## 2024-04-22 NOTE — ANESTHESIA POSTPROCEDURE EVALUATION
Anesthesia Post Evaluation    Patient: Sachin Gonzalez    Procedure(s) Performed: Procedure(s) (LRB):  CYSTOSCOPY, WITH DIRECT VISION INTERNAL URETHROTOMY (N/A)    Final Anesthesia Type: general      Patient location during evaluation: PACU  Patient participation: Yes- Able to Participate  Level of consciousness: awake  Post-procedure vital signs: reviewed and stable  Pain management: adequate  Airway patency: patent    PONV status at discharge: No PONV  Anesthetic complications: no      Cardiovascular status: stable  Respiratory status: unassisted  Hydration status: euvolemic  Follow-up not needed.              Vitals Value Taken Time   /67 04/22/24 0858   Temp 36.8 °C (98.2 °F) 04/22/24 0858   Pulse 78 04/22/24 0858   Resp 16 04/22/24 0858   SpO2 100 % 04/22/24 0858         No case tracking events are documented in the log.      Pain/Deonte Score: No data recorded

## 2024-04-22 NOTE — TELEPHONE ENCOUNTER
----- Message from Yaniv Murray MD sent at 4/22/2024  8:58 AM CDT -----  2 days for fisher removal, nurse visit  2-3 weeks with me to discuss cic, uroflow and pvr

## 2024-04-22 NOTE — CARE UPDATE
Received pt. from PACU nurse DESHAWN De La O. Alert and oriented x4. Resp. even and unlabored. Salazar catheter noted in place with urine flowing properly into collection bag. Safety and fall precautions in place and ongoing. Care continues. Family member at bedside.

## 2024-04-22 NOTE — OP NOTE
Date of Procedure: 04/22/2024    PREOP DIAGNOSIS:  Urethral stricture disease.    POSTOP DIAGNOSIS:  Urethral stricture disease.    PROCEDURES:   Direct visual incision of urethral stricture disease    SURGEON:  Yaniv Murray M.D.    Assistants: None    Specimen:  None    ANESTHESIA:  General endotracheal    FINDINGS:  Clear channel at the conclusion      PROCEDURE IN DETAIL:  Patient was brought to the operative suite and placed under general anesthesia and positioned into the dorsal lithotomy position.  After being sterilely prepped and draped a 20 U[place] urethrotome was inserted into a normal urethra.  Stricture was immediately identified, using a blade we were able to incise at approximately the 3 and 9 o'clock positions.  At the conclusion the scope passed easily without restrictions.  The bladder was entered, bladder neck was unremarkable.  Cystoscopy demonstrated no evidence of intravesical lesions or other abnormalities.  Bilateral ureteral orifices were normal in size, shape, caliber and location.  Of note a wire was inserted through the urethrotome and into the bladder.  Using Seldinger technique we placed an 18 Uruguayan Babbitt tip catheter, with 10 mL of sterile water within the balloon and it sat nicely upon the bladder neck.  Patient will have his catheter removed in 2 days.  Patient will then return to see me in 2-3 weeks with a uroflow and postvoid residual and to discuss CIC.    COMPLICATIONS:  None

## 2024-04-22 NOTE — CARE UPDATE
Pt. Ready for discharge. DC instructions given to pt. And family member. Verbalized understanding. IV dc'd with catheter tip intact. Safety measures remained intact throughout stay. Dc'd to personal vehicle per staff via w/c. Condition stable upon discharge.

## 2024-04-22 NOTE — PLAN OF CARE
Reviewed and completed all PACU orders. I encouraged questions, answered them thoroughly, and evaluated my instructions via teach-back method. I have disconnected patient from monitoring equipment and prepared them for the next phase of care. Patient has met all PACU discharge criteria at this point. Patient and family agree with the plan of care. Report given to DESHAWN Dey.

## 2024-04-22 NOTE — TRANSFER OF CARE
"Anesthesia Transfer of Care Note    Patient: Sachin Gonzalez    Procedure(s) Performed: Procedure(s) (LRB):  CYSTOSCOPY, WITH DIRECT VISION INTERNAL URETHROTOMY (N/A)    Patient location: PACU    Anesthesia Type: MAC    Transport from OR: Transported from OR on room air with adequate spontaneous ventilation    Post pain: adequate analgesia    Post assessment: no apparent anesthetic complications and tolerated procedure well    Post vital signs: stable    Level of consciousness: sedated    Nausea/Vomiting: no nausea/vomiting    Complications: none    Transfer of care protocol was followed      Last vitals: Visit Vitals  /86 (BP Location: Right arm, Patient Position: Sitting)   Pulse 69   Temp 36.2 °C (97.2 °F) (Temporal)   Resp 18   Ht 6' 7" (2.007 m)   Wt 107.3 kg (236 lb 8.9 oz)   SpO2 95%   BMI 26.65 kg/m²     "

## 2024-04-22 NOTE — DISCHARGE SUMMARY
[facility]  Discharge Note  Short Stay    Procedure(s) (LRB):  CYSTOSCOPY, WITH DIRECT VISION INTERNAL URETHROTOMY (N/A)      OUTCOME: Patient tolerated treatment/procedure well without complication and is now ready for discharge.    DISPOSITION: Home or Self Care    FINAL DIAGNOSIS:  urethral stricture disease    FOLLOWUP: In clinic    DISCHARGE INSTRUCTIONS:    Discharge Procedure Orders   Diet Adult Regular     Notify your health care provider if you experience any of the following:  severe uncontrolled pain     Notify your health care provider if you experience any of the following:  persistent nausea and vomiting or diarrhea     Notify your health care provider if you experience any of the following:  temperature >100.4     Activity as tolerated        TIME SPENT ON DISCHARGE: 5 minutes

## 2024-04-23 VITALS
WEIGHT: 236.56 LBS | HEART RATE: 75 BPM | HEIGHT: 78 IN | OXYGEN SATURATION: 98 % | SYSTOLIC BLOOD PRESSURE: 122 MMHG | BODY MASS INDEX: 27.37 KG/M2 | DIASTOLIC BLOOD PRESSURE: 78 MMHG | TEMPERATURE: 98 F | RESPIRATION RATE: 16 BRPM

## 2024-04-24 ENCOUNTER — CLINICAL SUPPORT (OUTPATIENT)
Dept: UROLOGY | Facility: CLINIC | Age: 59
End: 2024-04-24
Payer: COMMERCIAL

## 2024-04-24 ENCOUNTER — TELEPHONE (OUTPATIENT)
Dept: UROLOGY | Facility: CLINIC | Age: 59
End: 2024-04-24

## 2024-04-24 DIAGNOSIS — N99.114 POSTPROCEDURAL MALE URETHRAL STRICTURE: Primary | ICD-10-CM

## 2024-04-24 NOTE — TELEPHONE ENCOUNTER
Spoke with patient, advised him that it is okay to come in for nurse visit. Patient verbalized understanding. Patient states he is coming from [place] so it will be a while before he makes it.   ----- Message from Bob Patrick sent at 4/24/2024  9:19 AM CDT -----  Contact: [phone]  Pt is calling regarding nurse visit today , needing to know if he can come today (wasn't feeling good earlier) . Please call back at [phone] . Thanksdj

## 2024-04-24 NOTE — PROGRESS NOTES
Patient arrived for fisher removal. Patient laid supine on the bed and 10 cc balloon was deflated.   bag with clear yellow/orange urine. Patient states he's taking pyridium. Instructed to take a deep breath and fisher was removed intact. Patient instructed to return around 1430 for a bladder scan and to drink lots of water. Patient voiced understanding and stopped by the bathroom on his way out.    Shelia Briscoe LPN

## 2024-05-10 DIAGNOSIS — G25.0 ESSENTIAL TREMOR: ICD-10-CM

## 2024-05-10 DIAGNOSIS — I10 HYPERTENSION, UNSPECIFIED TYPE: ICD-10-CM

## 2024-05-10 RX ORDER — METOPROLOL SUCCINATE 50 MG/1
50 TABLET, EXTENDED RELEASE ORAL
Qty: 90 TABLET | Refills: 3 | Status: SHIPPED | OUTPATIENT
Start: 2024-05-10

## 2024-05-10 NOTE — TELEPHONE ENCOUNTER
No care due was identified.  [facility] Embedded Care Due Messages. Reference number: [id number].   5/10/2024 10:06:38 AM CDT

## 2024-05-10 NOTE — TELEPHONE ENCOUNTER
Refill Decision Note   Sachin Wayneman  is requesting a refill authorization.  Brief Assessment and Rationale for Refill:  Approve     Medication Therapy Plan:         Comments:     Note composed:12:35 PM 05/10/2024

## 2024-05-14 ENCOUNTER — OFFICE VISIT (OUTPATIENT)
Dept: PODIATRY | Facility: CLINIC | Age: 59
End: 2024-05-14
Payer: COMMERCIAL

## 2024-05-14 VITALS — WEIGHT: 236.56 LBS | HEIGHT: 78 IN | BODY MASS INDEX: 27.37 KG/M2

## 2024-05-14 DIAGNOSIS — G60.9 IDIOPATHIC PERIPHERAL NEUROPATHY: ICD-10-CM

## 2024-05-14 DIAGNOSIS — L85.3 DRY SKIN DERMATITIS: Primary | ICD-10-CM

## 2024-05-14 DIAGNOSIS — B35.1 ONYCHOMYCOSIS OF TOENAIL: ICD-10-CM

## 2024-05-14 PROCEDURE — 1159F MED LIST DOCD IN RCRD: CPT | Mod: CPTII,S$GLB,, | Performed by: PODIATRIST

## 2024-05-14 PROCEDURE — 3008F BODY MASS INDEX DOCD: CPT | Mod: CPTII,S$GLB,, | Performed by: PODIATRIST

## 2024-05-14 PROCEDURE — 99213 OFFICE O/P EST LOW 20 MIN: CPT | Mod: 25,S$GLB,, | Performed by: PODIATRIST

## 2024-05-14 PROCEDURE — 99999 PR PBB SHADOW E&M-EST. PATIENT-LVL III: CPT | Mod: PBBFAC,,, | Performed by: PODIATRIST

## 2024-05-14 PROCEDURE — 1160F RVW MEDS BY RX/DR IN RCRD: CPT | Mod: CPTII,S$GLB,, | Performed by: PODIATRIST

## 2024-05-14 PROCEDURE — 11721 DEBRIDE NAIL 6 OR MORE: CPT | Mod: S$GLB,,, | Performed by: PODIATRIST

## 2024-05-14 RX ORDER — TRIAMCINOLONE ACETONIDE 1 MG/G
CREAM TOPICAL 2 TIMES DAILY
Qty: 90 G | Refills: 0 | Status: SHIPPED | OUTPATIENT
Start: 2024-05-14

## 2024-05-15 DIAGNOSIS — M05.79 RHEUMATOID ARTHRITIS INVOLVING MULTIPLE SITES WITH POSITIVE RHEUMATOID FACTOR: ICD-10-CM

## 2024-05-15 RX ORDER — METHOTREXATE 2.5 MG/1
15 TABLET ORAL
Qty: 90 TABLET | Refills: 1 | Status: SHIPPED | OUTPATIENT
Start: 2024-05-15 | End: 2024-05-16 | Stop reason: SDUPTHER

## 2024-05-16 ENCOUNTER — OFFICE VISIT (OUTPATIENT)
Dept: UROLOGY | Facility: CLINIC | Age: 59
End: 2024-05-16
Payer: COMMERCIAL

## 2024-05-16 ENCOUNTER — TELEPHONE (OUTPATIENT)
Dept: UROLOGY | Facility: CLINIC | Age: 59
End: 2024-05-16
Payer: COMMERCIAL

## 2024-05-16 VITALS
BODY MASS INDEX: 27.37 KG/M2 | HEART RATE: 94 BPM | SYSTOLIC BLOOD PRESSURE: 131 MMHG | WEIGHT: 236.56 LBS | RESPIRATION RATE: 16 BRPM | HEIGHT: 78 IN | DIASTOLIC BLOOD PRESSURE: 89 MMHG

## 2024-05-16 DIAGNOSIS — M05.79 RHEUMATOID ARTHRITIS INVOLVING MULTIPLE SITES WITH POSITIVE RHEUMATOID FACTOR: ICD-10-CM

## 2024-05-16 DIAGNOSIS — N99.114 POSTPROCEDURAL MALE URETHRAL STRICTURE: Primary | ICD-10-CM

## 2024-05-16 DIAGNOSIS — Z85.46 HISTORY OF PROSTATE CANCER: ICD-10-CM

## 2024-05-16 DIAGNOSIS — R31.0 GROSS HEMATURIA: ICD-10-CM

## 2024-05-16 DIAGNOSIS — N52.9 ERECTILE DYSFUNCTION, UNSPECIFIED ERECTILE DYSFUNCTION TYPE: ICD-10-CM

## 2024-05-16 PROCEDURE — 3079F DIAST BP 80-89 MM HG: CPT | Mod: CPTII,S$GLB,, | Performed by: UROLOGY

## 2024-05-16 PROCEDURE — 99024 POSTOP FOLLOW-UP VISIT: CPT | Mod: S$GLB,,, | Performed by: UROLOGY

## 2024-05-16 PROCEDURE — 99999 PR PBB SHADOW E&M-EST. PATIENT-LVL V: CPT | Mod: PBBFAC,,, | Performed by: UROLOGY

## 2024-05-16 PROCEDURE — 1159F MED LIST DOCD IN RCRD: CPT | Mod: CPTII,S$GLB,, | Performed by: UROLOGY

## 2024-05-16 PROCEDURE — 3075F SYST BP GE 130 - 139MM HG: CPT | Mod: CPTII,S$GLB,, | Performed by: UROLOGY

## 2024-05-16 RX ORDER — METHOTREXATE 2.5 MG/1
15 TABLET ORAL
Qty: 90 TABLET | Refills: 1 | Status: SHIPPED | OUTPATIENT
Start: 2024-05-16

## 2024-05-16 NOTE — PROGRESS NOTES
Chief Complaint:   Encounter Diagnoses   Name Primary?    Postprocedural male urethral stricture Yes    History of prostate cancer     Gross hematuria     Erectile dysfunction, unspecified erectile dysfunction type        HPI:   5/16/24- doing well after DVIU,does not want to progress with CIC as of yet.  No hematuria.      Allergies:  No known allergies    Medications:  has a current medication list which includes the following prescription(s): pantoprazole, sucralfate, zinc sulfate, ascorbic acid (vitamin c), cholecalciferol (vitamin d3), famotidine, folic acid, hydrochlorothiazide, ibuprofen, magnesium, methotrexate, and metoprolol succinate.    Review of Systems:  General: No fever, chills, fatigability, or weight loss.  Skin: No rashes, itching, or changes in color or texture of skin.  Chest: Denies TYLER, cyanosis, wheezing, cough, and sputum production.  Abdomen: Appetite fine. No weight loss. Denies diarrhea, abdominal pain, hematemesis, or blood in stool.  Musculoskeletal: No joint stiffness or swelling. Denies back pain.  : As above.  All other review of systems negative.    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    PSH:   has a past surgical history that includes Prostatectomy (2011) and Esophagogastroduodenoscopy.    FamHx: family history includes Alcohol abuse in his father; Anxiety disorder in his mother; Arthritis in his mother; Hypertension in his mother; Kidney disease in his brother; No Known Problems in his sister; Stroke in his father.    SocHx:  reports that he has never smoked. He has never used smokeless tobacco. He reports that he does not drink alcohol and does not use drugs.      Physical Exam:  There were no vitals filed for this visit.  General: A&Ox3, no apparent distress, no  deformities  Neck: No masses, normal thyroid  Lungs: normal inspiration, no use of accessory muscles  Heart: normal pulse, no arrhythmias  Abdomen: Soft, NT, ND  Skin: The skin is warm and dry. No jaundice.  Ext: No c/c/e.  : 10/23- Test desc michael, no abnormalities of epididymus. Normal penile and scrotal skin. Meatus normal.    Labs/Studies:   pvr 26 ml 12/21  Cystoscopy bulbourethral stricture 10/23  Cystoscopy normal 5/21  PSA <0.01 5/23  CT urogram bladder outflow obstruction 3/21    Impression/Plan:       1. Prostate cancer-  RP pT3b Gl7 with adjuvant XRT  6/7/12    PSA today and will continue yearly.    2. Urethral stricture disease-  DVIU  4/22/24,  urethral dilation in [facility]  2/15/24,  DVIU  5/31/22    Doing well and does not want to progress with CIC as of yet, but will come in next appointment to learn.  Call with any issues before the next visit.  Of note stricture dilation performed in [place], at that point they determined him not a candidate for urethroplasty due to location.  Recommendation for intermittent catheters, which he has resisted up to this point.    3. Gross hematuria- structural evaluation relatively clear, continue to monitor expectantly.  This could have all been secondary to radiation effect.    4. Erectile dysfunction- sildenafil with the use of a MARÍA is relatively sufficient.  On return patient would like to discuss the IPP further.

## 2024-05-16 NOTE — TELEPHONE ENCOUNTER
Returned call to pt; states someone called him and asked him to come in earlier but a time wasn't given to him.  Pt does not want to come and have to wait.  I told him I would send a message to the [place] staff and have someone call him back.

## 2024-05-17 RX ORDER — SILDENAFIL 100 MG/1
100 TABLET, FILM COATED ORAL DAILY PRN
Qty: 10 TABLET | Refills: 11 | Status: SHIPPED | OUTPATIENT
Start: 2024-05-17

## 2024-05-22 ENCOUNTER — TELEPHONE (OUTPATIENT)
Dept: INTERNAL MEDICINE | Facility: CLINIC | Age: 59
End: 2024-05-22
Payer: COMMERCIAL

## 2024-05-22 NOTE — TELEPHONE ENCOUNTER
----- Message from Estephanie Garcia sent at 5/22/2024  7:11 AM CDT -----  Contact: self  Pt is asking for an return call in reference to needing to discuss labs he has scheduled for Monday 0/05/27, please call back at .[phone] Thx CJ

## 2024-05-23 ENCOUNTER — PATIENT MESSAGE (OUTPATIENT)
Dept: GASTROENTEROLOGY | Facility: CLINIC | Age: 59
End: 2024-05-23
Payer: COMMERCIAL

## 2024-05-24 ENCOUNTER — TELEPHONE (OUTPATIENT)
Dept: INTERNAL MEDICINE | Facility: CLINIC | Age: 59
End: 2024-05-24
Payer: COMMERCIAL

## 2024-05-24 NOTE — TELEPHONE ENCOUNTER
Returned call to pt regarding tetanus vaccine. No answer, left vm to return call to clinic.     Does pt need an appt? Last tentaus vaccine was 06/13/2016

## 2024-05-24 NOTE — TELEPHONE ENCOUNTER
----- Message from Hanna Ingram sent at 5/24/2024  8:29 AM CDT -----  Contact: desmond  Type:  Patient Returning Call    Who Called:desmond  Who Left Message for Patient:Polo Gonzalez, NACHO  Does the patient know what this is regarding?:unknown  Would the patient rather a call back or a response via [facility]? Call back   Best Call Back Number:[phone]  Additional Information:

## 2024-05-24 NOTE — TELEPHONE ENCOUNTER
His tdap is UTD. Clean wound with soap and water, and further pain or signs on infection, would need to be seen over weekend for antibiotics.

## 2024-05-24 NOTE — TELEPHONE ENCOUNTER
----- Message from Shivani Vazquez sent at 5/24/2024  7:53 AM CDT -----  Contact: Sachin Bond is calling in regard to stepping on nails this morning and would like to get a call back to discuss getting a tetanus shot scheduled for today.  Please call back at .[phone]       Thanks

## 2024-05-24 NOTE — PROGRESS NOTES
Subjective:     Patient ID: Sachin Gonzalez is a 58 y.o. male.    Chief Complaint: Nail Care (Pt c/o BL foot pain 4/10,Non-diabetic pt wears tennis shoes, PCP Dr. Greenberg last seen 4-2-24) and Foot Pain    Sachin is a 58 y.o. male who presents to the clinic for evaluation and treatment of high risk feet. 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 The patient's chief complaint is long, thick toenails. This patient has documented high risk feet requiring routine maintenance secondary to peripheral neuropathy.     PCP: Santy Greenberg MD    Date Last Seen by PCP: 04/02/2024    Current shoe gear:  Affected Foot: Casual shoes     Unaffected Foot: Casual shoes    Last encounter in this department: 3/30/2022    Hemoglobin A1C   Date Value Ref Range Status   01/13/2023 5.3 4.0 - 5.6 % Final     Comment:     ADA Screening Guidelines:  5.7-6.4%  Consistent with prediabetes  >or=6.5%  Consistent with diabetes    High levels of fetal hemoglobin interfere with the HbA1C  assay. Heterozygous hemoglobin variants (HbS, HgC, etc)do  not significantly interfere with this assay.   However, presence of multiple variants may affect accuracy.     04/12/2017 5.7 4.5 - 6.2 % Final     Comment:     According to ADA guidelines, hemoglobin A1C <7.0% represents  optimal control in non-pregnant diabetic patients.  Different  metrics may apply to specific populations.   Standards of Medical Care in Diabetes - 2016.  For the purpose of screening for the presence of diabetes:  <5.7%     Consistent with the absence of diabetes  5.7-6.4%  Consistent with increasing risk for diabetes   (prediabetes)  >or=6.5%  Consistent with diabetes  Currently no consensus exists for use of hemoglobin A1C  for diagnosis of diabetes for  children.         Patient Active Problem List   Diagnosis    History of prostate cancer    RA (rheumatoid arthritis)    Status post prostatectomy (06/07/12)    Erectile dysfunction    Peyronie's disease    Long-term use of immunosuppressant medication    Cephalic vein thrombosis-post op    Essential hypertension    Tension-type headache, not intractable    Lumbar foraminal stenosis    Parotid sialolithiasis    Gastroesophageal reflux disease without esophagitis    Mixed hyperlipidemia    Melena    Screen for colon cancer    Prostate cancer    Gross hematuria    Stricture of bulbous urethra in male    Drug-induced immunodeficiency    Postprocedural male urethral stricture       Medication List with Changes/Refills   New Medications    TRIAMCINOLONE ACETONIDE 0.1% (KENALOG) 0.1 % CREAM    Apply topically 2 (two) times daily.   Current Medications    ASCORBIC ACID, VITAMIN C, (VITAMIN C) 250 MG TABLET    Take 250 mg by mouth once daily.    CHOLECALCIFEROL, VITAMIN D3, 25 MCG (1,000 UNIT) CHEW    Take by mouth.    FOLIC ACID (FOLVITE) 1 MG TABLET    Take 1 tablet (1,000 mcg total) by mouth once daily.    IBUPROFEN (ADVIL,MOTRIN) 800 MG TABLET    Take 1 tablet (800 mg total) by mouth every 6 (six) hours as needed for Pain.    LINACLOTIDE (LINZESS) 72 MCG CAP CAPSULE    Take 1 capsule (72 mcg total) by mouth before breakfast.    METHYLPREDNISOLONE (MEDROL DOSEPACK) 4 MG TABLET    Take 1 tablet (4 mg total) by mouth once daily. Use as instructed on dose pack    METOPROLOL SUCCINATE (TOPROL-XL) 50 MG 24 HR TABLET    TAKE 1 TABLET DAILY    MULTIVITAMIN CAPSULE    Take 1 capsule by mouth once daily.    OXYCODONE-ACETAMINOPHEN (PERCOCET) 5-325 MG PER TABLET    Take 1 tablet by mouth every 4 (four) hours as needed for Pain.    PHENAZOPYRIDINE (PYRIDIUM) 200 MG TABLET    Take 1 tablet (200 mg total) by mouth 3 (three) times daily as needed for Pain.    PREGABALIN (LYRICA) 75 MG CAPSULE    Take 1 capsule (75 mg total) by mouth 2  (two) times daily.    SUCRALFATE (CARAFATE) 1 GRAM TABLET    Take 1 tablet (1 g total) by mouth 3 (three) times daily as needed (reflux indigestion).    ZINC SULFATE (ZINC-220 ORAL)    Take 1 tablet by mouth as needed.   Changed and/or Refilled Medications    Modified Medication Previous Medication    METHOTREXATE 2.5 MG TAB methotrexate 2.5 MG Tab       Take 6 tablets (15 mg total) by mouth every 7 days. This medication requires periodic lab monitoring    Take 6 tablets (15 mg total) by mouth every 7 days. This medication requires periodic lab monitoring    SILDENAFIL (VIAGRA) 100 MG TABLET sildenafiL (VIAGRA) 100 MG tablet       Take 1 tablet (100 mg total) by mouth daily as needed for Erectile Dysfunction.    Take 1 tablet (100 mg total) by mouth daily as needed for Erectile Dysfunction.   Discontinued Medications    METHOTREXATE 2.5 MG TAB    Take 6 tablets (15 mg total) by mouth every 7 days. This medication requires periodic lab monitoring       Review of patient's allergies indicates:   Allergen Reactions    Pollen extracts        Past Surgical History:   Procedure Laterality Date    COLONOSCOPY N/A 4/21/2021    Procedure: COLONOSCOPY covid test scheduled on 4/19/21;  Surgeon: Darrell Worthington MD;  Location: [facility];  Service: Endoscopy;  Laterality: N/A;    CYSTOURETHROSCOPY WITH DIRECT VISION INTERNAL URETHROTOMY N/A 5/31/2022    Procedure: CYSTOSCOPY, WITH DIRECT VISION INTERNAL URETHROTOMY;  Surgeon: Yaniv Murray MD;  Location: [facility];  Service: Urology;  Laterality: N/A;    CYSTOURETHROSCOPY WITH DIRECT VISION INTERNAL URETHROTOMY N/A 4/22/2024    Procedure: CYSTOSCOPY, WITH DIRECT VISION INTERNAL URETHROTOMY;  Surgeon: Yaniv Murray MD;  Location: [facility] OR;  Service: Urology;  Laterality: N/A;    ESOPHAGOGASTRODUODENOSCOPY      ESOPHAGOGASTRODUODENOSCOPY N/A 4/5/2021    Procedure: EGD (ESOPHAGOGASTRODUODENOSCOPY) Rapid Covid test needed;  Surgeon: Darrell Worthington MD;  Location: [facility]  "ENDO;  Service: Endoscopy;  Laterality: N/A;    OSTEOTOMY OF METATARSAL BONE Right 11/21/2022    Procedure: OSTEOTOMY, METATARSAL BONE;  Surgeon: Daniel Ramirez DPM;  Location: [facility] OR;  Service: Podiatry;  Laterality: Right;    PROSTATECTOMY  2011    RELEASE OF CONTRACTURE OF JOINT Right 11/21/2022    Procedure: RELEASE, CONTRACTURE, JOINT;  Surgeon: Daniel Ramirez DPM;  Location: [facility] OR;  Service: Podiatry;  Laterality: Right;    RESECTION OF GASTROCNEMIUS MUSCLE Right 11/21/2022    Procedure: RESECTION, MUSCLE, GASTROCNEMIUS;  Surgeon: Daniel Ramirez DPM;  Location: [facility] OR;  Service: Podiatry;  Laterality: Right;       Family History   Problem Relation Name Age of Onset    Stroke Father      Alcohol abuse Father      Arthritis Mother      Hypertension Mother      Anxiety disorder Mother      No Known Problems Sister      Kidney disease Brother         Social History     Socioeconomic History    Marital status: Single   Tobacco Use    Smoking status: Never    Smokeless tobacco: Never   Substance and Sexual Activity    Alcohol use: Never    Drug use: No    Sexual activity: Yes     Partners: Female   Social History Narrative    No pets or smokers in household.     Social Determinants of Health     Stress: No Stress Concern Present (12/12/2019)    [employer] - Occupational Stress Questionnaire     Feeling of Stress : Not at all       Vitals:    05/14/24 1335   Weight: 107.3 kg (236 lb 8.9 oz)   Height: 6' 7" (2.007 m)   PainSc:   4         Hemoglobin A1C   Date Value Ref Range Status   01/13/2023 5.3 4.0 - 5.6 % Final     Comment:     ADA Screening Guidelines:  5.7-6.4%  Consistent with prediabetes  >or=6.5%  Consistent with diabetes    High levels of fetal hemoglobin interfere with the HbA1C  assay. Heterozygous hemoglobin variants (HbS, HgC, etc)do  not significantly interfere with this assay.   However, presence of multiple variants may affect accuracy.     04/12/2017 5.7 4.5 " - 6.2 % Final     Comment:     According to ADA guidelines, hemoglobin A1C <7.0% represents  optimal control in non-pregnant diabetic patients.  Different  metrics may apply to specific populations.   Standards of Medical Care in Diabetes - 2016.  For the purpose of screening for the presence of diabetes:  <5.7%     Consistent with the absence of diabetes  5.7-6.4%  Consistent with increasing risk for diabetes   (prediabetes)  >or=6.5%  Consistent with diabetes  Currently no consensus exists for use of hemoglobin A1C  for diagnosis of diabetes for children.         Review of Systems   Constitutional:  Negative for chills and fever.   Respiratory:  Negative for shortness of breath.    Cardiovascular:  Negative for chest pain, palpitations, orthopnea, claudication and leg swelling.   Gastrointestinal:  Negative for diarrhea, nausea and vomiting.   Musculoskeletal:  Negative for joint pain.   Skin:  Negative for rash.   Neurological:  Positive for tingling and sensory change.   Psychiatric/Behavioral: Negative.           Objective:      PHYSICAL EXAM: Apperance: Alert and orient in no distress,well developed, and with good attention to grooming and body habits  Patient ambulating in tennis shoes.   LOWER EXTREMITY EXAM:  VASCULAR: Dorsalis pedis pulses 2/4 bilateral and Posterior Tibial pulses 2/4 bilateral. Capillary fill time <4 seconds bilateral. No edema observed bilateral. Varicosities absent bilateral. Skin temperature of the lower extremities is warm to warm, proximal to distal. Hair growth WNL bilateral.  DERMATOLOGICAL: No skin rashes, subcutaneous nodules, lesions, or ulcers observed bilateral. Nails 1,3,4,5 bilateral elongated, thickened, and discolored with subungual debris. Webspaces 1,2,3,4 bilateral clean, dry and without evidence of break in skin integrity. Multiple flat skin eruptions noted to bilateral dorsal feet and lower legs.   NEUROLOGICAL: Light touch, sharp-dull, proprioception all present and  equal bilaterally.  Vibratory sensation diminished at bilateral hallux and intact at bilateral navicular. Protective sensation absent at 6/10 sites as tested with a [place] 5.07 monofilament.   MUSCULOSKELETAL: Muscle strength is 5/5 for foot inverters, everters, plantarflexors, and dorsiflexors. Muscle tone is normal.           Assessment:       ICD-10-CM ICD-9-CM   1. Dry skin dermatitis  L85.3 692.89   2. Idiopathic peripheral neuropathy  G60.9 356.9   3. Onychomycosis of toenail  B35.1 110.1         Plan:   Dry skin dermatitis  -     triamcinolone acetonide 0.1% (KENALOG) 0.1 % cream; Apply topically 2 (two) times daily.  Dispense: 90 g; Refill: 0    Idiopathic peripheral neuropathy    Onychomycosis of toenail    I counseled the patient on his conditions, regarding findings of my examination, my impressions, and usual treatment plan.   Appointment spent on education about the neuropathy, and prevention of limb loss.  Prescription written for Kenalog cream to be applied to areas twice daily for 2 weeks.   Shoe inspection. Patient instructed on proper foot hygeine. We discussed wearing proper shoe gear, daily foot inspections, never walking without protective shoe gear, never putting sharp instruments to feet.    With patient's permission, nails 1-5 bilateral were debrided/trimmed in length and thickness aggressively to their soft tissue attachment mechanically and with electric , removing all offending nail and debris. Patient relates relief following the procedure.  Patient  will continue to monitor the areas daily, inspect feet, wear protective shoe gear when ambulatory, moisturizer to maintain skin integrity.  Patient to return 2 months or sooner if needed.          Reina Guzman DPM  [facility]

## 2024-06-03 ENCOUNTER — DOCUMENTATION ONLY (OUTPATIENT)
Dept: INTERNAL MEDICINE | Facility: CLINIC | Age: 59
End: 2024-06-03
Payer: COMMERCIAL

## 2024-06-19 ENCOUNTER — TELEPHONE (OUTPATIENT)
Dept: UROLOGY | Facility: CLINIC | Age: 59
End: 2024-06-19
Payer: COMMERCIAL

## 2024-06-21 ENCOUNTER — PATIENT MESSAGE (OUTPATIENT)
Dept: INTERNAL MEDICINE | Facility: CLINIC | Age: 59
End: 2024-06-21
Payer: COMMERCIAL

## 2024-06-28 PROBLEM — K92.1 MELENA: Status: RESOLVED | Noted: 2021-04-05 | Resolved: 2024-06-28

## 2024-06-28 PROBLEM — Z12.11 SCREEN FOR COLON CANCER: Status: RESOLVED | Noted: 2021-04-21 | Resolved: 2024-06-28

## 2024-06-28 PROBLEM — R31.0 GROSS HEMATURIA: Status: RESOLVED | Noted: 2021-12-08 | Resolved: 2024-06-28

## 2024-07-01 ENCOUNTER — TELEPHONE (OUTPATIENT)
Dept: UROLOGY | Facility: CLINIC | Age: 59
End: 2024-07-01
Payer: COMMERCIAL

## 2024-07-01 NOTE — TELEPHONE ENCOUNTER
Called the patient, after verification of name and , the patient was informed  of new appt time and date and placed on the waiting list for a sooner appt. He voiced understanding     ----- Message from Alina Butler sent at 2024  9:07 AM CDT -----  Contact: Sachin  .Patient is calling to speak with the nurse regarding appt  . Reports needing to schedule a follow up appt . Please give patient a call back at   [phone].

## 2024-07-15 ENCOUNTER — TELEPHONE (OUTPATIENT)
Dept: GASTROENTEROLOGY | Facility: CLINIC | Age: 59
End: 2024-07-15
Payer: COMMERCIAL

## 2024-07-15 NOTE — TELEPHONE ENCOUNTER
----- Message from Glenys Ricketts sent at 7/15/2024 10:58 AM CDT -----  Regarding: appt  Name of who is calling:   Sachin      What is the request in detail: Pt is requesting a call back in ref to cancelling today's appt and rescheduling due to being out of town      Can the clinic reply by [facility]:yes      What number to call back if not [facility]: [phone]

## 2024-07-15 NOTE — TELEPHONE ENCOUNTER
----- Message from Glenys Ricketts sent at 7/15/2024 11:20 AM CDT -----  Regarding: sooner appt  Name of who is calling:   Sachin      What is the request in detail: Pt is requesting a call back in ref to a sooner appt due to severe stomach issues      Can the clinic reply by [facility]:yes      What number to call back if not [facility]: [phone]

## 2024-07-15 NOTE — TELEPHONE ENCOUNTER
Spoke with patient and offered first available with NP. Patient confirmed he resides in  and is ok with establishing care here in [place]. V/U    ----- Message from Glenys Ricketts sent at 7/15/2024 11:14 AM CDT -----  Regarding: appt  Name of who is calling: Sachin        What is the request in detail: Pt is requesting a call back in ref to scheduling an appt due to stomach issues      Can the clinic reply by [facility]:yes      What number to call back if not [facility]: [phone]

## 2024-07-18 ENCOUNTER — OFFICE VISIT (OUTPATIENT)
Dept: UROLOGY | Facility: CLINIC | Age: 59
End: 2024-07-18
Payer: COMMERCIAL

## 2024-07-18 ENCOUNTER — OFFICE VISIT (OUTPATIENT)
Dept: INTERNAL MEDICINE | Facility: CLINIC | Age: 59
End: 2024-07-18
Payer: COMMERCIAL

## 2024-07-18 ENCOUNTER — HOSPITAL ENCOUNTER (OUTPATIENT)
Dept: RADIOLOGY | Facility: HOSPITAL | Age: 59
Discharge: HOME | End: 2024-07-18
Attending: PEDIATRICS
Payer: COMMERCIAL

## 2024-07-18 VITALS
RESPIRATION RATE: 17 BRPM | HEIGHT: 78 IN | SYSTOLIC BLOOD PRESSURE: 124 MMHG | OXYGEN SATURATION: 99 % | DIASTOLIC BLOOD PRESSURE: 86 MMHG | TEMPERATURE: 97 F | WEIGHT: 228.63 LBS | BODY MASS INDEX: 26.45 KG/M2 | HEART RATE: 89 BPM

## 2024-07-18 VITALS
HEART RATE: 92 BPM | HEIGHT: 78 IN | DIASTOLIC BLOOD PRESSURE: 88 MMHG | WEIGHT: 228.81 LBS | SYSTOLIC BLOOD PRESSURE: 126 MMHG | BODY MASS INDEX: 26.47 KG/M2

## 2024-07-18 DIAGNOSIS — R31.0 GROSS HEMATURIA: ICD-10-CM

## 2024-07-18 DIAGNOSIS — K21.9 GASTROESOPHAGEAL REFLUX DISEASE WITHOUT ESOPHAGITIS: ICD-10-CM

## 2024-07-18 DIAGNOSIS — Z85.46 HISTORY OF PROSTATE CANCER: ICD-10-CM

## 2024-07-18 DIAGNOSIS — I10 ESSENTIAL HYPERTENSION: Primary | Chronic | ICD-10-CM

## 2024-07-18 DIAGNOSIS — N52.9 ERECTILE DYSFUNCTION, UNSPECIFIED ERECTILE DYSFUNCTION TYPE: ICD-10-CM

## 2024-07-18 DIAGNOSIS — N99.114 POSTPROCEDURAL MALE URETHRAL STRICTURE: Primary | ICD-10-CM

## 2024-07-18 PROCEDURE — 99999 PR PBB SHADOW E&M-EST. PATIENT-LVL IV: CPT | Mod: PBBFAC,,, | Performed by: UROLOGY

## 2024-07-18 PROCEDURE — 3008F BODY MASS INDEX DOCD: CPT | Mod: CPTII,S$GLB,, | Performed by: PEDIATRICS

## 2024-07-18 PROCEDURE — 3074F SYST BP LT 130 MM HG: CPT | Mod: CPTII,S$GLB,, | Performed by: PEDIATRICS

## 2024-07-18 PROCEDURE — 3079F DIAST BP 80-89 MM HG: CPT | Mod: CPTII,S$GLB,, | Performed by: PEDIATRICS

## 2024-07-18 PROCEDURE — 1160F RVW MEDS BY RX/DR IN RCRD: CPT | Mod: CPTII,S$GLB,, | Performed by: PEDIATRICS

## 2024-07-18 PROCEDURE — 71046 X-RAY EXAM CHEST 2 VIEWS: CPT | Mod: 26,,, | Performed by: RADIOLOGY

## 2024-07-18 PROCEDURE — 3079F DIAST BP 80-89 MM HG: CPT | Mod: CPTII,S$GLB,, | Performed by: UROLOGY

## 2024-07-18 PROCEDURE — 3074F SYST BP LT 130 MM HG: CPT | Mod: CPTII,S$GLB,, | Performed by: UROLOGY

## 2024-07-18 PROCEDURE — 71046 X-RAY EXAM CHEST 2 VIEWS: CPT | Mod: TC

## 2024-07-18 PROCEDURE — 3008F BODY MASS INDEX DOCD: CPT | Mod: CPTII,S$GLB,, | Performed by: UROLOGY

## 2024-07-18 PROCEDURE — 99999 PR PBB SHADOW E&M-EST. PATIENT-LVL V: CPT | Mod: PBBFAC,,, | Performed by: PEDIATRICS

## 2024-07-18 PROCEDURE — 99214 OFFICE O/P EST MOD 30 MIN: CPT | Mod: S$GLB,,, | Performed by: UROLOGY

## 2024-07-18 PROCEDURE — 99214 OFFICE O/P EST MOD 30 MIN: CPT | Mod: S$GLB,,, | Performed by: PEDIATRICS

## 2024-07-18 PROCEDURE — 1159F MED LIST DOCD IN RCRD: CPT | Mod: CPTII,S$GLB,, | Performed by: PEDIATRICS

## 2024-07-18 RX ORDER — PANTOPRAZOLE SODIUM 40 MG/1
40 TABLET, DELAYED RELEASE ORAL 2 TIMES DAILY
Qty: 60 TABLET | Refills: 0 | Status: SHIPPED | OUTPATIENT
Start: 2024-07-18 | End: 2024-08-17

## 2024-07-18 RX ORDER — TADALAFIL 20 MG/1
20 TABLET ORAL
Qty: 20 TABLET | Refills: 11 | Status: SHIPPED | OUTPATIENT
Start: 2024-07-18

## 2024-07-18 NOTE — PROGRESS NOTES
Patient ID: Sachin Gonzalez is a 59 y.o. male.    Chief Complaint: Cough, Nasal Congestion, and GI Problem    History of Present Illness    CHIEF COMPLAINT:  Patient presents today for acid reflux symptoms.    ACID REFLUX:  He reports coughing and a feeling of fullness in his throat. He attempted to alleviate symptoms with OTC omeprazole for a few days, noting slight improvement. He denies difficulty swallowing but mentions occasional sensation of saliva getting caught, though this occurs infrequently. He also reports coughing up mucus every morning.    EGD 3 YEARS AGO:  He had an upper endoscopy (EGD) performed 3 years ago due to ongoing acid reflux symptoms. The results showed an irritated stomach, with no evidence of cancer or pre-cancer.    FAMILY HISTORY:  He reports having a half-brother on his father's side who experienced stomach problems. The half-brother never sought medical attention for these issues. His half-brother's drinking habits may have contributed to his death. No other relevant family history is reported.    SOCIAL HISTORY:  He denies smoking and alcohol consumption.    PMH, PSH, SH, FH reviewed with patient.    ROS:  General: -fever, -chills, -fatigue, -weight gain, -weight loss  Eyes: -vision changes, -redness, -discharge  ENT: -ear pain, -nasal congestion, -sore throat, -difficulty swallowing  Cardiovascular: -chest pain, -palpitations, -lower extremity edema  Respiratory: +cough, -shortness of breath  Gastrointestinal: -abdominal pain, -nausea, -vomiting, -diarrhea, -constipation, -blood in stool, +heartburn  Genitourinary: -dysuria, -hematuria, -frequency  Musculoskeletal: -joint pain, -muscle pain  Skin: -rash, -lesion  Neurological: -headache, -dizziness, -numbness, -tingling  Psychiatric: -anxiety, -depression, -sleep difficulty         Exam:  Physical Exam    General: No acute distress. Well-developed. Well-nourished.  Eyes: EOMI. Sclerae anicteric.  HENT: Normocephalic. Atraumatic.  Nares patent. Moist oral mucosa.  Cardiovascular: Regular rate. Regular rhythm. No murmurs. No rubs. No gallops. Normal S1, S2.  Respiratory: Normal respiratory effort. Clear to auscultation bilaterally. No rales. No rhonchi. No wheezing.  Abdomen: Soft. Non-tender. Non-distended. Normoactive bowel sounds.  Musculoskeletal: No  obvious deformity.  Extremities: No lower extremity edema.  Neurological: Alert & oriented x3. No slurred speech. Normal gait.  Psychiatric: Normal mood. Normal affect. Good insight. Good judgment.  Skin: Warm. Dry. No rash.         Assessment/Plan:  Essential hypertension  -     Lipid Panel; Future; Expected date: 07/18/2024  -     Comprehensive Metabolic Panel; Future; Expected date: 07/18/2024    Gastroesophageal reflux disease without esophagitis  -     X-Ray Chest PA And Lateral; Future; Expected date: 07/18/2024  -     Comprehensive Metabolic Panel; Future; Expected date: 07/18/2024  -     CBC Auto Differential; Future; Expected date: 07/18/2024  -     LIPASE; Future; Expected date: 07/18/2024  -     pantoprazole (PROTONIX) 40 MG tablet; Take 1 tablet (40 mg total) by mouth 2 (two) times daily.  Dispense: 60 tablet; Refill: 0         Assessment & Plan    D53.9 Nutritional anemia, unspecified  Z83.79 Family history of other diseases of the digestive system  K21.9 Gastro-esophageal reflux disease without esophagitis  K25.9 Gastric ulcer, unspecified as acute or chronic, without hemorrhage or perforation  ACID REFLUX:  - Suspect patient's cough and reflux symptoms are due to acid reflux, given history of EGD 3 years ago showing irritated stomach from acid reflux.  - Will treat presumed acid reflux with sucralfate to coat and protect stomach/esophageal lining and pantoprazole to reduce acid production.  - Started pantoprazole 40 mg twice daily for 4 weeks for treatment of presumed stomach ulcer from acid reflux.  - Continued sucralfate (Carafate): Take 3-4 times daily before meals and at  bedtime for 1 month.  - Dissolve each pill in a small amount of water and drink to coat the stomach and esophagus.  LABS:  - Will check CBC and CMP to evaluate for worsening anemia or any liver or pancreatic abnormalities.  - CBC and CMP ordered for tomorrow.  COUGH EVALUATION:  - Chest XR ordered to evaluate lungs and rule out other potential causes of cough.  - Chest XR ordered for today.  GI REFERRAL:  - Patient has upcoming GI appointment in 2 weeks for further evaluation and management.  FOLLOW UP:  - Follow up with GI doctor in 2 weeks as scheduled.  - Follow up in 2 months after GI appointment.          Visit today included increased complexity associated with the care of the episodic problem  addressed and managing the longitudinal care of the patient due to the serious and/or complex managed problem(s) .      Follow up in about 2 months (around 9/18/2024).    This note was generated with the assistance of ambient listening technology. Verbal consent was obtained by the patient and accompanying visitor(s) for the recording of patient appointment to facilitate this note. I attest to having reviewed and edited the generated note for accuracy, though some syntax or spelling errors may persist. Please contact the author of this note for any clarification.

## 2024-07-18 NOTE — PROGRESS NOTES
Chief Complaint:   Encounter Diagnoses   Name Primary?    Postprocedural male urethral stricture Yes    History of prostate cancer     Gross hematuria     Erectile dysfunction, unspecified erectile dysfunction type        HPI:   7/18/24- patient is having a slightly slower flow, does not want pursue CIC as of yet.  He has due for a PSA, no evidence of hematuria.  He has some questions about his erectile dysfunction today.      Allergies:  No known allergies    Medications:  has a current medication list which includes the following prescription(s): pantoprazole, sucralfate, zinc sulfate, ascorbic acid (vitamin c), cholecalciferol (vitamin d3), famotidine, folic acid, hydrochlorothiazide, ibuprofen, magnesium, methotrexate, and metoprolol succinate.    Review of Systems:  General: No fever, chills, fatigability, or weight loss.  Skin: No rashes, itching, or changes in color or texture of skin.  Chest: Denies TYLER, cyanosis, wheezing, cough, and sputum production.  Abdomen: Appetite fine. No weight loss. Denies diarrhea, abdominal pain, hematemesis, or blood in stool.  Musculoskeletal: No joint stiffness or swelling. Denies back pain.  : As above.  All other review of systems negative.    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    PSH:   has a past surgical history that includes Prostatectomy (2011) and Esophagogastroduodenoscopy.    FamHx: family history includes Alcohol abuse in his father; Anxiety disorder in his mother; Arthritis in his mother; Hypertension in his mother; Kidney disease in his brother; No Known Problems in his sister; Stroke in his father.    SocHx:  reports that he has never smoked. He has never used smokeless tobacco. He reports that he does not drink alcohol and does not use drugs.       Physical Exam:  There were no vitals filed for this visit.  General: A&Ox3, no apparent distress, no deformities  Neck: No masses, normal thyroid  Lungs: normal inspiration, no use of accessory muscles  Heart: normal pulse, no arrhythmias  Abdomen: Soft, NT, ND  Skin: The skin is warm and dry. No jaundice.  Ext: No c/c/e.  : 10/23- Test desc michael, no abnormalities of epididymus. Normal penile and scrotal skin. Meatus normal.    Labs/Studies:   pvr 26 ml 12/21  Cystoscopy bulbourethral stricture 10/23  Cystoscopy normal 5/21  PSA <0.01 5/23  CT urogram bladder outflow obstruction 3/21    Impression/Plan:       1. Prostate cancer-  RP pT3b Gl7 with adjuvant XRT  6/7/12    PSA today and will continue yearly.    2. Urethral stricture disease-  DVIU  4/22/24,  urethral dilation in [place]IE  2/15/24,  DVIU  5/31/22    Doing well and does not want to progress with CIC as of yet, will contact us if this becomes significantly worse.    3. Gross hematuria- structural evaluation relatively clear, continue to monitor expectantly.  This could have all been secondary to radiation effect.    4. Erectile dysfunction- sildenafil with the use of a MARÍA is relatively sufficient.  Patient would like to try Cialis 20 mg to see if this would assist a little more than the sildenafil.  Does not want to pursue an IPP as of yet.  See me back in 6 weeks, unless he needs to be seen sooner.  At which point he may be ready to pursue CIC if feasible.

## 2024-07-22 ENCOUNTER — OFFICE VISIT (OUTPATIENT)
Dept: PODIATRY | Facility: CLINIC | Age: 59
End: 2024-07-22
Payer: COMMERCIAL

## 2024-07-22 VITALS — HEIGHT: 78 IN | BODY MASS INDEX: 26.45 KG/M2 | WEIGHT: 228.63 LBS

## 2024-07-22 DIAGNOSIS — G60.9 IDIOPATHIC PERIPHERAL NEUROPATHY: Primary | ICD-10-CM

## 2024-07-22 DIAGNOSIS — B35.1 ONYCHOMYCOSIS OF TOENAIL: ICD-10-CM

## 2024-07-22 PROCEDURE — 99499 UNLISTED E&M SERVICE: CPT | Mod: S$GLB,,, | Performed by: PODIATRIST

## 2024-07-22 PROCEDURE — 11721 DEBRIDE NAIL 6 OR MORE: CPT | Mod: S$GLB,,, | Performed by: PODIATRIST

## 2024-07-22 PROCEDURE — 99999 PR PBB SHADOW E&M-EST. PATIENT-LVL III: CPT | Mod: PBBFAC,,, | Performed by: PODIATRIST

## 2024-07-22 NOTE — PROGRESS NOTES
Subjective:     Patient ID: Sachin Gonzalez is a 59 y.o. male.    Chief Complaint: Nail Care (2 mo nail care, pt rates pain 6/10 on the left 3rd and 4th toes, pt is non-diabetic, pt last seen pcp Santy Greenberg MD 7/18/24/)    Sachin is a 59 y.o. male who presents to the clinic for evaluation and treatment of high risk feet. 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 The patient's chief complaint is long, thick toenails. This patient has documented high risk feet requiring routine maintenance secondary to peripheral neuropathy. Patient complains of pain in left 3rd and 4th toes. Patient rates pain 6/10. Patient has  no other pedal complaints at this time.     PCP: Santy Greenberg MD    Date Last Seen by PCP: 07/18/2024    Current shoe gear:  Affected Foot: Casual shoes     Unaffected Foot: Casual shoes    Last encounter in this department: 3/30/2022    Hemoglobin A1C   Date Value Ref Range Status   01/13/2023 5.3 4.0 - 5.6 % Final     Comment:     ADA Screening Guidelines:  5.7-6.4%  Consistent with prediabetes  >or=6.5%  Consistent with diabetes    High levels of fetal hemoglobin interfere with the HbA1C  assay. Heterozygous hemoglobin variants (HbS, HgC, etc)do  not significantly interfere with this assay.   However, presence of multiple variants may affect accuracy.     04/12/2017 5.7 4.5 - 6.2 % Final     Comment:     According to ADA guidelines, hemoglobin A1C <7.0% represents  optimal control in non-pregnant diabetic patients.  Different  metrics may apply to specific populations.   Standards of Medical Care in Diabetes - 2016.  For the purpose of screening for the presence of diabetes:  <5.7%     Consistent  with the absence of diabetes  5.7-6.4%  Consistent with increasing risk for diabetes   (prediabetes)  >or=6.5%  Consistent with diabetes  Currently no consensus exists for use of hemoglobin A1C  for diagnosis of diabetes for children.         Patient Active Problem List   Diagnosis    History of prostate cancer    RA (rheumatoid arthritis)    Erectile dysfunction    Peyronie's disease    Long-term use of immunosuppressant medication    Cephalic vein thrombosis-post op    Essential hypertension    Tension-type headache, not intractable    Lumbar foraminal stenosis    Parotid sialolithiasis    Gastroesophageal reflux disease without esophagitis    Mixed hyperlipidemia    Prostate cancer    Gross hematuria    Stricture of bulbous urethra in male    Drug-induced immunodeficiency    Postprocedural male urethral stricture       Medication List with Changes/Refills   Current Medications    ASCORBIC ACID, VITAMIN C, (VITAMIN C) 250 MG TABLET    Take 250 mg by mouth once daily.    CHOLECALCIFEROL, VITAMIN D3, 25 MCG (1,000 UNIT) CHEW    Take by mouth.    FOLIC ACID (FOLVITE) 1 MG TABLET    Take 1 tablet (1,000 mcg total) by mouth once daily.    IBUPROFEN (ADVIL,MOTRIN) 800 MG TABLET    Take 1 tablet (800 mg total) by mouth every 6 (six) hours as needed for Pain.    LINACLOTIDE (LINZESS) 72 MCG CAP CAPSULE    Take 1 capsule (72 mcg total) by mouth before breakfast.    METHOTREXATE 2.5 MG TAB    Take 6 tablets (15 mg total) by mouth every 7 days. This medication requires periodic lab monitoring    METOPROLOL SUCCINATE (TOPROL-XL) 50 MG 24 HR TABLET    TAKE 1 TABLET DAILY    MULTIVITAMIN CAPSULE    Take 1 capsule by mouth once daily.    OXYCODONE-ACETAMINOPHEN (PERCOCET) 5-325 MG PER TABLET    Take 1 tablet by mouth every 4 (four) hours as needed for Pain.    PANTOPRAZOLE (PROTONIX) 40 MG TABLET    Take 1 tablet (40 mg total) by mouth 2 (two) times daily.    PHENAZOPYRIDINE (PYRIDIUM) 200 MG TABLET    Take 1 tablet (200 mg  total) by mouth 3 (three) times daily as needed for Pain.    PREGABALIN (LYRICA) 75 MG CAPSULE    Take 1 capsule (75 mg total) by mouth 2 (two) times daily.    SILDENAFIL (VIAGRA) 100 MG TABLET    Take 1 tablet (100 mg total) by mouth daily as needed for Erectile Dysfunction.    SUCRALFATE (CARAFATE) 1 GRAM TABLET    Take 1 tablet (1 g total) by mouth 3 (three) times daily as needed (reflux indigestion).    TADALAFIL (CIALIS) 20 MG TAB    Take 1 tablet (20 mg total) by mouth every 24 hours as needed (erectile dysfunction).    TRIAMCINOLONE ACETONIDE 0.1% (KENALOG) 0.1 % CREAM    Apply topically 2 (two) times daily.    ZINC SULFATE (ZINC-220 ORAL)    Take 1 tablet by mouth as needed.       Review of patient's allergies indicates:   Allergen Reactions    Pollen extracts        Past Surgical History:   Procedure Laterality Date    COLONOSCOPY N/A 4/21/2021    Procedure: COLONOSCOPY covid test scheduled on 4/19/21;  Surgeon: Darrell Worthington MD;  Location: [facility];  Service: Endoscopy;  Laterality: N/A;    CYSTOURETHROSCOPY WITH DIRECT VISION INTERNAL URETHROTOMY N/A 5/31/2022    Procedure: CYSTOSCOPY, WITH DIRECT VISION INTERNAL URETHROTOMY;  Surgeon: Yaniv Murray MD;  Location[facility] OR;  Service: Urology;  Laterality: N/A;    CYSTOURETHROSCOPY WITH DIRECT VISION INTERNAL URETHROTOMY N/A 4/22/2024    Procedure: CYSTOSCOPY, WITH DIRECT VISION INTERNAL URETHROTOMY;  Surgeon: Yaniv Murray MD;  Location: [facility] OR;  Service: Urology;  Laterality: N/A;    ESOPHAGOGASTRODUODENOSCOPY      ESOPHAGOGASTRODUODENOSCOPY N/A 4/5/2021    Procedure: EGD (ESOPHAGOGASTRODUODENOSCOPY) Rapid Covid test needed;  Surgeon: Darrell Worthington MD;  Location: [facility];  Service: Endoscopy;  Laterality: N/A;    OSTEOTOMY OF METATARSAL BONE Right 11/21/2022    Procedure: OSTEOTOMY, METATARSAL BONE;  Surgeon: Daniel Ramirez DPM;  Location: [facility];  Service: Podiatry;  Laterality: Right;    PROSTATECTOMY  2011    RELEASE OF  "CONTRACTURE OF JOINT Right 11/21/2022    Procedure: RELEASE, CONTRACTURE, JOINT;  Surgeon: Daniel Ramirez DPM;  Location: [facility] OR;  Service: Podiatry;  Laterality: Right;    RESECTION OF GASTROCNEMIUS MUSCLE Right 11/21/2022    Procedure: RESECTION, MUSCLE, GASTROCNEMIUS;  Surgeon: Daniel Ramirez DPM;  Location: [facility] OR;  Service: Podiatry;  Laterality: Right;       Family History   Problem Relation Name Age of Onset    Stroke Father      Alcohol abuse Father      Arthritis Mother      Hypertension Mother      Anxiety disorder Mother      No Known Problems Sister      Kidney disease Brother         Social History     Socioeconomic History    Marital status: Single   Tobacco Use    Smoking status: Never    Smokeless tobacco: Never   Substance and Sexual Activity    Alcohol use: Never    Drug use: No    Sexual activity: Yes     Partners: Female   Social History Narrative    No pets or smokers in household.     Social Determinants of Health     Stress: No Stress Concern Present (12/12/2019)    [facility] - Occupational Stress Questionnaire     Feeling of Stress : Not at all       Vitals:    07/22/24 0811   Weight: 103.7 kg (228 lb 9.9 oz)   Height: 6' 7" (2.007 m)   PainSc:   6         Hemoglobin A1C   Date Value Ref Range Status   01/13/2023 5.3 4.0 - 5.6 % Final     Comment:     ADA Screening Guidelines:  5.7-6.4%  Consistent with prediabetes  >or=6.5%  Consistent with diabetes    High levels of fetal hemoglobin interfere with the HbA1C  assay. Heterozygous hemoglobin variants (HbS, HgC, etc)do  not significantly interfere with this assay.   However, presence of multiple variants may affect accuracy.     04/12/2017 5.7 4.5 - 6.2 % Final     Comment:     According to ADA guidelines, hemoglobin A1C <7.0% represents  optimal control in non-pregnant diabetic patients.  Different  metrics may apply to specific populations.   Standards of Medical Care in Diabetes - 2016.  For the purpose " of screening for the presence of diabetes:  <5.7%     Consistent with the absence of diabetes  5.7-6.4%  Consistent with increasing risk for diabetes   (prediabetes)  >or=6.5%  Consistent with diabetes  Currently no consensus exists for use of hemoglobin A1C  for diagnosis of diabetes for children.         Review of Systems   Constitutional:  Negative for chills and fever.   Respiratory:  Negative for shortness of breath.    Cardiovascular:  Negative for chest pain, palpitations, orthopnea, claudication and leg swelling.   Gastrointestinal:  Negative for diarrhea, nausea and vomiting.   Musculoskeletal:  Negative for joint pain.   Skin:  Negative for rash.   Neurological:  Positive for tingling and sensory change.   Psychiatric/Behavioral: Negative.           Objective:      PHYSICAL EXAM: Apperance: Alert and orient in no distress,well developed, and with good attention to grooming and body habits  Patient ambulating in tennis shoes.   LOWER EXTREMITY EXAM:  VASCULAR: Dorsalis pedis pulses 2/4 bilateral and Posterior Tibial pulses 2/4 bilateral. Capillary fill time <4 seconds bilateral. No edema observed bilateral. Varicosities absent bilateral. Skin temperature of the lower extremities is warm to warm, proximal to distal. Hair growth WNL bilateral.  DERMATOLOGICAL: No skin rashes, subcutaneous nodules, lesions, or ulcers observed bilateral. Nails 1,3,4,5 bilateral elongated, thickened, and discolored with subungual debris. Webspaces 1,2,3,4 bilateral clean, dry and without evidence of break in skin integrity. Multiple flat skin eruptions noted to bilateral dorsal feet and lower legs.   NEUROLOGICAL: Light touch, sharp-dull, proprioception all present and equal bilaterally.  Vibratory sensation diminished at bilateral hallux and intact at bilateral navicular. Protective sensation absent at 6/10 sites as tested with a Gray-Ansley 5.07 monofilament.   MUSCULOSKELETAL: Muscle strength is 5/5 for foot inverters,  everters, plantarflexors, and dorsiflexors. Muscle tone is normal.           Assessment:       ICD-10-CM ICD-9-CM   1. Idiopathic peripheral neuropathy  G60.9 356.9   2. Onychomycosis of toenail  B35.1 110.1         Plan:   Idiopathic peripheral neuropathy    Onychomycosis of toenail    I counseled the patient on his conditions, regarding findings of my examination, my impressions, and usual treatment plan.   Appointment spent on education about the neuropathy, and prevention of limb loss.  Prescription written for Kenalog cream to be applied to areas twice daily for 2 weeks.   Shoe inspection. Patient instructed on proper foot hygeine. We discussed wearing proper shoe gear, daily foot inspections, never walking without protective shoe gear, never putting sharp instruments to feet.    With patient's permission, nails 1-5 bilateral were debrided/trimmed in length and thickness aggressively to their soft tissue attachment mechanically and with electric , removing all offending nail and debris. Patient relates relief following the procedure.  Dispensed toe lifts to be worn daily.   Patient  will continue to monitor the areas daily, inspect feet, wear protective shoe gear when ambulatory, moisturizer to maintain skin integrity.  Patient to return 2 months or sooner if needed.          Reina Guzman DPM  [facility]

## 2024-08-29 ENCOUNTER — LAB VISIT (OUTPATIENT)
Dept: LAB | Facility: HOSPITAL | Age: 59
End: 2024-08-29
Attending: PEDIATRICS
Payer: COMMERCIAL

## 2024-08-29 ENCOUNTER — OFFICE VISIT (OUTPATIENT)
Dept: UROLOGY | Facility: CLINIC | Age: 59
End: 2024-08-29
Payer: COMMERCIAL

## 2024-08-29 VITALS
DIASTOLIC BLOOD PRESSURE: 88 MMHG | HEART RATE: 108 BPM | BODY MASS INDEX: 26.37 KG/M2 | HEIGHT: 78 IN | WEIGHT: 227.94 LBS | SYSTOLIC BLOOD PRESSURE: 129 MMHG

## 2024-08-29 DIAGNOSIS — Z85.46 HISTORY OF PROSTATE CANCER: ICD-10-CM

## 2024-08-29 DIAGNOSIS — K21.9 GASTROESOPHAGEAL REFLUX DISEASE WITHOUT ESOPHAGITIS: ICD-10-CM

## 2024-08-29 DIAGNOSIS — R31.0 GROSS HEMATURIA: ICD-10-CM

## 2024-08-29 DIAGNOSIS — N99.114 POSTPROCEDURAL MALE URETHRAL STRICTURE: ICD-10-CM

## 2024-08-29 DIAGNOSIS — N52.9 ERECTILE DYSFUNCTION, UNSPECIFIED ERECTILE DYSFUNCTION TYPE: ICD-10-CM

## 2024-08-29 DIAGNOSIS — Z01.818 PRE-OP TESTING: Primary | ICD-10-CM

## 2024-08-29 LAB
ALBUMIN SERPL BCP-MCNC: 3.9 G/DL (ref 3.5–5.2)
ALP SERPL-CCNC: 84 U/L (ref 55–135)
ALT SERPL W/O P-5'-P-CCNC: 13 U/L (ref 10–44)
ANION GAP SERPL CALC-SCNC: 9 MMOL/L (ref 8–16)
AST SERPL-CCNC: 21 U/L (ref 10–40)
BASOPHILS # BLD AUTO: 0.03 K/UL (ref 0–0.2)
BASOPHILS NFR BLD: 0.6 % (ref 0–1.9)
BILIRUB SERPL-MCNC: 0.4 MG/DL (ref 0.1–1)
BUN SERPL-MCNC: 18 MG/DL (ref 8–23)
CALCIUM SERPL-MCNC: 9.5 MG/DL (ref 8.7–10.5)
CHLORIDE SERPL-SCNC: 105 MMOL/L (ref 95–110)
CO2 SERPL-SCNC: 25 MMOL/L (ref 23–29)
CREAT SERPL-MCNC: 1 MG/DL (ref 0.5–1.4)
DIFFERENTIAL METHOD BLD: ABNORMAL
EOSINOPHIL # BLD AUTO: 0.1 K/UL (ref 0–0.5)
EOSINOPHIL NFR BLD: 2.6 % (ref 0–8)
ERYTHROCYTE [DISTWIDTH] IN BLOOD BY AUTOMATED COUNT: 13.6 % (ref 11.5–14.5)
EST. GFR  (NO RACE VARIABLE): >60 ML/MIN/1.73 M^2
GLUCOSE SERPL-MCNC: 84 MG/DL (ref 70–110)
HCT VFR BLD AUTO: 41.7 % (ref 40–54)
HGB BLD-MCNC: 13.8 G/DL (ref 14–18)
IMM GRANULOCYTES # BLD AUTO: 0.01 K/UL (ref 0–0.04)
IMM GRANULOCYTES NFR BLD AUTO: 0.2 % (ref 0–0.5)
LYMPHOCYTES # BLD AUTO: 1.2 K/UL (ref 1–4.8)
LYMPHOCYTES NFR BLD: 24.6 % (ref 18–48)
MCH RBC QN AUTO: 32.2 PG (ref 27–31)
MCHC RBC AUTO-ENTMCNC: 33.1 G/DL (ref 32–36)
MCV RBC AUTO: 97 FL (ref 82–98)
MONOCYTES # BLD AUTO: 0.4 K/UL (ref 0.3–1)
MONOCYTES NFR BLD: 8.8 % (ref 4–15)
NEUTROPHILS # BLD AUTO: 3.1 K/UL (ref 1.8–7.7)
NEUTROPHILS NFR BLD: 63.2 % (ref 38–73)
NRBC BLD-RTO: 0 /100 WBC
PLATELET # BLD AUTO: 235 K/UL (ref 150–450)
PMV BLD AUTO: 10.3 FL (ref 9.2–12.9)
POTASSIUM SERPL-SCNC: 4.2 MMOL/L (ref 3.5–5.1)
PROT SERPL-MCNC: 7.6 G/DL (ref 6–8.4)
RBC # BLD AUTO: 4.28 M/UL (ref 4.6–6.2)
SODIUM SERPL-SCNC: 139 MMOL/L (ref 136–145)
WBC # BLD AUTO: 4.91 K/UL (ref 3.9–12.7)

## 2024-08-29 PROCEDURE — 99999 PR PBB SHADOW E&M-EST. PATIENT-LVL V: CPT | Mod: PBBFAC,,, | Performed by: UROLOGY

## 2024-08-29 PROCEDURE — 36415 COLL VENOUS BLD VENIPUNCTURE: CPT | Performed by: PEDIATRICS

## 2024-08-29 PROCEDURE — 80053 COMPREHEN METABOLIC PANEL: CPT | Performed by: PEDIATRICS

## 2024-08-29 PROCEDURE — 85025 COMPLETE CBC W/AUTO DIFF WBC: CPT | Performed by: PEDIATRICS

## 2024-08-29 RX ORDER — LIDOCAINE HYDROCHLORIDE 20 MG/ML
JELLY TOPICAL
Status: COMPLETED | OUTPATIENT
Start: 2024-08-29 | End: 2024-08-29

## 2024-08-29 RX ORDER — CIPROFLOXACIN 500 MG/1
500 TABLET ORAL
Status: COMPLETED | OUTPATIENT
Start: 2024-08-29 | End: 2024-08-29

## 2024-08-29 RX ADMIN — CIPROFLOXACIN 500 MG: 500 TABLET ORAL at 02:08

## 2024-08-29 RX ADMIN — LIDOCAINE HYDROCHLORIDE 11 ML: 20 JELLY TOPICAL at 03:08

## 2024-08-29 NOTE — PROGRESS NOTES
..Per Dr. Murray oral ciprofloxacin 500 MG was given to pt. Pt instructed to remain in clinic for 15 minutes to monitor for signs & symptoms of adverse reaction. Pt verbalized understanding.      Loraine Milanes RN

## 2024-08-29 NOTE — PROGRESS NOTES
Lidocaine Jelly 2% given prophylactically via penis/urethra to aid in comfort during procedure. Patient tolerated well.    Shelia Briscoe LPN

## 2024-08-29 NOTE — PROGRESS NOTES
Chief Complaint:   Encounter Diagnoses   Name Primary?    Postprocedural male urethral stricture Yes    History of prostate cancer     Gross hematuria     Erectile dysfunction, unspecified erectile dysfunction type        HPI:   8/29/24- PSA was performed this morning per the patient, states that his flow has reduced.  No evidence of hematuria, erections are stable with the vacuum device.      Allergies:  No known allergies    Medications:  has a current medication list which includes the following prescription(s): pantoprazole, sucralfate, zinc sulfate, ascorbic acid (vitamin c), cholecalciferol (vitamin d3), famotidine, folic acid, hydrochlorothiazide, ibuprofen, magnesium, methotrexate, and metoprolol succinate.    Review of Systems:  General: No fever, chills, fatigability, or weight loss.  Skin: No rashes, itching, or changes in color or texture of skin.  Chest: Denies TYLER, cyanosis, wheezing, cough, and sputum production.  Abdomen: Appetite fine. No weight loss. Denies diarrhea, abdominal pain, hematemesis, or blood in stool.  Musculoskeletal: No joint stiffness or swelling. Denies back pain.  : As above.  All other review of systems negative.    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    PSH:   has a past surgical history that includes Prostatectomy (2011) and Esophagogastroduodenoscopy.    FamHx: family history includes Alcohol abuse in his father; Anxiety disorder in his mother; Arthritis in his mother; Hypertension in his mother; Kidney disease in his brother; No Known Problems in his sister; Stroke in his father.    SocHx:  reports that he has never smoked. He has never used smokeless tobacco. He reports that he does not drink alcohol and does not use drugs.      Physical Exam:  There were no  vitals filed for this visit.  General: A&Ox3, no apparent distress, no deformities  Neck: No masses, normal thyroid  Lungs: normal inspiration, no use of accessory muscles  Heart: normal pulse, no arrhythmias  Abdomen: Soft, NT, ND  Skin: The skin is warm and dry. No jaundice.  Ext: No c/c/e.  :  8/24- Test desc michael, no abnormalities of epididymus. Normal penile and scrotal skin. Meatus normal.    Labs/Studies:   pvr 26 ml 12/21  Cystoscopy bulbous urethral stricture 8/24  Cystoscopy bulbourethral stricture 10/23  Cystoscopy normal 5/21  PSA <0.01 5/23  CT urogram bladder outflow obstruction 3/21    Procedure: Diagnostic Cystoscopy    Procedure in Detail: After proper consents were obtained, the patient was prepped and draped in normal sterile fashion for diagnostic cystoscopy. 5 ml of lidocaine jelly was instilled in the urethra. The flexible cystoscope was then introduced into the urethra, where we immediately encountered a urethral stricture disease and could not pass beyond. The cystoscope was then removed, and the procedure terminated.     Findings:  Urethral stricture      Impression/Plan:       1. Prostate cancer-  RP pT3b Gl7 with adjuvant XRT  6/7/12    PSA pending from today and will continue yearly.    2. Urethral stricture disease-  DVIU  4/22/24,  urethral dilation in [place]  2/15/24,  DVIU  5/31/22    Patient's flow has gotten worse, he never pursued CIC.  We will take him back for repeat cystoscopy with DVIU, he will then initiate CIC within 1-2 weeks after the procedure.  Call with any issues prior to the next appointment.  Please see below in regards to our discussion today.  Of note consultation to [place] previously stated that a buccal graft was not feasible.    3. Gross hematuria- structural evaluation relatively clear, continue to monitor expectantly.  This could have all been secondary to radiation effect.  No evidence of recurrence.    4. Erectile dysfunction- sildenafil with the use of a  MARÍA is relatively sufficient.  Does not want to pursue an IPP as of yet.    Patient understands the risks, benefits and alternatives of the above-stated procedure.  These include but not limited to damage to the surrounding structures including the urethra, prostate, ureters and bladder.  Risk of the need for stent placement, perforation requiring open procedures, Salazar catheterization at the conclusion of the procedure will be mandatory.  Risk of recurrent strictures or need for further surgeries.  Risk of pain, hematuria, infections.  Risk of heart attack, stroke, death, DVT and PE.  Patient understanding of all the above he has elected to pursue.

## 2024-09-03 ENCOUNTER — TELEPHONE (OUTPATIENT)
Dept: UROLOGY | Facility: CLINIC | Age: 59
End: 2024-09-03
Payer: COMMERCIAL

## 2024-09-03 DIAGNOSIS — N99.114 POSTPROCEDURAL MALE URETHRAL STRICTURE: Primary | ICD-10-CM

## 2024-09-03 NOTE — TELEPHONE ENCOUNTER
Added and moved patient's pre-op labs to 9/11/24 along with the labs patient already has scheduled that day. Moved pre-op labs to that day so they are before his sx on 10/8/24. Patient was notified of day and time and was told to let us know if he could not make the appointment.         ----- Message from Yaniv Murray MD sent at 9/3/2024  2:33 PM CDT -----  Regarding: RE: Sx  Contact: Renéalberto [phone]  DVIU 10/8 at [facility], order is in the chart  ----- Message -----  From: Pilar Brice CMA  Sent: 9/3/2024   1:18 PM CDT  To: Yaniv Murray MD  Subject: Sx                                               Patient wants to have sx on Oct.8th I don't see anything in your note or a case request to move it. Please advise.  ----- Message -----  From: Hawa Jones  Sent: 9/3/2024  10:33 AM CDT  To: Terri Purvis Staff    Would like to receive medical advice.      Would they like a call back or a response via [facility]:  call back    Additional information:  Sachin is calling to request if the proc can please be moved to Oct 8, 2024 Tuesday. Pt did not go into detail on why he is requesting to move the proc appt.  Please call Sachin back for advice

## 2024-10-04 ENCOUNTER — PATIENT MESSAGE (OUTPATIENT)
Dept: PREADMISSION TESTING | Facility: HOSPITAL | Age: 59
End: 2024-10-04
Payer: COMMERCIAL

## 2024-10-07 ENCOUNTER — TELEPHONE (OUTPATIENT)
Dept: PREADMISSION TESTING | Facility: HOSPITAL | Age: 59
End: 2024-10-07
Payer: COMMERCIAL

## 2024-10-07 NOTE — PRE-PROCEDURE INSTRUCTIONS
Pre op instructions reviewed with patient over telephone, verbalized understanding.    To confirm, Surgery is scheduled on 10/08/24, please arrive at 9:30am. We will call you late afternoon if your arrival time changes.    *Please report to the [facility] Lobby (1st Floor) located off of [facility] (2nd Entrance/Building on the left, in front of the flag pole).  Address: [address] [place] [address] [place] [address]     INSTRUCTIONS IMPORTANT!!!  DO NOT Eat, Drink, or Smoke after 12 midnight unless instructed otherwise by your Surgeon. OK to brush teeth, no gum, candy or mints!    >>>MEDICATION INSTRUCTIONS<<<: Morning of Surgery, take small sip of water with ONLY these medications:  Metoprolol, if it is a morning medication  Pantoprazole (Protonix), if needed    If your are taking Ozempic/ Mounjaro/ Wegovy/ Trulicity/ Semaglutide injections or weight loss medication, such as Phentermine,  please notify us immediately as they must be stopped 7 days prior to surgery.    !!!STOP ALL Aspirins, NSAIDS, WEIGHT LOSS INJECTIONS/PILLS, Herbal supplements, & Vitamins 7 DAYS BEFORE SURGERY!!!    ____  Avoid Alcoholic beverages 3 days prior to surgery, as it can thin the blood.  ____  NO Acrylic/fake nails or nail polish worn day of surgery (specifically hand/arm & foot surgeries).  ____  NO powder, lotions, deodorants, oils or cream on body.  ____  Remove all jewelry & piercings & foreign objects before arrival & leave at home.  ____  Remove Dentures, Hearing Aids & Contact Lens prior to surgery.  ____  Bring photo ID and insurance information to hospital (Leave Valuables at Home).  ____  If going home the same day, arrange for a ride home. You will not be able to drive for 24 hrs if Anesthesia was used.   ____  Males: Stop ED medications (Viagra, Cialis) 24 hrs prior to surgery.  ____  Wear clean, loose fitting clothing to allow for dressings/ bandages.      Bathing Instructions:    -Shower with anti-bacterial Soap  (Hibiclens or Dial) the night before surgery and the morning of.   -Do not use Hibiclens on your face or genitals.   -Apply clean clothes after shower.  -Do not shave your face or body 3 days prior to surgery unless instructed otherwise by your Surgeon.    [facility] Visitor/Ride Policy:  Only 2 adults allowed in pre op/recovery area during your procedure. You MUST HAVE A RIDE HOME from a responsible adult that you know and trust. Medical Transport, Uber or Lyft can ONLY be used if patient has a responsible adult to accompany them during ride home.       *Signs and symptoms of Infection Before or After Surgery:               !!!If you experience any fever, chills, nausea/ vomiting, foul odor/ excessive drainage from surgical site, flu-like symptoms, new wounds or cuts, PLEASE CALL THE SURGEON OFFICE at [phone] or SEND MESSAGE THROUGH [facility]!!!     *If you are running late the morning of surgery, please call the [facility] Surgery Dept @ [phone].     *Billing questions:  [phone]     Thank you,  -[facility] Pre Admit Dept.  [phone] Nicholas   or [phone]  M-F 7:30 am-4:00 pm (Closed Major Holidays)

## 2024-10-08 ENCOUNTER — HOSPITAL ENCOUNTER (OUTPATIENT)
Facility: HOSPITAL | Age: 59
Discharge: HOME | End: 2024-10-08
Attending: UROLOGY | Admitting: UROLOGY
Payer: COMMERCIAL

## 2024-10-08 ENCOUNTER — ANESTHESIA EVENT (OUTPATIENT)
Dept: SURGERY | Facility: HOSPITAL | Age: 59
End: 2024-10-08
Payer: COMMERCIAL

## 2024-10-08 ENCOUNTER — ANESTHESIA (OUTPATIENT)
Dept: SURGERY | Facility: HOSPITAL | Age: 59
End: 2024-10-08
Payer: COMMERCIAL

## 2024-10-08 ENCOUNTER — TELEPHONE (OUTPATIENT)
Dept: UROLOGY | Facility: CLINIC | Age: 59
End: 2024-10-08
Payer: COMMERCIAL

## 2024-10-08 VITALS
DIASTOLIC BLOOD PRESSURE: 98 MMHG | SYSTOLIC BLOOD PRESSURE: 139 MMHG | HEIGHT: 78 IN | BODY MASS INDEX: 27.1 KG/M2 | TEMPERATURE: 97 F | WEIGHT: 234.25 LBS | HEART RATE: 68 BPM | OXYGEN SATURATION: 99 % | RESPIRATION RATE: 14 BRPM

## 2024-10-08 DIAGNOSIS — N99.114 POSTPROCEDURAL MALE URETHRAL STRICTURE: Primary | ICD-10-CM

## 2024-10-08 PROCEDURE — 37000009 HC ANESTHESIA EA ADD 15 MINS: Performed by: UROLOGY

## 2024-10-08 PROCEDURE — C1729 CATH, DRAINAGE: HCPCS | Performed by: UROLOGY

## 2024-10-08 PROCEDURE — C1769 GUIDE WIRE: HCPCS | Performed by: UROLOGY

## 2024-10-08 PROCEDURE — 63600175 PHARM REV CODE 636 W HCPCS: Performed by: NURSE ANESTHETIST, CERTIFIED REGISTERED

## 2024-10-08 PROCEDURE — 71000015 HC POSTOP RECOV 1ST HR: Performed by: UROLOGY

## 2024-10-08 PROCEDURE — 71000033 HC RECOVERY, INTIAL HOUR: Performed by: UROLOGY

## 2024-10-08 PROCEDURE — 36000707: Performed by: UROLOGY

## 2024-10-08 PROCEDURE — 25000003 PHARM REV CODE 250: Performed by: ANESTHESIOLOGY

## 2024-10-08 PROCEDURE — 36000706: Performed by: UROLOGY

## 2024-10-08 PROCEDURE — 52276 CYSTOSCOPY AND TREATMENT: CPT | Mod: ,,, | Performed by: UROLOGY

## 2024-10-08 PROCEDURE — 37000008 HC ANESTHESIA 1ST 15 MINUTES: Performed by: UROLOGY

## 2024-10-08 PROCEDURE — 25000003 PHARM REV CODE 250: Performed by: NURSE ANESTHETIST, CERTIFIED REGISTERED

## 2024-10-08 RX ORDER — MIDAZOLAM HYDROCHLORIDE 1 MG/ML
INJECTION INTRAMUSCULAR; INTRAVENOUS
Status: DISCONTINUED | OUTPATIENT
Start: 2024-10-08 | End: 2024-10-08

## 2024-10-08 RX ORDER — ONDANSETRON HYDROCHLORIDE 2 MG/ML
4 INJECTION, SOLUTION INTRAVENOUS DAILY PRN
Status: DISCONTINUED | OUTPATIENT
Start: 2024-10-08 | End: 2024-10-08 | Stop reason: HOSPADM

## 2024-10-08 RX ORDER — FENTANYL CITRATE 50 UG/ML
25 INJECTION, SOLUTION INTRAMUSCULAR; INTRAVENOUS EVERY 5 MIN PRN
Status: DISCONTINUED | OUTPATIENT
Start: 2024-10-08 | End: 2024-10-08 | Stop reason: HOSPADM

## 2024-10-08 RX ORDER — CEFAZOLIN SODIUM 1 G/3ML
INJECTION, POWDER, FOR SOLUTION INTRAMUSCULAR; INTRAVENOUS
Status: DISCONTINUED | OUTPATIENT
Start: 2024-10-08 | End: 2024-10-08

## 2024-10-08 RX ORDER — CEFDINIR 300 MG/1
300 CAPSULE ORAL 2 TIMES DAILY
Qty: 10 CAPSULE | Refills: 0 | Status: SHIPPED | OUTPATIENT
Start: 2024-10-08 | End: 2024-10-13

## 2024-10-08 RX ORDER — PROPOFOL 10 MG/ML
VIAL (ML) INTRAVENOUS
Status: DISCONTINUED | OUTPATIENT
Start: 2024-10-08 | End: 2024-10-08

## 2024-10-08 RX ORDER — LIDOCAINE HYDROCHLORIDE 10 MG/ML
INJECTION, SOLUTION EPIDURAL; INFILTRATION; INTRACAUDAL; PERINEURAL
Status: DISCONTINUED | OUTPATIENT
Start: 2024-10-08 | End: 2024-10-08

## 2024-10-08 RX ORDER — MEPERIDINE HYDROCHLORIDE 25 MG/ML
12.5 INJECTION INTRAMUSCULAR; INTRAVENOUS; SUBCUTANEOUS ONCE AS NEEDED
Status: DISCONTINUED | OUTPATIENT
Start: 2024-10-08 | End: 2024-10-08 | Stop reason: HOSPADM

## 2024-10-08 RX ORDER — HYDROMORPHONE HYDROCHLORIDE 1 MG/ML
0.2 INJECTION, SOLUTION INTRAMUSCULAR; INTRAVENOUS; SUBCUTANEOUS EVERY 5 MIN PRN
Status: DISCONTINUED | OUTPATIENT
Start: 2024-10-08 | End: 2024-10-08 | Stop reason: HOSPADM

## 2024-10-08 RX ORDER — OXYCODONE AND ACETAMINOPHEN 5; 325 MG/1; MG/1
1 TABLET ORAL
Status: DISCONTINUED | OUTPATIENT
Start: 2024-10-08 | End: 2024-10-08 | Stop reason: HOSPADM

## 2024-10-08 RX ORDER — ONDANSETRON HYDROCHLORIDE 2 MG/ML
4 INJECTION, SOLUTION INTRAVENOUS ONCE AS NEEDED
Status: DISCONTINUED | OUTPATIENT
Start: 2024-10-08 | End: 2024-10-08 | Stop reason: HOSPADM

## 2024-10-08 RX ORDER — PHENAZOPYRIDINE HYDROCHLORIDE 200 MG/1
200 TABLET, FILM COATED ORAL 3 TIMES DAILY PRN
Qty: 15 TABLET | Refills: 0 | Status: SHIPPED | OUTPATIENT
Start: 2024-10-08

## 2024-10-08 RX ORDER — ONDANSETRON HYDROCHLORIDE 2 MG/ML
INJECTION, SOLUTION INTRAVENOUS
Status: DISCONTINUED | OUTPATIENT
Start: 2024-10-08 | End: 2024-10-08

## 2024-10-08 RX ORDER — OXYCODONE AND ACETAMINOPHEN 5; 325 MG/1; MG/1
1 TABLET ORAL EVERY 4 HOURS PRN
Qty: 10 TABLET | Refills: 0 | Status: SHIPPED | OUTPATIENT
Start: 2024-10-08

## 2024-10-08 RX ADMIN — PROPOFOL 50 MG: 10 INJECTION, EMULSION INTRAVENOUS at 09:10

## 2024-10-08 RX ADMIN — ONDANSETRON 4 MG: 2 INJECTION INTRAMUSCULAR; INTRAVENOUS at 09:10

## 2024-10-08 RX ADMIN — CEFAZOLIN 2 G: 330 INJECTION, POWDER, FOR SOLUTION INTRAMUSCULAR; INTRAVENOUS at 09:10

## 2024-10-08 RX ADMIN — LIDOCAINE HYDROCHLORIDE 50 MG: 10 SOLUTION INTRAVENOUS at 09:10

## 2024-10-08 RX ADMIN — SODIUM CHLORIDE: 9 INJECTION, SOLUTION INTRAVENOUS at 09:10

## 2024-10-08 RX ADMIN — MIDAZOLAM HYDROCHLORIDE 2 MG: 1 INJECTION, SOLUTION INTRAMUSCULAR; INTRAVENOUS at 09:10

## 2024-10-08 RX ADMIN — OXYCODONE HYDROCHLORIDE AND ACETAMINOPHEN 1 TABLET: 5; 325 TABLET ORAL at 10:10

## 2024-10-08 NOTE — DISCHARGE SUMMARY
[facility] - Surgery (Hospital)  Discharge Note  Short Stay    Procedure(s) (LRB):  CYSTOSCOPY, WITH DIRECT VISION INTERNAL URETHROTOMY (N/A)      OUTCOME: Patient tolerated treatment/procedure well without complication and is now ready for discharge.    DISPOSITION: Home or Self Care    FINAL DIAGNOSIS:  urethral stricture disease    FOLLOWUP: In clinic    DISCHARGE INSTRUCTIONS:    Discharge Procedure Orders   Diet Adult Regular     Notify your health care provider if you experience any of the following:  severe uncontrolled pain     Notify your health care provider if you experience any of the following:  persistent nausea and vomiting or diarrhea     Notify your health care provider if you experience any of the following:  temperature >100.4     Activity as tolerated        TIME SPENT ON DISCHARGE: 5 minutes

## 2024-10-08 NOTE — TELEPHONE ENCOUNTER
----- Message from Yaniv Murray MD sent at 10/8/2024  9:43 AM CDT -----  2 weeks with me, thurs am as a nurse visit for fisher removal.

## 2024-10-08 NOTE — OP NOTE
Date of Procedure: 10/08/2024    PREOP DIAGNOSIS:  Urethral stricture disease.    POSTOP DIAGNOSIS:  Urethral stricture disease.    PROCEDURES:   Direct visual incision of urethral stricture disease    SURGEON:  Yaniv Murray M.D.    Assistants: None    Specimen:  None    ANESTHESIA:  General endotracheal    FINDINGS:  Clear channel at the conclusion      PROCEDURE IN DETAIL:  Patient was brought to the operative suite and placed under general anesthesia and positioned into the dorsal lithotomy position.  After being sterilely prepped and draped a 20 [place]i urethrotome was inserted into a normal urethra.  Stricture was immediately identified, using a blade we were able to incise at approximately the 3 and 9 o'clock positions.  At the conclusion the scope passed easily without restrictions.  The bladder was entered, bladder neck was unremarkable.  Cystoscopy demonstrated no evidence of intravesical lesions or other abnormalities.  Bilateral ureteral orifices were normal in size, shape, caliber and location.  Of note a wire was inserted through the urethrotome and into the bladder.  Using Seldinger technique we placed an 18 [place]i [place] tip catheter, with 10 mL of sterile water within the balloon and it sat nicely upon the bladder neck.  Patient will have his catheter removed in 2 days.  Patient will then return to see me in 2 weeks to teach CIC.    COMPLICATIONS:  None

## 2024-10-08 NOTE — H&P
Chief Complaint:        Encounter Diagnoses   Name Primary?    Postprocedural male urethral stricture Yes    History of prostate cancer      Gross hematuria      Erectile dysfunction, unspecified erectile dysfunction type           HPI:   8/29/24- PSA was performed this morning per the patient, states that his flow has reduced.  No evidence of hematuria, erections are stable with the vacuum device.        Allergies:  No known allergies     Medications:  has a current medication list which includes the following prescription(s): pantoprazole, sucralfate, zinc sulfate, ascorbic acid (vitamin c), cholecalciferol (vitamin d3), famotidine, folic acid, hydrochlorothiazide, ibuprofen, magnesium, methotrexate, and metoprolol succinate.     Review of Systems:  General: No fever, chills, fatigability, or weight loss.  Skin: No rashes, itching, or changes in color or texture of skin.  Chest: Denies TYLER, cyanosis, wheezing, cough, and sputum production.  Abdomen: Appetite fine. No weight loss. Denies diarrhea, abdominal pain, hematemesis, or blood in stool.  Musculoskeletal: No joint stiffness or swelling. Denies back pain.  : As above.  All other review of systems negative.     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     PSH:   has a past surgical history that includes Prostatectomy (2011) and Esophagogastroduodenoscopy.     FamHx: family history includes Alcohol abuse in his father; Anxiety disorder in his mother; Arthritis in his mother; Hypertension in his mother; Kidney disease in his brother; No Known Problems in his sister; Stroke in his father.     SocHx:  reports that he has never smoked. He has never used smokeless tobacco. He reports that he does not drink alcohol and does not use drugs.       Physical  Exam:  There were no vitals filed for this visit.  General: A&Ox3, no apparent distress, no deformities  Neck: No masses, normal thyroid  Lungs: normal inspiration, no use of accessory muscles  Heart: normal pulse, no arrhythmias  Abdomen: Soft, NT, ND  Skin: The skin is warm and dry. No jaundice.  Ext: No c/c/e.  :  8/24- Test desc michael, no abnormalities of epididymus. Normal penile and scrotal skin. Meatus normal.     Labs/Studies:   pvr 26 ml 12/21  Cystoscopy bulbous urethral stricture 8/24  Cystoscopy bulbourethral stricture 10/23  Cystoscopy normal 5/21  PSA <0.01 5/23  CT urogram bladder outflow obstruction 3/21     Procedure: Diagnostic Cystoscopy     Procedure in Detail: After proper consents were obtained, the patient was prepped and draped in normal sterile fashion for diagnostic cystoscopy. 5 ml of lidocaine jelly was instilled in the urethra. The flexible cystoscope was then introduced into the urethra, where we immediately encountered a urethral stricture disease and could not pass beyond. The cystoscope was then removed, and the procedure terminated.      Findings:  Urethral stricture        Impression/Plan:        1. Prostate cancer-  RP pT3b Gl7 with adjuvant XRT  6/7/12     PSA pending from today and will continue yearly.     2. Urethral stricture disease-  DVIU  4/22/24,  urethral dilation in [place]  2/15/24,  DVIU  5/31/22     Patient's flow has gotten worse, he never pursued CIC.  We will take him back for repeat cystoscopy with DVIU, he will then initiate CIC within 1-2 weeks after the procedure.  Call with any issues prior to the next appointment.  Please see below in regards to our discussion today.  Of note consultation to [facility] previously stated that a buccal graft was not feasible.     3. Gross hematuria- structural evaluation relatively clear, continue to monitor expectantly.  This could have all been secondary to radiation effect.  No evidence of recurrence.     4. Erectile  dysfunction- sildenafil with the use of a MARÍA is relatively sufficient.  Does not want to pursue an IPP as of yet.     Patient understands the risks, benefits and alternatives of the above-stated procedure.  These include but not limited to damage to the surrounding structures including the urethra, prostate, ureters and bladder.  Risk of the need for stent placement, perforation requiring open procedures, Salazar catheterization at the conclusion of the procedure will be mandatory.  Risk of recurrent strictures or need for further surgeries.  Risk of pain, hematuria, infections.  Risk of heart attack, stroke, death, DVT and PE.  Patient understanding of all the above he has elected to pursue.

## 2024-10-08 NOTE — ANESTHESIA PREPROCEDURE EVALUATION
10/08/2024  Sachin Gonzalez is a 59 y.o., male.      Pre-op Assessment    I have reviewed the Patient Summary Reports.     I have reviewed the Nursing Notes. I have reviewed the NPO Status.      Review of Systems  Anesthesia Hx:  No problems with previous Anesthesia                Hematology/Oncology:  Hematology Normal   Oncology Normal                                   Cardiovascular:     Hypertension                                        Renal/:  Renal/ Normal                 Hepatic/GI:  Hepatic/GI Normal                 Musculoskeletal:  Arthritis   RA (rheumatoid arthritis)            Neurological:  Neurology Normal                                      Endocrine:  Endocrine Normal            Dermatological:  Skin Normal    Psych:  Psychiatric Normal                    Physical Exam  General: Well nourished, Cooperative, Alert and Oriented    Airway:  Mallampati: I / II  Mouth Opening: Normal  TM Distance: Normal  Tongue: Normal  Neck ROM: Normal ROM    Dental:  Intact      Patient Active Problem List   Diagnosis    History of prostate cancer    RA (rheumatoid arthritis)    Erectile dysfunction    Peyronie's disease    Long-term use of immunosuppressant medication    Cephalic vein thrombosis-post op    Essential hypertension    Tension-type headache, not intractable    Lumbar foraminal stenosis    Parotid sialolithiasis    Gastroesophageal reflux disease without esophagitis    Mixed hyperlipidemia    Prostate cancer    Gross hematuria    Stricture of bulbous urethra in male    Drug-induced immunodeficiency    Postprocedural male urethral stricture     Results for orders placed during the hospital encounter of 05/21/20    Echo Color Flow Doppler? Yes    Interpretation Summary  · Concentric left ventricular remodeling.  · Normal left ventricular systolic function. The estimated ejection fraction is  55%.  · Normal LV diastolic function.  · Normal right ventricular systolic function.  · Normal central venous pressure (3 mmHg).  · The estimated PA systolic pressure is 16 mmHg.      Anesthesia Plan  Type of Anesthesia, risks & benefits discussed:    Anesthesia Type: Gen ETT, MAC, Gen Supraglottic Airway  Intra-op Monitoring Plan: Standard ASA Monitors  Post Op Pain Control Plan: multimodal analgesia  Induction:  IV  Airway Plan: Direct  Informed Consent: Informed consent signed with the Patient and all parties understand the risks and agree with anesthesia plan.  All questions answered.   ASA Score: 2  Day of Surgery Review of History & Physical: H&P Update referred to the surgeon/provider.I have interviewed and examined the patient. I have reviewed the patient's H&P dated: There are no significant changes. H&P completed by Anesthesiologist.    Ready For Surgery From Anesthesia Perspective.     .       back pain, xray negative, follow up with ortho

## 2024-10-08 NOTE — TRANSFER OF CARE
"Anesthesia Transfer of Care Note    Patient: Sachin Gonzalez    Procedure(s) Performed: Procedure(s) (LRB):  CYSTOSCOPY, WITH DIRECT VISION INTERNAL URETHROTOMY (N/A)    Patient location: PACU    Anesthesia Type: MAC    Transport from OR: Transported from OR on room air with adequate spontaneous ventilation    Post pain: adequate analgesia    Post assessment: no apparent anesthetic complications    Post vital signs: stable    Level of consciousness: sedated    Nausea/Vomiting: no nausea/vomiting    Complications: none    Transfer of care protocol was followed      Last vitals: Visit Vitals  /89   Pulse 67   Temp 36.8 °C (98.2 °F) (Temporal)   Resp 16   Ht 6' 7" (2.007 m)   Wt 106.3 kg (234 lb 3.8 oz)   SpO2 99%   BMI 26.39 kg/m²     "

## 2024-10-10 ENCOUNTER — CLINICAL SUPPORT (OUTPATIENT)
Dept: UROLOGY | Facility: CLINIC | Age: 59
End: 2024-10-10
Payer: COMMERCIAL

## 2024-10-10 DIAGNOSIS — N99.114 POSTPROCEDURAL MALE URETHRAL STRICTURE: Primary | ICD-10-CM

## 2024-10-10 NOTE — PROGRESS NOTES
Patient arrived for cath removal.  After verifying name, and date of birth patient was laid in the supine position patient was instructed to take a deep breath and 10 cc balloon was deflated and the catheter was removed. Patient was instructed to drink lots of fluids and to call us if he can't urinate today. Patient voiced understanding.    Shelia Briscoe LPN

## 2024-10-10 NOTE — ANESTHESIA POSTPROCEDURE EVALUATION
Anesthesia Post Evaluation    Patient: Sachin Gonzalez    Procedure(s) Performed: Procedure(s) (LRB):  CYSTOSCOPY, WITH DIRECT VISION INTERNAL URETHROTOMY (N/A)    Final Anesthesia Type: general      Patient location during evaluation: PACU  Patient participation: Yes- Able to Participate  Level of consciousness: awake and alert, oriented and awake  Post-procedure vital signs: reviewed and stable  Pain management: adequate  Airway patency: patent    PONV status at discharge: No PONV  Anesthetic complications: no      Cardiovascular status: blood pressure returned to baseline  Respiratory status: unassisted  Hydration status: euvolemic  Follow-up not needed.              Vitals Value Taken Time   /103 10/08/24 1020   Temp 36.2 °C (97.2 °F) 10/08/24 1017   Pulse 63 10/08/24 1022   Resp 12 10/08/24 1021   SpO2 100 % 10/08/24 1022   Vitals shown include unfiled device data.      Event Time   Out of Recovery 10:23:46         Pain/Deonte Score: No data recorded

## 2024-10-14 DIAGNOSIS — K21.9 GASTROESOPHAGEAL REFLUX DISEASE WITHOUT ESOPHAGITIS: ICD-10-CM

## 2024-10-14 NOTE — TELEPHONE ENCOUNTER
----- Message from Toyin sent at 10/14/2024 10:29 AM CDT -----  Contact: Sachin  Type:  RX Refill Request    Who Called: Sachin  Refill or New Rx:refill  RX Name and Strength:unknown name  How is the patient currently taking it? (ex. 1XDay):as prescribed  Is this a 30 day or 90 day RX:90  Preferred Pharmacy with phone number:  [facility] [address] [place], [place] - [address]  [address] [address]  Phone: [phone] Fax: [phone]  Local or Mail Order:local  Ordering Provider:Dr. Greenberg  Would the patient rather a call back or a response via [facility]? [facility]  Best Call Back Number:[phone]  Additional Information: Patient request to receive prescription refill for stomach medication. Please send patient a message to notify when sent.   Thank you,  GH

## 2024-10-14 NOTE — TELEPHONE ENCOUNTER
No care due was identified.  [facility] Embedded Care Due Messages. Reference number: [id number].   10/14/2024 3:44:57 PM CDT

## 2024-10-15 RX ORDER — PANTOPRAZOLE SODIUM 40 MG/1
40 TABLET, DELAYED RELEASE ORAL 2 TIMES DAILY
Qty: 60 TABLET | Refills: 5 | Status: SHIPPED | OUTPATIENT
Start: 2024-10-15

## 2024-10-22 ENCOUNTER — OFFICE VISIT (OUTPATIENT)
Dept: PODIATRY | Facility: CLINIC | Age: 59
End: 2024-10-22
Payer: COMMERCIAL

## 2024-10-22 VITALS — WEIGHT: 234.38 LBS | HEIGHT: 78 IN | BODY MASS INDEX: 27.12 KG/M2

## 2024-10-22 DIAGNOSIS — B35.1 ONYCHOMYCOSIS OF TOENAIL: ICD-10-CM

## 2024-10-22 DIAGNOSIS — G60.9 IDIOPATHIC PERIPHERAL NEUROPATHY: Primary | ICD-10-CM

## 2024-10-22 PROCEDURE — 11721 DEBRIDE NAIL 6 OR MORE: CPT | Mod: S$GLB,,, | Performed by: PODIATRIST

## 2024-10-22 PROCEDURE — 99999 PR PBB SHADOW E&M-EST. PATIENT-LVL III: CPT | Mod: PBBFAC,,, | Performed by: PODIATRIST

## 2024-10-22 PROCEDURE — 99499 UNLISTED E&M SERVICE: CPT | Mod: S$GLB,,, | Performed by: PODIATRIST

## 2024-10-22 RX ORDER — GABAPENTIN 300 MG/1
300 CAPSULE ORAL 2 TIMES DAILY
Qty: 60 CAPSULE | Refills: 0 | Status: SHIPPED | OUTPATIENT
Start: 2024-10-22

## 2024-11-07 ENCOUNTER — OFFICE VISIT (OUTPATIENT)
Dept: UROLOGY | Facility: CLINIC | Age: 59
End: 2024-11-07
Payer: COMMERCIAL

## 2024-11-07 VITALS — BODY MASS INDEX: 27.04 KG/M2 | HEIGHT: 78 IN | WEIGHT: 233.69 LBS

## 2024-11-07 DIAGNOSIS — N99.114 POSTPROCEDURAL MALE URETHRAL STRICTURE: Primary | ICD-10-CM

## 2024-11-07 DIAGNOSIS — R31.0 GROSS HEMATURIA: ICD-10-CM

## 2024-11-07 DIAGNOSIS — N52.9 ERECTILE DYSFUNCTION, UNSPECIFIED ERECTILE DYSFUNCTION TYPE: ICD-10-CM

## 2024-11-07 DIAGNOSIS — E29.1 HYPOGONADISM IN MALE: ICD-10-CM

## 2024-11-07 DIAGNOSIS — Z85.46 HISTORY OF PROSTATE CANCER: ICD-10-CM

## 2024-11-07 PROCEDURE — 99999 PR PBB SHADOW E&M-EST. PATIENT-LVL IV: CPT | Mod: PBBFAC,,, | Performed by: UROLOGY

## 2024-11-07 NOTE — PROGRESS NOTES
Chief Complaint:   Encounter Diagnoses   Name Primary?    Postprocedural male urethral stricture Yes    History of prostate cancer     Gross hematuria     Erectile dysfunction, unspecified erectile dysfunction type        HPI:   11/7/24- patient is here today to discuss CIC, no hematuria or issues voiding, still has not obtained a PSA.      Allergies:  No known allergies    Medications:  has a current medication list which includes the following prescription(s): pantoprazole, sucralfate, zinc sulfate, ascorbic acid (vitamin c), cholecalciferol (vitamin d3), famotidine, folic acid, hydrochlorothiazide, ibuprofen, magnesium, methotrexate, and metoprolol succinate.    Review of Systems:  General: No fever, chills, fatigability, or weight loss.  Skin: No rashes, itching, or changes in color or texture of skin.  Chest: Denies TYLER, cyanosis, wheezing, cough, and sputum production.  Abdomen: Appetite fine. No weight loss. Denies diarrhea, abdominal pain, hematemesis, or blood in stool.  Musculoskeletal: No joint stiffness or swelling. Denies back pain.  : As above.  All other review of systems negative.    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    PSH:   has a past surgical history that includes Prostatectomy (2011) and Esophagogastroduodenoscopy.    FamHx: family history includes Alcohol abuse in his father; Anxiety disorder in his mother; Arthritis in his mother; Hypertension in his mother; Kidney disease in his brother; No Known Problems in his sister; Stroke in his father.    SocHx:  reports that he has never smoked. He has never used smokeless tobacco. He reports that he does not drink alcohol and does not use drugs.      Physical Exam:  There were no vitals filed for this visit.  General: A&Ox3, no  apparent distress, no deformities  Neck: No masses, normal thyroid  Lungs: normal inspiration, no use of accessory muscles  Heart: normal pulse, no arrhythmias  Abdomen: Soft, NT, ND  Skin: The skin is warm and dry. No jaundice.  Ext: No c/c/e.  :  8/24- Test desc michael, no abnormalities of epididymus. Normal penile and scrotal skin. Meatus normal.    Labs/Studies:   pvr 26 ml 12/21  Cystoscopy bulbous urethral stricture 8/24  Cystoscopy bulbourethral stricture 10/23  Cystoscopy normal 5/21  PSA <0.01 5/23  CT urogram bladder outflow obstruction 3/21    Impression/Plan:      1. Prostate cancer-  RP pT3b Gl7 with adjuvant XRT  6/7/12    PSA pending from today and will continue yearly.    2. Urethral stricture disease-  DVIU  10/8/24, 4/22/24,  urethral dilation in [place]  2/15/24,  DVIU  5/31/22    Patient's flow is stable and will now teach CIC.  Eventually taper down to monthly.  Call with any complaints prior to the next appointment.    3. Gross hematuria- structural evaluation relatively clear, continue to monitor expectantly.  This could have all been secondary to radiation effect.  No evidence of recurrence.    4. Erectile dysfunction- sildenafil with the use of a MARÍA is relatively sufficient.  Currently only using the MARÍA though.    5. Hypogonadism- clinical symptoms of hypogonadism with decreased libido and energy, labs today and if abnormal, patient favors cutaneous therapy over injections.  See me back in 3 months and reassess.

## 2024-11-08 DIAGNOSIS — M05.79 RHEUMATOID ARTHRITIS INVOLVING MULTIPLE SITES WITH POSITIVE RHEUMATOID FACTOR: ICD-10-CM

## 2024-11-08 RX ORDER — METHOTREXATE 2.5 MG/1
15 TABLET ORAL
Qty: 77 TABLET | Refills: 0 | Status: SHIPPED | OUTPATIENT
Start: 2024-11-08

## 2024-11-13 ENCOUNTER — TELEPHONE (OUTPATIENT)
Dept: UROLOGY | Facility: CLINIC | Age: 59
End: 2024-11-13
Payer: COMMERCIAL

## 2024-11-17 ENCOUNTER — PATIENT MESSAGE (OUTPATIENT)
Dept: UROLOGY | Facility: CLINIC | Age: 59
End: 2024-11-17
Payer: COMMERCIAL

## 2024-11-20 ENCOUNTER — HOSPITAL ENCOUNTER (OUTPATIENT)
Dept: RADIOLOGY | Facility: HOSPITAL | Age: 59
Discharge: HOME | End: 2024-11-20
Attending: PODIATRIST
Payer: COMMERCIAL

## 2024-11-20 ENCOUNTER — OFFICE VISIT (OUTPATIENT)
Dept: PODIATRY | Facility: CLINIC | Age: 59
End: 2024-11-20
Payer: COMMERCIAL

## 2024-11-20 VITALS — BODY MASS INDEX: 27.04 KG/M2 | WEIGHT: 233.69 LBS | HEIGHT: 78 IN

## 2024-11-20 DIAGNOSIS — G60.9 IDIOPATHIC PERIPHERAL NEUROPATHY: ICD-10-CM

## 2024-11-20 DIAGNOSIS — G60.9 IDIOPATHIC PERIPHERAL NEUROPATHY: Primary | ICD-10-CM

## 2024-11-20 PROCEDURE — 73630 X-RAY EXAM OF FOOT: CPT | Mod: TC,50

## 2024-11-20 PROCEDURE — 3008F BODY MASS INDEX DOCD: CPT | Mod: CPTII,S$GLB,, | Performed by: PODIATRIST

## 2024-11-20 PROCEDURE — 73630 X-RAY EXAM OF FOOT: CPT | Mod: 26,,, | Performed by: RADIOLOGY

## 2024-11-20 PROCEDURE — 99213 OFFICE O/P EST LOW 20 MIN: CPT | Mod: S$GLB,,, | Performed by: PODIATRIST

## 2024-11-20 PROCEDURE — 1159F MED LIST DOCD IN RCRD: CPT | Mod: CPTII,S$GLB,, | Performed by: PODIATRIST

## 2024-11-20 PROCEDURE — 1160F RVW MEDS BY RX/DR IN RCRD: CPT | Mod: CPTII,S$GLB,, | Performed by: PODIATRIST

## 2024-11-20 PROCEDURE — 99999 PR PBB SHADOW E&M-EST. PATIENT-LVL IV: CPT | Mod: PBBFAC,,, | Performed by: PODIATRIST

## 2024-11-20 RX ORDER — METHOCARBAMOL 750 MG/1
750 TABLET, FILM COATED ORAL
Qty: 30 TABLET | Refills: 0 | Status: SHIPPED | OUTPATIENT
Start: 2024-11-20

## 2024-11-21 ENCOUNTER — PATIENT MESSAGE (OUTPATIENT)
Dept: UROLOGY | Facility: CLINIC | Age: 59
End: 2024-11-21
Payer: COMMERCIAL

## 2024-11-26 NOTE — PROGRESS NOTES
Subjective:     Patient ID: Sachin Gonzalez is a 59 y.o. male.    Chief Complaint: Foot Pain (Non-diabetic pt c/o BL ball of foot pain, pt rates 10/10 pain, PCP Santy Greenberg last seen 7/18/2024)    Sachin is a 59 y.o. male who presents to the podiatry clinic  with complaint of  bilateral foot pain. Onset of the symptoms was about a month ago. Precipitating event: none known. Current symptoms include: inability to bear weight. Aggravating factors: any weight bearing and inactivity. Symptoms have gradually worsened. Patient has had prior foot problems. Evaluation to date: plain films: abnormal   and EMG . Patients rates pain 10/10 on pain scale.    Patient Active Problem List   Diagnosis    History of prostate cancer    RA (rheumatoid arthritis)    Erectile dysfunction    Peyronie's disease    Long-term use of immunosuppressant medication    Cephalic vein thrombosis-post op    Essential hypertension    Tension-type headache, not intractable    Lumbar foraminal stenosis    Parotid sialolithiasis    Gastroesophageal reflux disease without esophagitis    Mixed hyperlipidemia    Prostate cancer    Gross hematuria    Stricture of bulbous urethra in male    Drug-induced immunodeficiency    Postprocedural male urethral stricture       Medication List with Changes/Refills   New Medications    METHOCARBAMOL (ROBAXIN) 750 MG TAB    Take 1 tablet (750 mg total) by mouth as needed (PRN nightly).   Current Medications    ASCORBIC ACID, VITAMIN C, (VITAMIN C) 250 MG TABLET    Take 250 mg by mouth once daily.    CHOLECALCIFEROL, VITAMIN D3, 25 MCG (1,000 UNIT) CHEW    Take by mouth.    FOLIC ACID (FOLVITE) 1 MG TABLET    Take 1 tablet (1,000 mcg total) by mouth once daily.    GABAPENTIN (NEURONTIN) 300 MG CAPSULE    Take 1 capsule (300 mg total) by mouth 2 (two) times daily.    IBUPROFEN (ADVIL,MOTRIN) 800 MG TABLET    Take 1 tablet (800 mg total) by mouth every 6 (six) hours as needed for Pain.    METHOTREXATE 2.5 MG  TAB    Take 6 tablets (15 mg total) by mouth every 7 days. This medication requires periodic lab monitoring    METOPROLOL SUCCINATE (TOPROL-XL) 50 MG 24 HR TABLET    TAKE 1 TABLET DAILY    MULTIVITAMIN CAPSULE    Take 1 capsule by mouth once daily.    OXYCODONE-ACETAMINOPHEN (PERCOCET) 5-325 MG PER TABLET    Take 1 tablet by mouth every 4 (four) hours as needed for Pain.    PANTOPRAZOLE (PROTONIX) 40 MG TABLET    Take 1 tablet (40 mg total) by mouth 2 (two) times daily.    PHENAZOPYRIDINE (PYRIDIUM) 200 MG TABLET    Take 1 tablet (200 mg total) by mouth 3 (three) times daily as needed (burning).    SILDENAFIL (VIAGRA) 100 MG TABLET    Take 1 tablet (100 mg total) by mouth daily as needed for Erectile Dysfunction.    SUCRALFATE (CARAFATE) 1 GRAM TABLET    Take 1 tablet (1 g total) by mouth 3 (three) times daily as needed (reflux indigestion).    TADALAFIL (CIALIS) 20 MG TAB    Take 1 tablet (20 mg total) by mouth every 24 hours as needed (erectile dysfunction).    TRIAMCINOLONE ACETONIDE 0.1% (KENALOG) 0.1 % CREAM    Apply topically 2 (two) times daily.    ZINC SULFATE (ZINC-220 ORAL)    Take 1 tablet by mouth as needed.       Review of patient's allergies indicates:   Allergen Reactions    Pollen extracts        Past Surgical History:   Procedure Laterality Date    COLONOSCOPY N/A 4/21/2021    Procedure: COLONOSCOPY covid test scheduled on 4/19/21;  Surgeon: Darrell Worthington MD;  Location: [facility];  Service: Endoscopy;  Laterality: N/A;    CYSTOURETHROSCOPY WITH DIRECT VISION INTERNAL URETHROTOMY N/A 5/31/2022    Procedure: CYSTOSCOPY, WITH DIRECT VISION INTERNAL URETHROTOMY;  Surgeon: Yaniv Murray MD;  Location: [facility] OR;  Service: Urology;  Laterality: N/A;    CYSTOURETHROSCOPY WITH DIRECT VISION INTERNAL URETHROTOMY N/A 4/22/2024    Procedure: CYSTOSCOPY, WITH DIRECT VISION INTERNAL URETHROTOMY;  Surgeon: Yaniv Murray MD;  Location: [facility] OR;  Service: Urology;  Laterality: N/A;     "CYSTOURETHROSCOPY WITH DIRECT VISION INTERNAL URETHROTOMY N/A 10/8/2024    Procedure: CYSTOSCOPY, WITH DIRECT VISION INTERNAL URETHROTOMY;  Surgeon: Yaniv Murray MD;  Location: [facility] OR;  Service: Urology;  Laterality: N/A;    ESOPHAGOGASTRODUODENOSCOPY      ESOPHAGOGASTRODUODENOSCOPY N/A 4/5/2021    Procedure: EGD (ESOPHAGOGASTRODUODENOSCOPY) Rapid Covid test needed;  Surgeon: Darrell Worthington MD;  Location: [facility];  Service: Endoscopy;  Laterality: N/A;    OSTEOTOMY OF METATARSAL BONE Right 11/21/2022    Procedure: OSTEOTOMY, METATARSAL BONE;  Surgeon: Daniel Ramirez DPM;  Location: [facility] OR;  Service: Podiatry;  Laterality: Right;    PROSTATECTOMY  2011    RELEASE OF CONTRACTURE OF JOINT Right 11/21/2022    Procedure: RELEASE, CONTRACTURE, JOINT;  Surgeon: Daniel Ramirez DPM;  Location: [facility] OR;  Service: Podiatry;  Laterality: Right;    RESECTION OF GASTROCNEMIUS MUSCLE Right 11/21/2022    Procedure: RESECTION, MUSCLE, GASTROCNEMIUS;  Surgeon: Daniel Ramirez DPM;  Location: [facility] OR;  Service: Podiatry;  Laterality: Right;       Family History   Problem Relation Name Age of Onset    Stroke Father      Alcohol abuse Father      Arthritis Mother      Hypertension Mother      Anxiety disorder Mother      No Known Problems Sister      Kidney disease Brother         Social History     Socioeconomic History    Marital status:    Tobacco Use    Smoking status: Never    Smokeless tobacco: Never   Substance and Sexual Activity    Alcohol use: Never    Drug use: No    Sexual activity: Yes     Partners: Female   Social History Narrative    No pets or smokers in household.     Social Drivers of Health     Stress: No Stress Concern Present (12/12/2019)    [employer] [place] [employer] - Occupational Stress Questionnaire     Feeling of Stress : Not at all       Vitals:    11/20/24 1453   Weight: 106 kg (233 lb 11 oz)   Height: 6' 7" (2.007 m)   PainSc: 10-Worst pain ever       Hemoglobin A1C "   Date Value Ref Range Status   01/13/2023 5.3 4.0 - 5.6 % Final     Comment:     ADA Screening Guidelines:  5.7-6.4%  Consistent with prediabetes  >or=6.5%  Consistent with diabetes    High levels of fetal hemoglobin interfere with the HbA1C  assay. Heterozygous hemoglobin variants (HbS, HgC, etc)do  not significantly interfere with this assay.   However, presence of multiple variants may affect accuracy.     04/12/2017 5.7 4.5 - 6.2 % Final     Comment:     According to ADA guidelines, hemoglobin A1C <7.0% represents  optimal control in non-pregnant diabetic patients.  Different  metrics may apply to specific populations.   Standards of Medical Care in Diabetes - 2016.  For the purpose of screening for the presence of diabetes:  <5.7%     Consistent with the absence of diabetes  5.7-6.4%  Consistent with increasing risk for diabetes   (prediabetes)  >or=6.5%  Consistent with diabetes  Currently no consensus exists for use of hemoglobin A1C  for diagnosis of diabetes for children.         Review of Systems   Constitutional:  Negative for chills and fever.   Respiratory:  Negative for shortness of breath.    Cardiovascular:  Negative for chest pain, palpitations, orthopnea, claudication and leg swelling.   Gastrointestinal:  Negative for diarrhea, nausea and vomiting.   Musculoskeletal:  Negative for joint pain.   Skin:  Negative for rash.   Neurological:  Positive for tingling and sensory change.   Psychiatric/Behavioral: Negative.               Objective:      PHYSICAL EXAM: Apperance: Alert and orient in no distress,well developed, and with good attention to grooming and body habits  LOWER EXTREMITY EXAM:  VASCULAR: Dorsalis pedis pulses 2/4 bilateral and Posterior Tibial pulses 2/4 bilateral. Capillary fill time <4 seconds bilateral. No edema observed bilateral. Varicosities absent bilateral. Skin temperature of the lower extremities is warm to warm, proximal to distal. Hair growth WNL bilateral.  NEUROLOGICAL:  Light touch, sharp-dull, proprioception all present and equal bilaterally.  Vibratory sensation diminished at bilateral hallux and intact at bilateral navicular. Protective sensation absent at 6/10 sites as tested with a [place] 5.07 monofilament.   MUSCULOSKELETAL: Muscle strength is 5/5 for foot inverters, everters, plantarflexors, and dorsiflexors. Muscle tone is normal          Assessment:       ICD-10-CM ICD-9-CM   1. Idiopathic peripheral neuropathy  G60.9 356.9       Plan:   Idiopathic peripheral neuropathy  -     X-Ray Foot Complete Bilateral; Future; Expected date: 11/20/2024  -     methocarbamoL (ROBAXIN) 750 MG Tab; Take 1 tablet (750 mg total) by mouth as needed (PRN nightly).  Dispense: 30 tablet; Refill: 0    I counseled the patient on his conditions, regarding findings of my examination, my impressions, and usual treatment plan.   Discussed with patient treatments for neuropathy consisting of topical or oral medication.  Ordered bilateral foot x-rays to be completed today.   Prescribed Robaxin 750mg to be taken as needed at night.   Recommendations given for over-the-counter medicine such as Two [place] Go[place] and/or Capsaicin.  Counseled patient on daily foot inspections and proper shoe gear.  Patient to return if symptoms persist or worsen.          Reina Guzman DPM  [facility]

## 2024-11-28 ENCOUNTER — PATIENT MESSAGE (OUTPATIENT)
Dept: UROLOGY | Facility: CLINIC | Age: 59
End: 2024-11-28
Payer: COMMERCIAL

## 2024-11-28 DIAGNOSIS — E29.1 HYPOGONADISM IN MALE: ICD-10-CM

## 2024-11-28 DIAGNOSIS — K21.9 GASTROESOPHAGEAL REFLUX DISEASE WITHOUT ESOPHAGITIS: Primary | ICD-10-CM

## 2024-11-28 RX ORDER — TESTOSTERONE 20.25 MG/1.25G
4 GEL TOPICAL DAILY
Qty: 120 PACKET | Refills: 5 | Status: SHIPPED | OUTPATIENT
Start: 2024-11-28

## 2024-12-01 ENCOUNTER — PATIENT MESSAGE (OUTPATIENT)
Dept: PODIATRY | Facility: CLINIC | Age: 59
End: 2024-12-01
Payer: COMMERCIAL

## 2024-12-02 ENCOUNTER — TELEPHONE (OUTPATIENT)
Dept: UROLOGY | Facility: CLINIC | Age: 59
End: 2024-12-02
Payer: COMMERCIAL

## 2024-12-02 ENCOUNTER — PATIENT MESSAGE (OUTPATIENT)
Dept: UROLOGY | Facility: CLINIC | Age: 59
End: 2024-12-02
Payer: COMMERCIAL

## 2024-12-02 ENCOUNTER — PATIENT MESSAGE (OUTPATIENT)
Dept: PODIATRY | Facility: CLINIC | Age: 59
End: 2024-12-02
Payer: COMMERCIAL

## 2024-12-02 NOTE — TELEPHONE ENCOUNTER
Patient notified.    ----- Message from Yaniv Murray MD sent at 11/28/2024 12:36 PM CST -----  Will send in testosterone cream, if you can let him kmow that I sent it in; 4 pumps daily, 2 per each shoulder

## 2024-12-03 DIAGNOSIS — G60.9 IDIOPATHIC PERIPHERAL NEUROPATHY: Primary | ICD-10-CM

## 2024-12-03 RX ORDER — PREGABALIN 100 MG/1
100 CAPSULE ORAL 2 TIMES DAILY
Qty: 60 CAPSULE | Refills: 0 | Status: SHIPPED | OUTPATIENT
Start: 2024-12-03

## 2024-12-11 ENCOUNTER — TELEPHONE (OUTPATIENT)
Dept: RHEUMATOLOGY | Facility: CLINIC | Age: 59
End: 2024-12-11
Payer: COMMERCIAL

## 2024-12-11 NOTE — TELEPHONE ENCOUNTER
Medication refill requested for methotrexate.  Patient is a transfer from Dr. Jackson.  T.DENISE to patient to book an in office appointment.  Patient states he needs to check and see who he wants as a provider here at main campus or somewhere else.  States he will call once he makes a decision.

## 2024-12-12 ENCOUNTER — TELEPHONE (OUTPATIENT)
Dept: UROLOGY | Facility: CLINIC | Age: 59
End: 2024-12-12
Payer: COMMERCIAL

## 2024-12-12 NOTE — TELEPHONE ENCOUNTER
Pt placed on waiting list for a sooner appt      ----- Message from [employer] sent at 12/12/2024  3:28 PM CST -----  Contact: Sachin  .Type:  Sooner Apoointment Request    Caller is requesting a sooner appointment.  Caller declined first available appointment listed below.  Caller will not accept being placed on the waitlist and is requesting a message be sent to doctor.  Name of Caller:Sachin  When is the first available appointment?02/2025  Symptoms:3 month f/u  Would the patient rather a call back or a response via [facility]? Call back  Best Call Back Number:.[phone] (home)    Additional Information: Patient had an appointment for 4:00 and cannot make it, would like to reschedule. Please call back

## 2025-01-28 ENCOUNTER — OFFICE VISIT (OUTPATIENT)
Dept: PODIATRY | Facility: CLINIC | Age: 60
End: 2025-01-28
Payer: COMMERCIAL

## 2025-01-28 VITALS — WEIGHT: 233.69 LBS | BODY MASS INDEX: 27.04 KG/M2 | HEIGHT: 78 IN

## 2025-01-28 DIAGNOSIS — B35.1 ONYCHOMYCOSIS OF TOENAIL: ICD-10-CM

## 2025-01-28 DIAGNOSIS — M54.16 LUMBAR RADICULAR PAIN: Primary | ICD-10-CM

## 2025-01-28 DIAGNOSIS — M48.061 LUMBAR FORAMINAL STENOSIS: ICD-10-CM

## 2025-01-28 DIAGNOSIS — G60.9 IDIOPATHIC PERIPHERAL NEUROPATHY: ICD-10-CM

## 2025-01-28 PROCEDURE — 11721 DEBRIDE NAIL 6 OR MORE: CPT | Mod: S$GLB,,, | Performed by: PODIATRIST

## 2025-01-28 PROCEDURE — 99214 OFFICE O/P EST MOD 30 MIN: CPT | Mod: 25,S$GLB,, | Performed by: PODIATRIST

## 2025-01-28 PROCEDURE — 1160F RVW MEDS BY RX/DR IN RCRD: CPT | Mod: CPTII,S$GLB,, | Performed by: PODIATRIST

## 2025-01-28 PROCEDURE — 99999 PR PBB SHADOW E&M-EST. PATIENT-LVL IV: CPT | Mod: PBBFAC,,, | Performed by: PODIATRIST

## 2025-01-28 PROCEDURE — 1159F MED LIST DOCD IN RCRD: CPT | Mod: CPTII,S$GLB,, | Performed by: PODIATRIST

## 2025-01-28 PROCEDURE — 3008F BODY MASS INDEX DOCD: CPT | Mod: CPTII,S$GLB,, | Performed by: PODIATRIST

## 2025-01-28 RX ORDER — LIDOCAINE 50 MG/G
1 PATCH TOPICAL DAILY
Qty: 30 PATCH | Refills: 1 | Status: SHIPPED | OUTPATIENT
Start: 2025-01-28

## 2025-01-28 NOTE — PROGRESS NOTES
Subjective:     Patient ID: Sachin Gonzalez is a 59 y.o. male.    Chief Complaint: Nail Care (Non-diabetic pt wears tennis shoes, PCP Santy Greenberg last seen 7/18/2024)    Sachin is a 59 y.o. male who presents to the podiatry clinic with complaint of  bilateral foot pain. Onset of the symptoms was about a month ago. Precipitating event: none known. Current symptoms include: inability to bear weight. Aggravating factors: any weight bearing and inactivity. Symptoms have gradually worsened. Patient has had prior foot problems. Evaluation to date: plain films: abnormal   and EMG . Patients rates pain 10/10 on pain scale. Patient also complains of long toenails. Patient is accompanied by his wife today.     Patient Active Problem List   Diagnosis    History of prostate cancer    RA (rheumatoid arthritis)    Erectile dysfunction    Peyronie's disease    Long-term use of immunosuppressant medication    Cephalic vein thrombosis-post op    Essential hypertension    Tension-type headache, not intractable    Lumbar foraminal stenosis    Parotid sialolithiasis    Gastroesophageal reflux disease without esophagitis    Mixed hyperlipidemia    Prostate cancer    Gross hematuria    Stricture of bulbous urethra in male    Drug-induced immunodeficiency    Postprocedural male urethral stricture       Medication List with Changes/Refills   New Medications    LIDOCAINE (LIDODERM) 5 %    Place 1 patch onto the skin once daily. Remove & Discard patch within 12 hours or as directed by MD   Current Medications    ASCORBIC ACID, VITAMIN C, (VITAMIN C) 250 MG TABLET    Take 250 mg by mouth once daily.    CHOLECALCIFEROL, VITAMIN D3, 25 MCG (1,000 UNIT) CHEW    Take by mouth.    FOLIC ACID (FOLVITE) 1 MG TABLET    Take 1 tablet (1,000 mcg total) by mouth once daily.    IBUPROFEN (ADVIL,MOTRIN) 800 MG TABLET    Take 1 tablet (800 mg total) by mouth every 6 (six) hours as needed for Pain.    METHOCARBAMOL (ROBAXIN) 750 MG TAB    Take  1 tablet (750 mg total) by mouth as needed (PRN nightly).    METHOTREXATE 2.5 MG TAB    Take 6 tablets (15 mg total) by mouth every 7 days. This medication requires periodic lab monitoring    METOPROLOL SUCCINATE (TOPROL-XL) 50 MG 24 HR TABLET    TAKE 1 TABLET DAILY    MULTIVITAMIN CAPSULE    Take 1 capsule by mouth once daily.    OXYCODONE-ACETAMINOPHEN (PERCOCET) 5-325 MG PER TABLET    Take 1 tablet by mouth every 4 (four) hours as needed for Pain.    PANTOPRAZOLE (PROTONIX) 40 MG TABLET    Take 1 tablet (40 mg total) by mouth 2 (two) times daily.    PHENAZOPYRIDINE (PYRIDIUM) 200 MG TABLET    Take 1 tablet (200 mg total) by mouth 3 (three) times daily as needed (burning).    PREGABALIN (LYRICA) 100 MG CAPSULE    Take 1 capsule (100 mg total) by mouth 2 (two) times daily.    SILDENAFIL (VIAGRA) 100 MG TABLET    Take 1 tablet (100 mg total) by mouth daily as needed for Erectile Dysfunction.    SUCRALFATE (CARAFATE) 1 GRAM TABLET    Take 1 tablet (1 g total) by mouth 3 (three) times daily as needed (reflux indigestion).    TADALAFIL (CIALIS) 20 MG TAB    Take 1 tablet (20 mg total) by mouth every 24 hours as needed (erectile dysfunction).    TESTOSTERONE (ANDROGEL) 1.62 % (20.25 MG/1.25 GRAM) GLPK    Place 5 g onto the skin once daily.    TRIAMCINOLONE ACETONIDE 0.1% (KENALOG) 0.1 % CREAM    Apply topically 2 (two) times daily.    ZINC SULFATE (ZINC-220 ORAL)    Take 1 tablet by mouth as needed.       Review of patient's allergies indicates:   Allergen Reactions    Pollen extracts        Past Surgical History:   Procedure Laterality Date    COLONOSCOPY N/A 4/21/2021    Procedure: COLONOSCOPY covid test scheduled on 4/19/21;  Surgeon: Darrell Worthington MD;  Location: [facility];  Service: Endoscopy;  Laterality: N/A;    CYSTOURETHROSCOPY WITH DIRECT VISION INTERNAL URETHROTOMY N/A 5/31/2022    Procedure: CYSTOSCOPY, WITH DIRECT VISION INTERNAL URETHROTOMY;  Surgeon: Yaniv Murray MD;  Location[facility];   Service: Urology;  Laterality: N/A;    CYSTOURETHROSCOPY WITH DIRECT VISION INTERNAL URETHROTOMY N/A 4/22/2024    Procedure: CYSTOSCOPY, WITH DIRECT VISION INTERNAL URETHROTOMY;  Surgeon: Yaniv Murray MD;  Location: [facility];  Service: Urology;  Laterality: N/A;    CYSTOURETHROSCOPY WITH DIRECT VISION INTERNAL URETHROTOMY N/A 10/8/2024    Procedure: CYSTOSCOPY, WITH DIRECT VISION INTERNAL URETHROTOMY;  Surgeon: Yaniv Murray MD;  Location: [facility];  Service: Urology;  Laterality: N/A;    ESOPHAGOGASTRODUODENOSCOPY      ESOPHAGOGASTRODUODENOSCOPY N/A 4/5/2021    Procedure: EGD (ESOPHAGOGASTRODUODENOSCOPY) Rapid Covid test needed;  Surgeon: Darrell Worthington MD;  Location: [facility];  Service: Endoscopy;  Laterality: N/A;    OSTEOTOMY OF METATARSAL BONE Right 11/21/2022    Procedure: OSTEOTOMY, METATARSAL BONE;  Surgeon: Daniel Ramirez DPM;  Location: [facility];  Service: Podiatry;  Laterality: Right;    PROSTATECTOMY  2011    RELEASE OF CONTRACTURE OF JOINT Right 11/21/2022    Procedure: RELEASE, CONTRACTURE, JOINT;  Surgeon: Daniel Ramirez DPM;  Location: [facility];  Service: Podiatry;  Laterality: Right;    RESECTION OF GASTROCNEMIUS MUSCLE Right 11/21/2022    Procedure: RESECTION, MUSCLE, GASTROCNEMIUS;  Surgeon: Daniel Ramirez DPM;  Location: [facility];  Service: Podiatry;  Laterality: Right;       Family History   Problem Relation Name Age of Onset    Stroke Father      Alcohol abuse Father      Arthritis Mother      Hypertension Mother      Anxiety disorder Mother      No Known Problems Sister      Kidney disease Brother         Social History     Socioeconomic History    Marital status:    Tobacco Use    Smoking status: Never    Smokeless tobacco: Never   Substance and Sexual Activity    Alcohol use: Never    Drug use: No    Sexual activity: Yes     Partners: Female   Social History Narrative    No pets or smokers in household.     Social Drivers of Health     Stress: No Stress Concern  "Present (12/12/2019)    [employer] - Occupational Stress Questionnaire     Feeling of Stress : Not at all       Vitals:    01/28/25 1558   Weight: 106 kg (233 lb 11 oz)   Height: 6' 7" (2.007 m)   PainSc: 0-No pain       Hemoglobin A1C   Date Value Ref Range Status   01/13/2023 5.3 4.0 - 5.6 % Final     Comment:     ADA Screening Guidelines:  5.7-6.4%  Consistent with prediabetes  >or=6.5%  Consistent with diabetes    High levels of fetal hemoglobin interfere with the HbA1C  assay. Heterozygous hemoglobin variants (HbS, HgC, etc)do  not significantly interfere with this assay.   However, presence of multiple variants may affect accuracy.     04/12/2017 5.7 4.5 - 6.2 % Final     Comment:     According to ADA guidelines, hemoglobin A1C <7.0% represents  optimal control in non-pregnant diabetic patients.  Different  metrics may apply to specific populations.   Standards of Medical Care in Diabetes - 2016.  For the purpose of screening for the presence of diabetes:  <5.7%     Consistent with the absence of diabetes  5.7-6.4%  Consistent with increasing risk for diabetes   (prediabetes)  >or=6.5%  Consistent with diabetes  Currently no consensus exists for use of hemoglobin A1C  for diagnosis of diabetes for children.         Review of Systems   Constitutional:  Negative for chills and fever.   Respiratory:  Negative for shortness of breath.    Cardiovascular:  Negative for chest pain, palpitations, orthopnea, claudication and leg swelling.   Gastrointestinal:  Negative for diarrhea, nausea and vomiting.   Musculoskeletal:  Negative for joint pain.   Skin:  Negative for rash.   Neurological:  Positive for tingling and sensory change.   Psychiatric/Behavioral: Negative.           Objective:      PHYSICAL EXAM: Apperance: Alert and orient in no distress,well developed, and with good attention to grooming and body habits  LOWER EXTREMITY EXAM:  VASCULAR: Dorsalis pedis pulses 2/4 bilateral and " Posterior Tibial pulses 2/4 bilateral. Capillary fill time <4 seconds bilateral. No edema observed bilateral. Varicosities absent bilateral. Skin temperature of the lower extremities is warm to warm, proximal to distal. Hair growth WNL bilateral.  DERMATOLOGICAL: No skin rashes, subcutaneous nodules, lesions, or ulcers observed bilateral. Nails 1,2,3,4,5 bilateral elongated, thickened, and discolored with subungual debris. Webspaces 1,2,3,4 clean, dry and without evidence of break in skin integrity bilateral.   NEUROLOGICAL: Light touch, sharp-dull, proprioception all present and equal bilaterally.  Vibratory sensation diminished at bilateral hallux and intact at bilateral navicular. Protective sensation absent at 6/10 sites as tested with a [place] 5.07 monofilament.   MUSCULOSKELETAL: Muscle strength is 5/5 for foot inverters, everters, plantarflexors, and dorsiflexors. Muscle tone is normal          Assessment:       ICD-10-CM ICD-9-CM   1. Lumbar radicular pain  M54.16 724.4   2. Lumbar foraminal stenosis  M48.061 724.02   3. Idiopathic peripheral neuropathy  G60.9 356.9   4. Onychomycosis of toenail  B35.1 110.1       Plan:   Lumbar radicular pain  -     Ambulatory referral/consult to Pain Clinic; Future; Expected date: 02/04/2025  -     LIDOcaine (LIDODERM) 5 %; Place 1 patch onto the skin once daily. Remove & Discard patch within 12 hours or as directed by MD  Dispense: 30 patch; Refill: 1    Lumbar foraminal stenosis  -     Ambulatory referral/consult to Pain Clinic; Future; Expected date: 02/04/2025  -     LIDOcaine (LIDODERM) 5 %; Place 1 patch onto the skin once daily. Remove & Discard patch within 12 hours or as directed by MD  Dispense: 30 patch; Refill: 1    Idiopathic peripheral neuropathy    Onychomycosis of toenail    I counseled the patient on his conditions, regarding findings of my examination, my impressions, and usual treatment plan.   Reviewed previous foot x-rays in exam room with  patient.   Patient to resume Lyrica as need at night.   Prescription written for Lidoderm patches.   Referral completed for interventional pain clinic.   Appointment spent on education about the neuropathy, and prevention of limb loss.  Shoe inspection. Patient instructed on proper foot hygeine. We discussed wearing proper shoe gear, daily foot inspections, never walking without protective shoe gear, never putting sharp instruments to feet.    With patient's permission, nails 1-5 bilateral were trimmed in length and thickness aggressively to their soft tissue attachment mechanically and with electric , removing all offending nail and debris. Patient relates relief following the procedure.  Patient  will continue to monitor the areas daily, inspect feet, wear protective shoe gear when ambulatory, moisturizer to maintain skin integrity.   Patient to return 2 months or sooner if needed.           Reina Guzman DPM  [facility]

## 2025-02-19 ENCOUNTER — NURSE TRIAGE (OUTPATIENT)
Dept: ADMINISTRATIVE | Facility: CLINIC | Age: 60
End: 2025-02-19
Payer: COMMERCIAL

## 2025-02-19 NOTE — TELEPHONE ENCOUNTER
Reason for Disposition   Patient wants to be seen    Additional Information   Negative: SEVERE difficulty breathing (e.g., struggling for each breath, speaks in single words)   Negative: Bluish (or gray) lips or face   Negative: Shock suspected (e.g., cold/pale/clammy skin, too weak to stand, low BP, rapid pulse)   Negative: Sounds like a life-threatening emergency to the triager   Negative: Headache and stiff neck (can't touch chin to chest)   Negative: Chest pain  (Exception: MILD central chest pain, present only when coughing.)   Negative: Difficulty breathing that is not severe and not relieved by cleaning out the nose   Negative: Patient sounds very sick or weak to the triager   Negative: Fever > 104 F (40 C)   Negative: Fever > 101 F (38.3 C) and over 60 years of age   Negative: Fever > 100 F (37.8 C) and diabetes mellitus or weak immune system (e.g., HIV positive, cancer chemo, splenectomy, organ transplant, chronic steroids)   Negative: Fever > 100 F (37.8 C) and bedridden (e.g., CVA, chronic illness, recovering from surgery)   Negative: Using nasal washes and pain medicine > 24 hours and sinus pain (lower forehead, cheekbone, or eye) persists   Negative: Fever present > 3 days (72 hours)   Negative: Fever returns after gone for over 24 hours and symptoms worse (or not improved)   Negative: Earache   Negative: Patient requests antiviral medicine for influenza and flu symptoms present < 48 hours   Negative: HIGH RISK (e.g., age > 64 years, pregnant, HIV+, chronic medical condition) and flu symptoms   Negative: Nasal discharge present > 10 days   Negative: Cough present > 3 weeks    Protocols used: Influenza (Flu) - Seasonal-A-OH  Pt states he may have the flu. States he has a temp of 100 and congestion. Denies chest pain or difficulty breathing. Requesting an office appointment. Appointment set with PCP tomorrow for 8:40 am. Instructed to call [facility] back if new/worsening symptoms develop. Pt verbalized  understanding.

## 2025-02-20 ENCOUNTER — HOSPITAL ENCOUNTER (OUTPATIENT)
Dept: RADIOLOGY | Facility: HOSPITAL | Age: 60
Discharge: HOME | End: 2025-02-20
Attending: PEDIATRICS
Payer: COMMERCIAL

## 2025-02-20 ENCOUNTER — OFFICE VISIT (OUTPATIENT)
Dept: INTERNAL MEDICINE | Facility: CLINIC | Age: 60
End: 2025-02-20
Payer: COMMERCIAL

## 2025-02-20 VITALS
OXYGEN SATURATION: 100 % | WEIGHT: 233.94 LBS | BODY MASS INDEX: 27.07 KG/M2 | SYSTOLIC BLOOD PRESSURE: 144 MMHG | DIASTOLIC BLOOD PRESSURE: 92 MMHG | HEART RATE: 100 BPM | HEIGHT: 78 IN | TEMPERATURE: 100 F

## 2025-02-20 DIAGNOSIS — J10.1 INFLUENZA A: ICD-10-CM

## 2025-02-20 DIAGNOSIS — J06.9 UPPER RESPIRATORY TRACT INFECTION, UNSPECIFIED TYPE: Primary | ICD-10-CM

## 2025-02-20 DIAGNOSIS — J06.9 UPPER RESPIRATORY TRACT INFECTION, UNSPECIFIED TYPE: ICD-10-CM

## 2025-02-20 LAB
CTP QC/QA: YES
MOLECULAR STREP A: NEGATIVE
POC MOLECULAR INFLUENZA A AGN: POSITIVE
POC MOLECULAR INFLUENZA B AGN: NEGATIVE
SARS-COV-2 RDRP RESP QL NAA+PROBE: NEGATIVE

## 2025-02-20 PROCEDURE — 71046 X-RAY EXAM CHEST 2 VIEWS: CPT | Mod: TC

## 2025-02-20 PROCEDURE — 99999 PR PBB SHADOW E&M-EST. PATIENT-LVL V: CPT | Mod: PBBFAC,,, | Performed by: PEDIATRICS

## 2025-02-20 RX ORDER — OSELTAMIVIR PHOSPHATE 75 MG/1
75 CAPSULE ORAL 2 TIMES DAILY
Qty: 10 CAPSULE | Refills: 0 | Status: SHIPPED | OUTPATIENT
Start: 2025-02-20 | End: 2025-02-25

## 2025-02-20 NOTE — PROGRESS NOTES
Patient ID: Sachin Gonzalez is a 59 y.o. male.    Chief Complaint: Fever and Flu like Symptoms    History of Present Illness    CHIEF COMPLAINT:  Patient presents today with abdominal pains    HISTORY OF PRESENT ILLNESS:  He reports onset of symptoms on Tuesday with abdominal pains. He also experiences upper back and chest pain, and feels his equilibrium is off.    MEDICAL HISTORY:  He has old chest scarring noted on chest XR.    IMMUNIZATIONS:  He has received flu and COVID vaccines, but has not received the RSV vaccine.    PMH, PSH, SH, FH reviewed with patient.      ROS:  General: -fever, -chills, -fatigue, -weight gain, -weight loss  Eyes: -vision changes, -redness, -discharge  ENT: -ear pain, -nasal congestion, -sore throat  Cardiovascular: +chest pain, -palpitations, -lower extremity edema  Respiratory: -cough, -shortness of breath  Gastrointestinal: +abdominal pain, -nausea, -vomiting, -diarrhea, -constipation, -blood in stool  Genitourinary: -dysuria, -hematuria, -frequency  Musculoskeletal: -joint pain, -muscle pain  Skin: -rash, -lesion  Neurological: -headache, -dizziness, -numbness, -tingling  Psychiatric: -anxiety, -depression, -sleep difficulty         Exam:  Physical Exam    General: No acute distress. Well-developed. Well-nourished.  Eyes: EOMI. Sclerae anicteric.  HENT: Normocephalic. Atraumatic. Nares patent. Moist oral mucosa. Throat appears raw. No pus in throat. Postnasal drip.  Ears: Bilateral TMs clear. Bilateral EACs clear.  Cardiovascular: Regular rate. Regular rhythm. No murmurs. No rubs. No gallops. Normal S1, S2.  Respiratory: Normal respiratory effort. Clear to auscultation bilaterally. No rales. No rhonchi. No wheezing.  Abdomen: Soft. Non-tender. Non-distended. Normoactive bowel sounds.  Musculoskeletal: No  obvious deformity.  Extremities: No lower extremity edema.  Neurological: Alert & oriented x3. No slurred speech. Normal gait.  Psychiatric: Normal mood. Normal affect. Good  insight. Good judgment.  Skin: Warm. Dry. No rash.         Assessment/Plan:  Upper respiratory tract infection, unspecified type  -     POCT COVID-19 Rapid Screening  -     POCT Influenza A/B Molecular  -     POCT Strep A, Molecular  -     X-Ray Chest PA And Lateral; Future; Expected date: 02/20/2025    Influenza A  -     oseltamivir (TAMIFLU) 75 MG capsule; Take 1 capsule (75 mg total) by mouth 2 (two) times daily. for 5 days  Dispense: 10 capsule; Refill: 0         Assessment & Plan    IMPRESSION:  - Diagnosed patient with influenza A based on positive test result  - Ruled out COVID-19 and strep throat with negative test results  - Noted patient's flu vaccine history, acknowledging its partial effectiveness in reducing severity  - Assessed chest XR, revealing old scarring but no new pneumonia  - Determined Tamiflu appropriate for treatment, as patient is within window for effective use  - Deferred RSV vaccination due to current illness    INFLUENZA A:  - Performed influenza A test, which returned positive results.  - Diagnosed the patient with influenza A, currently the prevalent strain affecting the entire respiratory system.  - Explained that influenza is a viral infection unresponsive to antibiotics.  - Educated the patient about flu vaccine's effectiveness in reducing hospitalization and death risk by 85%, though not 100% effective.  - Informed about Tamiflu as an antiviral equivalent to antibiotics for bacterial infections.  - Prescribed Tamiflu: 1st dose in the morning, 2nd dose in the afternoon/evening, then twice daily for 5 days.  - Recommend OTC pain relievers (acetaminophen, ibuprofen) as needed for fever management.  - Advised on the infectious period of approximately 10 days for influenza.  - Instructed the patient to wear a mask when out due to being infectious.  - Emphasized the importance of hydration and rest.  - Directed the patient to contact the office if symptoms worsen or if household members  develop flu-like symptoms within 7-10 days.  - Patient to wear a mask when out in public during infectious period.  - Recommend rest and increased hydration intake.    PHYSICAL EXAMINATION FINDINGS:  - Conducted physical exam revealing clear heart and lungs, raw throat without pus, and clear eardrums and ear canals.  - Performed physical exam revealing clear heart and lungs.  - Observed raw throat with no pus and some postnasal drip during physical exam.    CHEST X-RAY:  - Obtained chest XR showing old scarring but no new findings or pneumonia.  - Performed chest XR.  - Identified old scarring on chest XR with no new findings or pneumonia.    STREP TEST:  - Conducted strep test.    COVID-19 TEST:  - Conducted COVID-19 test.    VACCINATIONS:  - Noted that the patient has received flu and COVID-19 vaccinations.  - Noted that the patient has received flu and COVID-19 vaccinations, but not the RSV vaccine.    RSV VACCINATION:  - Educated the patient about the new RSV vaccine requiring a one-time dose.  - Recommend scheduling RSV vaccination after recovery from current illness.    FOLLOW UP:  - Scheduled follow up in 1-2 weeks.          Visit today included increased complexity associated with the care of the episodic problem  addressed and managing the longitudinal care of the patient due to the serious and/or complex managed problem(s) .      No follow-ups on file.    This note was generated with the assistance of ambient listening technology. Verbal consent was obtained by the patient and accompanying visitor(s) for the recording of patient appointment to facilitate this note. I attest to having reviewed and edited the generated note for accuracy, though some syntax or spelling errors may persist. Please contact the author of this note for any clarification.

## 2025-02-24 ENCOUNTER — TELEPHONE (OUTPATIENT)
Dept: INTERNAL MEDICINE | Facility: CLINIC | Age: 60
End: 2025-02-24
Payer: COMMERCIAL

## 2025-02-24 NOTE — TELEPHONE ENCOUNTER
----- Message from Beatrice sent at 2/24/2025  8:46 AM CST -----  Contact: self  .Type:  Needs Medical AdviceWho Called: Sachin Harrisould the patient rather a call back or a response via [facility]? Call [facility] Call Back Number: [phone] Information: pt is requesting a call back regarding getting paperwork to give to his job. States he is needing documentation that he is going to be out of work for 10 days. States they are [facility] papers that need to filled out and sent over to his job on today. Also states someone will be bringing the papers to the office on his behalf. Fax number: [phone]

## 2025-02-24 NOTE — TELEPHONE ENCOUNTER
----- Message from Breanna sent at 2/24/2025  9:18 AM CST -----  Contact: Sachin  Type:  Patient Returning CallWho Called: ThelanderWho Left Message for Patient:Ted the patient know what this is regarding?:Paper workWould the patient rather a call back or a response via [facility]? Call [facility] Call Back Number:[phone] Information:

## 2025-02-24 NOTE — TELEPHONE ENCOUNTER
Return call to patient regarding [facility] paperwork that will be dropped of today. Spoke with patient informed him that once we receive paperwork it will be given to Dr. Greenberg. KJ

## 2025-02-27 ENCOUNTER — OFFICE VISIT (OUTPATIENT)
Dept: INTERNAL MEDICINE | Facility: CLINIC | Age: 60
End: 2025-02-27
Payer: COMMERCIAL

## 2025-02-27 VITALS
HEART RATE: 86 BPM | DIASTOLIC BLOOD PRESSURE: 88 MMHG | HEIGHT: 78 IN | BODY MASS INDEX: 26.99 KG/M2 | RESPIRATION RATE: 17 BRPM | OXYGEN SATURATION: 99 % | TEMPERATURE: 98 F | WEIGHT: 233.25 LBS | SYSTOLIC BLOOD PRESSURE: 138 MMHG

## 2025-02-27 DIAGNOSIS — J11.1 FLU: Primary | ICD-10-CM

## 2025-02-27 DIAGNOSIS — G25.0 ESSENTIAL TREMOR: ICD-10-CM

## 2025-02-27 DIAGNOSIS — H00.014 HORDEOLUM EXTERNUM OF LEFT UPPER EYELID: ICD-10-CM

## 2025-02-27 PROCEDURE — 1160F RVW MEDS BY RX/DR IN RCRD: CPT | Mod: CPTII,S$GLB,, | Performed by: PEDIATRICS

## 2025-02-27 PROCEDURE — 1159F MED LIST DOCD IN RCRD: CPT | Mod: CPTII,S$GLB,, | Performed by: PEDIATRICS

## 2025-02-27 PROCEDURE — 99213 OFFICE O/P EST LOW 20 MIN: CPT | Mod: S$GLB,,, | Performed by: PEDIATRICS

## 2025-02-27 PROCEDURE — 99999 PR PBB SHADOW E&M-EST. PATIENT-LVL V: CPT | Mod: PBBFAC,,, | Performed by: PEDIATRICS

## 2025-02-27 PROCEDURE — 3075F SYST BP GE 130 - 139MM HG: CPT | Mod: CPTII,S$GLB,, | Performed by: PEDIATRICS

## 2025-02-27 PROCEDURE — 3008F BODY MASS INDEX DOCD: CPT | Mod: CPTII,S$GLB,, | Performed by: PEDIATRICS

## 2025-02-27 PROCEDURE — 3079F DIAST BP 80-89 MM HG: CPT | Mod: CPTII,S$GLB,, | Performed by: PEDIATRICS

## 2025-02-27 NOTE — PROGRESS NOTES
Patient ID: Sachin Gonzalez is a 59 y.o. male.    Chief Complaint: Facial Swelling and nehemias shaking     History of Present Illness    CHIEF COMPLAINT:  Patient presents today for follow up of flu symptoms    FLU-LIKE SYMPTOMS:  He continues to experience chills and pain, though reports slight improvement. He has poor appetite and suboptimal hydration despite attempts to maintain fluid intake. He attempts to eat when taking medications.    NEUROLOGIC:  He reports experiencing hand tremors for approximately 1 year, primarily noticeable when holding a coffee cup or writing. He denies any head bobbing or family history of tremors.    OCULAR:  He reports blurry vision and swelling under the eye. The blurry vision temporarily improves with blinking and eye movement.    PMH, PSH, SH, FH reviewed with patient.      ROS:  General: -fever, +chills, -fatigue, -weight gain, -weight loss, +appetite changes  Eyes: +vision changes, -redness, -discharge  ENT: -ear pain, -nasal congestion, -sore throat  Cardiovascular: -chest pain, -palpitations, -lower extremity edema  Respiratory: -cough, -shortness of breath  Gastrointestinal: -abdominal pain, -nausea, -vomiting, -diarrhea, -constipation, -blood in stool  Genitourinary: -dysuria, -hematuria, -frequency  Musculoskeletal: -joint pain, +muscle pain  Skin: -rash, -lesion  Neurological: -headache, -dizziness, -numbness, -tingling, +tremors  Psychiatric: -anxiety, -depression, -sleep difficulty         Exam:  Physical Exam    Vitals: Weight: 233 lbs.  General: No acute distress. Well-developed. Well-nourished.  Eyes: EOMI. Sclerae anicteric. Stye on left upper outer eyelid. Upper and lower lid edema. Sclera and cornea quiet  HENT: Normocephalic. Atraumatic. Nares patent. Moist oral mucosa. Throat appears erythematous.  Ears: Clear fluid behind eardrums.  Cardiovascular: Regular rate. Regular rhythm. No murmurs. No rubs. No gallops. Normal S1, S2.  Respiratory: Normal respiratory  effort. Clear to auscultation bilaterally. No rales. No rhonchi. No wheezing.  Abdomen: Soft. Non-tender. Non-distended. Normoactive bowel sounds.  Musculoskeletal: No  obvious deformity.  Extremities: No lower extremity edema.  Neurological: Alert & oriented x3. No slurred speech. Normal gait. No cog wheel. Mild tremor with outstretched hands only, no pill rolling.  Psychiatric: Normal mood. Normal affect. Good insight. Good judgment.  Skin: Warm. Dry. No rash.         Assessment/Plan:  Flu    Hordeolum externum of left upper eyelid    Essential tremor         Assessment & Plan    IMPRESSION:  - Patient recovering from flu, experiencing prolonged symptoms consistent with typical course  - Identified large stye on left upper eyelid, likely due to clogged oil gland  - Considering essential tremor as cause of patient's hand tremors, given intermittent nature and lack of other Parkinson's symptoms  - Noted clear fluid behind eardrums and slightly red throat, but no signs of infection    RHEUMATOID ARTHRITIS INVOLVING MULTIPLE SITES WITH POSITIVE RHEUMATOID FACTOR:  - Evaluated patient's hands for tremors, finding no signs of Parkinson's disease.  - Assessed the tremor as potentially essential, which is often hereditary and not related to serious conditions.  - Plan to continue monitoring during future visits and discussed potential medication options if the condition worsens.    INFLUENZA:  - Patient reports chills and pains, but symptoms are slightly improved after about 6 weeks.  - Weight is stable, down only 0.5 lbs from previous visit.  - Throat is slightly red without pus, and mucous membranes are moist.  - Explained that flu recovery can take 6-8 weeks with gradual improvement.  - Advised rest, hydration, and maintaining appetite.  - Continued Tamiflu as previously prescribed on the 20th to reduce severity and risk of complications.  - Educated patient about flu severity, noting annual mortality rates of  40,000-80,000 Americans.    ESSENTIAL TREMOR:  - Patient reports hand shaking for about 1 year, mainly noticeable when holding a coffee cup or writing.  - Assessed as likely essential tremor, often hereditary and distinct from Parkinson's or other neurological disorders.  - Decided against medication at this time due to potential side effects.  - Follow-up scheduled during future well-check visits for tremor evaluation.    STYE:  - Identified a large stye on the left upper eyelid, with visible swelling and a clogged pore at the edge of the eyelashes.  - Explained stye formation process and typical recovery timeline of 1-2 weeks.  - Instructed patient to apply warm compress 4-6 times daily for 3-5 minutes each time and clean eyelashes with baby shampoo 2-4 times daily.  - Patient reports blurry vision in the left eye, likely due to the stye affecting the field of vision.  - Requested patient to contact the office Monday or Tuesday to report on stye condition.  - Advised follow-up if stye becomes increasingly painful, swollen, or does not improve after 1 week.    OTITIS MEDIA WITH EFFUSION:  - Examined ears and noted clear fluid behind eardrums bilaterally, but no infection.    HYDRATION STATUS:  - Inquired about patient's hydration status.  - Noted that dehydration may have contributed to the development of the stye.  - Advised patient to maintain hydration and continue efforts to eat despite reduced appetite.          Visit today included increased complexity associated with the care of the episodic problem  addressed and managing the longitudinal care of the patient due to the serious and/or complex managed problem(s) .      No follow-ups on file.    This note was generated with the assistance of ambient listening technology. Verbal consent was obtained by the patient and accompanying visitor(s) for the recording of patient appointment to facilitate this note. I attest to having reviewed and edited the generated  note for accuracy, though some syntax or spelling errors may persist. Please contact the author of this note for any clarification.

## 2025-03-06 ENCOUNTER — TELEPHONE (OUTPATIENT)
Dept: RHEUMATOLOGY | Facility: CLINIC | Age: 60
End: 2025-03-06
Payer: COMMERCIAL

## 2025-03-06 ENCOUNTER — TELEPHONE (OUTPATIENT)
Dept: INTERNAL MEDICINE | Facility: CLINIC | Age: 60
End: 2025-03-06
Payer: COMMERCIAL

## 2025-03-06 NOTE — TELEPHONE ENCOUNTER
----- Message from Mimi sent at 3/6/2025  1:32 PM CST -----  Name of Caller:.Sachin Gonzalez Best Call Back Number:.[phone]  Additional Information:  Patient employer stated they never received his [facility] paperwork and he would like it faxed please. Patient will be stopping by the office in the next 30 to 40 minutes to get an updated copy of his [facility] paperwork. Thx. EL

## 2025-03-06 NOTE — TELEPHONE ENCOUNTER
Left voicemail asking patient to contact the office back.        ----- Message from Aracely sent at 3/6/2025  1:43 PM CST -----  Regarding: Appt requested  Contact: [phone]  Appt Type: Former Pt of Dr. Jackson for [facility]. Date/Time/Preference:Provider:Caller: The pt Contact Preference:[phone] Information: Thank you.

## 2025-03-10 ENCOUNTER — OFFICE VISIT (OUTPATIENT)
Dept: RHEUMATOLOGY | Facility: CLINIC | Age: 60
End: 2025-03-10
Payer: COMMERCIAL

## 2025-03-10 VITALS
BODY MASS INDEX: 26.38 KG/M2 | WEIGHT: 234.13 LBS | DIASTOLIC BLOOD PRESSURE: 87 MMHG | HEART RATE: 82 BPM | SYSTOLIC BLOOD PRESSURE: 126 MMHG

## 2025-03-10 DIAGNOSIS — Z79.899 DRUG-INDUCED IMMUNODEFICIENCY: Primary | ICD-10-CM

## 2025-03-10 DIAGNOSIS — D84.821 DRUG-INDUCED IMMUNODEFICIENCY: Primary | ICD-10-CM

## 2025-03-10 DIAGNOSIS — Z79.631 LONG TERM METHOTREXATE USER: ICD-10-CM

## 2025-03-10 DIAGNOSIS — M05.79 RHEUMATOID ARTHRITIS INVOLVING MULTIPLE SITES WITH POSITIVE RHEUMATOID FACTOR: ICD-10-CM

## 2025-03-10 PROCEDURE — 1160F RVW MEDS BY RX/DR IN RCRD: CPT | Mod: CPTII,S$GLB,,

## 2025-03-10 PROCEDURE — 99999 PR PBB SHADOW E&M-EST. PATIENT-LVL IV: CPT | Mod: PBBFAC,,,

## 2025-03-10 PROCEDURE — 3008F BODY MASS INDEX DOCD: CPT | Mod: CPTII,S$GLB,,

## 2025-03-10 PROCEDURE — G2211 COMPLEX E/M VISIT ADD ON: HCPCS | Mod: S$GLB,,,

## 2025-03-10 PROCEDURE — 3079F DIAST BP 80-89 MM HG: CPT | Mod: CPTII,S$GLB,,

## 2025-03-10 PROCEDURE — 3074F SYST BP LT 130 MM HG: CPT | Mod: CPTII,S$GLB,,

## 2025-03-10 PROCEDURE — 1159F MED LIST DOCD IN RCRD: CPT | Mod: CPTII,S$GLB,,

## 2025-03-10 PROCEDURE — 99214 OFFICE O/P EST MOD 30 MIN: CPT | Mod: S$GLB,,,

## 2025-03-10 RX ORDER — FOLIC ACID 1 MG/1
1000 TABLET ORAL DAILY
Qty: 100 TABLET | Refills: 2 | Status: SHIPPED | OUTPATIENT
Start: 2025-03-10

## 2025-03-10 RX ORDER — METHOTREXATE 2.5 MG/1
20 TABLET ORAL
Qty: 32 TABLET | Refills: 2 | Status: SHIPPED | OUTPATIENT
Start: 2025-03-10

## 2025-03-10 NOTE — PROGRESS NOTES
Subjective:      Patient ID: Sachin Gonzalez is a 59 y.o. male.    Chief Complaint: Follow up on rheumatoid arthritis    HPI  Sachin Gonzalez is a 60yo female with a h/o HTN, HLD, lumbar foramen stenosis, and prostate cancer who presents for follow up on seropositive (+RF, +CCP) rheumatoid arthritis.  He is new to me, previously being followed by Dr. Jackson.    RF (2010) 19, (2013) 27, (2018) 18  CCP (2010) 21.8 (2013) 46.8, (2018) 32.9, (2020) 68.1, (2021) 68.9, (2023) 43.3, (2024) 46.3    Methotrexate 15mg (6 tablets) weekly with folic acid daily    RA acts up once in a while, about every month.  Low back, knees, and hands. With pain and swelling  Not waking with much pain/stiffness.    Taking buprofen 400mg PRN (1/2tablet)  Tylenol PRN    Review of Systems   Constitutional:  Negative for fatigue and fever.   Respiratory:  Negative for cough, shortness of breath and stridor.    Musculoskeletal:  Positive for arthralgias and back pain. Negative for joint swelling.   Neurological:  Negative for light-headedness and numbness.   Psychiatric/Behavioral:  Negative for behavioral problems and confusion.         Objective:   /87   Pulse 82   Wt 106.2 kg (234 lb 2.1 oz)   BMI 26.38 kg/m²   Physical Exam   Constitutional: He is oriented to person, place, and time. normal appearance.   Cardiovascular: Normal rate and regular rhythm.   Pulmonary/Chest: Effort normal and breath sounds normal.   Musculoskeletal:      Comments: Mild MCP synovitis in R 2nd and 3rd digit   Neurological: He is alert and oriented to person, place, and time.   Skin: Skin is warm and dry.   Psychiatric: His behavior is normal. Mood normal.            Assessment:     1. Drug-induced immunodeficiency    2. Rheumatoid arthritis involving multiple sites with positive rheumatoid factor    3. Long term methotrexate user          Plan:     Problem List Items Addressed This Visit       RA (rheumatoid arthritis)    Relevant Medications     methotrexate 2.5 MG Tab    folic acid (FOLVITE) 1 MG tablet    Other Relevant Orders    CBC Auto Differential    Comprehensive Metabolic Panel    Sedimentation rate    C-Reactive Protein    XR Arthritis Survey    Drug-induced immunodeficiency - Primary    Relevant Orders    Hepatitis B surface antigen    HBcAB    Hepatitis B surface antibody    Hepatitis C antibody    Quantiferon Gold TB    RPR (for monitoring)     Other Visit Diagnoses         Long term methotrexate user              Sachin Gonzalez with mild RA pain in hands and knees.  Increase methotrexate to 20mg (8 tablets) weekly with daily folic acid.  Ibuprofen PRN controls his pain well.  Labs today.  Check arthritis survey.    RTO 3-4mos. He drives from [place]; virtual apt if needed

## 2025-03-14 ENCOUNTER — TELEPHONE (OUTPATIENT)
Dept: INTERNAL MEDICINE | Facility: CLINIC | Age: 60
End: 2025-03-14
Payer: COMMERCIAL

## 2025-03-14 NOTE — TELEPHONE ENCOUNTER
----- Message from Destiny sent at 3/14/2025 12:15 PM CDT -----  Contact: [phone]  .1MEDICALADVICE Patient is calling for Medical Advice regarding:return to work excuse How long has patient had these symptoms:needs to return Monday Pharmacy name and phone#:Patient wants a call back or thru [facility]:call back Comments:Please advise he is asking to have a call and asking to have left at desk and also through the portal for the excuse Please advise patient replies from provider may take up to 48 hours.  ----- Message -----  From: Destiny Montejo  Sent: 3/14/2025  12:15 PM CDT  To: Yari Garcia    .1MEDICALADVICE Patient is calling for Medical Advice regarding:return to work excuse How long has patient had these symptoms:needs to return Monday Pharmacy name and phone#:Patient wants a call back or [facility]:call back Comments:Please advise Please advise patient replies from provider may take up to 48 hours.

## 2025-03-18 ENCOUNTER — TELEPHONE (OUTPATIENT)
Dept: INTERNAL MEDICINE | Facility: CLINIC | Age: 60
End: 2025-03-18
Payer: COMMERCIAL

## 2025-03-18 ENCOUNTER — HOSPITAL ENCOUNTER (OUTPATIENT)
Dept: RADIOLOGY | Facility: HOSPITAL | Age: 60
Discharge: HOME | End: 2025-03-18
Payer: COMMERCIAL

## 2025-03-18 DIAGNOSIS — M05.79 RHEUMATOID ARTHRITIS INVOLVING MULTIPLE SITES WITH POSITIVE RHEUMATOID FACTOR: ICD-10-CM

## 2025-03-18 PROCEDURE — 77077 JOINT SURVEY SINGLE VIEW: CPT | Mod: TC

## 2025-03-18 PROCEDURE — 77077 JOINT SURVEY SINGLE VIEW: CPT | Mod: 26,,, | Performed by: RADIOLOGY

## 2025-03-18 NOTE — TELEPHONE ENCOUNTER
----- Message from Tia sent at 3/18/2025 11:58 AM CDT -----  Who Called: PtWhat is the request in detail: Requesting call back to discuss if he can have his immunization shot done today when he comes in later. Please adviseCan the clinic reply by [facility]? Maxine Call Back Number: [phone] Information:

## 2025-03-19 ENCOUNTER — TELEPHONE (OUTPATIENT)
Dept: INTERNAL MEDICINE | Facility: CLINIC | Age: 60
End: 2025-03-19
Payer: COMMERCIAL

## 2025-03-19 ENCOUNTER — RESULTS FOLLOW-UP (OUTPATIENT)
Dept: RHEUMATOLOGY | Facility: CLINIC | Age: 60
End: 2025-03-19

## 2025-03-19 NOTE — TELEPHONE ENCOUNTER
----- Message from Dina sent at 3/19/2025 10:33 AM CDT -----  Contact: Self  Type: Needs Medical Advice  #URGENT#Who Called:  Rosaura Call Back Number: [phone] Information: Pt is calling in regards to the return to work excuse Dr Greenberg completed for him last Friday, he is needing this to be sent to his employer ASAP since he can't find it on his portal, can we please fax this letter to the fax number that was on his release documents. Pt did not have the fax # available. Thank You

## 2025-03-24 ENCOUNTER — TELEPHONE (OUTPATIENT)
Dept: PAIN MEDICINE | Facility: CLINIC | Age: 60
End: 2025-03-24
Payer: COMMERCIAL

## 2025-03-28 ENCOUNTER — TELEPHONE (OUTPATIENT)
Dept: INTERNAL MEDICINE | Facility: CLINIC | Age: 60
End: 2025-03-28
Payer: COMMERCIAL

## 2025-03-28 NOTE — TELEPHONE ENCOUNTER
----- Message from [employer] sent at 3/28/2025  3:59 PM CDT -----  Contact: self  ..Type:  Needs Medical AdviceWho Called: .Sachin GonzalezWould the patient rather a call back or a response via [facility]? Call back Best Call Back Number: .[phone] (home) Additional Information: pt is asking for an return call in reference to puttign an password /break the glass on his account .

## 2025-03-31 ENCOUNTER — OFFICE VISIT (OUTPATIENT)
Dept: PODIATRY | Facility: CLINIC | Age: 60
End: 2025-03-31
Payer: COMMERCIAL

## 2025-03-31 VITALS — HEIGHT: 78 IN | BODY MASS INDEX: 27.09 KG/M2 | WEIGHT: 234.13 LBS

## 2025-03-31 DIAGNOSIS — G60.9 IDIOPATHIC PERIPHERAL NEUROPATHY: Primary | ICD-10-CM

## 2025-03-31 DIAGNOSIS — B35.1 ONYCHOMYCOSIS OF TOENAIL: ICD-10-CM

## 2025-03-31 DIAGNOSIS — M48.061 LUMBAR FORAMINAL STENOSIS: ICD-10-CM

## 2025-03-31 DIAGNOSIS — M54.16 LUMBAR RADICULAR PAIN: ICD-10-CM

## 2025-03-31 PROCEDURE — 99999 PR PBB SHADOW E&M-EST. PATIENT-LVL IV: CPT | Mod: PBBFAC,,, | Performed by: PODIATRIST

## 2025-03-31 NOTE — PROGRESS NOTES
Subjective:     Patient ID: Sachin Gonzalez is a 59 y.o. male.    Chief Complaint: Nail Care (Non-diabetic pt wears tennis shoes, PCP Dr. Greenberg last seen 2-27-25) and Foot Pain (Pt c/o BL foot pain 7/10)    Sachin is a 59 y.o. male who presents to the podiatry clinic with complaint of  bilateral foot pain. Onset of the symptoms was about a month ago. Precipitating event: none known. Current symptoms include: inability to bear weight. Aggravating factors: any weight bearing and inactivity. Symptoms have gradually worsened. Patient has had prior foot problems. Evaluation to date: plain films: abnormal   and EMG . Patients rates pain 7/10 on pain scale. Patient also complains of long toenails.     Patient Active Problem List   Diagnosis    History of prostate cancer    RA (rheumatoid arthritis)    Erectile dysfunction    Peyronie's disease    Long-term use of immunosuppressant medication    Cephalic vein thrombosis-post op    Essential hypertension    Tension-type headache, not intractable    Lumbar foraminal stenosis    Parotid sialolithiasis    Gastroesophageal reflux disease without esophagitis    Mixed hyperlipidemia    Prostate cancer    Gross hematuria    Stricture of bulbous urethra in male    Drug-induced immunodeficiency    Postprocedural male urethral stricture       Medication List with Changes/Refills   Current Medications    ASCORBIC ACID, VITAMIN C, (VITAMIN C) 250 MG TABLET    Take 250 mg by mouth once daily.    CHOLECALCIFEROL, VITAMIN D3, 25 MCG (1,000 UNIT) CHEW    Take by mouth.    FOLIC ACID (FOLVITE) 1 MG TABLET    Take 1 tablet (1,000 mcg total) by mouth once daily.    IBUPROFEN (ADVIL,MOTRIN) 800 MG TABLET    Take 1 tablet (800 mg total) by mouth every 6 (six) hours as needed for Pain.    LIDOCAINE (LIDODERM) 5 %    Place 1 patch onto the skin once daily. Remove & Discard patch within 12 hours or as directed by MD    METHOCARBAMOL (ROBAXIN) 750 MG TAB    Take 1 tablet (750 mg total) by  mouth as needed (PRN nightly).    METHOTREXATE 2.5 MG TAB    Take 8 tablets (20 mg total) by mouth every 7 days. This medication requires periodic lab monitoring    METOPROLOL SUCCINATE (TOPROL-XL) 50 MG 24 HR TABLET    TAKE 1 TABLET DAILY    MULTIVITAMIN CAPSULE    Take 1 capsule by mouth once daily.    OXYCODONE-ACETAMINOPHEN (PERCOCET) 5-325 MG PER TABLET    Take 1 tablet by mouth every 4 (four) hours as needed for Pain.    PANTOPRAZOLE (PROTONIX) 40 MG TABLET    Take 1 tablet (40 mg total) by mouth 2 (two) times daily.    PHENAZOPYRIDINE (PYRIDIUM) 200 MG TABLET    Take 1 tablet (200 mg total) by mouth 3 (three) times daily as needed (burning).    PREGABALIN (LYRICA) 100 MG CAPSULE    Take 1 capsule (100 mg total) by mouth 2 (two) times daily.    SILDENAFIL (VIAGRA) 100 MG TABLET    Take 1 tablet (100 mg total) by mouth daily as needed for Erectile Dysfunction.    SUCRALFATE (CARAFATE) 1 GRAM TABLET    Take 1 tablet (1 g total) by mouth 3 (three) times daily as needed (reflux indigestion).    TADALAFIL (CIALIS) 20 MG TAB    Take 1 tablet (20 mg total) by mouth every 24 hours as needed (erectile dysfunction).    TESTOSTERONE (ANDROGEL) 1.62 % (20.25 MG/1.25 GRAM) GLPK    Place 5 g onto the skin once daily.    TRIAMCINOLONE ACETONIDE 0.1% (KENALOG) 0.1 % CREAM    Apply topically 2 (two) times daily.    ZINC SULFATE (ZINC-220 ORAL)    Take 1 tablet by mouth as needed.       Review of patient's allergies indicates:   Allergen Reactions    Pollen extracts        Past Surgical History:   Procedure Laterality Date    COLONOSCOPY N/A 4/21/2021    Procedure: COLONOSCOPY covid test scheduled on 4/19/21;  Surgeon: Darrell Worthington MD;  Location: [facility];  Service: Endoscopy;  Laterality: N/A;    CYSTOURETHROSCOPY WITH DIRECT VISION INTERNAL URETHROTOMY N/A 5/31/2022    Procedure: CYSTOSCOPY, WITH DIRECT VISION INTERNAL URETHROTOMY;  Surgeon: Yaniv Murray MD;  Location: [facility];  Service: Urology;  Laterality:  N/A;    CYSTOURETHROSCOPY WITH DIRECT VISION INTERNAL URETHROTOMY N/A 4/22/2024    Procedure: CYSTOSCOPY, WITH DIRECT VISION INTERNAL URETHROTOMY;  Surgeon: Yaniv Murray MD;  Location: [facility];  Service: Urology;  Laterality: N/A;    CYSTOURETHROSCOPY WITH DIRECT VISION INTERNAL URETHROTOMY N/A 10/8/2024    Procedure: CYSTOSCOPY, WITH DIRECT VISION INTERNAL URETHROTOMY;  Surgeon: Yaniv Murray MD;  Location: [facility];  Service: Urology;  Laterality: N/A;    ESOPHAGOGASTRODUODENOSCOPY      ESOPHAGOGASTRODUODENOSCOPY N/A 4/5/2021    Procedure: EGD (ESOPHAGOGASTRODUODENOSCOPY) Rapid Covid test needed;  Surgeon: Darrell Worthington MD;  Location: [facility];  Service: Endoscopy;  Laterality: N/A;    OSTEOTOMY OF METATARSAL BONE Right 11/21/2022    Procedure: OSTEOTOMY, METATARSAL BONE;  Surgeon: Daniel Ramirez DPM;  Location: [facility];  Service: Podiatry;  Laterality: Right;    PROSTATECTOMY  2011    RELEASE OF CONTRACTURE OF JOINT Right 11/21/2022    Procedure: RELEASE, CONTRACTURE, JOINT;  Surgeon: Daniel Ramirez DPM;  Location: [facility];  Service: Podiatry;  Laterality: Right;    RESECTION OF GASTROCNEMIUS MUSCLE Right 11/21/2022    Procedure: RESECTION, MUSCLE, GASTROCNEMIUS;  Surgeon: Daniel Ramirez DPM;  Location: [facility];  Service: Podiatry;  Laterality: Right;       Family History   Problem Relation Name Age of Onset    Stroke Father      Alcohol abuse Father      Arthritis Mother      Hypertension Mother      Anxiety disorder Mother      No Known Problems Sister      Kidney disease Brother         Social History     Socioeconomic History    Marital status:    Tobacco Use    Smoking status: Never    Smokeless tobacco: Never   Substance and Sexual Activity    Alcohol use: Never    Drug use: No    Sexual activity: Yes     Partners: Female   Social History Narrative    No pets or smokers in household.     Social Drivers of Health     Stress: No Stress Concern Present (12/12/2019)    [place]  "[place] [employer] - Occupational Stress Questionnaire     Feeling of Stress : Not at all       Vitals:    03/31/25 1318   Weight: 106.2 kg (234 lb 2.1 oz)   Height: 6' 7" (2.007 m)   PainSc:   7       Hemoglobin A1C   Date Value Ref Range Status   01/13/2023 5.3 4.0 - 5.6 % Final     Comment:     ADA Screening Guidelines:  5.7-6.4%  Consistent with prediabetes  >or=6.5%  Consistent with diabetes    High levels of fetal hemoglobin interfere with the HbA1C  assay. Heterozygous hemoglobin variants (HbS, HgC, etc)do  not significantly interfere with this assay.   However, presence of multiple variants may affect accuracy.     04/12/2017 5.7 4.5 - 6.2 % Final     Comment:     According to ADA guidelines, hemoglobin A1C <7.0% represents  optimal control in non-pregnant diabetic patients.  Different  metrics may apply to specific populations.   Standards of Medical Care in Diabetes - 2016.  For the purpose of screening for the presence of diabetes:  <5.7%     Consistent with the absence of diabetes  5.7-6.4%  Consistent with increasing risk for diabetes   (prediabetes)  >or=6.5%  Consistent with diabetes  Currently no consensus exists for use of hemoglobin A1C  for diagnosis of diabetes for children.         Review of Systems   Constitutional:  Negative for chills and fever.   Respiratory:  Negative for shortness of breath.    Cardiovascular:  Negative for chest pain, palpitations, orthopnea, claudication and leg swelling.   Gastrointestinal:  Negative for diarrhea, nausea and vomiting.   Musculoskeletal:  Negative for joint pain.   Skin:  Negative for rash.   Neurological:  Positive for tingling and sensory change.   Psychiatric/Behavioral: Negative.           Objective:      PHYSICAL EXAM: Apperance: Alert and orient in no distress,well developed, and with good attention to grooming and body habits  LOWER EXTREMITY EXAM:  VASCULAR: Dorsalis pedis pulses 2/4 bilateral and Posterior Tibial pulses 2/4 " bilateral. Capillary fill time <4 seconds bilateral. No edema observed bilateral. Varicosities absent bilateral. Skin temperature of the lower extremities is warm to warm, proximal to distal. Hair growth WNL bilateral.  DERMATOLOGICAL: No skin rashes, subcutaneous nodules, lesions, or ulcers observed bilateral. Nails 1,2,3,4,5 bilateral elongated, thickened, and discolored with subungual debris. Webspaces 1,2,3,4 clean, dry and without evidence of break in skin integrity bilateral.   NEUROLOGICAL: Light touch, sharp-dull, proprioception all present and equal bilaterally.  Vibratory sensation diminished at bilateral hallux and intact at bilateral navicular. Protective sensation absent at 6/10 sites as tested with a Teachey-Ansley 5.07 monofilament.   MUSCULOSKELETAL: Muscle strength is 5/5 for foot inverters, everters, plantarflexors, and dorsiflexors. Muscle tone is normal          Assessment:       ICD-10-CM ICD-9-CM   1. Idiopathic peripheral neuropathy  G60.9 356.9   2. Lumbar foraminal stenosis  M48.061 724.02   3. Lumbar radicular pain  M54.16 724.4   4. Onychomycosis of toenail  B35.1 110.1         Plan:   Idiopathic peripheral neuropathy    Lumbar foraminal stenosis    Lumbar radicular pain    Onychomycosis of toenail    I counseled the patient on his conditions, regarding findings of my examination, my impressions, and usual treatment plan.   Referral completed for interventional pain clinic. Patient instructed to reschedule appointment.   Appointment spent on education about the neuropathy, and prevention of limb loss.  Shoe inspection. Patient instructed on proper foot hygeine. We discussed wearing proper shoe gear, daily foot inspections, never walking without protective shoe gear, never putting sharp instruments to feet.    With patient's permission, nails 1-5 bilateral were trimmed in length and thickness aggressively to their soft tissue attachment mechanically and with electric , removing all  offending nail and debris. Patient relates relief following the procedure.  Patient  will continue to monitor the areas daily, inspect feet, wear protective shoe gear when ambulatory, moisturizer to maintain skin integrity.   Patient to return 2 months or sooner if needed.         Reina Guzman DPM  [facility]

## 2025-05-13 ENCOUNTER — TELEPHONE (OUTPATIENT)
Dept: UROLOGY | Facility: CLINIC | Age: 60
End: 2025-05-13
Payer: COMMERCIAL

## 2025-05-26 ENCOUNTER — TELEPHONE (OUTPATIENT)
Dept: PAIN MEDICINE | Facility: CLINIC | Age: 60
End: 2025-05-26
Payer: COMMERCIAL

## 2025-05-27 ENCOUNTER — TELEPHONE (OUTPATIENT)
Dept: PAIN MEDICINE | Facility: CLINIC | Age: 60
End: 2025-05-27
Payer: COMMERCIAL

## 2025-06-06 DIAGNOSIS — I10 HYPERTENSION, UNSPECIFIED TYPE: ICD-10-CM

## 2025-06-06 DIAGNOSIS — G25.0 ESSENTIAL TREMOR: ICD-10-CM

## 2025-06-06 RX ORDER — METOPROLOL SUCCINATE 50 MG/1
50 TABLET, EXTENDED RELEASE ORAL
Qty: 90 TABLET | Refills: 2 | Status: SHIPPED | OUTPATIENT
Start: 2025-06-06

## 2025-07-04 ENCOUNTER — NURSE TRIAGE (OUTPATIENT)
Dept: ADMINISTRATIVE | Facility: CLINIC | Age: 60
End: 2025-07-04
Payer: COMMERCIAL

## 2025-07-05 NOTE — TELEPHONE ENCOUNTER
Pt is calling and states that he is seeing some light red in his urine. Pt states that he only sees it when he is trying to have a BM. Pt states that he does have some muscle pain in his shoulder and back. Pt states that he does pull his belt very tight when he stands for a long period of times. Pt states that he does catheterize himself and he was told to do at least 1X per month. He is doing it at least 1X per week recently b/c his flow of urine is just not that strong anymore. He realized with this last catheterization, that he had to kind of force it in. Dispo- See PCP within 24 hours. Attempted to get pt an appt with his PCP and nothing available until Monday 7/7, which pt states that he wont be in town for that. Explained to pt that I would route this message to his PCP and Urologist to see if they could fit him in for an appt some time Tuesday next week or to follow up with him. Pt VU. Care Advice given, advised pt to call back for any further concerns or questions.           Reason for Disposition   Blood in urine  (Exception: Could be normal menstrual bleeding.)    Additional Information   Negative: Shock suspected (e.g., cold/pale/clammy skin, too weak to stand, low BP, rapid pulse)   Negative: Sounds like a life-threatening emergency to the triager   Negative: Recent back or abdominal injury   Negative: Recent genital injury   Negative: [1] Unable to urinate (or only a few drops) > 4 hours AND [2] bladder feels very full (e.g., palpable bladder or strong urge to urinate)   Negative: [1] Diffuse (all over) muscle pains in the shoulders, arms, legs, and back AND [2] dark (cola or tea-colored) or red-colored urine   Negative: Passing pure blood or large blood clots (i.e., size > a dime)  (Exception: Chuy or small strands.)   Negative: Fever > 100.4 F (38.0 C)   Negative: Patient sounds very sick or weak to the triager   Negative: [1] Pain or burning with passing urine AND [2] side (flank) or back pain  present   Negative: Known sickle cell disease   Negative: Taking Coumadin (warfarin) or other strong blood thinner, or known bleeding disorder (e.g., thrombocytopenia)   Negative: Pain or burning with passing urine   Negative: Side (flank) or back pain present   Negative: [1] Pink or red-colored urine and likely from food (beets, rhubarb, red food dye) AND [2] lasts > 24 hours after stopping food    Protocols used: Urine - Blood In-A-AH

## 2025-07-08 ENCOUNTER — TELEPHONE (OUTPATIENT)
Dept: PODIATRY | Facility: CLINIC | Age: 60
End: 2025-07-08
Payer: COMMERCIAL

## 2025-07-08 NOTE — TELEPHONE ENCOUNTER
Spoke with the patient about appt. He is aware that the prev appt offered is no longer available. Patient has an appt scheduled for tomorrow.     Copied from CRM #[id number]. Topic: Appointments - Appointment Confirmation  >> 2025  8:59 AM Irene Mcmanus wrote:  .Type: Patient  Requesting Call Back      Who Called: Sachin    What is this regarding?: Wait list offer    Would the patient rather a call back or a response via [facility]?  Call back    Best Call Back Number:.[phone]    Additional Information:  Patient was sent an offer for an appointment for today at 11:45 and would like to accept. The offer  before patient time limit of 9:12 am to accept. Please Contact to patient to verify if this appointment is still available.

## 2025-07-09 ENCOUNTER — OFFICE VISIT (OUTPATIENT)
Dept: PODIATRY | Facility: CLINIC | Age: 60
End: 2025-07-09
Payer: COMMERCIAL

## 2025-07-09 VITALS — BODY MASS INDEX: 27.09 KG/M2 | WEIGHT: 234.13 LBS | HEIGHT: 78 IN

## 2025-07-09 DIAGNOSIS — G60.9 IDIOPATHIC PERIPHERAL NEUROPATHY: Primary | ICD-10-CM

## 2025-07-09 DIAGNOSIS — B35.1 ONYCHOMYCOSIS OF TOENAIL: ICD-10-CM

## 2025-07-09 DIAGNOSIS — M48.061 LUMBAR FORAMINAL STENOSIS: ICD-10-CM

## 2025-07-09 DIAGNOSIS — M54.16 LUMBAR RADICULAR PAIN: ICD-10-CM

## 2025-07-09 DIAGNOSIS — R60.0 EDEMA OF RIGHT LOWER EXTREMITY: ICD-10-CM

## 2025-07-09 PROCEDURE — 3008F BODY MASS INDEX DOCD: CPT | Mod: CPTII,S$GLB,, | Performed by: PODIATRIST

## 2025-07-09 PROCEDURE — 99999 PR PBB SHADOW E&M-EST. PATIENT-LVL IV: CPT | Mod: PBBFAC,,, | Performed by: PODIATRIST

## 2025-07-09 PROCEDURE — 11721 DEBRIDE NAIL 6 OR MORE: CPT | Mod: S$GLB,,, | Performed by: PODIATRIST

## 2025-07-09 PROCEDURE — 1159F MED LIST DOCD IN RCRD: CPT | Mod: CPTII,S$GLB,, | Performed by: PODIATRIST

## 2025-07-09 PROCEDURE — 99213 OFFICE O/P EST LOW 20 MIN: CPT | Mod: 25,S$GLB,, | Performed by: PODIATRIST

## 2025-07-09 PROCEDURE — 1160F RVW MEDS BY RX/DR IN RCRD: CPT | Mod: CPTII,S$GLB,, | Performed by: PODIATRIST

## 2025-07-09 NOTE — PROGRESS NOTES
Subjective:     Patient ID: Sachin Gonzalez is a 60 y.o. male.    Chief Complaint: Nail Care (Nail care, non-diabetic, neuropathy, wears tennis and socks, last seen PCP Dr. Greenberg on 02/27/2025)    Sachin is a 60 y.o. male who presents to the podiatry clinic with complaint of  bilateral foot pain. Onset of the symptoms was about a month ago. Precipitating event: none known. Current symptoms include: inability to bear weight. Aggravating factors: any weight bearing and inactivity. Symptoms have gradually worsened. Patient has had prior foot problems. Evaluation to date: plain films: abnormal   and EMG. Patients rates pain 7/10 on pain scale. Patient also complains of long toenails. Patient states he was unable to keep appointment with back doctor. Patient also complains of right lower leg swelling. Patient has no other pedal complaints at this time.     Patient Active Problem List   Diagnosis    History of prostate cancer    RA (rheumatoid arthritis)    Erectile dysfunction    Peyronie's disease    Long-term use of immunosuppressant medication    Cephalic vein thrombosis-post op    Essential hypertension    Tension-type headache, not intractable    Lumbar foraminal stenosis    Parotid sialolithiasis    Gastroesophageal reflux disease without esophagitis    Mixed hyperlipidemia    Prostate cancer    Gross hematuria    Stricture of bulbous urethra in male    Drug-induced immunodeficiency    Postprocedural male urethral stricture       Medication List with Changes/Refills   Current Medications    ASCORBIC ACID, VITAMIN C, (VITAMIN C) 250 MG TABLET    Take 250 mg by mouth once daily.    CHOLECALCIFEROL, VITAMIN D3, 25 MCG (1,000 UNIT) CHEW    Take by mouth.    FOLIC ACID (FOLVITE) 1 MG TABLET    Take 1 tablet (1,000 mcg total) by mouth once daily.    IBUPROFEN (ADVIL,MOTRIN) 800 MG TABLET    Take 1 tablet (800 mg total) by mouth every 6 (six) hours as needed for Pain.    LIDOCAINE (LIDODERM) 5 %    Place 1 patch  onto the skin once daily. Remove & Discard patch within 12 hours or as directed by MD    METHOCARBAMOL (ROBAXIN) 750 MG TAB    Take 1 tablet (750 mg total) by mouth as needed (PRN nightly).    METHOTREXATE 2.5 MG TAB    Take 8 tablets (20 mg total) by mouth every 7 days. This medication requires periodic lab monitoring    METOPROLOL SUCCINATE (TOPROL-XL) 50 MG 24 HR TABLET    TAKE 1 TABLET DAILY    MULTIVITAMIN CAPSULE    Take 1 capsule by mouth once daily.    OXYCODONE-ACETAMINOPHEN (PERCOCET) 5-325 MG PER TABLET    Take 1 tablet by mouth every 4 (four) hours as needed for Pain.    PANTOPRAZOLE (PROTONIX) 40 MG TABLET    Take 1 tablet (40 mg total) by mouth 2 (two) times daily.    PHENAZOPYRIDINE (PYRIDIUM) 200 MG TABLET    Take 1 tablet (200 mg total) by mouth 3 (three) times daily as needed (burning).    PREGABALIN (LYRICA) 100 MG CAPSULE    Take 1 capsule (100 mg total) by mouth 2 (two) times daily.    SILDENAFIL (VIAGRA) 100 MG TABLET    Take 1 tablet (100 mg total) by mouth daily as needed for Erectile Dysfunction.    SUCRALFATE (CARAFATE) 1 GRAM TABLET    Take 1 tablet (1 g total) by mouth 3 (three) times daily as needed (reflux indigestion).    TADALAFIL (CIALIS) 20 MG TAB    Take 1 tablet (20 mg total) by mouth every 24 hours as needed (erectile dysfunction).    TESTOSTERONE (ANDROGEL) 1.62 % (20.25 MG/1.25 GRAM) GLPK    Place 5 g onto the skin once daily.    TRIAMCINOLONE ACETONIDE 0.1% (KENALOG) 0.1 % CREAM    Apply topically 2 (two) times daily.    ZINC SULFATE (ZINC-220 ORAL)    Take 1 tablet by mouth as needed.       Review of patient's allergies indicates:   Allergen Reactions    Pollen extracts        Past Surgical History:   Procedure Laterality Date    COLONOSCOPY N/A 4/21/2021    Procedure: COLONOSCOPY covid test scheduled on 4/19/21;  Surgeon: Darrell Worthington MD;  Location: [facility];  Service: Endoscopy;  Laterality: N/A;    CYSTOURETHROSCOPY WITH DIRECT VISION INTERNAL URETHROTOMY N/A  5/31/2022    Procedure: CYSTOSCOPY, WITH DIRECT VISION INTERNAL URETHROTOMY;  Surgeon: Yaniv Murray MD;  Location: [facility];  Service: Urology;  Laterality: N/A;    CYSTOURETHROSCOPY WITH DIRECT VISION INTERNAL URETHROTOMY N/A 4/22/2024    Procedure: CYSTOSCOPY, WITH DIRECT VISION INTERNAL URETHROTOMY;  Surgeon: Yaniv Murray MD;  Location: [facility] OR;  Service: Urology;  Laterality: N/A;    CYSTOURETHROSCOPY WITH DIRECT VISION INTERNAL URETHROTOMY N/A 10/8/2024    Procedure: CYSTOSCOPY, WITH DIRECT VISION INTERNAL URETHROTOMY;  Surgeon: Yaniv Murray MD;  Location: [facility] OR;  Service: Urology;  Laterality: N/A;    ESOPHAGOGASTRODUODENOSCOPY      ESOPHAGOGASTRODUODENOSCOPY N/A 4/5/2021    Procedure: EGD (ESOPHAGOGASTRODUODENOSCOPY) Rapid Covid test needed;  Surgeon: Darrell Worthington MD;  Location: [facility];  Service: Endoscopy;  Laterality: N/A;    OSTEOTOMY OF METATARSAL BONE Right 11/21/2022    Procedure: OSTEOTOMY, METATARSAL BONE;  Surgeon: Daniel Ramirez DPM;  Location: [facility] OR;  Service: Podiatry;  Laterality: Right;    PROSTATECTOMY  2011    RELEASE OF CONTRACTURE OF JOINT Right 11/21/2022    Procedure: RELEASE, CONTRACTURE, JOINT;  Surgeon: Daniel Ramirez DPM;  Location: [facility];  Service: Podiatry;  Laterality: Right;    RESECTION OF GASTROCNEMIUS MUSCLE Right 11/21/2022    Procedure: RESECTION, MUSCLE, GASTROCNEMIUS;  Surgeon: Daniel Ramirez DPM;  Location: [facility];  Service: Podiatry;  Laterality: Right;       Family History   Problem Relation Name Age of Onset    Stroke Father      Alcohol abuse Father      Arthritis Mother      Hypertension Mother      Anxiety disorder Mother      No Known Problems Sister      Kidney disease Brother         Social History     Socioeconomic History    Marital status:    Tobacco Use    Smoking status: Never    Smokeless tobacco: Never   Substance and Sexual Activity    Alcohol use: Never    Drug use: No    Sexual activity: Yes      "Partners: Female   Social History Narrative    No pets or smokers in household.     Social Drivers of Health     Stress: No Stress Concern Present (12/12/2019)    [place] [place] [employer] - Occupational Stress Questionnaire     Feeling of Stress : Not at all       Vitals:    07/09/25 1418   Weight: 106.2 kg (234 lb 2.1 oz)   Height: 6' 7" (2.007 m)   PainSc: 0-No pain       Hemoglobin A1C   Date Value Ref Range Status   01/13/2023 5.3 4.0 - 5.6 % Final     Comment:     ADA Screening Guidelines:  5.7-6.4%  Consistent with prediabetes  >or=6.5%  Consistent with diabetes    High levels of fetal hemoglobin interfere with the HbA1C  assay. Heterozygous hemoglobin variants (HbS, HgC, etc)do  not significantly interfere with this assay.   However, presence of multiple variants may affect accuracy.     04/12/2017 5.7 4.5 - 6.2 % Final     Comment:     According to ADA guidelines, hemoglobin A1C <7.0% represents  optimal control in non-pregnant diabetic patients.  Different  metrics may apply to specific populations.   Standards of Medical Care in Diabetes - 2016.  For the purpose of screening for the presence of diabetes:  <5.7%     Consistent with the absence of diabetes  5.7-6.4%  Consistent with increasing risk for diabetes   (prediabetes)  >or=6.5%  Consistent with diabetes  Currently no consensus exists for use of hemoglobin A1C  for diagnosis of diabetes for children.         Review of Systems   Constitutional:  Negative for chills and fever.   Respiratory:  Negative for shortness of breath.    Cardiovascular:  Negative for chest pain, palpitations, orthopnea, claudication and leg swelling.   Gastrointestinal:  Negative for diarrhea, nausea and vomiting.   Musculoskeletal:  Negative for joint pain.   Skin:  Negative for rash.   Neurological:  Positive for tingling and sensory change.   Psychiatric/Behavioral: Negative.           Objective:      PHYSICAL EXAM: Apperance: Alert and orient in no " distress,well developed, and with good attention to grooming and body habits  LOWER EXTREMITY EXAM:  VASCULAR: Dorsalis pedis pulses 2/4 bilateral and Posterior Tibial pulses 2/4 bilateral. Capillary fill time <4 seconds bilateral. No edema observed bilateral. Varicosities absent bilateral. Skin temperature of the lower extremities is warm to warm, proximal to distal. Hair growth WNL bilateral.  DERMATOLOGICAL: No skin rashes, subcutaneous nodules, lesions, or ulcers observed bilateral. Nails 1,2,3,4,5 bilateral elongated, thickened, and discolored with subungual debris. Webspaces 1,2,3,4 clean, dry and without evidence of break in skin integrity bilateral.   NEUROLOGICAL: Light touch, sharp-dull, proprioception all present and equal bilaterally.  Vibratory sensation diminished at bilateral hallux and intact at bilateral navicular. Protective sensation absent at 6/10 sites as tested with a [place] 5.07 monofilament.   MUSCULOSKELETAL: Muscle strength is 5/5 for foot inverters, everters, plantarflexors, and dorsiflexors. Muscle tone is normal          Assessment:       ICD-10-CM ICD-9-CM   1. Idiopathic peripheral neuropathy  G60.9 356.9   2. Lumbar foraminal stenosis  M48.061 724.02   3. Lumbar radicular pain  M54.16 724.4   4. Onychomycosis of toenail  B35.1 110.1   5. Edema of right lower extremity  R60.0 782.3         Plan:   Idiopathic peripheral neuropathy    Lumbar foraminal stenosis    Lumbar radicular pain    Onychomycosis of toenail    Edema of right lower extremity  -     US Lower Extremity Veins Right; Future; Expected date: 07/09/2025    I counseled the patient on his conditions, regarding findings of my examination, my impressions, and usual treatment plan.   Referral completed for interventional pain clinic. Patient instructed to reschedule appointment.   Appointment spent on education about the neuropathy, and prevention of limb loss.  Shoe inspection. Patient instructed on proper foot  hygeine. We discussed wearing proper shoe gear, daily foot inspections, never walking without protective shoe gear, never putting sharp instruments to feet.    With patient's permission, nails 1-5 bilateral were trimmed in length and thickness aggressively to their soft tissue attachment mechanically and with electric , removing all offending nail and debris. Patient relates relief following the procedure.  Ordered completed for right venous ultrasound.   Patient  will continue to monitor the areas daily, inspect feet, wear protective shoe gear when ambulatory, moisturizer to maintain skin integrity.   Patient to return 2 months or sooner if needed.         Reina Guzman DPM  [facility]

## 2025-07-18 ENCOUNTER — OFFICE VISIT (OUTPATIENT)
Dept: UROLOGY | Facility: CLINIC | Age: 60
End: 2025-07-18
Payer: COMMERCIAL

## 2025-07-18 VITALS
SYSTOLIC BLOOD PRESSURE: 124 MMHG | DIASTOLIC BLOOD PRESSURE: 80 MMHG | WEIGHT: 234.13 LBS | HEIGHT: 78 IN | HEART RATE: 83 BPM | BODY MASS INDEX: 27.09 KG/M2

## 2025-07-18 DIAGNOSIS — N52.9 ERECTILE DYSFUNCTION, UNSPECIFIED ERECTILE DYSFUNCTION TYPE: ICD-10-CM

## 2025-07-18 DIAGNOSIS — E29.1 HYPOGONADISM IN MALE: ICD-10-CM

## 2025-07-18 DIAGNOSIS — N99.114 POSTPROCEDURAL MALE URETHRAL STRICTURE: ICD-10-CM

## 2025-07-18 DIAGNOSIS — Z85.46 HISTORY OF PROSTATE CANCER: Primary | ICD-10-CM

## 2025-07-18 DIAGNOSIS — R31.0 GROSS HEMATURIA: ICD-10-CM

## 2025-07-18 PROCEDURE — 99999 PR PBB SHADOW E&M-EST. PATIENT-LVL V: CPT | Mod: PBBFAC,,, | Performed by: UROLOGY

## 2025-07-18 RX ORDER — LIDOCAINE HYDROCHLORIDE 20 MG/ML
JELLY TOPICAL
Status: COMPLETED | OUTPATIENT
Start: 2025-07-18 | End: 2025-07-18

## 2025-07-18 RX ORDER — CIPROFLOXACIN 500 MG/1
500 TABLET, FILM COATED ORAL
Status: COMPLETED | OUTPATIENT
Start: 2025-07-18 | End: 2025-07-18

## 2025-07-18 RX ADMIN — CIPROFLOXACIN 500 MG: 500 TABLET, FILM COATED ORAL at 02:07

## 2025-07-18 RX ADMIN — LIDOCAINE HYDROCHLORIDE: 20 JELLY TOPICAL at 02:07

## 2025-07-18 NOTE — PROGRESS NOTES
Chief Complaint:   Encounter Diagnoses   Name Primary?    History of prostate cancer Yes    Postprocedural male urethral stricture     Gross hematuria     Hypogonadism in male     Erectile dysfunction, unspecified erectile dysfunction type        HPI:   7/18/25- patient could not afford the testosterone replacement and is otherwise doing well.  Still using the vacuum device for his erections, having some difficulty with inserting the catheter.  Currently doing it every month.  Gross hematuria when he has a bowel movement only.    Allergies:  No known allergies    Medications:  has a current medication list which includes the following prescription(s): pantoprazole, sucralfate, zinc sulfate, ascorbic acid (vitamin c), cholecalciferol (vitamin d3), famotidine, folic acid, hydrochlorothiazide, ibuprofen, magnesium, methotrexate, and metoprolol succinate.    Review of Systems:  General: No fever, chills, fatigability, or weight loss.  Skin: No rashes, itching, or changes in color or texture of skin.  Chest: Denies TYLER, cyanosis, wheezing, cough, and sputum production.  Abdomen: Appetite fine. No weight loss. Denies diarrhea, abdominal pain, hematemesis, or blood in stool.  Musculoskeletal: No joint stiffness or swelling. Denies back pain.  : As above.  All other review of systems negative.    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    PSH:   has a past surgical history that includes Prostatectomy (2011) and Esophagogastroduodenoscopy.    FamHx: family history includes Alcohol abuse in his father; Anxiety disorder in his mother; Arthritis in his mother; Hypertension in his mother; Kidney disease in his brother; No Known Problems in his sister; Stroke in his father.    SocHx:  reports that he has never smoked.  He has never used smokeless tobacco. He reports that he does not drink alcohol and does not use drugs.      Physical Exam:  There were no vitals filed for this visit.  General: A&Ox3, no apparent distress, no deformities  Neck: No masses, normal thyroid  Lungs: normal inspiration, no use of accessory muscles  Heart: normal pulse, no arrhythmias  Abdomen: Soft, NT, ND  Skin: The skin is warm and dry. No jaundice.  Ext: No c/c/e.  :  7/25- Test desc michael, no abnormalities of epididymus. Normal penile and scrotal skin. Meatus normal.    Labs/Studies:   Cystoscopy slight narrowing at the bulbous urethra but easily passed with the flexible cystoscope 7/25  Cystoscopy bulbous urethral stricture 8/24  Cystoscopy bulbourethral stricture 10/23  Cystoscopy normal 5/21  PSA <0.01 11/24  Testosterone 287 11/24   Estrogen 89 11/24  CT urogram bladder outflow obstruction 3/21    Procedure: Diagnostic Cystoscopy    Procedure in Detail: After proper consents were obtained, the patient was prepped and draped in normal sterile fashion for diagnostic cystoscopy. 5 ml of lidocaine jelly was instilled in the urethra. The flexible cystoscope was then introduced into the urethra, and advanced into the bladder under direct vision. The urethral mucosa appeared up into the bulbous urethra where a small stricture was noted but easily passed with the cystoscope.  Previous prostate resection.  The bladder neck was normal and open. Inspection of the interior of the bladder was then carried out. The trigone was unremarkable, with no mucosal lesions. The ureteral orifices were normal in position and configuration. Systematic inspection of the mucosa of the bladder it was then carried out, rotating the cystoscope so that all areas of the left and right lateral walls, the dome of the bladder, and the posterior wall were all visualized. The cystoscope was then advanced further into the bladder, and maximum deflection of the scope was performed so  that the bladder neck could be inspected. No mucosal lesions were noted there. The cystoscope was then removed, and the procedure terminated.     Findings:  Slight narrowing of the bulbous urethra but easily passed with the flexible cystoscope      Impression/Plan:       1. Prostate cancer-  RP pT3b Gl7 with adjuvant XRT  6/7/12    PSA stable and will be due in 6 months.  Call with any complaints prior to that appointment.    2. Urethral stricture disease-  DVIU  10/8/24, 4/22/24,  urethral dilation in [place]IE  2/15/24,  DVIU  5/31/22    Patient's flow is stable but he has had some issues with CIC, currently doing it every month.  Based on cystoscopy there is some slight narrowing, therefore he will increase it to every 3 weeks.  Call if symptoms become worse.    3. Gross hematuria- structural evaluation relatively clear, continue to monitor expectantly.  Primarily occurs now after a bowel movement.    4. Erectile dysfunction- sildenafil with the use of a MARÍA is relatively sufficient.  Uses only 50 mg has the 100 mg causes a headache.  Does not want to pursue TriMix.    5. Hypogonadism- clinical symptoms of hypogonadism but the cream is too expensive, does not want to pursue shots but may consider.   No

## 2025-07-18 NOTE — PROGRESS NOTES
..Per Dr. Murray oral ciprofloxacin was given to pt. Pt instructed to remain in clinic for 15 minutes to monitor for signs & symptoms of adverse reaction. Pt verbalized understanding.

## 2025-08-12 ENCOUNTER — LAB VISIT (OUTPATIENT)
Dept: LAB | Facility: HOSPITAL | Age: 60
End: 2025-08-12
Payer: COMMERCIAL

## 2025-08-12 DIAGNOSIS — M05.79 RHEUMATOID ARTHRITIS INVOLVING MULTIPLE SITES WITH POSITIVE RHEUMATOID FACTOR: ICD-10-CM

## 2025-08-12 DIAGNOSIS — D84.821 DRUG-INDUCED IMMUNODEFICIENCY: ICD-10-CM

## 2025-08-12 DIAGNOSIS — Z79.899 DRUG-INDUCED IMMUNODEFICIENCY: ICD-10-CM

## 2025-08-12 LAB
ABSOLUTE EOSINOPHIL (OHS): 0.17 K/UL
ABSOLUTE MONOCYTE (OHS): 0.55 K/UL (ref 0.3–1)
ABSOLUTE NEUTROPHIL COUNT (OHS): 3.18 K/UL (ref 1.8–7.7)
ALBUMIN SERPL BCP-MCNC: 3.9 G/DL (ref 3.5–5.2)
ALP SERPL-CCNC: 73 UNIT/L (ref 40–150)
ALT SERPL W/O P-5'-P-CCNC: 14 UNIT/L (ref 0–55)
ANION GAP (OHS): 8 MMOL/L (ref 8–16)
AST SERPL-CCNC: 26 UNIT/L (ref 0–50)
BASOPHILS # BLD AUTO: 0.03 K/UL
BASOPHILS NFR BLD AUTO: 0.6 %
BILIRUB SERPL-MCNC: 0.4 MG/DL (ref 0.1–1)
BUN SERPL-MCNC: 15 MG/DL (ref 8–23)
CALCIUM SERPL-MCNC: 8.9 MG/DL (ref 8.7–10.5)
CHLORIDE SERPL-SCNC: 109 MMOL/L (ref 95–110)
CO2 SERPL-SCNC: 23 MMOL/L (ref 23–29)
CREAT SERPL-MCNC: 1.1 MG/DL (ref 0.5–1.4)
CRP SERPL-MCNC: 5.6 MG/L
ERYTHROCYTE [DISTWIDTH] IN BLOOD BY AUTOMATED COUNT: 13.8 % (ref 11.5–14.5)
ERYTHROCYTE [SEDIMENTATION RATE] IN BLOOD: 9 MM/HR
GFR SERPLBLD CREATININE-BSD FMLA CKD-EPI: >60 ML/MIN/1.73/M2
GLUCOSE SERPL-MCNC: 88 MG/DL (ref 70–110)
HBV CORE AB SERPL QL IA: REACTIVE
HBV SURFACE AB SER-ACNC: >1000 MIU/ML
HBV SURFACE AB SERPL IA-ACNC: REACTIVE M[IU]/ML
HBV SURFACE AG SERPL QL IA: NORMAL
HCT VFR BLD AUTO: 40.7 % (ref 40–54)
HCV AB SERPL QL IA: NORMAL
HGB BLD-MCNC: 13.6 GM/DL (ref 14–18)
IMM GRANULOCYTES # BLD AUTO: 0.01 K/UL (ref 0–0.04)
IMM GRANULOCYTES NFR BLD AUTO: 0.2 % (ref 0–0.5)
LYMPHOCYTES # BLD AUTO: 1.15 K/UL (ref 1–4.8)
MCH RBC QN AUTO: 32 PG (ref 27–31)
MCHC RBC AUTO-ENTMCNC: 33.4 G/DL (ref 32–36)
MCV RBC AUTO: 96 FL (ref 82–98)
NUCLEATED RBC (/100WBC) (OHS): 0 /100 WBC
PLATELET # BLD AUTO: 221 K/UL (ref 150–450)
PMV BLD AUTO: 10.4 FL (ref 9.2–12.9)
POTASSIUM SERPL-SCNC: 4.1 MMOL/L (ref 3.5–5.1)
PROT SERPL-MCNC: 7.5 GM/DL (ref 6–8.4)
RBC # BLD AUTO: 4.25 M/UL (ref 4.6–6.2)
RELATIVE EOSINOPHIL (OHS): 3.3 %
RELATIVE LYMPHOCYTE (OHS): 22.6 % (ref 18–48)
RELATIVE MONOCYTE (OHS): 10.8 % (ref 4–15)
RELATIVE NEUTROPHIL (OHS): 62.5 % (ref 38–73)
SODIUM SERPL-SCNC: 140 MMOL/L (ref 136–145)
WBC # BLD AUTO: 5.09 K/UL (ref 3.9–12.7)

## 2025-08-12 PROCEDURE — 86140 C-REACTIVE PROTEIN: CPT

## 2025-08-12 PROCEDURE — 36415 COLL VENOUS BLD VENIPUNCTURE: CPT

## 2025-08-12 PROCEDURE — 86803 HEPATITIS C AB TEST: CPT

## 2025-08-12 PROCEDURE — 86706 HEP B SURFACE ANTIBODY: CPT

## 2025-08-12 PROCEDURE — 85651 RBC SED RATE NONAUTOMATED: CPT

## 2025-08-12 PROCEDURE — 87340 HEPATITIS B SURFACE AG IA: CPT

## 2025-08-12 PROCEDURE — 80053 COMPREHEN METABOLIC PANEL: CPT

## 2025-08-12 PROCEDURE — 86480 TB TEST CELL IMMUN MEASURE: CPT

## 2025-08-12 PROCEDURE — 85025 COMPLETE CBC W/AUTO DIFF WBC: CPT

## 2025-08-12 PROCEDURE — 86704 HEP B CORE ANTIBODY TOTAL: CPT

## 2025-08-12 PROCEDURE — 86592 SYPHILIS TEST NON-TREP QUAL: CPT

## 2025-08-13 LAB — RPR SER QL: NORMAL

## 2025-08-14 LAB
MITOGEN MINUS NIL (OHS): 9.85
NIL TB SYNCED (OHS): 0.15
QUANTIFERON GOLD INTERP (OHS): NEGATIVE
TB1 AG MINUS NIL (OHS): 0.05
TB2 AG MINUS NIL (OHS): 0.02

## 2025-08-21 DIAGNOSIS — R76.8 HEPATITIS B ANTIBODY POSITIVE: Primary | ICD-10-CM

## 2025-08-29 ENCOUNTER — TELEPHONE (OUTPATIENT)
Dept: RHEUMATOLOGY | Facility: CLINIC | Age: 60
End: 2025-08-29
Payer: COMMERCIAL

## (undated) DEVICE — MARKER SKIN RULER STERILE

## (undated) DEVICE — TRAY SKIN SCRUB WET 4 COMPART

## (undated) DEVICE — DRESSING SPONGE 16PLY 4X4 NS

## (undated) DEVICE — MANIFOLD 4 PORT

## (undated) DEVICE — SPONGE COTTON TRAY 4X4IN

## (undated) DEVICE — SKIN MARKER DEVON 160

## (undated) DEVICE — BOWL STERILE LARGE 32OZ

## (undated) DEVICE — SEE MEDLINE ITEM 154981

## (undated) DEVICE — SET IRR URLGY 2LINE UNIV SPIKE

## (undated) DEVICE — UROVIEW 2600/2800

## (undated) DEVICE — GUIDE WIRE MOTION .035 X 150CM

## (undated) DEVICE — IRRIGATION SET Y-TYPE TUR/BLAD

## (undated) DEVICE — BLADE SURG #15 CARBON STEEL

## (undated) DEVICE — COVER PIN STEINMAN YELLOW

## (undated) DEVICE — SUT PROLENE 3-0 PS-2 BL 18IN

## (undated) DEVICE — PACK BASIC SETUP SC BR

## (undated) DEVICE — GOWN POLY REINF BRTH SLV XL

## (undated) DEVICE — SOL IRR NACL .9% 3000ML

## (undated) DEVICE — APPLICATOR CHLORAPREP ORN 26ML

## (undated) DEVICE — KIT TURNOVER

## (undated) DEVICE — DRAPE MOBILE C-ARM

## (undated) DEVICE — SUT MONOCRYL 3-0 SH U/D

## (undated) DEVICE — PAD ABD 8X10 STERILE

## (undated) DEVICE — SYR ONLY LUER LOCK 20CC

## (undated) DEVICE — SEE MEDLINE ITEM 157117

## (undated) DEVICE — GUIDEWIRE NITINOL HYBRID 150CM

## (undated) DEVICE — BANDAGE SOFFORM STER 2IN

## (undated) DEVICE — BANDAGE MATRIX HK LOOP 4IN 5YD

## (undated) DEVICE — GOWN SMARTGOWN LVL4 X-LONG XL

## (undated) DEVICE — DRAPE T CYSTOSCOPY STERILE

## (undated) DEVICE — CONTAINER SPECIMEN OR STER 4OZ

## (undated) DEVICE — TOWEL OR DISP STRL BLUE 4/PK

## (undated) DEVICE — DRAPE THREE-QTR REINF 53X77IN

## (undated) DEVICE — SUT 4-0 ETHILON 18 PS-2

## (undated) DEVICE — GAUZE SPONGE 4X4 12PLY

## (undated) DEVICE — NDL SAFETY 25G X 1.5 ECLIPSE

## (undated) DEVICE — DRAPE SURG W/TWL 17 5/8X23

## (undated) DEVICE — PAD UNDERPAD 30X30

## (undated) DEVICE — BNDG COFLEX FOAM LF2 ST 3X5YD

## (undated) DEVICE — DRESSING GAUZE XEROFORM 5X9

## (undated) DEVICE — GLOVE SIGNATURE ESSNTL LTX 7.5

## (undated) DEVICE — GOWN POLY REINF X-LONG XL

## (undated) DEVICE — SUT ETHICON 3-0 BLK MONO PS

## (undated) DEVICE — GLOVE SURG BIOGEL LATEX SZ 7.5

## (undated) DEVICE — COVER LIGHT HANDLE 80/CA

## (undated) DEVICE — Device

## (undated) DEVICE — CANISTER SUCTION JUMBO 12L

## (undated) DEVICE — PAD CAST SPECIALIST STRL 4

## (undated) DEVICE — SOL WATER STRL IRR 1000ML

## (undated) DEVICE — SET IRRIGATION WARMING NORMAL

## (undated) DEVICE — SOL NACL IRR 3000ML

## (undated) DEVICE — SUT VICRYL PLUS 3-0 PS2 18

## (undated) DEVICE — TRAY SKIN SCRUB WET PREMIUM

## (undated) DEVICE — PAD CAST SPECIALIST STRL 6

## (undated) DEVICE — CUFF ATS 2 PORT SNGL BLDR 24IN

## (undated) DEVICE — SUT VICRYL PLUS 0 CT1 18IN

## (undated) DEVICE — SYR 10CC LUER LOCK

## (undated) DEVICE — PAD ADULT ELECTRODE GROUNDING

## (undated) DEVICE — SPONGE DERMACEA GAUZE 4X4

## (undated) DEVICE — DRESSING N ADH OIL EMUL 3X3

## (undated) DEVICE — DRAPE T EXTRM SURG 121X128X90

## (undated) DEVICE — SEE MEDLINE ITEM 157185

## (undated) DEVICE — SUT VICRYL 2-0 36 CT-1

## (undated) DEVICE — BANDAGE MATRIX HK LOOP 6IN 5YD

## (undated) DEVICE — BAG DRAIN URINARY CONT IRRG

## (undated) DEVICE — UNDERGLOVES BIOGEL PI SIZE 8

## (undated) DEVICE — DRAPE U SPLIT SHEET 54X76IN

## (undated) DEVICE — STAPLER SKIN PROXIMATE WIDE